# Patient Record
Sex: FEMALE | Race: WHITE | NOT HISPANIC OR LATINO | Employment: OTHER | URBAN - METROPOLITAN AREA
[De-identification: names, ages, dates, MRNs, and addresses within clinical notes are randomized per-mention and may not be internally consistent; named-entity substitution may affect disease eponyms.]

---

## 2017-01-19 ENCOUNTER — GENERIC CONVERSION - ENCOUNTER (OUTPATIENT)
Dept: OTHER | Facility: OTHER | Age: 67
End: 2017-01-19

## 2017-01-19 ENCOUNTER — ALLSCRIPTS OFFICE VISIT (OUTPATIENT)
Dept: OTHER | Facility: OTHER | Age: 67
End: 2017-01-19

## 2017-01-19 DIAGNOSIS — R07.9 CHEST PAIN: ICD-10-CM

## 2017-01-19 DIAGNOSIS — I10 ESSENTIAL (PRIMARY) HYPERTENSION: ICD-10-CM

## 2017-01-19 DIAGNOSIS — R06.09 OTHER FORMS OF DYSPNEA: ICD-10-CM

## 2017-01-24 LAB
ADEQUACY: (HISTORICAL): NORMAL
CLINICIAN PROVIDIED ICD 9 OR 10 (HISTORICAL): NORMAL
COMMENT (HISTORICAL): NORMAL
DIAGNOSIS (HISTORICAL): NORMAL
HPV HIGH RISK (HISTORICAL): NEGATIVE
NOTE: (HISTORICAL): NORMAL
PERFORMED BY (HISTORICAL): NORMAL
QC REVIEWED BY (HISTORICAL): NORMAL

## 2017-01-25 ENCOUNTER — GENERIC CONVERSION - ENCOUNTER (OUTPATIENT)
Dept: OTHER | Facility: OTHER | Age: 67
End: 2017-01-25

## 2017-01-27 ENCOUNTER — GENERIC CONVERSION - ENCOUNTER (OUTPATIENT)
Dept: OTHER | Facility: OTHER | Age: 67
End: 2017-01-27

## 2017-01-27 LAB
A/G RATIO (HISTORICAL): 2 (ref 1.1–2.5)
ALBUMIN SERPL BCP-MCNC: 4.3 G/DL (ref 3.6–4.8)
ALP SERPL-CCNC: 77 IU/L (ref 39–117)
ALT SERPL W P-5'-P-CCNC: 23 IU/L (ref 0–32)
AST SERPL W P-5'-P-CCNC: 17 IU/L (ref 0–40)
BASOPHILS # BLD AUTO: 0 %
BASOPHILS # BLD AUTO: 0 X10E3/UL (ref 0–0.2)
BILIRUB SERPL-MCNC: 0.8 MG/DL (ref 0–1.2)
BUN SERPL-MCNC: 13 MG/DL (ref 8–27)
BUN/CREA RATIO (HISTORICAL): 21 (ref 11–26)
CALCIUM SERPL-MCNC: 9 MG/DL (ref 8.7–10.3)
CHLORIDE SERPL-SCNC: 104 MMOL/L (ref 96–106)
CHOLEST SERPL-MCNC: 165 MG/DL (ref 100–199)
CHOLEST/HDLC SERPL: 3.6 RATIO UNITS (ref 0–4.4)
CO2 SERPL-SCNC: 23 MMOL/L (ref 18–29)
CREAT SERPL-MCNC: 0.63 MG/DL (ref 0.57–1)
DEPRECATED RDW RBC AUTO: 13.9 % (ref 12.3–15.4)
EGFR AFRICAN AMERICAN (HISTORICAL): 110 ML/MIN/1.73
EGFR-AMERICAN CALC (HISTORICAL): 95 ML/MIN/1.73
EOSINOPHIL # BLD AUTO: 0.1 X10E3/UL (ref 0–0.4)
EOSINOPHIL # BLD AUTO: 2 %
GLUCOSE SERPL-MCNC: 131 MG/DL (ref 65–99)
HBA1C MFR BLD HPLC: 5.7 % (ref 4.8–5.6)
HCT VFR BLD AUTO: 46.3 % (ref 34–46.6)
HDLC SERPL-MCNC: 46 MG/DL
HGB BLD-MCNC: 16.3 G/DL (ref 11.1–15.9)
IMM.GRANULOCYTES (CD4/8) (HISTORICAL): 0 %
IMM.GRANULOCYTES (CD4/8) (HISTORICAL): 0 X10E3/UL (ref 0–0.1)
LDLC SERPL CALC-MCNC: 98 MG/DL (ref 0–99)
LYMPHOCYTES # BLD AUTO: 1.3 X10E3/UL (ref 0.7–3.1)
LYMPHOCYTES # BLD AUTO: 17 %
MCH RBC QN AUTO: 30.6 PG (ref 26.6–33)
MCHC RBC AUTO-ENTMCNC: 35.2 G/DL (ref 31.5–35.7)
MCV RBC AUTO: 87 FL (ref 79–97)
MONOCYTES # BLD AUTO: 0.5 X10E3/UL (ref 0.1–0.9)
MONOCYTES (HISTORICAL): 6 %
NEUTROPHILS # BLD AUTO: 6 X10E3/UL (ref 1.4–7)
NEUTROPHILS # BLD AUTO: 75 %
PLATELET # BLD AUTO: 234 X10E3/UL (ref 150–379)
POTASSIUM SERPL-SCNC: 3.7 MMOL/L (ref 3.5–5.2)
RBC (HISTORICAL): 5.32 X10E6/UL (ref 3.77–5.28)
SODIUM SERPL-SCNC: 142 MMOL/L (ref 134–144)
TOT. GLOBULIN, SERUM (HISTORICAL): 2.2 G/DL (ref 1.5–4.5)
TOTAL PROTEIN (HISTORICAL): 6.5 G/DL (ref 6–8.5)
TRIGL SERPL-MCNC: 103 MG/DL (ref 0–149)
VLDLC SERPL CALC-MCNC: 21 MG/DL (ref 5–40)
WBC # BLD AUTO: 8 X10E3/UL (ref 3.4–10.8)

## 2017-01-28 LAB
INTERPRETATION (HISTORICAL): NORMAL
T4 FREE SERPL-MCNC: 8.5 UG/DL (ref 4.5–12)
TSH SERPL DL<=0.05 MIU/L-ACNC: 5.56 UIU/ML (ref 0.45–4.5)

## 2017-01-30 ENCOUNTER — GENERIC CONVERSION - ENCOUNTER (OUTPATIENT)
Dept: OTHER | Facility: OTHER | Age: 67
End: 2017-01-30

## 2017-02-22 ENCOUNTER — HOSPITAL ENCOUNTER (OUTPATIENT)
Dept: RADIOLOGY | Facility: HOSPITAL | Age: 67
Discharge: HOME | End: 2017-02-22
Attending: INTERNAL MEDICINE
Payer: COMMERCIAL

## 2017-02-22 ENCOUNTER — GENERIC CONVERSION - ENCOUNTER (OUTPATIENT)
Dept: OTHER | Facility: OTHER | Age: 67
End: 2017-02-22

## 2017-02-22 DIAGNOSIS — Z12.31 ENCOUNTER FOR SCREENING MAMMOGRAM FOR MALIGNANT NEOPLASM OF BREAST: ICD-10-CM

## 2017-02-22 PROCEDURE — G0202 SCR MAMMO BI INCL CAD: HCPCS

## 2017-03-03 LAB — TSH SERPL DL<=0.05 MIU/L-ACNC: 2.34 UIU/ML (ref 0.45–4.5)

## 2017-03-05 ENCOUNTER — GENERIC CONVERSION - ENCOUNTER (OUTPATIENT)
Dept: OTHER | Facility: OTHER | Age: 67
End: 2017-03-05

## 2017-03-06 ENCOUNTER — ALLSCRIPTS OFFICE VISIT (OUTPATIENT)
Dept: OTHER | Facility: OTHER | Age: 67
End: 2017-03-06

## 2017-07-11 ENCOUNTER — GENERIC CONVERSION - ENCOUNTER (OUTPATIENT)
Dept: OTHER | Facility: OTHER | Age: 67
End: 2017-07-11

## 2017-07-17 ENCOUNTER — GENERIC CONVERSION - ENCOUNTER (OUTPATIENT)
Dept: OTHER | Facility: OTHER | Age: 67
End: 2017-07-17

## 2018-01-11 NOTE — RESULT NOTES
Verified Results  (1) TSH WITH FT4 REFLEX [id number] 02:16PM Radha Peña Serum     Test Name Result Flag Reference   TSH 2 340 uIU/mL  0 450-4 500       Discussion/Summary   Your thyroid testing is normal, please continue the same dose of your medication  - Dr Maty Escobedo

## 2018-01-13 NOTE — RESULT NOTES
Verified Results  * MAMMO SCREENING BILATERAL W CAD [id number] 09:09AM Thor Luther Order Number: [id number]     Test Name Result Flag Reference   MAMMO SCREENING BILATERAL W CAD (Report)     Patient History:   Patient is postmenopausal    Benign cyst aspiration, August 26, 2002  Patient's BMI is 41 6  Reason for exam: screening, asymptomatic  Mammo Screening Bilateral W CAD: February 22, 2017 - Check In #:    [de-identified]   Bilateral CC and MLO view(s) were taken  Technologist: TERI Gama   Prior study comparison: March 18, 2015, bilateral screening    [facility] performed at [facility] [address]  January 17, 2013, [facility]  The breast tissue is almost entirely fat  No new dominant soft    tissue mass, architectural distortion or suspicious    calcifications are noted  The skin and nipple structures are    within normal limits  Benign appearing calcifications are noted  No mammographic evidence of malignancy  No    significant changes when compared with prior studies  ACR BI-RADSï¾® Assessments: BiRad:2 - Benign     Recommendation:   Routine screening mammogram of both breasts in 1 year  Analyzed by CAD     8-10% of cancers will be missed on mammography  Management of a    palpable abnormality must be based on clinical grounds  Patients   will be notified of their results via letter from our facility  Accredited by [employer] and FDA       Transcription Location: [facility]: [id number]     Risk Value(s):   Tyrer-Cuzick 10 Year: 1 900%, Tyrer-Cuzick Lifetime: 4 100%,    Myriad Table: 1 5%, VALENTIN 5 Year: 1 4%, NCI Lifetime: 5 8%   Signed by:   Rita Ren MD   2/22/17       Discussion/Summary   Your mammogram is normal, please repeat yearly  - Dr Camila Bernardo

## 2018-01-14 VITALS
BODY MASS INDEX: 41.72 KG/M2 | DIASTOLIC BLOOD PRESSURE: 80 MMHG | SYSTOLIC BLOOD PRESSURE: 140 MMHG | WEIGHT: 221 LBS | HEART RATE: 64 BPM | RESPIRATION RATE: 16 BRPM | TEMPERATURE: 96.9 F | HEIGHT: 61 IN

## 2018-01-15 NOTE — PROGRESS NOTES
Assessment    1  Chest pain (786 50) (R07 9)   2  Encounter for annual routine gynecological examination (V7 31) (Z01 419)    Plan  Benign essential hypertension    · (1) CBC/PLT/DIFF; Status:Active; Requested [id number];    · (1) COMPREHENSIVE METABOLIC PANEL; Status:Active; Requested [id number];    · (1) LIPID PANEL, FASTING; Status:Active; Requested for:2017;   Benign essential hypertension, Chest pain, Dyspnea on exertion    · STRESS TEST ONLY, EXERCISE; Status:Hold For - Scheduling; Requested  for:2017;   Benign essential hypertension, Weight gain    · (1) TSH WITH FT4 REFLEX; Status:Active; Requested [id number];   Encounter for routine gynecological examination, Impaired fasting glucose    · (1) THIN PREP PAP WITH IMAGING; Status:Active; Requested [id number]; Maturation index required? : No  HPV? : if ASCUS  Encounter for screening mammogram for malignant neoplasm of breast    · * MAMMO SCREENING BILATERAL W CAD; Status:Active; Requested [id number];   Impaired fasting glucose    · (1) HEMOGLOBIN A1C; Status:Active; Requested [id number];     Discussion/Summary  health maintenance visit Currently, she eats a healthy diet and has an adequate exercise regimen  the risks and benefits of cervical cancer screening were discussed Pap test was done today Breast cancer screening: the risks and benefits of breast cancer screening were discussed, monthly self breast exam was advised and mammogram has been ordered  Colorectal cancer screening: colorectal cancer screening is current  Screening lab work includes hemoglobin, glucose, lipid profile and thyroid function testing  The immunizations are up to date  Advice and education were given regarding nutrition, aerobic exercise, weight loss, calcium supplements, vitamin D supplements, reproductive health, contraception, cardiovascular risk reduction and sunscreen use  Patient discussion: discussed with the patient  Pap done today    Will check stress test due to stress pain  Follow up after studies  Chief Complaint  Seen for a CPE  er/cma  History of Present Illness  HM, Adult Female: The patient is being seen for a health maintenance and gynecology evaluation  The last health maintenance visit was 4 year(s) ago  General Health: The patient's health since the last visit is described as good  She has regular dental visits  She denies vision problems  She denies hearing loss  Immunizations status: up to date  Lifestyle:  She consumes a diverse and healthy diet  She has weight concerns  She exercises regularly  She does not use tobacco  She denies alcohol use  She denies drug use  Reproductive health: the patient is postmenopausal    Screening: cancer screening reviewed and updated  metabolic screening reviewed and updated  risk screening reviewed and updated  HPI: Here for CPE  For past 2 weeks has had intermittent tingling R side of upper lip and chest tenderness  Will happen with rest  No real pattern  Has family history of heart disease and personal history of hypertension  WIll last for a few minutes at a time  Review of Systems    Constitutional: No fever, no chills, feels well, no tiredness, no recent weight gain or weight loss  Eyes: No complaints of eye pain, no red eyes, no eyesight problems, no discharge, no dry eyes, no itching of eyes  ENT: no complaints of earache, no loss of hearing, no nose bleeds, no nasal discharge, no sore throat, no hoarseness  Cardiovascular: No complaints of slow heart rate, no fast heart rate, no chest pain, no palpitations, no leg claudication, no lower extremity edema  Respiratory: No complaints of shortness of breath, no wheezing, no cough, no SOB on exertion, no orthopnea, no PND  Gastrointestinal: No complaints of abdominal pain, no constipation, no nausea or vomiting, no diarrhea, no bloody stools     Genitourinary: No complaints of dysuria, no incontinence, no pelvic pain, no dysmenorrhea, no vaginal discharge or bleeding  Musculoskeletal: No complaints of arthralgias, no myalgias, no joint swelling or stiffness, no limb pain or swelling  Integumentary: No complaints of skin rash or lesions, no itching, no skin wounds, no breast pain or lump  Neurological: No complaints of headache, no confusion, no convulsions, no numbness, no dizziness or fainting, no tingling, no limb weakness, no difficulty walking  Psychiatric: Not suicidal, no sleep disturbance, no anxiety or depression, no change in personality, no emotional problems  Endocrine: No complaints of proptosis, no hot flashes, no muscle weakness, no deepening of the voice, no feelings of weakness  Hematologic/Lymphatic: No complaints of swollen glands, no swollen glands in the neck, does not bleed easily, does not bruise easily  Active Problems    1  Acute sinusitis (461 9) (J01 90)   2  Benign essential hypertension (401 1) (I10)   3  Chest pain (786 50) (R07 9)   4  Chronic rhinitis (472 0) (J31 0)   5  Decreased body height (781 91) (R29 890)   6  Encounter for routine gynecological examination (V72 31) (Z01 419)   7  Encounter for routine pelvic examination (V72 31) (Z01 419)   8  Encounter for screening colonoscopy (V76 51) (Z12 11)   9  Encounter for screening mammogram for malignant neoplasm of breast (V76 12)   (Z12 31)   10  Hematest positive stools (792 1) (R19 5)   11  Hidradenitis suppurativa (705 83) (L73 2)   12  Impacted cerumen of left ear (380 4) (H61 22)   13  Impaired fasting glucose (790 21) (R73 01)   14  Leiomyoma of uterus (218 9) (D25 9)   15  Lymphadenopathy (785 6) (R59 1)   16  Morbid or severe obesity due to excess calories (278 01) (E66 01)   17  Screening for diabetes mellitus (DM) (V77 1) (Z13 1)   18  Sebaceous cyst (706 2) (L72 3)   19  Tinnitus, unspecified laterality (388 30) (H93 19)   20  Venous insufficiency (chronic) (peripheral) (459 81) (I87 2)   21   Well adult on routine health check (V70 0) (Z00 00)    Past Medical History    · History of acute sinusitis (V12 69) (Z87 09)    Social History    · Non-smoker (V49 89) (Z78 9)    Current Meds   1  Aspir-81 81 MG Oral Tablet Delayed Release; 1 Every Day; Therapy: 78DJV8922 to  Requested for: 51REA8703 Recorded   2  Daily Vites Oral Tablet; 1 Every Day; Therapy: 67LYV3317 to  Requested for: 60IHP3211 Recorded   3  Fluticasone Propionate 50 MCG/ACT Nasal Suspension; 2 puffs to each nostril daily; Therapy: 54OQR5787 to (Last Rx:10Jan2013)  Requested for: 79GXY5960 Ordered   4  Metoprolol Tartrate 50 MG Oral Tablet; take one tablet by mouth every day; Therapy: 48UTS8598 to (Evaluate:11Mar2017)  Requested for: 67NOL8358; Last   Rx:16Mar2016 Ordered    Allergies    1  No Known Drug Allergies    Vitals   Recorded: 27KSL9641 10:23AM   Temperature 97 1 F   Heart Rate 76   Respiration 16   Systolic 071 mm Hg   Diastolic 148 mm Hg   Height 5 ft 1 in   Weight 223 lb    BMI Calculated 42 14 kg/m2   BSA Calculated 1 97 m2     Physical Exam    Constitutional   General appearance: No acute distress, well appearing and well nourished  Eyes   Conjunctiva and lids: No swelling, erythema or discharge  Pupils and irises: Equal, round, reactive to light  Ears, Nose, Mouth, and Throat   External inspection of ears and nose: Normal     Otoscopic examination: Tympanic membranes translucent with normal light reflex  Canals patent without erythema  Hearing: Normal     Nasal mucosa, septum, and turbinates: Normal without edema or erythema  Lips, teeth, and gums: Normal, good dentition  Oropharynx: Normal with no erythema, edema, exudate or lesions  Neck   Neck: Supple, symmetric, trachea midline, no masses  Thyroid: Normal, no thyromegaly  Pulmonary   Respiratory effort: No increased work of breathing or signs of respiratory distress      Percussion of chest: Normal     Palpation of chest: Normal     Auscultation of lungs: Clear to auscultation  Cardiovascular   Palpation of heart: Normal PMI, no thrills  Auscultation of heart: Normal rate and rhythm, normal S1 and S2, no murmurs  Carotid pulses: 2+ bilaterally  Abdominal aorta: Normal     Femoral pulses: 2+ bilaterally  Pedal pulses: 2+ bilaterally  Examination of extremities for edema and/or varicosities: Normal     Chest   Breasts: Normal, no dimpling or skin changes appreciated  Palpation of breasts and axillae: Normal, no masses palpated  Abdomen   Abdomen: Non-tender, no masses  Liver and spleen: No hepatomegaly or splenomegaly  Examination for hernias: No hernia appreciated  Genitourinary   External genitalia and vagina: Normal, no lesions appreciated  Urethra: Normal, no discharge  Bladder: Not distended, no tenderness  Cervix: Normal, no lesions  Uterus: Normal size, no tenderness, no masses  Adnexa/Parametria: Normal, no masses or tenderness  Lymphatic   Palpation of lymph nodes in neck: No lymphadenopathy  Palpation of lymph nodes in axillae: No lymphadenopathy  Palpation of lymph nodes in groin: No lymphadenopathy  Palpation of lymph nodes in other areas: No lymphadenopathy  Musculoskeletal   Gait and station: Normal     Digits and nails: Normal without clubbing or cyanosis  Joints, bones, and muscles: Normal     Range of motion: Normal     Stability: Normal     Muscle strength/tone: Normal     Skin   Skin and subcutaneous tissue: Normal without rashes or lesions  Palpation of skin and subcutaneous tissue: Normal turgor  Neurologic   Cranial nerves: Cranial nerves II-XII intact  Reflexes: 2+ and symmetric  Sensation: No sensory loss  Psychiatric   Judgment and insight: Normal     Orientation to person, place, and time: Normal     Recent and remote memory: Intact      Mood and affect: Normal        Results/Data  PHQ-2 Adult Depression Screening 62WHP4164 10:26AM User, Ahs     Test Name Result Flag Reference   PHQ-2 Adult Depression Score 0     Over the last two weeks, how often have you been bothered by any of the following problems? Little interest or pleasure in doing things: Not at all - 0  Feeling down, depressed, or hopeless: Not at all - 0   PHQ-2 Adult Depression Screening Negative         Procedure    Procedure: Visual Acuity Test    Indication: routine screening  Inforrmation supplied by a Snellen chart  Results: 20/20 in both eyes with corrective device, 20/20 in the right eye with corrective device, 20/20 in the left eye with corrective device      Health Management  Encounter for screening mammogram for malignant neoplasm of breast   Digital Bilateral Screening Mammogram With CAD; every 1 year; Last 56DDT5409; Next  Due: 07BUN7579;  Overdue    Signatures   Electronically signed by : LUCAS Conway ; Jan 19 2017 10:51AM EST                       (Author)

## 2018-01-15 NOTE — RESULT NOTES
Verified Results  ([facility]) Pap IG, HPV-hr 14PDS2239 12:00AM Oscar Peña Marsha     Test Name Result Flag Reference   DIAGNOSIS: Comment     NEGATIVE FOR INTRAEPITHELIAL LESION AND MALIGNANCY  THIS SPECIMEN WAS RESCREENED AS PART OF OUR  PROGRAM   THE CYTOLOGY PROCESSING WAS PERFORMED AT THE [facility] LOCATED AT  [facility], [address], [address] [place] [id number]  Specimen adequacy: Comment     Satisfactory for evaluation  Endocervical and/or squamous metaplastic  cells (endocervical component) are present  Clinician provided ICD10: Comment     R73 01  Z01 419   Performed by: Ramya Barker, Cytotechnologist (ASCP)   QC reviewed by: Vaishnavi De La Vega, Cytotechnologist (ASCP)     Rosella Goldmann Note: Comment     The Pap smear is a screening test designed to aid in the detection of  premalignant and malignant conditions of the uterine cervix  It is not a  diagnostic procedure and should not be used as the sole means of detecting  cervical cancer  Both false-positive and false-negative reports do occur  HPV, high-risk Negative  Negative   This high-risk HPV test detects thirteen high-risk types  (16/18/31/33/35/39/45/51/52/56/58/59/68) without differentiation         Discussion/Summary   Your pap smear is normal, please repeat in 3 years  - Dr Sai Medrano

## 2018-01-16 NOTE — RESULT NOTES
Verified Results  (1) CBC/PLT/DIFF 70HRL8956 07:46AM [facility]     Test Name Result Flag Reference   WBC 8 0 x10E3/uL  3 4-10 8   RBC 5 32 x10E6/uL H 3 77-5 28   Hemoglobin 16 3 g/dL H 11 1-15 9   Hematocrit 46 3 %  34 0-46  6   MCV 87 fL  79-97   MCH 30 6 pg  26 6-33 0   MCHC 35 2 g/dL  31 5-35 7   RDW 13 9 %  12 3-15 4   Platelets 901 L35V3/WAYNE  150-379   Neutrophils 75 %     Lymphs 17 %     Monocytes 6 %     Eos 2 %     Basos 0 %     Neutrophils (Absolute) 6 0 x10E3/uL  1 4-7 0   Lymphs (Absolute) 1 3 x10E3/uL  0 7-3 1   Monocytes(Absolute) 0 5 x10E3/uL  0 1-0 9   Eos (Absolute) 0 1 x10E3/uL  0 0-0 4   Baso (Absolute) 0 0 x10E3/uL  0 0-0 2   Immature Granulocytes 0 %     Immature Grans (Abs) 0 0 x10E3/uL  0 0-0 1     (1) COMPREHENSIVE METABOLIC PANEL 75GKJ0861 43:02QX Scaled Inferencetie Tengaged     Test Name Result Flag Reference   Glucose, Serum 131 mg/dL H 65-99   BUN 13 mg/dL  8-27   Creatinine, Serum 0 63 mg/dL  0 57-1 00   eGFR If NonAfricn Am 95 mL/min/1 73  >59   eGFR If Africn Am 110 mL/min/1 73  >59   BUN/Creatinine Ratio 21  11-26   Sodium, Serum 142 mmol/L  134-144   Potassium, Serum 3 7 mmol/L  3 5-5 2   Chloride, Serum 104 mmol/L     Carbon Dioxide, Total 23 mmol/L  18-29   Calcium, Serum 9 0 mg/dL  8 7-10 3   Protein, Total, Serum 6 5 g/dL  6 0-8 5   Albumin, Serum 4 3 g/dL  3 6-4 8   Globulin, Total 2 2 g/dL  1 5-4 5   A/G Ratio 2 0  1 1-2 5   Bilirubin, Total 0 8 mg/dL  0 0-1 2   Alkaline Phosphatase, S 77 IU/L     AST (SGOT) 17 IU/L  0-40   ALT (SGPT) 23 IU/L  0-32     (1) LIPID PANEL, FASTING 27Jan2017 07:46AM Celia Peña     Test Name Result Flag Reference   Cholesterol, Total 165 mg/dL  100-199   Triglycerides 103 mg/dL  0-149   HDL Cholesterol 46 mg/dL  >39   VLDL Cholesterol Adi 21 mg/dL  5-40   LDL Cholesterol Calc 98 mg/dL  0-99   T  Chol/HDL Ratio 3 6 ratio units  0 0-4 4   T   Chol/HDL Ratio                                                             Men  Women 1/2 Avg  Risk  3 4    3 3                                                   Avg Risk  5 0    4 4                                                2X Avg  Risk  9 6    7 1                                                3X Avg  Risk 23 4   11 0     (1) TSH WITH FT4 REFLEX 27Jan2017 07:46AM Vernard Crater     Test Name Result Flag Reference   TSH 5 560 uIU/mL H 0 450-4 500   Thyroxine (T4) 8 5 ug/dL  4 5-12 0     (1) HEMOGLOBIN A1C 27Jan2017 07:46AM Yoandy Peña     Test Name Result Flag Reference   Hemoglobin A1c 5 7 % H 4 8-5 6   Pre-diabetes: 5 7 - 6 4           Diabetes: >6 4           Glycemic control for adults with diabetes: <7 0     [facility]) Cardiovascular Risk Assessment 27Jan2017 07:46AM Vernard Crater     Test Name Result Flag Reference   Interpretation Note     Supplement report is available  PDF Image

## 2018-01-22 VITALS
TEMPERATURE: 97.1 F | WEIGHT: 223 LBS | HEART RATE: 76 BPM | HEIGHT: 61 IN | BODY MASS INDEX: 42.1 KG/M2 | RESPIRATION RATE: 16 BRPM | DIASTOLIC BLOOD PRESSURE: 100 MMHG | SYSTOLIC BLOOD PRESSURE: 118 MMHG

## 2018-03-01 PROBLEM — E03.9 HYPOTHYROIDISM: Status: ACTIVE | Noted: 2017-01-30

## 2018-03-06 ENCOUNTER — OFFICE VISIT (OUTPATIENT)
Dept: FAMILY MEDICINE CLINIC | Facility: CLINIC | Age: 68
End: 2018-03-06
Payer: COMMERCIAL

## 2018-03-06 VITALS
BODY MASS INDEX: 43.65 KG/M2 | SYSTOLIC BLOOD PRESSURE: 138 MMHG | RESPIRATION RATE: 16 BRPM | TEMPERATURE: 97.5 F | HEART RATE: 82 BPM | WEIGHT: 231 LBS | DIASTOLIC BLOOD PRESSURE: 76 MMHG

## 2018-03-06 DIAGNOSIS — I10 BENIGN ESSENTIAL HYPERTENSION: ICD-10-CM

## 2018-03-06 DIAGNOSIS — E03.9 HYPOTHYROIDISM, UNSPECIFIED TYPE: ICD-10-CM

## 2018-03-06 DIAGNOSIS — J01.00 ACUTE NON-RECURRENT MAXILLARY SINUSITIS: Primary | ICD-10-CM

## 2018-03-06 PROCEDURE — 99213 OFFICE O/P EST LOW 20 MIN: CPT | Performed by: FAMILY MEDICINE

## 2018-03-06 RX ORDER — FLUCONAZOLE 150 MG/1
150 TABLET ORAL ONCE
Qty: 1 TABLET | Refills: 0 | Status: SHIPPED | OUTPATIENT
Start: 2018-03-06 | End: 2018-03-06

## 2018-03-06 RX ORDER — AMOXICILLIN AND CLAVULANATE POTASSIUM 875; 125 MG/1; MG/1
1 TABLET, FILM COATED ORAL EVERY 12 HOURS SCHEDULED
Qty: 20 TABLET | Refills: 0 | Status: SHIPPED | OUTPATIENT
Start: 2018-03-06 | End: 2018-03-16

## 2018-03-06 RX ORDER — LEVOTHYROXINE SODIUM 0.03 MG/1
25 TABLET ORAL DAILY
Qty: 90 TABLET | Refills: 1 | Status: SHIPPED | OUTPATIENT
Start: 2018-03-06 | End: 2018-03-06 | Stop reason: SDUPTHER

## 2018-03-06 RX ORDER — LEVOTHYROXINE SODIUM 0.03 MG/1
25 TABLET ORAL DAILY
Qty: 90 TABLET | Refills: 1 | Status: SHIPPED | OUTPATIENT
Start: 2018-03-06 | End: 2020-08-19

## 2018-03-06 RX ORDER — FLUCONAZOLE 150 MG/1
150 TABLET ORAL ONCE
Qty: 1 TABLET | Refills: 0 | Status: SHIPPED | OUTPATIENT
Start: 2018-03-06 | End: 2018-03-06 | Stop reason: SDUPTHER

## 2018-03-06 NOTE — PATIENT INSTRUCTIONS
Over-the-counter products may be used for your symptoms:    <<GUAIFENESIN>> is an expectorant that is useful for thick mucus  <<DEXTROMETHORPHAN>> is a cough suppressant that works in the brain to help reduce bothersome cough  <<ANTI-HISTAMINES>> are useful as allergy medications and to help dry up bothersome thin secretions  <<PSEUDOEPHEDRINE and PHENYLEPHRINE>> are stimulant decongestants that can be used for congestion and sinus pressure, however they be used with caution in those with high blood pressure or heart conditions

## 2018-03-06 NOTE — PROGRESS NOTES
Assessment/Plan:         There are no diagnoses linked to this encounter  No Follow-up on file  Subjective:      Patient ID: Ellen Carreon is a 74 y o  female  Chief Complaint   Patient presents with    Cough     with phlegm for a couple weeks        mucinex DM w/o help  Chest sore from cough      Cough   This is a new problem  The current episode started 1 to 4 weeks ago  The problem has been gradually worsening  The cough is productive of purulent sputum and productive of sputum  Associated symptoms include chills, a sore throat and shortness of breath  Pertinent negatives include no fever  She has tried OTC cough suppressant for the symptoms  The treatment provided mild relief  Sinus pressure at times, green nasal discharge  The following portions of the patient's history were reviewed and updated as appropriate: allergies, current medications, past family history, past medical history, past social history, past surgical history and problem list     Review of Systems   Constitutional: Positive for chills  Negative for fever  HENT: Positive for sore throat  Respiratory: Positive for cough and shortness of breath  Current Outpatient Prescriptions   Medication Sig Dispense Refill    aspirin 81 MG tablet Take by mouth daily      metoprolol tartrate (LOPRESSOR) 50 mg tablet Take 1 tablet by mouth daily      Multiple Vitamins-Minerals (MULTIVITAMIN ADULT PO) Take by mouth daily      fluticasone (FLONASE) 50 mcg/act nasal spray 2 puffs into each nostril daily      levothyroxine 25 mcg tablet Take 1 tablet by mouth daily       No current facility-administered medications for this visit  Objective:    /76   Pulse 82   Temp 97 5 °F (36 4 °C)   Resp 16   Wt 105 kg (231 lb)   BMI 43 65 kg/m²        Physical Exam   Constitutional: She appears well-developed  HENT:   Head: Normocephalic  Sinus congestion/redness   Eyes: Conjunctivae are normal    Neck: Neck supple  Cardiovascular: Normal rate and intact distal pulses  Pulmonary/Chest: Effort normal  No respiratory distress  rhonchi   Abdominal: Soft  Musculoskeletal: She exhibits no edema or deformity  Neurological: She is alert  Skin: Skin is warm and dry  Psychiatric: Her behavior is normal  Thought content normal    Nursing note and vitals reviewed               Deidre Yanez DO

## 2018-03-10 DIAGNOSIS — I10 BENIGN ESSENTIAL HYPERTENSION: Primary | ICD-10-CM

## 2018-03-11 RX ORDER — METOPROLOL TARTRATE 50 MG/1
TABLET, FILM COATED ORAL
Qty: 90 TABLET | Refills: 3 | Status: SHIPPED | OUTPATIENT
Start: 2018-03-11 | End: 2018-03-12 | Stop reason: SDUPTHER

## 2018-03-12 DIAGNOSIS — I10 BENIGN ESSENTIAL HYPERTENSION: ICD-10-CM

## 2018-03-12 RX ORDER — METOPROLOL TARTRATE 50 MG/1
TABLET, FILM COATED ORAL
Qty: 90 TABLET | Refills: 3 | Status: SHIPPED | OUTPATIENT
Start: 2018-03-12 | End: 2020-06-12 | Stop reason: SDUPTHER

## 2018-06-11 ENCOUNTER — OFFICE VISIT (OUTPATIENT)
Dept: FAMILY MEDICINE CLINIC | Facility: CLINIC | Age: 68
End: 2018-06-11
Payer: COMMERCIAL

## 2018-06-11 VITALS
HEART RATE: 56 BPM | RESPIRATION RATE: 18 BRPM | BODY MASS INDEX: 39.75 KG/M2 | DIASTOLIC BLOOD PRESSURE: 80 MMHG | SYSTOLIC BLOOD PRESSURE: 140 MMHG | TEMPERATURE: 96.7 F | HEIGHT: 62 IN | WEIGHT: 216 LBS

## 2018-06-11 DIAGNOSIS — I10 BENIGN ESSENTIAL HYPERTENSION: ICD-10-CM

## 2018-06-11 DIAGNOSIS — J01.80 ACUTE NON-RECURRENT SINUSITIS OF OTHER SINUS: Primary | ICD-10-CM

## 2018-06-11 DIAGNOSIS — E03.9 HYPOTHYROIDISM, UNSPECIFIED TYPE: ICD-10-CM

## 2018-06-11 DIAGNOSIS — R73.01 IMPAIRED FASTING GLUCOSE: ICD-10-CM

## 2018-06-11 PROCEDURE — 1101F PT FALLS ASSESS-DOCD LE1/YR: CPT | Performed by: INTERNAL MEDICINE

## 2018-06-11 PROCEDURE — 99213 OFFICE O/P EST LOW 20 MIN: CPT | Performed by: INTERNAL MEDICINE

## 2018-06-11 RX ORDER — AMOXICILLIN 875 MG/1
875 TABLET, COATED ORAL 2 TIMES DAILY
Qty: 20 TABLET | Refills: 0 | Status: SHIPPED | OUTPATIENT
Start: 2018-06-11 | End: 2018-06-25 | Stop reason: ALTCHOICE

## 2018-06-11 NOTE — PROGRESS NOTES
Subjective:      Patient ID: Pat Cifuentes is a 74 y o  female  Chief Complaint   Patient presents with    Nasal Congestion     for 3 months prcma    Cough       Here with acute illness for past 3 months or so  Has sinus pressure, scratchy throat, cough with yellow sputum, extreme fatigue  Tried zyrtec, claritin, xyzal without relief  Denies shortness of breath, has occasional wheeze  The following portions of the patient's history were reviewed and updated as appropriate: allergies, current medications, past family history, past medical history, past social history, past surgical history and problem list     Review of Systems   Constitutional: Positive for fatigue  Negative for fever  HENT: Positive for congestion, sinus pain, sinus pressure and sore throat  Respiratory: Positive for cough  Cardiovascular: Negative  Current Outpatient Prescriptions   Medication Sig Dispense Refill    aspirin 81 MG tablet Take by mouth daily      fluticasone (FLONASE) 50 mcg/act nasal spray 2 puffs into each nostril daily      levothyroxine 25 mcg tablet Take 1 tablet (25 mcg total) by mouth daily 90 tablet 1    metoprolol tartrate (LOPRESSOR) 50 mg tablet TAKE ONE TABLET BY MOUTH EVERY DAY 90 tablet 3    Multiple Vitamins-Minerals (MULTIVITAMIN ADULT PO) Take by mouth daily      amoxicillin (AMOXIL) 875 mg tablet Take 1 tablet (875 mg total) by mouth 2 (two) times a day for 10 days 20 tablet 0     No current facility-administered medications for this visit  Objective:    /80   Pulse 56   Temp (!) 96 7 °F (35 9 °C)   Resp 18   Ht 5' 1 5" (1 562 m)   Wt 98 kg (216 lb)   BMI 40 15 kg/m²        Physical Exam   Constitutional: She appears well-developed and well-nourished  HENT:   Right Ear: Tympanic membrane is retracted  Left Ear: Tympanic membrane is retracted  Nose: Mucosal edema and rhinorrhea present     Mouth/Throat: Oropharynx is clear and moist    Eyes: Conjunctivae are normal    Neck: Neck supple  No JVD present  No thyromegaly present  Cardiovascular: Normal rate, regular rhythm, normal heart sounds and intact distal pulses  Exam reveals no gallop and no friction rub  No murmur heard  Pulmonary/Chest: Effort normal and breath sounds normal  She has no wheezes  She has no rales  Abdominal: Soft  Bowel sounds are normal  She exhibits no distension  There is no tenderness  Musculoskeletal: She exhibits no edema  Assessment/Plan:    No problem-specific Assessment & Plan notes found for this encounter  Diagnoses and all orders for this visit:    Acute non-recurrent sinusitis of other sinus  Comments:  Continue flonase, add saline NS, continue mucinex DM  Follow up if not improving  Orders:  -     amoxicillin (AMOXIL) 875 mg tablet; Take 1 tablet (875 mg total) by mouth 2 (two) times a day for 10 days    Benign essential hypertension  -     Mammo screening bilateral w cad; Future  -     CBC and differential; Future  -     Comprehensive metabolic panel; Future  -     Lipid panel; Future  -     TSH, 3rd generation with T4 reflex; Future  -     HEMOGLOBIN A1C W/ EAG ESTIMATION; Future  -     CBC and differential  -     Comprehensive metabolic panel  -     Lipid panel  -     TSH, 3rd generation with T4 reflex  -     HEMOGLOBIN A1C W/ EAG ESTIMATION    Hypothyroidism, unspecified type  -     Mammo screening bilateral w cad; Future  -     CBC and differential; Future  -     Comprehensive metabolic panel; Future  -     Lipid panel; Future  -     TSH, 3rd generation with T4 reflex; Future  -     HEMOGLOBIN A1C W/ EAG ESTIMATION; Future  -     CBC and differential  -     Comprehensive metabolic panel  -     Lipid panel  -     TSH, 3rd generation with T4 reflex  -     HEMOGLOBIN A1C W/ EAG ESTIMATION    Impaired fasting glucose  -     Mammo screening bilateral w cad;  Future  -     CBC and differential; Future  -     Comprehensive metabolic panel; Future  -     Lipid panel; Future  -     TSH, 3rd generation with T4 reflex; Future  -     HEMOGLOBIN A1C W/ EAG ESTIMATION; Future  -     CBC and differential  -     Comprehensive metabolic panel  -     Lipid panel  -     TSH, 3rd generation with T4 reflex  -     HEMOGLOBIN A1C W/ EAG ESTIMATION          No Follow-up on file         Eric Cha MD

## 2018-06-21 LAB
ALBUMIN SERPL-MCNC: 4.2 G/DL (ref 3.6–4.8)
ALBUMIN/GLOB SERPL: 2 {RATIO} (ref 1.2–2.2)
ALP SERPL-CCNC: 68 IU/L (ref 39–117)
ALT SERPL-CCNC: 18 IU/L (ref 0–32)
AST SERPL-CCNC: 11 IU/L (ref 0–40)
BASOPHILS # BLD AUTO: 0 X10E3/UL (ref 0–0.2)
BASOPHILS NFR BLD AUTO: 0 %
BILIRUB SERPL-MCNC: 0.5 MG/DL (ref 0–1.2)
BUN SERPL-MCNC: 16 MG/DL (ref 8–27)
BUN/CREAT SERPL: 27 (ref 12–28)
CALCIUM SERPL-MCNC: 9.2 MG/DL (ref 8.7–10.3)
CHLORIDE SERPL-SCNC: 106 MMOL/L (ref 96–106)
CHOLEST SERPL-MCNC: 164 MG/DL (ref 100–199)
CHOLEST/HDLC SERPL: 4.4 RATIO (ref 0–4.4)
CO2 SERPL-SCNC: 22 MMOL/L (ref 20–29)
CREAT SERPL-MCNC: 0.59 MG/DL (ref 0.57–1)
EOSINOPHIL # BLD AUTO: 0.1 X10E3/UL (ref 0–0.4)
EOSINOPHIL NFR BLD AUTO: 2 %
ERYTHROCYTE [DISTWIDTH] IN BLOOD BY AUTOMATED COUNT: 14 % (ref 12.3–15.4)
EST. AVERAGE GLUCOSE BLD GHB EST-MCNC: 111 MG/DL
GLOBULIN SER-MCNC: 2.1 G/DL (ref 1.5–4.5)
GLUCOSE SERPL-MCNC: 112 MG/DL (ref 65–99)
HBA1C MFR BLD: 5.5 % (ref 4.8–5.6)
HCT VFR BLD AUTO: 45.5 % (ref 34–46.6)
HDLC SERPL-MCNC: 37 MG/DL
HGB BLD-MCNC: 15.1 G/DL (ref 11.1–15.9)
IMM GRANULOCYTES # BLD: 0 X10E3/UL (ref 0–0.1)
IMM GRANULOCYTES NFR BLD: 0 %
LDLC SERPL CALC-MCNC: 101 MG/DL (ref 0–99)
LYMPHOCYTES # BLD AUTO: 1.4 X10E3/UL (ref 0.7–3.1)
LYMPHOCYTES NFR BLD AUTO: 24 %
MCH RBC QN AUTO: 29.8 PG (ref 26.6–33)
MCHC RBC AUTO-ENTMCNC: 33.2 G/DL (ref 31.5–35.7)
MCV RBC AUTO: 90 FL (ref 79–97)
MICRODELETION SYND BLD/T FISH: NORMAL
MONOCYTES # BLD AUTO: 0.4 X10E3/UL (ref 0.1–0.9)
MONOCYTES NFR BLD AUTO: 7 %
NEUTROPHILS # BLD AUTO: 3.9 X10E3/UL (ref 1.4–7)
NEUTROPHILS NFR BLD AUTO: 67 %
PLATELET # BLD AUTO: 202 X10E3/UL (ref 150–379)
POTASSIUM SERPL-SCNC: 4.3 MMOL/L (ref 3.5–5.2)
PROT SERPL-MCNC: 6.3 G/DL (ref 6–8.5)
RBC # BLD AUTO: 5.07 X10E6/UL (ref 3.77–5.28)
SL AMB EGFR AFRICAN AMERICAN: 111 ML/MIN/1.73
SL AMB EGFR NON AFRICAN AMERICAN: 96 ML/MIN/1.73
SL AMB T4, FREE (DIRECT): 1.02 NG/DL (ref 0.82–1.77)
SL AMB VLDL CHOLESTEROL CALC: 26 MG/DL (ref 5–40)
SODIUM SERPL-SCNC: 144 MMOL/L (ref 134–144)
TRIGL SERPL-MCNC: 130 MG/DL (ref 0–149)
TSH SERPL DL<=0.005 MIU/L-ACNC: 4.66 UIU/ML (ref 0.45–4.5)
WBC # BLD AUTO: 5.8 X10E3/UL (ref 3.4–10.8)

## 2018-06-21 NOTE — PROGRESS NOTES
Subjective:      Patient ID: Amna Stone is a 74 y o  female  Chief Complaint   Patient presents with    Follow-up     review new labs-lj       Here for follow up of labwork  Had stopped her levothyroxine and is feeling tired and having weight gain  No side effects with any of her medications  Feels well  The following portions of the patient's history were reviewed and updated as appropriate: allergies, current medications, past family history, past medical history, past social history, past surgical history and problem list     Review of Systems   Constitutional: Positive for fatigue and unexpected weight change  Respiratory: Negative  Cardiovascular: Negative  Current Outpatient Prescriptions   Medication Sig Dispense Refill    aspirin 81 MG tablet Take by mouth daily      fluticasone (FLONASE) 50 mcg/act nasal spray 2 puffs into each nostril daily      levothyroxine 25 mcg tablet Take 1 tablet (25 mcg total) by mouth daily 90 tablet 1    metoprolol tartrate (LOPRESSOR) 50 mg tablet TAKE ONE TABLET BY MOUTH EVERY DAY 90 tablet 3    Multiple Vitamins-Minerals (MULTIVITAMIN ADULT PO) Take by mouth daily       No current facility-administered medications for this visit  Objective:    /82   Pulse 72   Temp (!) 97 3 °F (36 3 °C)   Resp 16   Ht 5' 1 5" (1 562 m)   Wt 98 4 kg (217 lb)   BMI 40 34 kg/m²        Physical Exam   Constitutional: She appears well-developed and well-nourished  Eyes: Conjunctivae are normal    Neck: Neck supple  No JVD present  No thyromegaly present  Cardiovascular: Normal rate, regular rhythm, normal heart sounds and intact distal pulses  Exam reveals no gallop and no friction rub  No murmur heard  Pulmonary/Chest: Effort normal and breath sounds normal  She has no wheezes  She has no rales  Musculoskeletal: She exhibits no edema  Assessment/Plan:    Hypothyroidism  Reviewed labs with patient  TSH is elevated    She will restart her levothyroxine at 25 mcg daily  Impaired fasting glucose  This is improved with A1C at 5 5  Encouraged continued weight loss, she is doing Weight Watchers  Benign essential hypertension  Stable on beta blocker, will continue  Diagnoses and all orders for this visit:    Hypothyroidism, unspecified type    Impaired fasting glucose    Benign essential hypertension          Return in about 6 months (around 12/25/2018)         Evan Tolentino MD

## 2018-06-22 ENCOUNTER — HOSPITAL ENCOUNTER (OUTPATIENT)
Dept: RADIOLOGY | Facility: HOSPITAL | Age: 68
Discharge: HOME | End: 2018-06-22
Attending: INTERNAL MEDICINE
Payer: COMMERCIAL

## 2018-06-22 DIAGNOSIS — I10 BENIGN ESSENTIAL HYPERTENSION: ICD-10-CM

## 2018-06-22 DIAGNOSIS — E03.9 HYPOTHYROIDISM, UNSPECIFIED TYPE: ICD-10-CM

## 2018-06-22 DIAGNOSIS — R73.01 IMPAIRED FASTING GLUCOSE: ICD-10-CM

## 2018-06-22 PROCEDURE — 77067 SCR MAMMO BI INCL CAD: CPT

## 2018-06-25 ENCOUNTER — OFFICE VISIT (OUTPATIENT)
Dept: FAMILY MEDICINE CLINIC | Facility: CLINIC | Age: 68
End: 2018-06-25
Payer: COMMERCIAL

## 2018-06-25 VITALS
HEART RATE: 72 BPM | SYSTOLIC BLOOD PRESSURE: 136 MMHG | BODY MASS INDEX: 39.93 KG/M2 | RESPIRATION RATE: 16 BRPM | WEIGHT: 217 LBS | DIASTOLIC BLOOD PRESSURE: 82 MMHG | TEMPERATURE: 97.3 F | HEIGHT: 62 IN

## 2018-06-25 DIAGNOSIS — R73.01 IMPAIRED FASTING GLUCOSE: ICD-10-CM

## 2018-06-25 DIAGNOSIS — E03.9 HYPOTHYROIDISM, UNSPECIFIED TYPE: Primary | ICD-10-CM

## 2018-06-25 DIAGNOSIS — I10 BENIGN ESSENTIAL HYPERTENSION: ICD-10-CM

## 2018-06-25 PROCEDURE — 3008F BODY MASS INDEX DOCD: CPT | Performed by: INTERNAL MEDICINE

## 2018-06-25 PROCEDURE — 3075F SYST BP GE 130 - 139MM HG: CPT | Performed by: INTERNAL MEDICINE

## 2018-06-25 PROCEDURE — 1160F RVW MEDS BY RX/DR IN RCRD: CPT | Performed by: INTERNAL MEDICINE

## 2018-06-25 PROCEDURE — 3079F DIAST BP 80-89 MM HG: CPT | Performed by: INTERNAL MEDICINE

## 2018-06-25 PROCEDURE — 1036F TOBACCO NON-USER: CPT | Performed by: INTERNAL MEDICINE

## 2018-06-25 PROCEDURE — 99214 OFFICE O/P EST MOD 30 MIN: CPT | Performed by: INTERNAL MEDICINE

## 2018-12-03 ENCOUNTER — OFFICE VISIT (OUTPATIENT)
Dept: FAMILY MEDICINE CLINIC | Facility: CLINIC | Age: 68
End: 2018-12-03
Payer: COMMERCIAL

## 2018-12-03 VITALS
BODY MASS INDEX: 38.5 KG/M2 | RESPIRATION RATE: 16 BRPM | DIASTOLIC BLOOD PRESSURE: 78 MMHG | SYSTOLIC BLOOD PRESSURE: 120 MMHG | TEMPERATURE: 96.1 F | WEIGHT: 209.2 LBS | HEART RATE: 62 BPM | HEIGHT: 62 IN

## 2018-12-03 DIAGNOSIS — J01.00 ACUTE NON-RECURRENT MAXILLARY SINUSITIS: Primary | ICD-10-CM

## 2018-12-03 PROCEDURE — 3008F BODY MASS INDEX DOCD: CPT | Performed by: NURSE PRACTITIONER

## 2018-12-03 PROCEDURE — 99213 OFFICE O/P EST LOW 20 MIN: CPT | Performed by: NURSE PRACTITIONER

## 2018-12-03 PROCEDURE — 1160F RVW MEDS BY RX/DR IN RCRD: CPT | Performed by: NURSE PRACTITIONER

## 2018-12-03 RX ORDER — AMOXICILLIN 875 MG/1
875 TABLET, COATED ORAL 2 TIMES DAILY
Qty: 20 TABLET | Refills: 0 | Status: SHIPPED | OUTPATIENT
Start: 2018-12-03 | End: 2018-12-13

## 2018-12-03 NOTE — PROGRESS NOTES
Assessment/Plan:    Take antibiotics until finished  Reviewed supportive care  RTO prn  Problem List Items Addressed This Visit     None      Visit Diagnoses     Acute non-recurrent maxillary sinusitis    -  Primary    Relevant Medications    amoxicillin (AMOXIL) 875 mg tablet          Patient Instructions   Coricidin HBP      Return if symptoms worsen or fail to improve  Subjective:      Patient ID: Jagdish Dickens is a 79 y o  female  Chief Complaint   Patient presents with    Cough     prcma    Sinus Problem    Headache       She had cold symptoms about 3 weeks ago  She has worsening sinus pressure and has a productive cough  She feels exhausted  Denies fever, SOB, or wheezing  She tried Mucinex DM and Delsym OTC  The following portions of the patient's history were reviewed and updated as appropriate: allergies, current medications, past family history, past medical history, past social history, past surgical history and problem list     Review of Systems   Constitutional: Positive for fatigue  Negative for chills and fever  HENT: Positive for congestion and sinus pressure  Negative for ear pain, postnasal drip, rhinorrhea and sore throat  Respiratory: Positive for cough  Negative for shortness of breath and wheezing  Cardiovascular: Negative for chest pain  Gastrointestinal: Negative for abdominal pain, diarrhea, nausea and vomiting  Musculoskeletal: Negative for arthralgias  Skin: Negative for rash  Neurological: Negative for headaches           Current Outpatient Prescriptions   Medication Sig Dispense Refill    aspirin 81 MG tablet Take by mouth daily      fluticasone (FLONASE) 50 mcg/act nasal spray 2 puffs into each nostril daily      levothyroxine 25 mcg tablet Take 1 tablet (25 mcg total) by mouth daily 90 tablet 1    metoprolol tartrate (LOPRESSOR) 50 mg tablet TAKE ONE TABLET BY MOUTH EVERY DAY 90 tablet 3    Multiple Vitamins-Minerals (MULTIVITAMIN ADULT PO) Take by mouth daily      amoxicillin (AMOXIL) 875 mg tablet Take 1 tablet (875 mg total) by mouth 2 (two) times a day for 10 days 20 tablet 0     No current facility-administered medications for this visit  Objective:    /78   Pulse 62   Temp (!) 96 1 °F (35 6 °C)   Resp 16   Ht 5' 1 5" (1 562 m)   Wt 94 9 kg (209 lb 3 2 oz)   BMI 38 89 kg/m²        Physical Exam   Constitutional: She appears well-developed and well-nourished  HENT:   Head: Normocephalic and atraumatic  Right Ear: Tympanic membrane, external ear and ear canal normal    Left Ear: Tympanic membrane, external ear and ear canal normal    Nose: Mucosal edema present  No rhinorrhea  Right sinus exhibits maxillary sinus tenderness and frontal sinus tenderness  Left sinus exhibits maxillary sinus tenderness and frontal sinus tenderness  Mouth/Throat: Uvula is midline, oropharynx is clear and moist and mucous membranes are normal    Eyes: Conjunctivae are normal    Neck: Neck supple  No edema present  No thyromegaly present  Cardiovascular: Normal rate, regular rhythm, normal heart sounds and intact distal pulses  No murmur heard  Pulmonary/Chest: Effort normal and breath sounds normal    Abdominal: Bowel sounds are normal  She exhibits no distension  There is no splenomegaly or hepatomegaly  There is no tenderness  Lymphadenopathy:        Right cervical: No superficial cervical adenopathy present  Left cervical: No superficial cervical adenopathy present  Skin: Skin is warm, dry and intact  No rash noted  Psychiatric: She has a normal mood and affect  Nursing note and vitals reviewed               Robertson Prima

## 2019-02-13 ENCOUNTER — OFFICE VISIT (OUTPATIENT)
Dept: URGENT CARE | Facility: CLINIC | Age: 69
End: 2019-02-13
Payer: COMMERCIAL

## 2019-02-13 VITALS
HEART RATE: 62 BPM | SYSTOLIC BLOOD PRESSURE: 150 MMHG | TEMPERATURE: 97.2 F | DIASTOLIC BLOOD PRESSURE: 82 MMHG | OXYGEN SATURATION: 100 %

## 2019-02-13 DIAGNOSIS — S76.311A RIGHT HAMSTRING STRAIN, INITIAL ENCOUNTER: Primary | ICD-10-CM

## 2019-02-13 PROCEDURE — 99213 OFFICE O/P EST LOW 20 MIN: CPT | Performed by: PHYSICIAN ASSISTANT

## 2019-02-13 NOTE — PROGRESS NOTES
[facility]        NAME: Jagruti Price is a 79 y o  female  : 1950    MRN: [id number]  DATE: 2019  TIME: 12:03 PM    Assessment and Plan   Right hamstring strain, initial encounter [W22 [id number]]  1  Right hamstring strain, initial encounter       Cane provided in office  Patient Instructions   Rest and elevate your leg as much as possible  Ice for 20-30 minutes at a time, 3-4 times per day  Apply heat for 20-30 minutes at a time  Following heat application stretch the leg  You should try to stretch her legs about 2-3 times daily  Take  Advil or Tylenol as directed for pain  Follow up with your family doctor or an orthopedist in 3-5 days  Proceed to the ER if symptoms worsen  The diagnosis, etiology, expected course of illness, and treatment plan were reviewed  All questions answered  Precautions given  Patient verbalized understanding and agreement the treatment plan  Chief Complaint     Chief Complaint   Patient presents with    Fall     averted a fell and injured right knee pain in back of knee and radiates down and up to thigh      History of Present Illness   53-year-old female accompanied by her daughter presenting with c/o right posterior thigh pain x today  Patient notes she was shoveling when injury occurred due to slipping on ice  She notes her right leg went out straight in front of her, the left knee bent under her, and that she ended up bending forward over the straightened right leg  She noted an immediate straining sensation, followed by a sharp stabbing pain in the upper half of the thigh that has been persistent and constant since the injury  She notes the pain does increase in severity and radiate to the lower calf with standing from a seated position, going up stairs, and occasionally with walking  Pain is somewhat better with sitting   She denies any bruising, swelling, or redness but notes she presented here immediately and has not really taken time to look at the thigh  No treatments tried  No previous injury  No back, hip, knee, or lower leg pain  Review of Systems   Review of Systems   Constitutional: Negative for chills, diaphoresis and fever  Respiratory: Negative for cough, shortness of breath and wheezing  Cardiovascular: Negative for chest pain and palpitations  Gastrointestinal: Negative for abdominal pain, diarrhea, nausea and vomiting  Skin: Negative for color change, rash and wound  Current Medications       Current Outpatient Medications:     aspirin 81 MG tablet, Take by mouth daily, Disp: , Rfl:     fluticasone (FLONASE) 50 mcg/act nasal spray, 2 puffs into each nostril daily, Disp: , Rfl:     levothyroxine 25 mcg tablet, Take 1 tablet (25 mcg total) by mouth daily, Disp: 90 tablet, Rfl: 1    metoprolol tartrate (LOPRESSOR) 50 mg tablet, TAKE ONE TABLET BY MOUTH EVERY DAY, Disp: 90 tablet, Rfl: 3    Multiple Vitamins-Minerals (MULTIVITAMIN ADULT PO), Take by mouth daily, Disp: , Rfl:     Current Allergies     Allergies as of 02/13/2019    (No Known Allergies)          The following portions of the patient's history were reviewed and updated as appropriate: allergies, current medications, past family history, past medical history, past social history, past surgical history and problem list      History reviewed  No pertinent past medical history  No past surgical history on file  Family History   Problem Relation Age of Onset    Dementia Mother      Medications have been verified  Objective   /82   Pulse 62   Temp (!) 97 2 °F (36 2 °C)   SpO2 100%      Physical Exam     Physical Exam   Constitutional: She is oriented to person, place, and time  She appears well-developed and well-nourished  No distress  HENT:   Head: Normocephalic and atraumatic  Eyes: Conjunctivae are normal    Cardiovascular: Normal rate, regular rhythm and normal heart sounds  Exam reveals no gallop and no friction rub     No murmur heard   Pulmonary/Chest: Effort normal and breath sounds normal  No respiratory distress  She has no decreased breath sounds  She has no wheezes  She has no rhonchi  She has no rales  Musculoskeletal:        Right hip: She exhibits normal range of motion, no tenderness and no bony tenderness  Right knee: Normal  She exhibits no swelling, no effusion, no ecchymosis, no deformity, no laceration, no erythema, normal alignment, normal patellar mobility and no bony tenderness  No tenderness found  Right upper leg: She exhibits tenderness and swelling  She exhibits no bony tenderness, no edema, no deformity and no laceration  Right lower leg: Normal  She exhibits no tenderness, no bony tenderness, no swelling, no edema, no deformity and no laceration  There is mild edema of the right lateral posterior thigh  No overlying erythema, ecchymosis, or disruption of skin integrity  Tender to palpation over the superior and lateral aspect of the left thigh  No bony tenderness to the thigh  Pain with hip flexion and rising from a seated position  Antalgic gait  LE strength 5/5  Distal sensation intact  Neurological: She is alert and oriented to person, place, and time  No cranial nerve deficit  She exhibits normal muscle tone  Coordination normal    Skin: Skin is warm and dry  No rash noted  She is not diaphoretic  Psychiatric: She has a normal mood and affect  Her behavior is normal    Nursing note and vitals reviewed

## 2019-02-13 NOTE — PATIENT INSTRUCTIONS
Rest and elevate your leg as much as possible  Ice for 20-30 minutes at a time, 3-4 times per day  Apply heat for 20-30 minutes at a time  Following heat application stretch the leg  You should try to stretch her legs about 2-3 times daily  Take  Advil or Tylenol as directed for pain  Follow up with your family doctor or an orthopedist in 3-5 days  Proceed to the ER if symptoms worsen  Hamstring Exercises   WHAT YOU NEED TO KNOW:   Hamstring exercises help strengthen and stretch the muscles that support your lower back, hips, and knee  This decreases pain, improves movement, and decreases your risk of future injury  DISCHARGE INSTRUCTIONS:   Contact your healthcare provider if:   · You have sharp or worsening pain during exercise or at rest     · You have questions or concern about your condition, care, or exercise program   Exercise safely:   · Move slowly and smoothly  Avoid fast or jerky motions  This will help prevent another injury  · Breathe normally  Do not hold your breath  It is important to breathe in and out so you do not tense up during exercise  Tension could prevent your muscles from stretching  · Do the exercises and stretches on both legs  Do this so the muscles on both legs remain strong and flexible  · Stop if you feel sharp pain or an increase in pain  Stop the exercise and contact your healthcare provider if you have these symptoms  It is normal to feel some discomfort, such as a dull ache, during exercise  Regular exercise will help decrease your discomfort over time  · Warm up before you stretch and exercise  This will help prevent an injury  Walk or ride a stationary bike for 5 to 10 minutes  How to perform stretching exercises:  Ask your healthcare provider how often to do these stretches:  · Hamstring stretch with a towel:  Lie on your back on the floor  Bend both legs so your feet rest flat  Lift one leg off the floor and loop a towel around your foot   Grasp the ends of the towel and slowly straighten your lifted leg  Use the towel to gently pull your leg toward you until you feel the stretch  Keep your leg straight and your foot flexed toward your body  Hold for 30 seconds  Use a longer towel if needed  · Sitting hamstring stretch:  Sit on the floor with both legs straight in front of you  Do not point your toes or flex your feet  Place your palms on the floor and slide your hands forward until you feel the stretch  Keep your back straight and do not lock your knees  Hold the stretch for 30 seconds  · Standing hamstring stretch:  Stand with your feet hips distance apart  Place one leg so it rests on a firm surface, such as a table or chair  Keep your toes pointing up  Slide both hands down the outside of your leg until you feel a stretch  Keep your chest lifted and your back straight  Hold for 30 seconds  · Sitting wide-leg stretch:  Sit on the floor and extend your legs as wide as possible  Keep your legs straight and lean over one leg  Slide your hands forward until you feel a stretch  Keep your chest lifted and your back straight  Hold for 30 seconds  How to perform strengthening exercises:  Always do strengthening exercises after you stretch  As you get stronger your healthcare provider may tell you to you add weights or more repetitions to your strengthening exercises  · Hamstring curls:  Place your hand on a wall or the back of a chair for balance  Place the weight in one of your legs  Lift the other leg and raise your heel toward your buttocks  Hold for 5 seconds  Slowly lower your leg until it is a few inches off the floor  Do 3 sets of 10  Repeat on other side  · Straight leg raise:  Lie on the floor with your face down  Rest your forehead on your folded arms  Keep your body in a straight line  Keep your hip bones on the floor, and tighten the butt and thigh muscles of your injured leg   Keep one leg straight and raise it toward the ceiling as high as you can  Hold for 5 seconds  Slowly return to the starting position  Do 3 sets of 10  Repeat on other side  Follow up with your healthcare provider as directed:  Write down your questions so you remember to ask them during your visits  © 2017 [employer] Information is for End User's use only and may not be sold, redistributed or otherwise used for commercial purposes  All illustrations and images included in CareNotes® are the copyrighted property of [employer]  or [employer]  The above information is an  only  It is not intended as medical advice for individual conditions or treatments  Talk to your doctor, nurse or pharmacist before following any medical regimen to see if it is safe and effective for you

## 2019-03-05 DIAGNOSIS — I10 BENIGN ESSENTIAL HYPERTENSION: ICD-10-CM

## 2019-03-05 RX ORDER — METOPROLOL TARTRATE 50 MG/1
TABLET, FILM COATED ORAL
Qty: 90 TABLET | Refills: 0 | Status: SHIPPED | OUTPATIENT
Start: 2019-03-05 | End: 2019-06-13 | Stop reason: SDUPTHER

## 2019-03-14 DIAGNOSIS — I10 BENIGN ESSENTIAL HYPERTENSION: ICD-10-CM

## 2019-03-14 RX ORDER — METOPROLOL TARTRATE 50 MG/1
TABLET, FILM COATED ORAL
Qty: 90 TABLET | Refills: 3 | Status: SHIPPED | OUTPATIENT
Start: 2019-03-14 | End: 2021-03-16

## 2019-06-13 DIAGNOSIS — I10 BENIGN ESSENTIAL HYPERTENSION: ICD-10-CM

## 2019-06-13 RX ORDER — METOPROLOL TARTRATE 50 MG/1
TABLET, FILM COATED ORAL
Qty: 90 TABLET | Refills: 3 | Status: SHIPPED | OUTPATIENT
Start: 2019-06-13 | End: 2020-06-17

## 2020-06-09 DIAGNOSIS — I10 BENIGN ESSENTIAL HYPERTENSION: ICD-10-CM

## 2020-06-12 DIAGNOSIS — I10 BENIGN ESSENTIAL HYPERTENSION: ICD-10-CM

## 2020-06-15 DIAGNOSIS — I10 BENIGN ESSENTIAL HYPERTENSION: ICD-10-CM

## 2020-06-15 RX ORDER — METOPROLOL TARTRATE 50 MG/1
50 TABLET, FILM COATED ORAL DAILY
Qty: 90 TABLET | Refills: 0 | OUTPATIENT
Start: 2020-06-15

## 2020-06-15 RX ORDER — METOPROLOL TARTRATE 50 MG/1
50 TABLET, FILM COATED ORAL DAILY
Qty: 90 TABLET | Refills: 3 | Status: SHIPPED | OUTPATIENT
Start: 2020-06-15 | End: 2020-08-19

## 2020-06-16 NOTE — TELEPHONE ENCOUNTER
Requested medication(s) are due for refill today: Yes  Patient has already received a courtesy refill: No  Other reason request has been forwarded to provider:Failed protocol-see messages

## 2020-06-17 RX ORDER — METOPROLOL TARTRATE 50 MG/1
TABLET, FILM COATED ORAL
Qty: 30 TABLET | Refills: 0 | Status: SHIPPED | OUTPATIENT
Start: 2020-06-17 | End: 2021-03-16

## 2020-08-17 NOTE — PROGRESS NOTES
Assessment/Plan:    1  Medicare annual wellness visit, subsequent    2  Benign essential hypertension  Assessment & Plan: This is well controlled on current dose of metoprolol and she will continue  Orders:  -     CBC and differential; Future  -     Comprehensive metabolic panel; Future  -     Lipid panel; Future    3  Hypothyroidism, unspecified type  Assessment & Plan:  She is feeling fatigued and having a hard time losing weight  Will check TSH  Orders:  -     TSH, 3rd generation with Free T4 reflex; Future    4  Impaired fasting glucose  Assessment & Plan: Will check labs for glucose control and A1C  Orders:  -     HEMOGLOBIN A1C W/ EAG ESTIMATION; Future    5  Morbid obesity ([address] )  Assessment & Plan:  Discussed need for dietary changes, exercise, weight loss  6  Encounter for hepatitis C screening test for low risk patient  -     Hepatitis C antibody; Future    7  Colon cancer screening  -     Ambulatory referral to Gastroenterology; Future    8  Exposure to COVID-19 virus  -     SARS-CoV2 Antibody, Total (IgG, IgA, IgM) [facility]; Future          Patient Instructions       Medicare Preventive Visit Patient Instructions  Thank you for completing your Welcome to Medicare Visit or Medicare Annual Wellness Visit today  Your next wellness visit will be due in one year (8/19/2021)  The screening/preventive services that you may require over the next 5-10 years are detailed below  Some tests may not apply to you based off risk factors and/or age  Screening tests ordered at today's visit but not completed yet may show as past due  Also, please note that scanned in results may not display below    Preventive Screenings:  Service Recommendations Previous Testing/Comments   Colorectal Cancer Screening  * Colonoscopy    * Fecal Occult Blood Test (FOBT)/Fecal Immunochemical Test (FIT)  * Fecal DNA/Cologuard Test  * Flexible Sigmoidoscopy Age: 54-65 years old   Colonoscopy: every 10 years (may be performed more frequently if at higher risk)  OR  FOBT/FIT: every 1 year  OR  Cologuard: every 3 years  OR  Sigmoidoscopy: every 5 years  Screening may be recommended earlier than age 48 if at higher risk for colorectal cancer  Also, an individualized decision between you and your healthcare provider will decide whether screening between the ages of 74-80 would be appropriate  Colonoscopy: 05/26/2015  FOBT/FIT: Not on file  Cologuard: Not on file  Sigmoidoscopy: Not on file    Screening Current     Breast Cancer Screening Age: 36 years old  Frequency: every 1-2 years  Not required if history of left and right mastectomy Mammogram: 06/22/2018       Cervical Cancer Screening Between the ages of 21-29, pap smear recommended once every 3 years  Between the ages of 33-67, can perform pap smear with HPV co-testing every 5 years  Recommendations may differ for women with a history of total hysterectomy, cervical cancer, or abnormal pap smears in past  Pap Smear: 01/19/2017    Screening Not Indicated   Hepatitis C Screening Once for adults born between 1945 and 1965  More frequently in patients at high risk for Hepatitis C Hep C Antibody: Not on file       Diabetes Screening 1-2 times per year if you're at risk for diabetes or have pre-diabetes Fasting glucose: No results in last 5 years   A1C: 5 5 %       Cholesterol Screening Once every 5 years if you don't have a lipid disorder  May order more often based on risk factors  Lipid panel: 06/19/2018    Screening Current     Other Preventive Screenings Covered by Medicare:  1  Abdominal Aortic Aneurysm (AAA) Screening: covered once if your at risk  You're considered to be at risk if you have a family history of AAA    2  Lung Cancer Screening: covers low dose CT scan once per year if you meet all of the following conditions: (1) Age 50-69; (2) No signs or symptoms of lung cancer; (3) Current smoker or have quit smoking within the last 15 years; (4) You have a tobacco smoking history of at least 30 pack years (packs per day multiplied by number of years you smoked); (5) You get a written order from a healthcare provider  3  Glaucoma Screening: covered annually if you're considered high risk: (1) You have diabetes OR (2) Family history of glaucoma OR (3)  aged 48 and older OR (3)  American aged 72 and older  3  Osteoporosis Screening: covered every 2 years if you meet one of the following conditions: (1) You're estrogen deficient and at risk for osteoporosis based off medical history and other findings; (2) Have a vertebral abnormality; (3) On glucocorticoid therapy for more than 3 months; (4) Have primary hyperparathyroidism; (5) On osteoporosis medications and need to assess response to drug therapy  · Last bone density test (DXA Scan): Not on file  5  HIV Screening: covered annually if you're between the age of 12-76  Also covered annually if you are younger than 13 and older than 72 with risk factors for HIV infection  For pregnant patients, it is covered up to 3 times per pregnancy  Immunizations:  Immunization Recommendations   Influenza Vaccine Annual influenza vaccination during flu season is recommended for all persons aged >= 6 months who do not have contraindications   Pneumococcal Vaccine (Prevnar and Pneumovax)  * Prevnar = PCV13  * Pneumovax = PPSV23   Adults 25-60 years old: 1-3 doses may be recommended based on certain risk factors  Adults 72 years old: Prevnar (PCV13) vaccine recommended followed by Pneumovax (PPSV23) vaccine  If already received PPSV23 since turning 65, then PCV13 recommended at least one year after PPSV23 dose  Hepatitis B Vaccine 3 dose series if at intermediate or high risk (ex: diabetes, end stage renal disease, liver disease)   Tetanus (Td) Vaccine - COST NOT COVERED BY MEDICARE PART B Following completion of primary series, a booster dose should be given every 10 years to maintain immunity against tetanus   Td may also be given as tetanus wound prophylaxis  Tdap Vaccine - COST NOT COVERED BY MEDICARE PART B Recommended at least once for all adults  For pregnant patients, recommended with each pregnancy  Shingles Vaccine (Shingrix) - COST NOT COVERED BY MEDICARE PART B  2 shot series recommended in those aged 48 and above     Health Maintenance Due:      Topic Date Due    Hepatitis C Screening  1950    MAMMOGRAM  06/22/2019    Cervical Cancer Screening  01/19/2020     Immunizations Due:      Topic Date Due    Influenza Vaccine  07/01/2020     Advance Directives   What are advance directives? Advance directives are legal documents that state your wishes and plans for medical care  These plans are made ahead of time in case you lose your ability to make decisions for yourself  Advance directives can apply to any medical decision, such as the treatments you want, and if you want to donate organs  What are the types of advance directives? There are many types of advance directives, and each state has rules about how to use them  You may choose a combination of any of the following:  · Living will: This is a written record of the treatment you want  You can also choose which treatments you do not want, which to limit, and which to stop at a certain time  This includes surgery, medicine, IV fluid, and tube feedings  · Durable power of  for healthcare [facility]): This is a written record that states who you want to make healthcare choices for you when you are unable to make them for yourself  This person, called a proxy, is usually a family member or a friend  You may choose more than 1 proxy  · Do not resuscitate (DNR) order:  A DNR order is used in case your heart stops beating or you stop breathing  It is a request not to have certain forms of treatment, such as CPR  A DNR order may be included in other types of advance directives  · Medical directive:   This covers the care that you want if you are in a coma, near death, or unable to make decisions for yourself  You can list the treatments you want for each condition  Treatment may include pain medicine, surgery, blood transfusions, dialysis, IV or tube feedings, and a ventilator (breathing machine)  · Values history: This document has questions about your views, beliefs, and how you feel and think about life  This information can help others choose the care that you would choose  Why are advance directives important? An advance directive helps you control your care  Although spoken wishes may be used, it is better to have your wishes written down  Spoken wishes can be misunderstood, or not followed  Treatments may be given even if you do not want them  An advance directive may make it easier for your family to make difficult choices about your care  Weight Management   Why it is important to manage your weight:  Being overweight increases your risk of health conditions such as heart disease, high blood pressure, type 2 diabetes, and certain types of cancer  It can also increase your risk for osteoarthritis, sleep apnea, and other respiratory problems  Aim for a slow, steady weight loss  Even a small amount of weight loss can lower your risk of health problems  How to lose weight safely:  A safe and healthy way to lose weight is to eat fewer calories and get regular exercise  You can lose up about 1 pound a week by decreasing the number of calories you eat by 500 calories each day  Healthy meal plan for weight management:  A healthy meal plan includes a variety of foods, contains fewer calories, and helps you stay healthy  A healthy meal plan includes the following:  · Eat whole-grain foods more often  A healthy meal plan should contain fiber  Fiber is the part of grains, fruits, and vegetables that is not broken down by your body  Whole-grain foods are healthy and provide extra fiber in your diet   Some examples of whole-grain foods are whole-wheat breads and pastas, oatmeal, brown rice, and bulgur  · Eat a variety of vegetables every day  Include dark, leafy greens such as spinach, kale, angel greens, and mustard greens  Eat yellow and orange vegetables such as carrots, sweet potatoes, and winter squash  · Eat a variety of fruits every day  Choose fresh or canned fruit (canned in its own juice or light syrup) instead of juice  Fruit juice has very little or no fiber  · Eat low-fat dairy foods  Drink fat-free (skim) milk or 1% milk  Eat fat-free yogurt and low-fat cottage cheese  Try low-fat cheeses such as mozzarella and other reduced-fat cheeses  · Choose meat and other protein foods that are low in fat  Choose beans or other legumes such as split peas or lentils  Choose fish, skinless poultry (chicken or turkey), or lean cuts of red meat (beef or pork)  Before you cook meat or poultry, cut off any visible fat  · Use less fat and oil  Try baking foods instead of frying them  Add less fat, such as margarine, sour cream, regular salad dressing and mayonnaise to foods  Eat fewer high-fat foods  Some examples of high-fat foods include french fries, doughnuts, ice cream, and cakes  · Eat fewer sweets  Limit foods and drinks that are high in sugar  This includes candy, cookies, regular soda, and sweetened drinks  Exercise:  Exercise at least 30 minutes per day on most days of the week  Some examples of exercise include walking, biking, dancing, and swimming  You can also fit in more physical activity by taking the stairs instead of the elevator or parking farther away from stores  Ask your healthcare provider about the best exercise plan for you  © Copyright [employer] 2018 Information is for End User's use only and may not be sold, redistributed or otherwise used for commercial purposes   All illustrations and images included in CareNotes® are the copyrighted property of [employer]  or Mike Dennison in about 6 months (around 2/19/2021)  Subjective:      Patient ID: Carine Cuevas is a 78 y o  female  Chief Complaint   Patient presents with   [facility] Wellness Visit     [facility]       Here for a follow up visit  Refuses colon cancer screening or mammogram     She has not taken her thyroid medication in quite some time; it ran out  She is feeling fatigued  Has not been able to lose weight  Denies hair or skin changes, cold or heat intolerance  There is no chest pain, dyspnea, cough, fever  The following portions of the patient's history were reviewed and updated as appropriate: allergies, current medications, past family history, past medical history, past social history, past surgical history and problem list     Review of Systems   Constitutional: Positive for fatigue  Negative for fever and unexpected weight change  Respiratory: Negative  Cardiovascular: Negative  Endocrine: Negative  Current Outpatient Medications   Medication Sig Dispense Refill    aspirin 81 MG tablet Take by mouth daily      metoprolol tartrate (LOPRESSOR) 50 mg tablet TAKE ONE TABLET BY MOUTH EVERY DAY 90 tablet 3    metoprolol tartrate (LOPRESSOR) 50 mg tablet TAKE ONE TABLET BY MOUTH EVERY DAY 30 tablet 0    Multiple Vitamins-Minerals (MULTIVITAMIN ADULT PO) Take by mouth daily      fluticasone (FLONASE) 50 mcg/act nasal spray 2 puffs into each nostril daily       No current facility-administered medications for this visit  Objective:    /82   Pulse 76   Temp 98 °F (36 7 °C)   Resp 18   Ht 5' 1" (1 549 m)   Wt 104 kg (230 lb)   BMI 43 46 kg/m²        Physical Exam  Constitutional:       Appearance: She is well-developed  She is obese  Eyes:      Conjunctiva/sclera: Conjunctivae normal    Neck:      Musculoskeletal: Neck supple  Thyroid: No thyromegaly  Vascular: No JVD  Cardiovascular:      Rate and Rhythm: Normal rate and regular rhythm  Heart sounds: Normal heart sounds  No murmur  No friction rub  No gallop  Pulmonary:      Effort: Pulmonary effort is normal       Breath sounds: Normal breath sounds  No wheezing or rales  Abdominal:      General: Bowel sounds are normal  There is no distension  Palpations: Abdomen is soft  Tenderness: There is no abdominal tenderness  Musculoskeletal:      Right lower leg: No edema  Left lower leg: No edema  Neurological:      Mental Status: She is alert                  De Velasco MD

## 2020-08-19 ENCOUNTER — OFFICE VISIT (OUTPATIENT)
Dept: FAMILY MEDICINE CLINIC | Facility: CLINIC | Age: 70
End: 2020-08-19
Payer: MEDICARE

## 2020-08-19 VITALS
SYSTOLIC BLOOD PRESSURE: 124 MMHG | TEMPERATURE: 98 F | RESPIRATION RATE: 18 BRPM | HEART RATE: 76 BPM | HEIGHT: 61 IN | BODY MASS INDEX: 43.43 KG/M2 | DIASTOLIC BLOOD PRESSURE: 82 MMHG | WEIGHT: 230 LBS

## 2020-08-19 DIAGNOSIS — E66.01 MORBID OBESITY (HCC): ICD-10-CM

## 2020-08-19 DIAGNOSIS — R73.01 IMPAIRED FASTING GLUCOSE: ICD-10-CM

## 2020-08-19 DIAGNOSIS — Z00.00 MEDICARE ANNUAL WELLNESS VISIT, SUBSEQUENT: Primary | ICD-10-CM

## 2020-08-19 DIAGNOSIS — Z20.822 EXPOSURE TO COVID-19 VIRUS: ICD-10-CM

## 2020-08-19 DIAGNOSIS — Z12.11 COLON CANCER SCREENING: ICD-10-CM

## 2020-08-19 DIAGNOSIS — E03.9 HYPOTHYROIDISM, UNSPECIFIED TYPE: ICD-10-CM

## 2020-08-19 DIAGNOSIS — Z11.59 ENCOUNTER FOR HEPATITIS C SCREENING TEST FOR LOW RISK PATIENT: ICD-10-CM

## 2020-08-19 DIAGNOSIS — I10 BENIGN ESSENTIAL HYPERTENSION: ICD-10-CM

## 2020-08-19 PROCEDURE — 1170F FXNL STATUS ASSESSED: CPT | Performed by: INTERNAL MEDICINE

## 2020-08-19 PROCEDURE — 4040F PNEUMOC VAC/ADMIN/RCVD: CPT | Performed by: INTERNAL MEDICINE

## 2020-08-19 PROCEDURE — 3074F SYST BP LT 130 MM HG: CPT | Performed by: INTERNAL MEDICINE

## 2020-08-19 PROCEDURE — 99214 OFFICE O/P EST MOD 30 MIN: CPT | Performed by: INTERNAL MEDICINE

## 2020-08-19 PROCEDURE — G0402 INITIAL PREVENTIVE EXAM: HCPCS | Performed by: INTERNAL MEDICINE

## 2020-08-19 PROCEDURE — 1125F AMNT PAIN NOTED PAIN PRSNT: CPT | Performed by: INTERNAL MEDICINE

## 2020-08-19 PROCEDURE — 1036F TOBACCO NON-USER: CPT | Performed by: INTERNAL MEDICINE

## 2020-08-19 PROCEDURE — 3008F BODY MASS INDEX DOCD: CPT | Performed by: INTERNAL MEDICINE

## 2020-08-19 PROCEDURE — 3079F DIAST BP 80-89 MM HG: CPT | Performed by: INTERNAL MEDICINE

## 2020-08-19 PROCEDURE — 1160F RVW MEDS BY RX/DR IN RCRD: CPT | Performed by: INTERNAL MEDICINE

## 2020-08-19 NOTE — PROGRESS NOTES
Assessment and Plan:     Problem List Items Addressed This Visit     None           Preventive health issues were discussed with patient, and age appropriate screening tests were ordered as noted in patient's After Visit Summary  Personalized health advice and appropriate referrals for health education or preventive services given if needed, as noted in patient's After Visit Summary  History of Present Illness:     Patient presents for Medicare Annual Wellness visit    Patient Care Team:  Chris Coreas MD as PCP - General     Problem List:     Patient Active Problem List   Diagnosis    Benign essential hypertension    Chronic rhinitis    Hidradenitis suppurativa    Hypothyroidism    Impaired fasting glucose    Morbid obesity ([address] )    Venous insufficiency (chronic) (peripheral)      Past Medical and Surgical History:     No past medical history on file  No past surgical history on file     Family History:     Family History   Problem Relation Age of Onset    Dementia Mother       Social History:        Social History     Socioeconomic History    Marital status: /Civil Union     Spouse name: Not on file    Number of children: Not on file    Years of education: Not on file    Highest education level: Not on file   Occupational History    Not on file   Social Needs    Financial resource strain: Not on file    Food insecurity     Worry: Not on file     Inability: Not on file   [employer] needs     Medical: Not on file     Non-medical: Not on file   Tobacco Use    Smoking status: Never Smoker    Smokeless tobacco: Never Used   Substance and Sexual Activity    Alcohol use: No    Drug use: No    Sexual activity: Not on file   Lifestyle    Physical activity     Days per week: Not on file     Minutes per session: Not on file    Stress: Not on file   Relationships    Social connections     Talks on phone: Not on file     Gets together: Not on file     Attends Orthodox service: Not on file     Active member of club or organization: Not on file     Attends meetings of clubs or organizations: Not on file     Relationship status: Not on file    Intimate partner violence     Fear of current or ex partner: Not on file     Emotionally abused: Not on file     Physically abused: Not on file     Forced sexual activity: Not on file   Other Topics Concern    Not on file   Social History Narrative    Not on file      Medications and Allergies:     Current Outpatient Medications   Medication Sig Dispense Refill    aspirin 81 MG tablet Take by mouth daily      metoprolol tartrate (LOPRESSOR) 50 mg tablet TAKE ONE TABLET BY MOUTH EVERY DAY 90 tablet 3    metoprolol tartrate (LOPRESSOR) 50 mg tablet TAKE ONE TABLET BY MOUTH EVERY DAY 30 tablet 0    Multiple Vitamins-Minerals (MULTIVITAMIN ADULT PO) Take by mouth daily      fluticasone (FLONASE) 50 mcg/act nasal spray 2 puffs into each nostril daily      levothyroxine 25 mcg tablet Take 1 tablet (25 mcg total) by mouth daily (Patient not taking: Reported on 8/19/2020) 90 tablet 1    metoprolol tartrate (LOPRESSOR) 50 mg tablet Take 1 tablet (50 mg total) by mouth daily (Patient not taking: Reported on 8/19/2020) 90 tablet 3     No current facility-administered medications for this visit  No Known Allergies   Immunizations:     Immunization History   Administered Date(s) Administered    Influenza Split High Dose Preservative Free IM 10/28/2017    Pneumococcal Conjugate 13-Valent 10/28/2017    Pneumococcal Polysaccharide PPV23 11/05/2015    Tdap 10/12/2009, 01/10/2013      Health Maintenance:         Topic Date Due    Hepatitis C Screening  1950    MAMMOGRAM  06/22/2019    Cervical Cancer Screening  01/19/2020         Topic Date Due    Influenza Vaccine  07/01/2020      Medicare Health Risk Assessment:     There were no vitals taken for this visit  Myra Campos is here for her Subsequent Wellness visit       Health Risk Assessment: Patient rates overall health as good  Patient feels that their physical health rating is slightly worse  Eyesight was rated as same  Hearing was rated as slightly worse  Patient feels that their emotional and mental health rating is same  Pain experienced in the last 7 days has been none  Patient states that she has experienced no weight loss or gain in last 6 months  Depression Screening:   PHQ-2 Score: 0      Fall Risk Screening: In the past year, patient has experienced: no history of falling in past year      Urinary Incontinence Screening:   Patient has not leaked urine accidently in the last six months  Home Safety:  Patient has trouble with stairs inside or outside of their home  Patient has working smoke alarms and has working carbon monoxide detector  Home safety hazards include: none  Nutrition:   Current diet is Regular  Medications:   Patient is currently taking over-the-counter supplements  OTC medications include: see medication list  Patient is able to manage medications  Activities of Daily Living (ADLs)/Instrumental Activities of Daily Living (IADLs):   Walk and transfer into and out of bed and chair?: Yes  Dress and groom yourself?: Yes    Bathe or shower yourself?: Yes    Feed yourself?  Yes  Do your laundry/housekeeping?: Yes  Manage your money, pay your bills and track your expenses?: Yes  Make your own meals?: Yes    Do your own shopping?: Yes    Previous Hospitalizations:   Any hospitalizations or ED visits within the last 12 months?: No      Advance Care Planning:   Living will: No    Advanced directive: No      Comments: Declines information    Cognitive Screening:   Provider or family/friend/caregiver concerned regarding cognition?: No    PREVENTIVE SCREENINGS      Cardiovascular Screening:    General: Screening Current      Diabetes Screening:     General: Risks and Benefits Discussed    Due for: Blood Glucose      Colorectal Cancer Screening:     General: Screening Current      Breast Cancer Screening:     General: Risks and Benefits Discussed    Due for: Mammogram        Cervical Cancer Screening:    General: Screening Not Indicated      Osteoporosis Screening:    General: Patient Declines      Abdominal Aortic Aneurysm (AAA) Screening:        General: Screening Not Indicated      Lung Cancer Screening:     General: Screening Not Indicated      Hepatitis C Screening:    General: Risks and Benefits Discussed    Other Counseling Topics:   Alcohol use counseling, car/seat belt/driving safety, skin self-exam, sunscreen and calcium and vitamin D intake and regular weightbearing exercise         Thaddeus Nassar MD

## 2020-08-19 NOTE — PATIENT INSTRUCTIONS
Medicare Preventive Visit Patient Instructions  Thank you for completing your Welcome to Medicare Visit or Medicare Annual Wellness Visit today  Your next wellness visit will be due in one year (8/19/2021)  The screening/preventive services that you may require over the next 5-10 years are detailed below  Some tests may not apply to you based off risk factors and/or age  Screening tests ordered at today's visit but not completed yet may show as past due  Also, please note that scanned in results may not display below  Preventive Screenings:  Service Recommendations Previous Testing/Comments   Colorectal Cancer Screening  * Colonoscopy    * Fecal Occult Blood Test (FOBT)/Fecal Immunochemical Test (FIT)  * Fecal DNA/Cologuard Test  * Flexible Sigmoidoscopy Age: 54-65 years old   Colonoscopy: every 10 years (may be performed more frequently if at higher risk)  OR  FOBT/FIT: every 1 year  OR  Cologuard: every 3 years  OR  Sigmoidoscopy: every 5 years  Screening may be recommended earlier than age 48 if at higher risk for colorectal cancer  Also, an individualized decision between you and your healthcare provider will decide whether screening between the ages of 74-80 would be appropriate  Colonoscopy: 05/26/2015  FOBT/FIT: Not on file  Cologuard: Not on file  Sigmoidoscopy: Not on file    Screening Current     Breast Cancer Screening Age: 36 years old  Frequency: every 1-2 years  Not required if history of left and right mastectomy Mammogram: 06/22/2018       Cervical Cancer Screening Between the ages of 21-29, pap smear recommended once every 3 years  Between the ages of 33-67, can perform pap smear with HPV co-testing every 5 years     Recommendations may differ for women with a history of total hysterectomy, cervical cancer, or abnormal pap smears in past  Pap Smear: 01/19/2017    Screening Not Indicated   Hepatitis C Screening Once for adults born between 1945 and 1965  More frequently in patients at high risk for Hepatitis C Hep C Antibody: Not on file       Diabetes Screening 1-2 times per year if you're at risk for diabetes or have pre-diabetes Fasting glucose: No results in last 5 years   A1C: 5 5 %       Cholesterol Screening Once every 5 years if you don't have a lipid disorder  May order more often based on risk factors  Lipid panel: 06/19/2018    Screening Current     Other Preventive Screenings Covered by Medicare:  1  Abdominal Aortic Aneurysm (AAA) Screening: covered once if your at risk  You're considered to be at risk if you have a family history of AAA  2  Lung Cancer Screening: covers low dose CT scan once per year if you meet all of the following conditions: (1) Age 50-69; (2) No signs or symptoms of lung cancer; (3) Current smoker or have quit smoking within the last 15 years; (4) You have a tobacco smoking history of at least 30 pack years (packs per day multiplied by number of years you smoked); (5) You get a written order from a healthcare provider  3  Glaucoma Screening: covered annually if you're considered high risk: (1) You have diabetes OR (2) Family history of glaucoma OR (3)  aged 48 and older OR (3)  American aged 72 and older  3  Osteoporosis Screening: covered every 2 years if you meet one of the following conditions: (1) You're estrogen deficient and at risk for osteoporosis based off medical history and other findings; (2) Have a vertebral abnormality; (3) On glucocorticoid therapy for more than 3 months; (4) Have primary hyperparathyroidism; (5) On osteoporosis medications and need to assess response to drug therapy  · Last bone density test (DXA Scan): Not on file  5  HIV Screening: covered annually if you're between the age of 12-76  Also covered annually if you are younger than 13 and older than 72 with risk factors for HIV infection  For pregnant patients, it is covered up to 3 times per pregnancy      Immunizations:  Immunization Recommendations   Influenza Vaccine Annual influenza vaccination during flu season is recommended for all persons aged >= 6 months who do not have contraindications   Pneumococcal Vaccine (Prevnar and Pneumovax)  * Prevnar = PCV13  * Pneumovax = PPSV23   Adults 25-60 years old: 1-3 doses may be recommended based on certain risk factors  Adults 72 years old: Prevnar (PCV13) vaccine recommended followed by Pneumovax (PPSV23) vaccine  If already received PPSV23 since turning 65, then PCV13 recommended at least one year after PPSV23 dose  Hepatitis B Vaccine 3 dose series if at intermediate or high risk (ex: diabetes, end stage renal disease, liver disease)   Tetanus (Td) Vaccine - COST NOT COVERED BY MEDICARE PART B Following completion of primary series, a booster dose should be given every 10 years to maintain immunity against tetanus  Td may also be given as tetanus wound prophylaxis  Tdap Vaccine - COST NOT COVERED BY MEDICARE PART B Recommended at least once for all adults  For pregnant patients, recommended with each pregnancy  Shingles Vaccine (Shingrix) - COST NOT COVERED BY MEDICARE PART B  2 shot series recommended in those aged 48 and above     Health Maintenance Due:      Topic Date Due    Hepatitis C Screening  1950    MAMMOGRAM  06/22/2019    Cervical Cancer Screening  01/19/2020     Immunizations Due:      Topic Date Due    Influenza Vaccine  07/01/2020     Advance Directives   What are advance directives? Advance directives are legal documents that state your wishes and plans for medical care  These plans are made ahead of time in case you lose your ability to make decisions for yourself  Advance directives can apply to any medical decision, such as the treatments you want, and if you want to donate organs  What are the types of advance directives? There are many types of advance directives, and each state has rules about how to use them   You may choose a combination of any of the following:  · Living will: This is a written record of the treatment you want  You can also choose which treatments you do not want, which to limit, and which to stop at a certain time  This includes surgery, medicine, IV fluid, and tube feedings  · Durable power of  for healthcare [facility]): This is a written record that states who you want to make healthcare choices for you when you are unable to make them for yourself  This person, called a proxy, is usually a family member or a friend  You may choose more than 1 proxy  · Do not resuscitate (DNR) order:  A DNR order is used in case your heart stops beating or you stop breathing  It is a request not to have certain forms of treatment, such as CPR  A DNR order may be included in other types of advance directives  · Medical directive: This covers the care that you want if you are in a coma, near death, or unable to make decisions for yourself  You can list the treatments you want for each condition  Treatment may include pain medicine, surgery, blood transfusions, dialysis, IV or tube feedings, and a ventilator (breathing machine)  · Values history: This document has questions about your views, beliefs, and how you feel and think about life  This information can help others choose the care that you would choose  Why are advance directives important? An advance directive helps you control your care  Although spoken wishes may be used, it is better to have your wishes written down  Spoken wishes can be misunderstood, or not followed  Treatments may be given even if you do not want them  An advance directive may make it easier for your family to make difficult choices about your care  Weight Management   Why it is important to manage your weight:  Being overweight increases your risk of health conditions such as heart disease, high blood pressure, type 2 diabetes, and certain types of cancer   It can also increase your risk for osteoarthritis, sleep apnea, and other respiratory problems  Aim for a slow, steady weight loss  Even a small amount of weight loss can lower your risk of health problems  How to lose weight safely:  A safe and healthy way to lose weight is to eat fewer calories and get regular exercise  You can lose up about 1 pound a week by decreasing the number of calories you eat by 500 calories each day  Healthy meal plan for weight management:  A healthy meal plan includes a variety of foods, contains fewer calories, and helps you stay healthy  A healthy meal plan includes the following:  · Eat whole-grain foods more often  A healthy meal plan should contain fiber  Fiber is the part of grains, fruits, and vegetables that is not broken down by your body  Whole-grain foods are healthy and provide extra fiber in your diet  Some examples of whole-grain foods are whole-wheat breads and pastas, oatmeal, brown rice, and bulgur  · Eat a variety of vegetables every day  Include dark, leafy greens such as spinach, kale, angel greens, and mustard greens  Eat yellow and orange vegetables such as carrots, sweet potatoes, and winter squash  · Eat a variety of fruits every day  Choose fresh or canned fruit (canned in its own juice or light syrup) instead of juice  Fruit juice has very little or no fiber  · Eat low-fat dairy foods  Drink fat-free (skim) milk or 1% milk  Eat fat-free yogurt and low-fat cottage cheese  Try low-fat cheeses such as mozzarella and other reduced-fat cheeses  · Choose meat and other protein foods that are low in fat  Choose beans or other legumes such as split peas or lentils  Choose fish, skinless poultry (chicken or turkey), or lean cuts of red meat (beef or pork)  Before you cook meat or poultry, cut off any visible fat  · Use less fat and oil  Try baking foods instead of frying them  Add less fat, such as margarine, sour cream, regular salad dressing and mayonnaise to foods  Eat fewer high-fat foods   Some examples of high-fat foods include french fries, doughnuts, ice cream, and cakes  · Eat fewer sweets  Limit foods and drinks that are high in sugar  This includes candy, cookies, regular soda, and sweetened drinks  Exercise:  Exercise at least 30 minutes per day on most days of the week  Some examples of exercise include walking, biking, dancing, and swimming  You can also fit in more physical activity by taking the stairs instead of the elevator or parking farther away from stores  Ask your healthcare provider about the best exercise plan for you  © Copyright [employer] 2018 Information is for End User's use only and may not be sold, redistributed or otherwise used for commercial purposes   All illustrations and images included in CareNotes® are the copyrighted property of A D A M , Inc  or [address] [place] [address]

## 2020-08-27 LAB
ALBUMIN SERPL-MCNC: 4.2 G/DL (ref 3.8–4.8)
ALBUMIN/GLOB SERPL: 2 {RATIO} (ref 1.2–2.2)
ALP SERPL-CCNC: 66 IU/L (ref 39–117)
ALT SERPL-CCNC: 23 IU/L (ref 0–32)
AST SERPL-CCNC: 18 IU/L (ref 0–40)
BASOPHILS # BLD AUTO: 0 X10E3/UL (ref 0–0.2)
BASOPHILS NFR BLD AUTO: 0 %
BILIRUB SERPL-MCNC: 0.5 MG/DL (ref 0–1.2)
BUN SERPL-MCNC: 18 MG/DL (ref 8–27)
BUN/CREAT SERPL: 22 (ref 12–28)
CALCIUM SERPL-MCNC: 9.2 MG/DL (ref 8.7–10.3)
CHLORIDE SERPL-SCNC: 103 MMOL/L (ref 96–106)
CHOLEST SERPL-MCNC: 155 MG/DL (ref 100–199)
CO2 SERPL-SCNC: 23 MMOL/L (ref 20–29)
CREAT SERPL-MCNC: 0.81 MG/DL (ref 0.57–1)
EOSINOPHIL # BLD AUTO: 0.1 X10E3/UL (ref 0–0.4)
EOSINOPHIL NFR BLD AUTO: 2 %
ERYTHROCYTE [DISTWIDTH] IN BLOOD BY AUTOMATED COUNT: 13.1 % (ref 11.7–15.4)
EST. AVERAGE GLUCOSE BLD GHB EST-MCNC: 123 MG/DL
GLOBULIN SER-MCNC: 2.1 G/DL (ref 1.5–4.5)
GLUCOSE SERPL-MCNC: 122 MG/DL (ref 65–99)
HBA1C MFR BLD: 5.9 % (ref 4.8–5.6)
HCT VFR BLD AUTO: 46.4 % (ref 34–46.6)
HCV AB S/CO SERPL IA: <0.1 S/CO RATIO (ref 0–0.9)
HDLC SERPL-MCNC: 38 MG/DL
HGB BLD-MCNC: 15.7 G/DL (ref 11.1–15.9)
IMM GRANULOCYTES # BLD: 0 X10E3/UL (ref 0–0.1)
IMM GRANULOCYTES NFR BLD: 0 %
LDLC SERPL CALC-MCNC: 95 MG/DL (ref 0–99)
LYMPHOCYTES # BLD AUTO: 1.5 X10E3/UL (ref 0.7–3.1)
LYMPHOCYTES NFR BLD AUTO: 22 %
MCH RBC QN AUTO: 30.8 PG (ref 26.6–33)
MCHC RBC AUTO-ENTMCNC: 33.8 G/DL (ref 31.5–35.7)
MCV RBC AUTO: 91 FL (ref 79–97)
MICRODELETION SYND BLD/T FISH: NORMAL
MONOCYTES # BLD AUTO: 0.6 X10E3/UL (ref 0.1–0.9)
MONOCYTES NFR BLD AUTO: 9 %
NEUTROPHILS # BLD AUTO: 4.6 X10E3/UL (ref 1.4–7)
NEUTROPHILS NFR BLD AUTO: 67 %
PLATELET # BLD AUTO: 234 X10E3/UL (ref 150–450)
POTASSIUM SERPL-SCNC: 4.1 MMOL/L (ref 3.5–5.2)
PROT SERPL-MCNC: 6.3 G/DL (ref 6–8.5)
RBC # BLD AUTO: 5.1 X10E6/UL (ref 3.77–5.28)
SARS-COV-2 IGA SERPL QL IA: NEGATIVE
SARS-COV-2 IGG SERPL QL IA: NEGATIVE
SARS-COV-2 IGM SERPL QL IA: NEGATIVE
SL AMB EGFR AFRICAN AMERICAN: 86 ML/MIN/1.73
SL AMB EGFR NON AFRICAN AMERICAN: 75 ML/MIN/1.73
SL AMB T4, FREE (DIRECT): 1.03 NG/DL (ref 0.82–1.77)
SL AMB VLDL CHOLESTEROL CALC: 22 MG/DL (ref 5–40)
SODIUM SERPL-SCNC: 138 MMOL/L (ref 134–144)
TRIGL SERPL-MCNC: 109 MG/DL (ref 0–149)
TSH SERPL DL<=0.005 MIU/L-ACNC: 5.45 UIU/ML (ref 0.45–4.5)
WBC # BLD AUTO: 6.8 X10E3/UL (ref 3.4–10.8)

## 2021-01-27 ENCOUNTER — TELEMEDICINE (OUTPATIENT)
Dept: FAMILY MEDICINE CLINIC | Facility: CLINIC | Age: 71
End: 2021-01-27
Payer: MEDICARE

## 2021-01-27 DIAGNOSIS — Z20.822 EXPOSURE TO COVID-19 VIRUS: ICD-10-CM

## 2021-01-27 DIAGNOSIS — Z20.822 EXPOSURE TO COVID-19 VIRUS: Primary | ICD-10-CM

## 2021-01-27 PROCEDURE — U0003 INFECTIOUS AGENT DETECTION BY NUCLEIC ACID (DNA OR RNA); SEVERE ACUTE RESPIRATORY SYNDROME CORONAVIRUS 2 (SARS-COV-2) (CORONAVIRUS DISEASE [COVID-19]), AMPLIFIED PROBE TECHNIQUE, MAKING USE OF HIGH THROUGHPUT TECHNOLOGIES AS DESCRIBED BY CMS-2020-01-R: HCPCS | Performed by: FAMILY MEDICINE

## 2021-01-27 PROCEDURE — 99212 OFFICE O/P EST SF 10 MIN: CPT | Performed by: FAMILY MEDICINE

## 2021-01-27 PROCEDURE — U0005 INFEC AGEN DETEC AMPLI PROBE: HCPCS | Performed by: FAMILY MEDICINE

## 2021-01-27 NOTE — PROGRESS NOTES
COVID-19 Virtual Visit     Assessment/Plan:    Problem List Items Addressed This Visit     None      Visit Diagnoses     Exposure to COVID-19 virus    -  Primary    Relevant Orders    Novel Coronavirus (Covid-19),PCR [facility] - Collected at [facility] or [facility]         Disposition:     I recommended self-quarantine for 10 days and to watch for symptoms until 14 days after exposure  If patient were to develop symptoms, they should self isolate and call our office for further guidance  I referred patient to one of our centralized sites for a COVID-19 swab  I have spent 15 minutes directly with the patient  Encounter provider Meet Harmon MD    Provider located at [address] [id number]    Recent Visits  No visits were found meeting these conditions  Showing recent visits within past 7 days and meeting all other requirements     Today's Visits  Date Type Provider Dept   01/27/21 Telemedicine Meet Harmon, [address] [place] [address] today's visits and meeting all other requirements     Future Appointments  No visits were found meeting these conditions  Showing future appointments within next 150 days and meeting all other requirements      This virtual check-in was done via [employer] and patient was informed that this is not a secure, HIPAA-compliant platform  She agrees to proceed  Patient agrees to participate in a virtual check in via telephone or video visit instead of presenting to the office to address urgent/immediate medical needs  Patient is aware this is a billable service  After connecting through [facility], the patient was identified by name and date of birth  Herlinda Castro was informed that this was a telemedicine visit and that the exam was being conducted confidentially over secure lines  My office door was closed  No one else was in the room   Herlinda Castro acknowledged consent and understanding of privacy and security of the telemedicine visit  I informed the patient that I have reviewed her record in Epic and presented the opportunity for her to ask any questions regarding the visit today  The patient agreed to participate  Subjective:   Holland Duenas is a 78 y o  female who is concerned about COVID-19  Patient's symptoms include nasal congestion, sore throat, cough and headache  Patient denies fever, chills, fatigue, malaise, rhinorrhea, anosmia, loss of taste, shortness of breath, chest tightness, abdominal pain, nausea, vomiting, diarrhea and myalgias  Date of symptom onset: 1/20/2021    Exposure:   Contact with a person who is under investigation (PUI) for or who is positive for COVID-19 within the last 14 days?: Yes    Hospitalized recently for fever and/or lower respiratory symptoms?: No      Currently a healthcare worker that is involved in direct patient care?: No      Works in a special setting where the risk of COVID-19 transmission may be high? (this may include long-term care, correctional and MCC facilities; homeless shelters; assisted-living facilities and group homes ): No      Resident in a special setting where the risk of COVID-19 transmission may be high? (this may include long-term care, correctional and MCC facilities; homeless shelters; assisted-living facilities and group homes ): No      Pt's  is currently hospitalized with COVID-19  No results found for: Carlos Manuel Josue, [address]  No past medical history on file  No past surgical history on file    Current Outpatient Medications   Medication Sig Dispense Refill    aspirin 81 MG tablet Take by mouth daily      fluticasone (FLONASE) 50 mcg/act nasal spray 2 puffs into each nostril daily      metoprolol tartrate (LOPRESSOR) 50 mg tablet TAKE ONE TABLET BY MOUTH EVERY DAY 90 tablet 3    metoprolol tartrate (LOPRESSOR) 50 mg tablet TAKE ONE TABLET BY MOUTH EVERY DAY 30 tablet 0    Multiple Vitamins-Minerals (MULTIVITAMIN ADULT PO) Take by mouth daily       No current facility-administered medications for this visit  No Known Allergies    Review of Systems   Constitutional: Negative for chills, fatigue and fever  HENT: Positive for congestion and sore throat  Negative for rhinorrhea  Respiratory: Positive for cough  Negative for chest tightness and shortness of breath  Gastrointestinal: Negative for abdominal pain, diarrhea, nausea and vomiting  Musculoskeletal: Negative for myalgias  Neurological: Positive for headaches  Objective: There were no vitals filed for this visit  Physical Exam  Constitutional:       General: She is not in acute distress  Appearance: Normal appearance  She is not ill-appearing, toxic-appearing or diaphoretic  HENT:      Head: Normocephalic and atraumatic  Eyes:      General:         Right eye: No discharge  Left eye: No discharge  Conjunctiva/sclera: Conjunctivae normal    Pulmonary:      Effort: Pulmonary effort is normal    Neurological:      Mental Status: She is alert  Psychiatric:         Mood and Affect: Mood normal          Behavior: Behavior normal          Thought Content: Thought content normal          Judgment: Judgment normal        VIRTUAL VISIT DISCLAIMER    Kaushal Kelly acknowledges that she has consented to an online visit or consultation  She understands that the online visit is based solely on information provided by her, and that, in the absence of a face-to-face physical evaluation by the physician, the diagnosis she receives is both limited and provisional in terms of accuracy and completeness  This is not intended to replace a full medical face-to-face evaluation by the physician  Kaushal Kelly understands and accepts these terms

## 2021-01-28 LAB — SARS-COV-2 RNA RESP QL NAA+PROBE: NEGATIVE

## 2021-03-01 DIAGNOSIS — Z23 ENCOUNTER FOR IMMUNIZATION: ICD-10-CM

## 2021-03-07 ENCOUNTER — IMMUNIZATIONS (OUTPATIENT)
Dept: FAMILY MEDICINE CLINIC | Facility: HOSPITAL | Age: 71
End: 2021-03-07

## 2021-03-07 DIAGNOSIS — Z23 ENCOUNTER FOR IMMUNIZATION: Primary | ICD-10-CM

## 2021-03-07 PROCEDURE — 91300 SARS-COV-2 / COVID-19 MRNA VACCINE (PFIZER-BIONTECH) 30 MCG: CPT

## 2021-03-07 PROCEDURE — 0001A SARS-COV-2 / COVID-19 MRNA VACCINE (PFIZER-BIONTECH) 30 MCG: CPT

## 2021-03-16 ENCOUNTER — OFFICE VISIT (OUTPATIENT)
Dept: FAMILY MEDICINE CLINIC | Facility: CLINIC | Age: 71
End: 2021-03-16
Payer: MEDICARE

## 2021-03-16 VITALS
HEIGHT: 61 IN | TEMPERATURE: 98 F | HEART RATE: 66 BPM | WEIGHT: 234 LBS | DIASTOLIC BLOOD PRESSURE: 80 MMHG | SYSTOLIC BLOOD PRESSURE: 142 MMHG | BODY MASS INDEX: 44.18 KG/M2

## 2021-03-16 DIAGNOSIS — Z12.31 ENCOUNTER FOR SCREENING MAMMOGRAM FOR MALIGNANT NEOPLASM OF BREAST: ICD-10-CM

## 2021-03-16 DIAGNOSIS — E66.01 MORBID OBESITY (HCC): ICD-10-CM

## 2021-03-16 DIAGNOSIS — N63.23 UNSPECIFIED LUMP IN THE LEFT BREAST, LOWER OUTER QUADRANT: ICD-10-CM

## 2021-03-16 DIAGNOSIS — N63.20 LEFT BREAST LUMP: Primary | ICD-10-CM

## 2021-03-16 PROCEDURE — 99213 OFFICE O/P EST LOW 20 MIN: CPT | Performed by: FAMILY MEDICINE

## 2021-03-16 NOTE — PROGRESS NOTES
Assessment/Plan:     Diagnoses and all orders for this visit:      Unspecified lump in the left breast, lower outer quadrant   -     Mammo diagnostic left w cad; Future  -     US breast left limited (diagnostic); Future    Encounter for screening mammogram for malignant neoplasm of breast  -     Mammo screening bilateral w 3d & cad; Future    Morbid obesity ([facility] [address] )  BMI Counseling: Body mass index is 44 21 kg/m²  Discussed with patient's BMI with her  The BMI is above normal  Nutrition recommendations include reducing portion sizes, decreasing overall calorie intake, 3-5 servings of fruits/vegetables daily, reducing fast food intake and consuming healthier snacks  Exercise recommendations include moderate aerobic physical activity for 150 minutes/week  Subjective:      Patient ID: Satish Huddleston is a 78 y o  female  HPI   Jil Jernigan presents today for evaluation of a lump that she feels in the left outer breast which she has noticed for the past week  States it is not painful, but she does notice it and it can be bothersome  There is some growth since she noticed it  Denies fevers, chills, weight loss, fatigue, night sweats, nipple discharge, nipple retraction, skin discoloration/changes  Her aunt (fathers sister) with breast cancer  She did not have a mammogram done recently  The following portions of the patient's history were reviewed and updated as appropriate: allergies, current medications, past family history, past medical history, past social history, past surgical history and problem list     Review of Systems   Constitutional: Negative  HENT: Negative  Eyes: Negative  Respiratory: Negative  Cardiovascular: Negative  Gastrointestinal: Negative  Endocrine: Negative  Genitourinary: Negative  Musculoskeletal: Negative  Skin: Negative  As noted in HPI   Allergic/Immunologic: Negative  Neurological: Negative  Hematological: Negative      Psychiatric/Behavioral: Negative  Objective:      /80   Pulse 66   Temp 98 °F (36 7 °C)   Ht 5' 1" (1 549 m)   Wt 106 kg (234 lb)   BMI 44 21 kg/m²          Physical Exam  Constitutional:       General: She is not in acute distress  Appearance: She is well-developed  She is not diaphoretic  HENT:      Head: Normocephalic and atraumatic  Eyes:      General: No scleral icterus  Right eye: No discharge  Left eye: No discharge  Conjunctiva/sclera: Conjunctivae normal    Neck:      Musculoskeletal: Normal range of motion  Pulmonary:      Effort: Pulmonary effort is normal    Chest:      Breasts: Breasts are symmetrical          Right: No swelling, bleeding, inverted nipple, mass, nipple discharge, skin change or tenderness  Left: Mass (possible mass vs fatty tissue noted on left outter aspect of left breast  No tenderness or pain  ) present  No swelling, bleeding, inverted nipple, nipple discharge, skin change or tenderness  Lymphadenopathy:      Upper Body:      Right upper body: No supraclavicular, axillary or pectoral adenopathy  Left upper body: No supraclavicular, axillary or pectoral adenopathy  Skin:     General: Skin is warm  Neurological:      Mental Status: She is alert and oriented to person, place, and time  Psychiatric:         Behavior: Behavior normal          Thought Content:  Thought content normal          Judgment: Judgment normal

## 2021-03-18 DIAGNOSIS — I10 BENIGN ESSENTIAL HYPERTENSION: ICD-10-CM

## 2021-03-18 RX ORDER — METOPROLOL TARTRATE 50 MG/1
TABLET, FILM COATED ORAL
Qty: 90 TABLET | Refills: 3 | Status: SHIPPED | OUTPATIENT
Start: 2021-03-18 | End: 2021-06-15

## 2021-03-26 ENCOUNTER — HOSPITAL ENCOUNTER (OUTPATIENT)
Dept: RADIOLOGY | Facility: HOSPITAL | Age: 71
Discharge: HOME | End: 2021-03-26
Attending: FAMILY MEDICINE
Payer: MEDICARE

## 2021-03-26 ENCOUNTER — IMMUNIZATIONS (OUTPATIENT)
Dept: FAMILY MEDICINE CLINIC | Facility: HOSPITAL | Age: 71
End: 2021-03-26
Payer: MEDICARE

## 2021-03-26 VITALS — BODY MASS INDEX: 44.18 KG/M2 | HEIGHT: 61 IN | WEIGHT: 234 LBS

## 2021-03-26 DIAGNOSIS — N63.23 UNSPECIFIED LUMP IN THE LEFT BREAST, LOWER OUTER QUADRANT: ICD-10-CM

## 2021-03-26 DIAGNOSIS — N63.20 LEFT BREAST LUMP: ICD-10-CM

## 2021-03-26 DIAGNOSIS — Z23 ENCOUNTER FOR IMMUNIZATION: Primary | ICD-10-CM

## 2021-03-26 PROCEDURE — 77066 DX MAMMO INCL CAD BI: CPT

## 2021-03-26 PROCEDURE — 0002A SARS-COV-2 / COVID-19 MRNA VACCINE (PFIZER-BIONTECH) 30 MCG: CPT

## 2021-03-26 PROCEDURE — 76642 ULTRASOUND BREAST LIMITED: CPT

## 2021-03-26 PROCEDURE — 91300 SARS-COV-2 / COVID-19 MRNA VACCINE (PFIZER-BIONTECH) 30 MCG: CPT

## 2021-03-26 PROCEDURE — G0279 TOMOSYNTHESIS, MAMMO: HCPCS

## 2021-03-26 NOTE — PROGRESS NOTES
Met with patient and Dr Wilma Cotto regarding recommendation for:     _____ Right ___x___Left      __x__Ultrasound guided breast biopsy  _____Stereotactic breast biopsy  __x___Verbalized understanding  Blood thinners:  _____ yes ___x___ no    Date stopped: ___N/A____    Biopsy teaching sheet given:  __x___yes ____no    Tentative biopsy appointment: Monday 04/19 @ 10:30am [address]    Patient was also counseled that the [facility] requires us to have all of our outpatient breast biopsy patients screened for COVID-19 within 6 days prior to their biopsy date  Patient could have COVID screening swab collected on Tuesday 04/13 or Wednesday 04/14 to allow adequate time for her swab results to be processed prior to her biopsy appointment date  Informed patient that the closest testing facility is the [facility] located at [address] [place] [address] in [place], [place]  Patient aware of [facility] at [address] hours of operation: Monday- Friday from 8am to 8pm, and Saturday and Sunday from 8am to 4pm  The [facility] asks that patients please call extension [phone] from the parking lot when they arrive, and a staff member will come directly to their vehicle to collect the screening swab; and that scheduling an appointment is not required per current policy

## 2021-03-29 ENCOUNTER — TELEPHONE (OUTPATIENT)
Dept: FAMILY MEDICINE CLINIC | Facility: CLINIC | Age: 71
End: 2021-03-29

## 2021-03-29 NOTE — TELEPHONE ENCOUNTER
----- Message from [address] sent at 3/29/2021 12:24 PM EDT -----    ----- Message -----  From: Gabe Rose MD  Sent: 3/26/2021   3:22 PM EDT  To: Lazarus De La Fuente MD

## 2021-03-29 NOTE — TELEPHONE ENCOUNTER
I called patient regarding ultrasound results to see if she had any questions for me regarding her upcoming breast biopsy  LMOM  I see she is already scheduled and told her she did not need to call back if she did not have any additional questions

## 2021-03-30 DIAGNOSIS — Z01.818 PREPROCEDURAL EXAMINATION: Primary | ICD-10-CM

## 2021-04-13 DIAGNOSIS — Z01.818 PREPROCEDURAL EXAMINATION: ICD-10-CM

## 2021-04-13 PROCEDURE — U0003 INFECTIOUS AGENT DETECTION BY NUCLEIC ACID (DNA OR RNA); SEVERE ACUTE RESPIRATORY SYNDROME CORONAVIRUS 2 (SARS-COV-2) (CORONAVIRUS DISEASE [COVID-19]), AMPLIFIED PROBE TECHNIQUE, MAKING USE OF HIGH THROUGHPUT TECHNOLOGIES AS DESCRIBED BY CMS-2020-01-R: HCPCS | Performed by: FAMILY MEDICINE

## 2021-04-13 PROCEDURE — U0005 INFEC AGEN DETEC AMPLI PROBE: HCPCS | Performed by: FAMILY MEDICINE

## 2021-04-14 LAB — SARS-COV-2 RNA RESP QL NAA+PROBE: NEGATIVE

## 2021-04-15 ENCOUNTER — TELEPHONE (OUTPATIENT)
Dept: RADIOLOGY | Facility: HOSPITAL | Age: 71
End: 2021-04-15

## 2021-04-19 ENCOUNTER — HOSPITAL ENCOUNTER (OUTPATIENT)
Dept: RADIOLOGY | Facility: HOSPITAL | Age: 71
Discharge: HOME | End: 2021-04-19
Attending: FAMILY MEDICINE

## 2021-04-19 ENCOUNTER — HOSPITAL ENCOUNTER (OUTPATIENT)
Dept: RADIOLOGY | Facility: HOSPITAL | Age: 71
Discharge: HOME | End: 2021-04-19
Attending: FAMILY MEDICINE
Payer: MEDICARE

## 2021-04-19 VITALS — SYSTOLIC BLOOD PRESSURE: 128 MMHG | DIASTOLIC BLOOD PRESSURE: 72 MMHG

## 2021-04-19 DIAGNOSIS — R92.8 ABNORMAL MAMMOGRAM: ICD-10-CM

## 2021-04-19 PROCEDURE — 88361 TUMOR IMMUNOHISTOCHEM/COMPUT: CPT | Performed by: PATHOLOGY

## 2021-04-19 PROCEDURE — 38505 NEEDLE BIOPSY LYMPH NODES: CPT

## 2021-04-19 PROCEDURE — 76942 ECHO GUIDE FOR BIOPSY: CPT

## 2021-04-19 PROCEDURE — 88341 IMHCHEM/IMCYTCHM EA ADD ANTB: CPT | Performed by: PATHOLOGY

## 2021-04-19 PROCEDURE — 19083 BX BREAST 1ST LESION US IMAG: CPT

## 2021-04-19 PROCEDURE — 88305 TISSUE EXAM BY PATHOLOGIST: CPT | Performed by: PATHOLOGY

## 2021-04-19 PROCEDURE — 88342 IMHCHEM/IMCYTCHM 1ST ANTB: CPT | Performed by: PATHOLOGY

## 2021-04-19 PROCEDURE — A4648 IMPLANTABLE TISSUE MARKER: HCPCS

## 2021-04-19 RX ORDER — LIDOCAINE HYDROCHLORIDE 10 MG/ML
5 INJECTION, SOLUTION EPIDURAL; INFILTRATION; INTRACAUDAL; PERINEURAL ONCE
Status: COMPLETED | OUTPATIENT
Start: 2021-04-19 | End: 2021-04-19

## 2021-04-19 RX ADMIN — LIDOCAINE HYDROCHLORIDE 5 ML: 10 INJECTION, SOLUTION EPIDURAL; INFILTRATION; INTRACAUDAL; PERINEURAL at 10:57

## 2021-04-19 NOTE — DISCHARGE INSTR - OTHER ORDERS
POST LARGE CORE BREAST BIOPSY PATIENT INFORMATION      1  Place an ice pack inside your bra over the top of the dressing every hour for 20 minutes (20 minutes on, 60 minutes off)  Do this until bedtime  2  Do not shower or bathe until the following morning  3  You may bathe your breast carefully with the steri-strips in place  Be careful    Not to loosen them  The steri-strips will fall off in 3-5 days  4  You may have mild discomfort, and you may have some bruising where the   Needle entered the skin  This should clear within 5-7 days  5  If you need medicine for discomfort, take acetaminophen products such as   Tylenol  You may also take Advil or Motrin products  6  Do not participate in strenuous activities such as-tennis, aerobics, skiing,  Weight lifting, etc  for 24 hours  Refrain from swimming/soaking for 72 hours  7  Wearing a bra for sleeping may be more comfortable for the first 24-48 hours  8  Watch for continued bleeding, pain or fever over 101; please call with any questions or concerns  For procedures done at the [facility]  [facility] "[address] call:  Sandra Emmanuel RN at [facility] RN at [phone]                    *After 4 PM call the Interventional Radiology Department                    [phone] and ask to speak with the nurse on call  For procedures done at the [facility] or [address] [place] [address] call:         Immanuel Campos RN at [phone]  *After 4 PM call your physician, or go to the Emergency Department  For procedures done at [address] [place] [address] call:  Nidhi Robbins RN at [phone]  *After 4 PM call your physician, or go to the Emergency Department  9          The final results of your biopsy are usually available within one week

## 2021-04-19 NOTE — PROGRESS NOTES
Outside lab work received from [facility] via fax. Orders placed back in Jan 2020 by Dr. Arguelles. Routed to dr. Arguelles to advise. Copy sent to scanning.            Procedure type:    __x___Ultrasound guided _____Stereotactic    Breast:    __x___Left breast biopsy _____Right breast biopsy    Location: Site #1 Left Breast 2 o'clock 8cm FN       Site #2 Left Breast axillary tail/ axillary lymph node    Needle: Site #1 12G Marquee               Site #2 16G Marquee    # of passes: Site #1 3 passes            Site #2 3 passes    Clip: Site #1 Wing clip          Site #2 Hydromark shape#3 coil/corkscrew    Performed by: Erik Dickens MD    Pressure held for 5 minutes by: Frank Tee RN    Steri Strips:    ___x__yes _____no    Band aid:    _____yes__x___no  Gauze and tape in place    Tolerated procedure:    __x___yes _____no

## 2021-04-20 ENCOUNTER — TELEPHONE (OUTPATIENT)
Dept: RADIOLOGY | Facility: HOSPITAL | Age: 71
End: 2021-04-20

## 2021-04-20 ENCOUNTER — TELEPHONE (OUTPATIENT)
Dept: FAMILY MEDICINE CLINIC | Facility: CLINIC | Age: 71
End: 2021-04-20

## 2021-04-20 DIAGNOSIS — C50.919 MALIGNANT NEOPLASM OF FEMALE BREAST, UNSPECIFIED ESTROGEN RECEPTOR STATUS, UNSPECIFIED LATERALITY, UNSPECIFIED SITE OF BREAST (HCC): Primary | ICD-10-CM

## 2021-04-20 NOTE — TELEPHONE ENCOUNTER
Radiology called stating they tried to get a hold of the pt that both the findings were malignant  Pt was not able to get a hold of   FYI

## 2021-04-21 NOTE — TELEPHONE ENCOUNTER
I called and phone went right to voicemail  [employer] message was sent to have patient call as well

## 2021-04-22 ENCOUNTER — TELEPHONE (OUTPATIENT)
Dept: RADIOLOGY | Facility: HOSPITAL | Age: 71
End: 2021-04-22

## 2021-04-22 ENCOUNTER — TELEPHONE (OUTPATIENT)
Dept: HEMATOLOGY ONCOLOGY | Facility: CLINIC | Age: 71
End: 2021-04-22

## 2021-04-22 NOTE — TELEPHONE ENCOUNTER
Patient called back and I spoke to her about the biopsy, positive for breast cancer  She would like to see Dr Horace Hussein as her next step  Order is in the chart and she will call there for an appointment  She had no further questions

## 2021-04-22 NOTE — TELEPHONE ENCOUNTER
New Patient Breast Form   Patient Details:     Travis Mclean     1950     [id number]     Background Information:   [address] starts by opening a telephone encounter and gathering the following information   Who is calling to schedule and relationship?  self   Referring Provider Darnell   To which speciality is the referral?  Surgical Oncology   Reason for Visit? new   Tumor Type?  invasive breast ca   Is there a confimed diagnosis from biopsy/tissue reviewed by Pathology? Yes   Date of Tissue Diagnosis (If done outside of [facility] please obtain report and slides) 4/2021   Is patient aware of diagnosis, and who made them aware? Yes / Dr Tonya Gamino   If no tissue diagnosis, please stop and discuss with Navigator prior to scheduling   When was the diagnosis made? 4/2021   Were outside slides requested  No   If biopsy done externally, obtain reports and slides for internal review   Have you had any imaging or labs done? Yes   If YES, when and  where was the blood work done? [facility]   If outside of [facility] obtain records   Was the patient told to bring a disk? No   Are records in EPIC? Yes   Is there a personal history of cancer? No   If YES please list type and YR diagnosed    If patient has a prior history of breast cancer were old records obtained? Have you ever had genetic testing done for breast cancer? If so, can you please bring a copy to appointment for timely treatment planning No   Is there a family history of cancer? Yes   If YES please list type Lung, colon   Scheduling Information:   Preferred [facility]   Are there any days the patient cannot be seen?  no   How was New Patient Packet Sent? Awaiting appt   Miscellaneous: Routing to [facility] for direction  After completing the above information, please route to finance, nurse navigation and clinical trials for review

## 2021-04-23 PROBLEM — C50.412 MALIGNANT NEOPLASM OF UPPER-OUTER QUADRANT OF LEFT FEMALE BREAST (HCC): Status: ACTIVE | Noted: 2021-04-23

## 2021-04-26 ENCOUNTER — TELEPHONE (OUTPATIENT)
Dept: RADIOLOGY | Facility: HOSPITAL | Age: 71
End: 2021-04-26

## 2021-04-26 NOTE — PROGRESS NOTES
Patient returned follow-up call on: Thursday 04/22 @ 3:37pm    Post procedure call completed: Tuesday 04/20 attempted, Wednesday 04/21 attempted, Thursday 04/22 attempted      Bleeding: _____yes __x___no    Pain: _____yes ___x___no    Patient reports using the ice pack Monday evening 04/19 and that it did help moderately well with her breast pain  She also has been recommended to continue to use ice pack near breast biopsy site as needed if it still benefits her pain control  I did also recommend her to take OTC medications as needed if she has any residual pain or discomfort in the breast near the biopsy site  Redness/Swelling: ______yes ___x___no    Band aid removed: __x___yes _____no  Patient reports she did remove the gauze and paper tape Tuesday 04/20 when she washed up in the morning  Steri-Strips intact: ___x___yes _____no    Patient with no additional questions at this time  I advised her that her breast biopsy pathology results are also available at this time, and that both Dr Vane Puentes and Dr Bryson Penaloza have made attempts to reach out to her to review the results of her pathology  Patient to review results with Radiologist or PCP when available

## 2021-04-30 ENCOUNTER — TELEPHONE (OUTPATIENT)
Dept: SURGICAL ONCOLOGY | Facility: CLINIC | Age: 71
End: 2021-04-30

## 2021-04-30 PROBLEM — Z17.0 MALIGNANT NEOPLASM OF UPPER-OUTER QUADRANT OF LEFT BREAST IN FEMALE, ESTROGEN RECEPTOR POSITIVE (HCC): Status: ACTIVE | Noted: 2021-04-23

## 2021-05-03 ENCOUNTER — APPOINTMENT (OUTPATIENT)
Dept: LAB | Facility: CLINIC | Age: 71
End: 2021-05-03
Payer: MEDICARE

## 2021-05-03 ENCOUNTER — CONSULT (OUTPATIENT)
Dept: SURGICAL ONCOLOGY | Facility: CLINIC | Age: 71
End: 2021-05-03
Payer: MEDICARE

## 2021-05-03 ENCOUNTER — TRANSCRIBE ORDERS (OUTPATIENT)
Dept: LAB | Facility: CLINIC | Age: 71
End: 2021-05-03

## 2021-05-03 ENCOUNTER — HOSPITAL ENCOUNTER (OUTPATIENT)
Dept: RADIOLOGY | Facility: HOSPITAL | Age: 71
Discharge: HOME | End: 2021-05-03
Attending: SURGERY
Payer: MEDICARE

## 2021-05-03 ENCOUNTER — OFFICE VISIT (OUTPATIENT)
Dept: LAB | Facility: CLINIC | Age: 71
End: 2021-05-03
Payer: MEDICARE

## 2021-05-03 VITALS
HEIGHT: 61 IN | TEMPERATURE: 98.1 F | DIASTOLIC BLOOD PRESSURE: 90 MMHG | HEART RATE: 63 BPM | BODY MASS INDEX: 43.61 KG/M2 | RESPIRATION RATE: 16 BRPM | WEIGHT: 231 LBS | SYSTOLIC BLOOD PRESSURE: 154 MMHG

## 2021-05-03 DIAGNOSIS — C50.412 MALIGNANT NEOPLASM OF UPPER-OUTER QUADRANT OF LEFT FEMALE BREAST, UNSPECIFIED ESTROGEN RECEPTOR STATUS (HCC): ICD-10-CM

## 2021-05-03 DIAGNOSIS — Z80.3 FAMILY HISTORY OF BREAST CANCER: ICD-10-CM

## 2021-05-03 DIAGNOSIS — Z17.0 MALIGNANT NEOPLASM OF UPPER-OUTER QUADRANT OF LEFT BREAST IN FEMALE, ESTROGEN RECEPTOR POSITIVE (HCC): ICD-10-CM

## 2021-05-03 DIAGNOSIS — C50.412 MALIGNANT NEOPLASM OF UPPER-OUTER QUADRANT OF LEFT BREAST IN FEMALE, ESTROGEN RECEPTOR POSITIVE (HCC): ICD-10-CM

## 2021-05-03 DIAGNOSIS — Z01.818 PREOPERATIVE TESTING: ICD-10-CM

## 2021-05-03 DIAGNOSIS — C50.912 CARCINOMA OF LEFT BREAST METASTATIC TO AXILLARY LYMPH NODE (HCC): ICD-10-CM

## 2021-05-03 DIAGNOSIS — Z17.0 MALIGNANT NEOPLASM OF UPPER-OUTER QUADRANT OF LEFT BREAST IN FEMALE, ESTROGEN RECEPTOR POSITIVE (HCC): Primary | ICD-10-CM

## 2021-05-03 DIAGNOSIS — C50.412 MALIGNANT NEOPLASM OF UPPER-OUTER QUADRANT OF LEFT BREAST IN FEMALE, ESTROGEN RECEPTOR POSITIVE (HCC): Primary | ICD-10-CM

## 2021-05-03 DIAGNOSIS — Z80.3 FAMILY HISTORY OF MALIGNANT NEOPLASM OF BREAST: ICD-10-CM

## 2021-05-03 DIAGNOSIS — Z17.0 ESTROGEN RECEPTOR POSITIVE: ICD-10-CM

## 2021-05-03 DIAGNOSIS — C50.412 MALIGNANT NEOPLASM OF UPPER-OUTER QUADRANT OF LEFT FEMALE BREAST, UNSPECIFIED ESTROGEN RECEPTOR STATUS (HCC): Primary | ICD-10-CM

## 2021-05-03 DIAGNOSIS — C50.919 MALIGNANT NEOPLASM OF FEMALE BREAST, UNSPECIFIED ESTROGEN RECEPTOR STATUS, UNSPECIFIED LATERALITY, UNSPECIFIED SITE OF BREAST (HCC): ICD-10-CM

## 2021-05-03 DIAGNOSIS — C77.3 CARCINOMA OF LEFT BREAST METASTATIC TO AXILLARY LYMPH NODE (HCC): ICD-10-CM

## 2021-05-03 LAB
ALBUMIN SERPL BCP-MCNC: 3.9 G/DL (ref 3.5–5)
ALP SERPL-CCNC: 74 U/L (ref 46–116)
ALT SERPL W P-5'-P-CCNC: 41 U/L (ref 12–78)
ANION GAP SERPL CALCULATED.3IONS-SCNC: 10 MMOL/L (ref 4–13)
AST SERPL W P-5'-P-CCNC: 19 U/L (ref 5–45)
BASOPHILS # BLD AUTO: 0.03 THOUSANDS/ΜL (ref 0–0.1)
BASOPHILS NFR BLD AUTO: 0 % (ref 0–1)
BILIRUB SERPL-MCNC: 0.49 MG/DL (ref 0.2–1)
BUN SERPL-MCNC: 14 MG/DL (ref 5–25)
CALCIUM SERPL-MCNC: 9.2 MG/DL (ref 8.3–10.1)
CHLORIDE SERPL-SCNC: 106 MMOL/L (ref 100–108)
CO2 SERPL-SCNC: 28 MMOL/L (ref 21–32)
CREAT SERPL-MCNC: 0.74 MG/DL (ref 0.6–1.3)
EOSINOPHIL # BLD AUTO: 0.06 THOUSAND/ΜL (ref 0–0.61)
EOSINOPHIL NFR BLD AUTO: 1 % (ref 0–6)
ERYTHROCYTE [DISTWIDTH] IN BLOOD BY AUTOMATED COUNT: 13.9 % (ref 11.6–15.1)
GFR SERPL CREATININE-BSD FRML MDRD: 84 ML/MIN/1.73SQ M
GLUCOSE SERPL-MCNC: 114 MG/DL (ref 65–140)
HCT VFR BLD AUTO: 47.4 % (ref 34.8–46.1)
HGB BLD-MCNC: 15.8 G/DL (ref 11.5–15.4)
IMM GRANULOCYTES # BLD AUTO: 0.02 THOUSAND/UL (ref 0–0.2)
IMM GRANULOCYTES NFR BLD AUTO: 0 % (ref 0–2)
LYMPHOCYTES # BLD AUTO: 1.48 THOUSANDS/ΜL (ref 0.6–4.47)
LYMPHOCYTES NFR BLD AUTO: 17 % (ref 14–44)
MCH RBC QN AUTO: 29.9 PG (ref 26.8–34.3)
MCHC RBC AUTO-ENTMCNC: 33.3 G/DL (ref 31.4–37.4)
MCV RBC AUTO: 90 FL (ref 82–98)
MONOCYTES # BLD AUTO: 0.57 THOUSAND/ΜL (ref 0.17–1.22)
MONOCYTES NFR BLD AUTO: 7 % (ref 4–12)
NEUTROPHILS # BLD AUTO: 6.37 THOUSANDS/ΜL (ref 1.85–7.62)
NEUTS SEG NFR BLD AUTO: 75 % (ref 43–75)
NRBC BLD AUTO-RTO: 0 /100 WBCS
PLATELET # BLD AUTO: 255 THOUSANDS/UL (ref 149–390)
PMV BLD AUTO: 10.5 FL (ref 8.9–12.7)
POTASSIUM SERPL-SCNC: 3.8 MMOL/L (ref 3.5–5.3)
PROT SERPL-MCNC: 7.5 G/DL (ref 6.4–8.2)
RBC # BLD AUTO: 5.29 MILLION/UL (ref 3.81–5.12)
SODIUM SERPL-SCNC: 144 MMOL/L (ref 136–145)
WBC # BLD AUTO: 8.53 THOUSAND/UL (ref 4.31–10.16)

## 2021-05-03 PROCEDURE — 71046 X-RAY EXAM CHEST 2 VIEWS: CPT

## 2021-05-03 PROCEDURE — 99205 OFFICE O/P NEW HI 60 MIN: CPT | Performed by: SURGERY

## 2021-05-03 PROCEDURE — 80053 COMPREHEN METABOLIC PANEL: CPT

## 2021-05-03 PROCEDURE — 36415 COLL VENOUS BLD VENIPUNCTURE: CPT

## 2021-05-03 PROCEDURE — 85025 COMPLETE CBC W/AUTO DIFF WBC: CPT

## 2021-05-03 PROCEDURE — 93005 ELECTROCARDIOGRAM TRACING: CPT

## 2021-05-03 RX ORDER — OXYCODONE HYDROCHLORIDE AND ACETAMINOPHEN 5; 325 MG/1; MG/1
1 TABLET ORAL EVERY 6 HOURS PRN
Qty: 20 TABLET | Refills: 0 | Status: SHIPPED | OUTPATIENT
Start: 2021-05-03 | End: 2022-06-29

## 2021-05-03 NOTE — PROGRESS NOTES
Surgical Oncology Consult Note       [address]  [facility]  [address]   [place] [address]    Carine Hwang  1950  [id number]  [id number] [facility]  [facility]  [address] [address]      Chief Complaint:     Chief Complaint   Patient presents with    Consult    Breast Cancer     New Diagnosis       Assessment and Plan:   Assessment/Plan   Patient has 4 cm left breast cancer ER positive HER2 negative MT negative and positive axillary lymph node  I have recommended a modified radical mastectomy with a ANAND  directed axillary dissection  Well so coordinated Genetics as well as a metastatic workup  Oncology History:     Oncology History   Malignant neoplasm of upper-outer quadrant of left breast in female, estrogen receptor positive ([address] )   4/19/2021 Biopsy    Left breast US guided biopsy:  A  2 o'clock 8 cm from the nipple  Invasive mammary carcinoma of no special type  Grade 2  ER 90  MT 1  HER2 1+  Lymphovascular invasion: not identified    B  Left Axillary lymph node biopsy:  Invasive/ metastatic adenocarcinoma, compatible with breast primary involving fibroadipose tissues and a portion of lymphoid pranchyma  ER 90  MT 1  HER2 1+    Concordant  Malignancy appears multifocal  The 2 adjacent masses span an area of approximately 4 cm  Right breast clear  4/19/2021 Genomic Testing    MammaPrint pending  History of Present Illness: This is a 53-year-old woman who approximately 1 month ago appreciated a palpable abnormality in her left axilla  She sought medical attention and had a diagnostic workup which showed no abnormalities on the right side however on the left side at the 2 o'clock position 8 cm from the nipple was a 4 cm mass  This was biopsied and shown to be invasive ductal carcinoma grade 2 ER 90% MT negative HER2 negative    They also saw a deep lymph node which was biopsied and shown to be positive for metastatic disease  There is some question whether this was a 2nd  Breast cancer though the tissue had lymphoid tissue within it and most likely represents a metastatic lymph node  The radiologist also describes some skin and parenchymal tissue thickening  There is no peu de orange on her clinical exam    Patient's family history is remarkable for a paternal aunt with breast cancer and a mother with colon cancer  She would like to be genetically tested if possible  Review of Systems:   Review of Systems   Constitutional: Negative for activity change, appetite change and fatigue  HENT: Negative  Eyes: Negative  Respiratory: Negative for cough, shortness of breath and wheezing  Cardiovascular: Negative for chest pain and leg swelling  Gastrointestinal: Negative  Endocrine: Negative  Genitourinary: Negative  Musculoskeletal:        No new changes or complaints of bone pain   Skin: Negative  Allergic/Immunologic: Negative  Neurological: Negative  Hematological: Negative  Psychiatric/Behavioral: Negative  Past Medical History:      Patient Active Problem List   Diagnosis    Benign essential hypertension    Chronic rhinitis    Hidradenitis suppurativa    Hypothyroidism    Impaired fasting glucose    Morbid obesity ([address] )    Venous insufficiency (chronic) (peripheral)    Malignant neoplasm of upper-outer quadrant of left breast in female, estrogen receptor positive ([address] )      History reviewed  No pertinent past medical history       Past Surgical History:   Procedure Laterality Date    AXILLARY SURGERY Right     sweat glands removed -     BREAST CYST EXCISION Right 2000    benign    BREAST SURGERY Right     CHOLECYSTECTOMY      US GUIDED BREAST BIOPSY LEFT COMPLETE Left 4/19/2021    US GUIDED BREAST LYMPH NODE BIOPSY LEFT Left 4/19/2021        Family History   Problem Relation Age of Onset    Dementia Mother     Colon cancer Mother         55's-59's    Lung cancer Father         55's-59's    No Known Problems Sister     Breast cancer Paternal Aunt     No Known Problems Sister     Stomach cancer Paternal Uncle         66's        Social History     Socioeconomic History    Marital status: /Civil Union     Spouse name: Not on file    Number of children: Not on file    Years of education: Not on file    Highest education level: Not on file   Occupational History    Not on file   Social Needs    Financial resource strain: Not on file    Food insecurity     Worry: Not on file     Inability: Not on file   [employer] needs     Medical: Not on file     Non-medical: Not on file   Tobacco Use    Smoking status: Never Smoker    Smokeless tobacco: Never Used   Substance and Sexual Activity    Alcohol use: No    Drug use: No    Sexual activity: Not on file   Lifestyle    Physical activity     Days per week: Not on file     Minutes per session: Not on file    Stress: Not on file   Relationships    Social connections     Talks on phone: Not on file     Gets together: Not on file     Attends Anabaptist service: Not on file     Active member of club or organization: Not on file     Attends meetings of clubs or organizations: Not on file     Relationship status: Not on file    Intimate partner violence     Fear of current or ex partner: Not on file     Emotionally abused: Not on file     Physically abused: Not on file     Forced sexual activity: Not on file   Other Topics Concern    Not on file   Social History Narrative    Not on file        Current Outpatient Medications:     aspirin 81 MG tablet, Take by mouth daily, Disp: , Rfl:     fluticasone (FLONASE) 50 mcg/act nasal spray, 2 puffs into each nostril daily, Disp: , Rfl:     metoprolol tartrate (LOPRESSOR) 50 mg tablet, TAKE ONE TABLET BY MOUTH EVERY DAY, Disp: 90 tablet, Rfl: 3    Multiple Vitamins-Minerals (MULTIVITAMIN ADULT PO), Take by mouth daily, Disp: , Rfl:    No Known Allergies    Physical Exam:     Vitals:    05/03/21 1304   BP: 154/90   Pulse: 63   Resp: 16   Temp: 98 1 °F (36 7 °C)     Physical Exam  Vitals signs reviewed  Constitutional:       Appearance: She is well-developed  HENT:      Head: Normocephalic and atraumatic  Eyes:      Pupils: Pupils are equal, round, and reactive to light  Neck:      Musculoskeletal: Normal range of motion and neck supple  Thyroid: No thyromegaly  Vascular: No JVD  Trachea: No tracheal deviation  Cardiovascular:      Rate and Rhythm: Normal rate and regular rhythm  Heart sounds: Normal heart sounds  No murmur  No friction rub  No gallop  Pulmonary:      Effort: Pulmonary effort is normal  No respiratory distress  Breath sounds: Normal breath sounds  No wheezing or rales  Chest:          Comments: The right breast was examined in the sitting and supine position  There are no worrisome skin changes, tenderness, inverted nipple, nipple discharge, swelling, bleeding or evidence of a mass in any quadrant  Jeremías survey demonstrated no evidence of any clinically suspicious axillary, pectoral or paraclavicular lymph nodes  Examination of the left breast demonstrates some ecchymosis  I do not see any peau de orange over the mass  There is palpable adenopathy,  The there is also ecchymosis in the region so I am not sure that I am feeling the node  The node on ultrasound is quite deep  Abdominal:      General: There is no distension  Palpations: Abdomen is soft  There is no hepatomegaly or mass  Tenderness: There is no abdominal tenderness  There is no guarding or rebound  Musculoskeletal: Normal range of motion  General: No tenderness  Lymphadenopathy:      Cervical: No cervical adenopathy  Skin:     General: Skin is warm and dry  Findings: No erythema or rash     Neurological:      Mental Status: She is alert and oriented to person, place, and time  Cranial Nerves: No cranial nerve deficit  Psychiatric:         Behavior: Behavior normal          Results/Discussion:     I reviewed her mammogram and ultrasound films  There is a question whether there is multifocality the primary lesion  Given the size of the lesion as well as the size of the breast I have recommended a mastectomy without  Immediate reconstruction  In addition because she has at least 1 biopsy-proven lymph node and another suspicious I recommended an axillary dissection  Postoperatively I explained she would most likely need radiation therapy as well as chemotherapy and anti hormonal therapy  I explained delayed reconstruction review possible but I would complete her primary treatment 1st   The patient is interested in genetic testing  We will coordinate that today  Given the size of her primary as well as axilla disease I have recommended a metastatic workup which will coordinate as well  The patient and I underwent the process of consent for   Left breast modified radical mastectomy with ANAND  directed axillary dissection  The complications outlined on the consent including relatively minor problems (wound infection, wound healing problems, hematomas, scarring, chronic discomfort/pain), moderate problems (injury to nerves or blood vessels, allergic reactions) and major complications (extensive blood loss requiring transfusions or possible addtional surgeries, cardiac arrest, stroke and possible death)  We also reviewed specific complications as outlined on the consent form including  Recurrence, need for additional adjuvant therapies, lymphedema  All questions were answered to the patient's satisfaction  We will coordinate the surgery at our next mutual convience  Advance Care Planning/Advance Directives:  I discussed the disease status, treatment plans and follow-up with the patient

## 2021-05-03 NOTE — PATIENT INSTRUCTIONS
Pre-Surgery Instructions:   Medication Instructions    aspirin 81 MG tablet per anesthesia guidelines     fluticasone (FLONASE) 50 mcg/act nasal spray Stop taking 1 days prior to surgery    metoprolol tartrate (LOPRESSOR) 50 mg tablet Take morning of surgery if this is when you would normally take it    Multiple Vitamins-Minerals (MULTIVITAMIN ADULT PO) Stop taking 1 week prior to surgery             Mastectomy   AMBULATORY CARE:   What you need to know about a mastectomy:  A mastectomy is surgery to remove all or part of one or both breasts  Tissue, lymph nodes, or muscle near the breast may also be removed  A mastectomy may be done to treat breast cancer or prevent the cancer from spreading  The type of mastectomy used depends on the size of the tumor and if the cancer has spread  A mastectomy can also be done to prevent breast cancer  This may be a choice if you are at high risk for breast cancer  How to prepare for a mastectomy:   · Your surgeon will talk to you about how to prepare for surgery  He or she may tell you not to eat or drink anything after midnight on the day before your surgery  Arrange for someone to drive you home and stay with you after surgery  This person can help care for you and watch for complications from surgery  · Tell your surgeon about all medicines you currently take  He or she will tell you if you need to stop any medicine for the surgery, and when to stop  You may need to stop taking aspirin or blood thinners several days before surgery  He or she will tell you which medicines to take or not take on the day of surgery  · Tell your healthcare provider or surgeon about all your allergies, including allergic reactions to medicine, anesthesia, or antibiotics  What will happen during a mastectomy:   · You will be given general anesthesia to keep you asleep and free from pain during surgery  You may be given an antibiotic to help prevent a bacterial infection      · Your surgeon will make an incision over your breast  He or she will remove the tumor and breast tissue  The nipple and areola (area around the nipple) may be removed  Surgery that leaves these in place is called nipple-sparing mastectomy  Your surgeon may also remove muscle from behind your breast  If lymph nodes will be taken, a small incision will be made in your armpit  The lymph nodes will be removed and tested for cancer  · Your surgeon may leave extra skin if you are having reconstruction surgery  This surgery is called skin-sparing mastectomy  Reconstruction surgery helps make the breast look more natural  It is done right after the mastectomy  · One or more drains may be inserted near your incision  A drain removes extra fluid and helps your incision heal  Your surgeon will close your incision with stitches and cover it with a bandage  He or she may also wrap a tight-fitting bandage around both of your breasts  This may decrease swelling, bleeding, and pain  What to expect after a mastectomy:   · You will be helped to walk around after surgery to help prevent blood clots  · Healthcare providers will monitor you until you are awake  You may need to spend 1 to 2 nights in the hospital     · You may have trouble moving the arm closest to your mastectomy  This should get better in a few days  Risks of a mastectomy:  You may bleed more than expected or develop an infection  Nerves, blood vessels, and muscles may be damaged during your surgery  Blood or fluid may collect under your skin  You may need other procedures to remove the fluid or blood  You may have swelling in the arm closest to the mastectomy or where lymph nodes were removed  This swelling is called lymphedema  Lymphedema may cause tingling, numbness, stiffness, and weakness in your arm  This may be permanent  You may get a blood clot in your arm or leg  The blood clot may travel to your heart, lungs, or brain   This may become life-threatening  Call your local emergency number (911 in the [address]) for any of the following:   · You feel lightheaded, short of breath, and have chest pain  · You have trouble breathing  · You cough up blood  Seek care immediately if:   · Blood soaks through your bandage  · Your stitches come apart  · Your bruise suddenly gets bigger  · Your leg or arm is larger than usual and painful  Call your doctor or surgeon if:   · You have a fever or chills  · Your wound is red, swollen, or draining pus  · You have nausea or are vomiting  · Your skin is itchy, swollen, or you have a rash  · Your pain does not get better after you take pain medicine  · Your drain falls out or stops draining fluid  · Your drain has pus or foul-smelling fluid coming out of it  · You have numbness, tingling, or swelling in your arm or hand  · You feel very sad or anxious, or are having trouble coping with changes from the mastectomy  · You have questions or concerns about your condition or care  Medicines: You may need any of the following:  · Antibiotics  help prevent a bacterial infection  · Prescription pain medicine  may be given  Ask your healthcare provider how to take this medicine safely  Some prescription pain medicines contain acetaminophen  Do not take other medicines that contain acetaminophen without talking to your healthcare provider  Too much acetaminophen may cause liver damage  Prescription pain medicine may cause constipation  Ask your healthcare provider how to prevent or treat constipation  · NSAIDs , such as ibuprofen, help decrease swelling, pain, and fever  NSAIDs can cause stomach bleeding or kidney problems in certain people  If you take blood thinner medicine, always ask your healthcare provider if NSAIDs are safe for you  Always read the medicine label and follow directions  · Take your medicine as directed    Contact your healthcare provider if you think your medicine is not helping or if you have side effects  Tell him or her if you are allergic to any medicine  Keep a list of the medicines, vitamins, and herbs you take  Include the amounts, and when and why you take them  Bring the list or the pill bottles to follow-up visits  Carry your medicine list with you in case of an emergency  Care for your wound as directed: If you have a tight-fitting bandage, you can remove it in 24 to 48 hours, or as directed  Ask your healthcare provider when your incision can get wet  You may need to take a sponge bath until your drain is removed  Carefully wash around the incision with soap and water  It is okay to allow the soap and water to gently run over your incision  Gently pat dry the area and put on new, clean bandages as directed  Change your bandages when they get wet or dirty  If lymph nodes were removed from your armpit, ask your healthcare provider when you can wear deodorant  Check your incision every day for redness, pus, or swelling  Self-care:   · Apply ice  on your incision for 15 to 20 minutes every hour or as directed  Use an ice pack, or put crushed ice in a plastic bag  Cover it with a towel  Ice helps prevent tissue damage and decreases swelling and pain  · Elevate  your arm nearest to your incision above the level of your heart  Do this as often as you can  This will help decrease swelling and pain  Prop your arm on pillows or blankets to keep it elevated comfortably  · Rest  as directed  Do not lift anything heavier than 5 pounds  Do not push or pull with your arms  You can use your arms to groom, eat, and bathe  Take short walks around the house  Gradually walk further as you feel better  Ask your healthcare provider when you can return to your normal activities  · Do not sleep on your stomach  This will put too much pressure on your incision  Sleep on your back or on the opposite side of your incision  · Empty your drain  as directed   You may need to write down how much fluid you empty from your drain each day  Ask your healthcare provider for more information about how to empty your drain  · Wear a supportive bra  as directed  Wait until you remove the tight-fitting bandage to wear a bra  You may be given a surgical bra or told to wear a sports bra  A supportive bra may help hold your bandages in place  It may also help with swelling and pain  Do not  wear bras with lace or underwire  They may rub against your incision and cause discomfort  Arm stretches: Your healthcare provider may show you how to do arm stretches  Arm stretches may prevent stiff arms or shoulders  You may need to wait until after your drains are removed to begin stretching  Do not do arm stretches until your healthcare provider says it is okay  Ask your healthcare provider for more information about arm stretches  Follow up with your doctor or surgeon as directed:  Write down your questions so you remember to ask them during your visits  For support and more information:  You may have difficulty coping with the changes to your body  Talk to your family or friends about how you are feeling  Ask your healthcare provider about support groups  It may be helpful to talk with other women who have had a mastectomy  · [address] [place] [address]  Phone: [phone]  Web Address: http://[employer]/  org  · [address] [place] [address]  Phone: 3- 460 - [phone]  Web Address: http://[employer]/  [address] Information is for End User's use only and may not be sold, redistributed or otherwise used for commercial purposes  All illustrations and images included in CareNotes® are the copyrighted property of [employer]  or [address] [place] [address]  The above information is an  only   It is not intended as medical advice for individual conditions or treatments  Talk to your doctor, nurse or pharmacist before following any medical regimen to see if it is safe and effective for you  Daniel-Reese Drain Care   WHAT YOU NEED TO KNOW:   A Daniel-Reese (ALYSSA) drain is used to remove fluids that build up in an area of your body after surgery  The ALYSSA drain is a bulb shaped device connected to a tube  One end of the tube is placed inside you during surgery  The other end comes out through a small cut in your skin  The bulb is connected to this end  You may have a stitch to hold the tube in place  DISCHARGE INSTRUCTIONS:   Return to the emergency department if:   · Your ALYSSA drain breaks or comes out  · You have cloudy yellow or brown drainage from your ALYSSA drain site, or the drainage smells bad  Contact your healthcare provider if:   · You drain less than 30 milliliters (2 tablespoons) in 24 hours  This may mean your drain can be removed  · You suddenly stop draining fluid or think your ALYSSA drain is blocked  · You have a fever higher than 101 5°F (38 6°C)  · You have increased pain, redness, or swelling around the drain site  · You have questions about your ALYSSA drain care  How a Daniel-Reese drain works: The ALYSSA drain removes fluids by creating suction in the tube  The bulb is squeezed flat and connected to the tube that sticks out of your body  The bulb expands as it fills with fluid  How to change the bandage around your Daniel-Reese drain:  If you have a bandage, change it once a day  You may need to change your bandage more than once a day if it gets completely wet  · Wash your hands with soap and water  · Loosen the tape and gently remove the old bandage  Throw the old bandage into a plastic trash bag  · Use soap and water or saline solution to clean your ALYSSA drain site  Dip a cotton swab or gauze pad in the solution and gently clean your skin  · Pat the area dry       · Place a new bandage on your ALYSSA drain site and secure it to your skin with surgical tape  · Wash your hands  How to empty the Daniel-Reese drain:  Empty the bulb when it is half full or every 8 to 12 hours  · Wash your hands with soap and water  · Remove the plug from the bulb  · Pour the fluid into a measuring cup  · Clean the plug with an alcohol swab or a cotton ball dipped in rubbing alcohol  · Squeeze the bulb flat and put the plug back in  The bulb should stay flat until it starts to fill with fluid again  · Measure the amount of fluid you pour out  Write down how much fluid you empty from the ALYSSA drain and the date and time you collected it  · Flush the fluid down the toilet  Wash your hands  Clear clogged tubing:  Use the following steps to clear your Daniel-Reese tubing:  · Hold the tubing between your thumb and first finger at the place closest to your skin  This hand will prevent the tube from being pulled out of your skin  · Use your other thumb and first finger to slide the clog down the tubing toward the bulb  You may have to repeat the sliding until the tubing is unclogged  Daniel-Reese drain removal:  The amount of fluid that you drain will decrease as your wound heals  The ALYSSA drain usually is removed when less than 30 milliliters (2 tablespoons) is collected in 24 hours  Ask your healthcare provider when and how your ALYSSA drain will be removed  Follow up with your healthcare provider as directed:  Write down your questions so you remember to ask them during your visits  © Copyright [address] Information is for End User's use only and may not be sold, redistributed or otherwise used for commercial purposes  All illustrations and images included in CareNotes® are the copyrighted property of [employer]  or [facility]   The above information is an  only  It is not intended as medical advice for individual conditions or treatments   Talk to your doctor, nurse or pharmacist before following any medical regimen to see if it is safe and effective for you  Physical Therapy

## 2021-05-04 DIAGNOSIS — Z17.0 MALIGNANT NEOPLASM OF UPPER-OUTER QUADRANT OF LEFT BREAST IN FEMALE, ESTROGEN RECEPTOR POSITIVE (HCC): Primary | ICD-10-CM

## 2021-05-04 DIAGNOSIS — C50.412 MALIGNANT NEOPLASM OF UPPER-OUTER QUADRANT OF LEFT BREAST IN FEMALE, ESTROGEN RECEPTOR POSITIVE (HCC): Primary | ICD-10-CM

## 2021-05-04 LAB
ATRIAL RATE: 60 BPM
P AXIS: 14 DEGREES
PR INTERVAL: 146 MS
QRS AXIS: 2 DEGREES
QRSD INTERVAL: 90 MS
QT INTERVAL: 420 MS
QTC INTERVAL: 420 MS
T WAVE AXIS: 38 DEGREES
VENTRICULAR RATE: 60 BPM

## 2021-05-04 PROCEDURE — 93010 ELECTROCARDIOGRAM REPORT: CPT | Performed by: INTERNAL MEDICINE

## 2021-05-04 NOTE — PROGRESS NOTES
Call placed to patient regarding recommendation for;    _____ RIGHT ____X__LEFT LN      __X___SAVI  placement  Procedure explained to patient, additional questions answered at this time    __X___Verbalized understanding        Blood thinners:  ___X__yes _____no (ASA 81mg)    Date stopped: ___N/A____    All teaching points discussed during call, pt with no questions at this time, pt adv to arrive at 0800 for 0830 insertion    Pt given name/# for any further questions/needs

## 2021-05-05 LAB — MISCELLANEOUS LAB TEST RESULT: NORMAL

## 2021-05-07 ENCOUNTER — HOSPITAL ENCOUNTER (OUTPATIENT)
Dept: RADIOLOGY | Facility: HOSPITAL | Age: 71
Discharge: HOME | End: 2021-05-07
Attending: SURGERY
Payer: MEDICARE

## 2021-05-07 ENCOUNTER — PATIENT OUTREACH (OUTPATIENT)
Dept: SURGICAL ONCOLOGY | Facility: CLINIC | Age: 71
End: 2021-05-07

## 2021-05-07 ENCOUNTER — TELEPHONE (OUTPATIENT)
Dept: GYNECOLOGIC ONCOLOGY | Facility: CLINIC | Age: 71
End: 2021-05-07

## 2021-05-07 DIAGNOSIS — C50.412 MALIGNANT NEOPLASM OF UPPER-OUTER QUADRANT OF LEFT BREAST IN FEMALE, ESTROGEN RECEPTOR POSITIVE (HCC): ICD-10-CM

## 2021-05-07 DIAGNOSIS — Z17.0 MALIGNANT NEOPLASM OF UPPER-OUTER QUADRANT OF LEFT BREAST IN FEMALE, ESTROGEN RECEPTOR POSITIVE (HCC): ICD-10-CM

## 2021-05-07 DIAGNOSIS — C77.3 CARCINOMA OF LEFT BREAST METASTATIC TO AXILLARY LYMPH NODE (HCC): ICD-10-CM

## 2021-05-07 DIAGNOSIS — C50.912 CARCINOMA OF LEFT BREAST METASTATIC TO AXILLARY LYMPH NODE (HCC): ICD-10-CM

## 2021-05-07 PROCEDURE — 71260 CT THORAX DX C+: CPT

## 2021-05-07 PROCEDURE — 78306 BONE IMAGING WHOLE BODY: CPT

## 2021-05-07 PROCEDURE — A9503 TC99M MEDRONATE: HCPCS

## 2021-05-07 PROCEDURE — 74177 CT ABD & PELVIS W/CONTRAST: CPT

## 2021-05-07 PROCEDURE — G1004 CDSM NDSC: HCPCS

## 2021-05-07 RX ADMIN — IOHEXOL 100 ML: 350 INJECTION, SOLUTION INTRAVENOUS at 12:42

## 2021-05-07 NOTE — PROGRESS NOTES
Breast Oncology Nurse Navigator    Called patient for initial navigation outreach  No answer  Left voicemail message explaining reason for call  Provided with brief information regarding the role of breast oncology nurse navigator as well as additional cancer support services available to utilize as needed  Offered my contact information and availability with request for return call to discuss in more detail and assist with any questions and or support needs at this time  Awaiting return call

## 2021-05-10 ENCOUNTER — TELEPHONE (OUTPATIENT)
Dept: SURGICAL ONCOLOGY | Facility: CLINIC | Age: 71
End: 2021-05-10

## 2021-05-10 NOTE — TELEPHONE ENCOUNTER
Called patient to review CT and bone scan results  Explained that neither study showed any evidence of metastatic disease  Discussed the pulmonary nodules with patient and informed her that Dr Floridalma Parekh recommended a 3 month follow up CT scan  Patient verbalized understanding and was appreciative of the phone call  Denies any questions at this time

## 2021-05-14 ENCOUNTER — PATIENT OUTREACH (OUTPATIENT)
Dept: SURGICAL ONCOLOGY | Facility: CLINIC | Age: 71
End: 2021-05-14

## 2021-05-14 NOTE — PROGRESS NOTES
Received call from patient with questions regarding treatment, reason for order of treatment, pathology clarification, post operative care and recovery and post surgical bra to her satisfaction  She has no additional questions or support needs at this time  Encouraged to call as needed with any additional questions or support needs

## 2021-05-19 ENCOUNTER — TELEPHONE (OUTPATIENT)
Dept: SURGICAL ONCOLOGY | Facility: CLINIC | Age: 71
End: 2021-05-19

## 2021-05-19 NOTE — TELEPHONE ENCOUNTER
Called patient to review genetic testing results; which came back negative  Patient verbalized understanding and was appreciative of the phone call  Explained that a copy of the results would be mailed to her today  Patient denies any questions at this time

## 2021-06-02 ENCOUNTER — OFFICE VISIT (OUTPATIENT)
Dept: SURGICAL ONCOLOGY | Facility: CLINIC | Age: 71
End: 2021-06-02

## 2021-06-02 ENCOUNTER — HOSPITAL ENCOUNTER (OUTPATIENT)
Dept: ULTRASOUND IMAGING | Facility: CLINIC | Age: 71
Discharge: HOME | End: 2021-06-02
Payer: MEDICARE

## 2021-06-02 ENCOUNTER — HOSPITAL ENCOUNTER (OUTPATIENT)
Dept: MAMMOGRAPHY | Facility: CLINIC | Age: 71
Discharge: HOME | End: 2021-06-02
Payer: MEDICARE

## 2021-06-02 VITALS
HEART RATE: 64 BPM | DIASTOLIC BLOOD PRESSURE: 80 MMHG | HEIGHT: 61 IN | TEMPERATURE: 98 F | RESPIRATION RATE: 16 BRPM | SYSTOLIC BLOOD PRESSURE: 148 MMHG | BODY MASS INDEX: 43.23 KG/M2 | WEIGHT: 229 LBS | OXYGEN SATURATION: 96 %

## 2021-06-02 VITALS — SYSTOLIC BLOOD PRESSURE: 142 MMHG | HEART RATE: 60 BPM | DIASTOLIC BLOOD PRESSURE: 84 MMHG

## 2021-06-02 DIAGNOSIS — C50.412 MALIGNANT NEOPLASM OF UPPER-OUTER QUADRANT OF LEFT BREAST IN FEMALE, ESTROGEN RECEPTOR POSITIVE (HCC): ICD-10-CM

## 2021-06-02 DIAGNOSIS — R93.89 ABNORMAL ULTRASOUND: ICD-10-CM

## 2021-06-02 DIAGNOSIS — Z17.0 MALIGNANT NEOPLASM OF UPPER-OUTER QUADRANT OF LEFT BREAST IN FEMALE, ESTROGEN RECEPTOR POSITIVE (HCC): ICD-10-CM

## 2021-06-02 DIAGNOSIS — C77.3 CARCINOMA OF LEFT BREAST METASTATIC TO AXILLARY LYMPH NODE (HCC): ICD-10-CM

## 2021-06-02 DIAGNOSIS — Z01.818 PREOP EXAMINATION: Primary | ICD-10-CM

## 2021-06-02 DIAGNOSIS — C50.912 CARCINOMA OF LEFT BREAST METASTATIC TO AXILLARY LYMPH NODE (HCC): ICD-10-CM

## 2021-06-02 PROCEDURE — 99024 POSTOP FOLLOW-UP VISIT: CPT | Performed by: SURGERY

## 2021-06-02 PROCEDURE — 19285 PERQ DEV BREAST 1ST US IMAG: CPT

## 2021-06-02 PROCEDURE — A4648 IMPLANTABLE TISSUE MARKER: HCPCS

## 2021-06-02 RX ORDER — LIDOCAINE HYDROCHLORIDE 10 MG/ML
5 INJECTION, SOLUTION EPIDURAL; INFILTRATION; INTRACAUDAL; PERINEURAL ONCE
Status: COMPLETED | OUTPATIENT
Start: 2021-06-02 | End: 2021-06-02

## 2021-06-02 RX ADMIN — LIDOCAINE HYDROCHLORIDE 5 ML: 10 INJECTION, SOLUTION EPIDURAL; INFILTRATION; INTRACAUDAL; PERINEURAL at 10:18

## 2021-06-02 NOTE — H&P (VIEW-ONLY)
Surgical Oncology Follow Up       [address] [facility],[address]  [facility] [place]  [address]  [place] [address]    Nathanael Goins  1950  [id number]  [address] [facility],[address]  [facility]  [address] [address]    Chief Complaint   Patient presents with    Updated History and Physical          Assessment & Plan:    the patient presents for of follow-up visit  She has had a ANAND  clip placed in her lymph node earlier today  This is in a different enlarged lymph node than the 1 that was originally biopsied but they appear to be tethered together on the mammogram   Patient is gone through the process of informed consent for modified radical mastectomy using the ANAND  to facilitate the axillary dissection  Cancer History:     Oncology History   Malignant neoplasm of upper-outer quadrant of left breast in female, estrogen receptor positive ([facility] [address] )   4/19/2021 Biopsy    Left breast US guided biopsy:  A  2 o'clock 8 cm from the nipple  Invasive mammary carcinoma of no special type  Grade 2  ER 90  VA 1  HER2 1+  Lymphovascular invasion: not identified    B  Left Axillary lymph node biopsy:  Invasive/ metastatic adenocarcinoma, compatible with breast primary involving fibroadipose tissues and a portion of lymphoid pranchyma  ER 90  VA 1  HER2 1+    Concordant  Malignancy appears multifocal  The 2 adjacent masses span an area of approximately 4 cm  Right breast clear  5/3/2021 Genetic Testing    The following genes were evaluated: OLINDA, BRCA1, BRCA2, CDH1, CHEK2, PALB2, PTEN, STK11, TP53  Additional genes evaluated for a total of 36 genes analyzed  Negative result  No pathogenic sequence variants or deletions/dupllications identified  [facility]           Interval History:     Patient had a ANAND  placed clip an axillary lymph node earlier today    See above she has not noticed any changes in her breast  She is minimally sore in the axilla  Review of Systems:   Review of Systems   All other systems reviewed and are negative        Past Medical History     Patient Active Problem List   Diagnosis    Benign essential hypertension    Chronic rhinitis    Hidradenitis suppurativa    Hypothyroidism    Impaired fasting glucose    Morbid obesity ([facility] [address] )    Venous insufficiency (chronic) (peripheral)    Malignant neoplasm of upper-outer quadrant of left breast in female, estrogen receptor positive ([facility] [address] )     Past Medical History:   Diagnosis Date    BRCA gene mutation negative 05/19/2021    [facility]; 36 gene panel     Past Surgical History:   Procedure Laterality Date    AXILLARY SURGERY Right     sweat glands removed -     BREAST CYST EXCISION Right 2000    benign    BREAST SURGERY Right     CHOLECYSTECTOMY      US BREAST NEEDLE LOC LEFT Left 6/2/2021    US GUIDED BREAST BIOPSY LEFT COMPLETE Left 4/19/2021    US GUIDED BREAST LYMPH NODE BIOPSY LEFT Left 4/19/2021     Family History   Problem Relation Age of Onset    Dementia Mother     Colon cancer Mother         55's-59's    Lung cancer Father         55's-59's    No Known Problems Sister     Breast cancer Paternal Aunt     No Known Problems Sister     Stomach cancer Paternal Uncle         66's     Social History     Socioeconomic History    Marital status: /Civil Union     Spouse name: Not on file    Number of children: Not on file    Years of education: Not on file    Highest education level: Not on file   Occupational History    Not on file   Social Needs    Financial resource strain: Not on file    Food insecurity     Worry: Not on file     Inability: Not on file    Transportation needs     Medical: Not on file     Non-medical: Not on file   Tobacco Use    Smoking status: Never Smoker    Smokeless tobacco: Never Used   Substance and Sexual Activity    Alcohol use: No    Drug use: No    Sexual activity: Not on file   Lifestyle    Physical activity     Days per week: Not on file     Minutes per session: Not on file    Stress: Not on file   Relationships    Social connections     Talks on phone: Not on file     Gets together: Not on file     Attends Sabianism service: Not on file     Active member of club or organization: Not on file     Attends meetings of clubs or organizations: Not on file     Relationship status: Not on file    Intimate partner violence     Fear of current or ex partner: Not on file     Emotionally abused: Not on file     Physically abused: Not on file     Forced sexual activity: Not on file   Other Topics Concern    Not on file   Social History Narrative    Not on file       Current Outpatient Medications:     aspirin 81 MG tablet, Take by mouth daily, Disp: , Rfl:     fluticasone (FLONASE) 50 mcg/act nasal spray, 2 puffs into each nostril daily, Disp: , Rfl:     metoprolol tartrate (LOPRESSOR) 50 mg tablet, TAKE ONE TABLET BY MOUTH EVERY DAY, Disp: 90 tablet, Rfl: 3    Multiple Vitamins-Minerals (MULTIVITAMIN ADULT PO), Take by mouth daily, Disp: , Rfl:     oxyCODONE-acetaminophen (PERCOCET) 5-325 mg per tablet, Take 1 tablet by mouth every 6 (six) hours as needed for moderate painMax Daily Amount: 4 tablets (Patient not taking: Reported on 6/2/2021), Disp: 20 tablet, Rfl: 0  No current facility-administered medications for this visit  No Known Allergies    Physical Exam:     Vitals:    06/02/21 1450   BP: 148/80   Pulse: 64   Resp: 16   Temp: 98 °F (36 7 °C)   SpO2: 96%     Physical Exam  Vitals signs reviewed  Constitutional:       Appearance: She is well-developed  HENT:      Head: Normocephalic and atraumatic  Eyes:      Pupils: Pupils are equal, round, and reactive to light  Neck:      Musculoskeletal: Normal range of motion and neck supple  Thyroid: No thyromegaly  Vascular: No JVD  Trachea: No tracheal deviation     Cardiovascular:      Rate and Rhythm: Normal rate and regular rhythm  Heart sounds: Normal heart sounds  No murmur  No friction rub  No gallop  Pulmonary:      Effort: Pulmonary effort is normal  No respiratory distress  Breath sounds: Normal breath sounds  No wheezing or rales  Chest:       Abdominal:      General: There is no distension  Palpations: Abdomen is soft  There is no hepatomegaly or mass  Tenderness: There is no abdominal tenderness  There is no guarding or rebound  Musculoskeletal: Normal range of motion  General: No tenderness  Lymphadenopathy:      Cervical: No cervical adenopathy  Skin:     General: Skin is warm and dry  Findings: No erythema or rash  Neurological:      Mental Status: She is alert and oriented to person, place, and time  Cranial Nerves: No cranial nerve deficit  Psychiatric:         Behavior: Behavior normal            Results & Discussion:     I reviewed her films from earlier today  Her ANAND clip is placed in a node adjacent to the clip biopsied node  I have not recommended reconstruction since she will need postoperative radiation therapy and has relatively extensive disease  We did talk about delayed reconstruction is an option which will pursue  The [place] of her physical exam is unchanged  All questions were answered the patient's satisfaction  Advance Care Planning/Advance Directives:  I discussed the disease status, treatment plans and follow-up with the patient

## 2021-06-02 NOTE — PROGRESS NOTES
Surgical Oncology Follow Up       [address]  CANCER [facility] SURGICAL ONCOLOGY [facility]  [address]   [facility] [address]    Remona   1950  [id number]  [address] [facility] SURGICAL ONCOLOGY [place]  [address] [address]    Chief Complaint   Patient presents with    Updated History and Physical          Assessment & Plan:    the patient presents for of follow-up visit  She has had a ANAND  clip placed in her lymph node earlier today  This is in a different enlarged lymph node than the 1 that was originally biopsied but they appear to be tethered together on the mammogram   Patient is gone through the process of informed consent for modified radical mastectomy using the ANAND  to facilitate the axillary dissection  Cancer History:     Oncology History   Malignant neoplasm of upper-outer quadrant of left breast in female, estrogen receptor positive ([facility] [address] )   2021 Biopsy    Left breast US guided biopsy:  A  2 o'clock 8 cm from the nipple  Invasive mammary carcinoma of no special type  Grade 2  ER 90  ME 1  HER2 1+  Lymphovascular invasion: not identified    B  Left Axillary lymph node biopsy:  Invasive/ metastatic adenocarcinoma, compatible with breast primary involving fibroadipose tissues and a portion of lymphoid pranchyma  ER 90  ME 1  HER2 1+    Concordant  Malignancy appears multifocal  The 2 adjacent masses span an area of approximately 4 cm  Right breast clear  5/3/2021 Genetic Testing    The following genes were evaluated: OLINDA, BRCA1, BRCA2, CDH1, CHEK2, PALB2, PTEN, STK11, TP53  Additional genes evaluated for a total of 36 genes analyzed  Negative result  No pathogenic sequence variants or deletions/dupllications identified  [employer]           Interval History:     Patient had a ANAND  placed clip an axillary lymph node earlier today    See above she has not noticed any changes in her breast  She is minimally sore in the axilla  Review of Systems:   Review of Systems   All other systems reviewed and are negative        Past Medical History     Patient Active Problem List   Diagnosis    Benign essential hypertension    Chronic rhinitis    Hidradenitis suppurativa    Hypothyroidism    Impaired fasting glucose    Morbid obesity ([facility] [address] )    Venous insufficiency (chronic) (peripheral)    Malignant neoplasm of upper-outer quadrant of left breast in female, estrogen receptor positive ([facility] [address] )     Past Medical History:   Diagnosis Date    BRCA gene mutation negative 05/19/2021    [employer]; 36 gene panel     Past Surgical History:   Procedure Laterality Date    AXILLARY SURGERY Right     sweat glands removed -     BREAST CYST EXCISION Right 2000    benign    BREAST SURGERY Right     CHOLECYSTECTOMY      US BREAST NEEDLE LOC LEFT Left 6/2/2021    US GUIDED BREAST BIOPSY LEFT COMPLETE Left 4/19/2021    US GUIDED BREAST LYMPH NODE BIOPSY LEFT Left 4/19/2021     Family History   Problem Relation Age of Onset    Dementia Mother     Colon cancer Mother         55's-59's    Lung cancer Father         55's-59's    No Known Problems Sister     Breast cancer Paternal Aunt     No Known Problems Sister     Stomach cancer Paternal Uncle         66's     Social History     Socioeconomic History    Marital status: /Civil Union     Spouse name: Not on file    Number of children: Not on file    Years of education: Not on file    Highest education level: Not on file   Occupational History    Not on file   Social Needs    Financial resource strain: Not on file    Food insecurity     Worry: Not on file     Inability: Not on file    Transportation needs     Medical: Not on file     Non-medical: Not on file   Tobacco Use    Smoking status: Never Smoker    Smokeless tobacco: Never Used   Substance and Sexual Activity    Alcohol use: No    Drug use: No    Sexual activity: Not on file   Lifestyle    Physical activity     Days per week: Not on file     Minutes per session: Not on file    Stress: Not on file   Relationships    Social connections     Talks on phone: Not on file     Gets together: Not on file     Attends Protestant service: Not on file     Active member of club or organization: Not on file     Attends meetings of clubs or organizations: Not on file     Relationship status: Not on file    Intimate partner violence     Fear of current or ex partner: Not on file     Emotionally abused: Not on file     Physically abused: Not on file     Forced sexual activity: Not on file   Other Topics Concern    Not on file   Social History Narrative    Not on file       Current Outpatient Medications:     aspirin 81 MG tablet, Take by mouth daily, Disp: , Rfl:     fluticasone (FLONASE) 50 mcg/act nasal spray, 2 puffs into each nostril daily, Disp: , Rfl:     metoprolol tartrate (LOPRESSOR) 50 mg tablet, TAKE ONE TABLET BY MOUTH EVERY DAY, Disp: 90 tablet, Rfl: 3    Multiple Vitamins-Minerals (MULTIVITAMIN ADULT PO), Take by mouth daily, Disp: , Rfl:     oxyCODONE-acetaminophen (PERCOCET) 5-325 mg per tablet, Take 1 tablet by mouth every 6 (six) hours as needed for moderate painMax Daily Amount: 4 tablets (Patient not taking: Reported on 6/2/2021), Disp: 20 tablet, Rfl: 0  No current facility-administered medications for this visit  No Known Allergies    Physical Exam:     Vitals:    06/02/21 1450   BP: 148/80   Pulse: 64   Resp: 16   Temp: 98 °F (36 7 °C)   SpO2: 96%     Physical Exam  Vitals signs reviewed  Constitutional:       Appearance: She is well-developed  HENT:      Head: Normocephalic and atraumatic  Eyes:      Pupils: Pupils are equal, round, and reactive to light  Neck:      Musculoskeletal: Normal range of motion and neck supple  Thyroid: No thyromegaly  Vascular: No JVD  Trachea: No tracheal deviation     Cardiovascular:      Rate and Rhythm: Normal rate and regular rhythm  Heart sounds: Normal heart sounds  No murmur  No friction rub  No gallop  Pulmonary:      Effort: Pulmonary effort is normal  No respiratory distress  Breath sounds: Normal breath sounds  No wheezing or rales  Chest:       Abdominal:      General: There is no distension  Palpations: Abdomen is soft  There is no hepatomegaly or mass  Tenderness: There is no abdominal tenderness  There is no guarding or rebound  Musculoskeletal: Normal range of motion  General: No tenderness  Lymphadenopathy:      Cervical: No cervical adenopathy  Skin:     General: Skin is warm and dry  Findings: No erythema or rash  Neurological:      Mental Status: She is alert and oriented to person, place, and time  Cranial Nerves: No cranial nerve deficit  Psychiatric:         Behavior: Behavior normal            Results & Discussion:     I reviewed her films from earlier today  Her ANAND clip is placed in a node adjacent to the clip biopsied node  I have not recommended reconstruction since she will need postoperative radiation therapy and has relatively extensive disease  We did talk about delayed reconstruction is an option which will pursue  The [place] of her physical exam is unchanged  All questions were answered the patient's satisfaction  Advance Care Planning/Advance Directives:  I discussed the disease status, treatment plans and follow-up with the patient

## 2021-06-02 NOTE — DISCHARGE INSTR - OTHER ORDERS
POST LARGE CORE BREAST BIOPSY PATIENT INFORMATION      1  Place an ice pack inside your bra over the top of the dressing every hour for 20 minutes (20 minutes on, 60 minutes off)  Do this until bedtime  2  Do not shower or bathe until the following morning  3  You may bathe your breast carefully with the steri-strips in place  Be careful    Not to loosen them  The steri-strips will fall off in 3-5 days  4  You may have mild discomfort, and you may have some bruising where the   Needle entered the skin  This should clear within 5-7 days  5  If you need medicine for discomfort, take acetaminophen products such as   Tylenol  You may also take Advil or Motrin products  6  Do not participate in strenuous activities such as-tennis, aerobics, skiing,  Weight lifting, etc  for 24 hours  Refrain from swimming/soaking for 72 hours  7  Wearing a bra for sleeping may be more comfortable for the first 24-48 hours  8  Watch for continued bleeding, pain or fever over 101; please call with any questions or concerns  For procedures done at the [facility] [address] call:  Javier Habermann RN at [facility] RN at [phone]                    *After 4 PM call the Interventional Radiology Department                    [phone] and ask to speak with the nurse on call  For procedures done at the [address] [place] [address] call:         Alyssa Roca RN at   *After 4 PM call the Interventional Radiology Department   [phone] and ask to speak with the nurse on call  For procedures done at [address] [place] [address] call: The Radiology Nurse at [phone]  *After 4 PM call your physician, or go to the Emergency Department  9          The final results of your biopsy are usually available within one week

## 2021-06-02 NOTE — PROGRESS NOTES
Procedure type:    __x___ultrasound guided siobhan  placement _____stereotactic    Breast:    __x___Left _____Right    Location: Axilla    Needle: 16g Siobhan  Reflector    # of passes: 1     Clip: Siobhan     Performed by: Dr Vera Wei held for 5 minutes by: Sammy Membreno    Steryumiko Strips:    __x___yes _____no    Band aid:    __x___yes_____no    Tape and guaze:    _____yes __x___no    Tolerated procedure:    __x___yes _____no

## 2021-06-03 NOTE — PROGRESS NOTES
Post procedure call completed 6/3/21 at 1128    Bleeding: _____yes __X___no    Pain: _____yes ___X___no    Redness/Swelling: ______yes ___X___no    Pt with no questions at this time,  adv to call with any questions or concerns, has name/# for contact

## 2021-06-04 NOTE — PRE-PROCEDURE INSTRUCTIONS
Pre-Surgery Instructions:   Medication Instructions    aspirin 81 MG tablet Patient was instructed by Physician and understands   fluticasone (FLONASE) 50 mcg/act nasal spray Instructed patient per Anesthesia Guidelines   metoprolol tartrate (LOPRESSOR) 50 mg tablet Instructed patient per Anesthesia Guidelines   Multiple Vitamins-Minerals (MULTIVITAMIN ADULT PO) Patient was instructed by Physician and understands  Pre op and bathing instructions reviewed  Pt has hibiclens  Pt  Verbalized understanding of current visitor restrictions  Pt  Verbalized an understanding of all instructions reviewed and offers no concerns at this time  Instructed to avoid all NSAIDs and OTC Vit/Supp from now until after surgery   Tylenol ok prn Last dose for ASA 6-3-21  Instructed to take per normal schedule including DOS with sips water   DOS instructed to take Metoprolol with a few sips of H2O and may use Flonase nasal spray

## 2021-06-07 ENCOUNTER — ANESTHESIA EVENT (OUTPATIENT)
Dept: PERIOP | Facility: HOSPITAL | Age: 71
End: 2021-06-07
Payer: MEDICARE

## 2021-06-08 ENCOUNTER — ANESTHESIA (OUTPATIENT)
Dept: PERIOP | Facility: HOSPITAL | Age: 71
End: 2021-06-08
Payer: MEDICARE

## 2021-06-08 ENCOUNTER — HOSPITAL ENCOUNTER (OUTPATIENT)
Facility: HOSPITAL | Age: 71
Setting detail: OUTPATIENT SURGERY
Discharge: HOME | End: 2021-06-09
Attending: SURGERY | Admitting: SURGERY
Payer: MEDICARE

## 2021-06-08 DIAGNOSIS — C50.412 MALIGNANT NEOPLASM OF UPPER-OUTER QUADRANT OF LEFT BREAST IN FEMALE, ESTROGEN RECEPTOR POSITIVE (HCC): ICD-10-CM

## 2021-06-08 DIAGNOSIS — Z17.0 MALIGNANT NEOPLASM OF UPPER-OUTER QUADRANT OF LEFT BREAST IN FEMALE, ESTROGEN RECEPTOR POSITIVE (HCC): ICD-10-CM

## 2021-06-08 PROCEDURE — 99024 POSTOP FOLLOW-UP VISIT: CPT | Performed by: SURGERY

## 2021-06-08 PROCEDURE — 19307 MAST MOD RAD: CPT | Performed by: SURGERY

## 2021-06-08 PROCEDURE — 88307 TISSUE EXAM BY PATHOLOGIST: CPT | Performed by: PATHOLOGY

## 2021-06-08 PROCEDURE — 88342 IMHCHEM/IMCYTCHM 1ST ANTB: CPT | Performed by: PATHOLOGY

## 2021-06-08 PROCEDURE — C9290 INJ, BUPIVACAINE LIPOSOME: HCPCS | Performed by: ANESTHESIOLOGY

## 2021-06-08 PROCEDURE — 88341 IMHCHEM/IMCYTCHM EA ADD ANTB: CPT | Performed by: PATHOLOGY

## 2021-06-08 PROCEDURE — G9197 ORDER FOR CEPH: HCPCS | Performed by: SURGERY

## 2021-06-08 RX ORDER — HYDROMORPHONE HCL/PF 1 MG/ML
0.5 SYRINGE (ML) INJECTION EVERY 4 HOURS PRN
Status: DISCONTINUED | OUTPATIENT
Start: 2021-06-08 | End: 2021-06-09 | Stop reason: HOSPADM

## 2021-06-08 RX ORDER — SODIUM CHLORIDE, SODIUM LACTATE, POTASSIUM CHLORIDE, CALCIUM CHLORIDE 600; 310; 30; 20 MG/100ML; MG/100ML; MG/100ML; MG/100ML
125 INJECTION, SOLUTION INTRAVENOUS CONTINUOUS
Status: DISCONTINUED | OUTPATIENT
Start: 2021-06-08 | End: 2021-06-08

## 2021-06-08 RX ORDER — ACETAMINOPHEN 325 MG/1
650 TABLET ORAL EVERY 6 HOURS PRN
Status: DISCONTINUED | OUTPATIENT
Start: 2021-06-08 | End: 2021-06-09 | Stop reason: HOSPADM

## 2021-06-08 RX ORDER — CEFAZOLIN SODIUM 2 G/50ML
2000 SOLUTION INTRAVENOUS ONCE
Status: COMPLETED | OUTPATIENT
Start: 2021-06-08 | End: 2021-06-08

## 2021-06-08 RX ORDER — OXYCODONE HYDROCHLORIDE 10 MG/1
10 TABLET ORAL EVERY 4 HOURS PRN
Status: DISCONTINUED | OUTPATIENT
Start: 2021-06-08 | End: 2021-06-09 | Stop reason: HOSPADM

## 2021-06-08 RX ORDER — HYDRALAZINE HYDROCHLORIDE 20 MG/ML
5 INJECTION INTRAMUSCULAR; INTRAVENOUS EVERY 6 HOURS PRN
Status: DISCONTINUED | OUTPATIENT
Start: 2021-06-08 | End: 2021-06-09 | Stop reason: HOSPADM

## 2021-06-08 RX ORDER — DEXAMETHASONE SODIUM PHOSPHATE 10 MG/ML
INJECTION, SOLUTION INTRAMUSCULAR; INTRAVENOUS AS NEEDED
Status: DISCONTINUED | OUTPATIENT
Start: 2021-06-08 | End: 2021-06-08

## 2021-06-08 RX ORDER — GLYCOPYRROLATE 0.2 MG/ML
INJECTION INTRAMUSCULAR; INTRAVENOUS AS NEEDED
Status: DISCONTINUED | OUTPATIENT
Start: 2021-06-08 | End: 2021-06-08

## 2021-06-08 RX ORDER — ROCURONIUM BROMIDE 10 MG/ML
INJECTION, SOLUTION INTRAVENOUS AS NEEDED
Status: DISCONTINUED | OUTPATIENT
Start: 2021-06-08 | End: 2021-06-08

## 2021-06-08 RX ORDER — OXYCODONE HYDROCHLORIDE 5 MG/1
5 TABLET ORAL EVERY 4 HOURS PRN
Status: DISCONTINUED | OUTPATIENT
Start: 2021-06-08 | End: 2021-06-09 | Stop reason: HOSPADM

## 2021-06-08 RX ORDER — LIDOCAINE HYDROCHLORIDE 10 MG/ML
0.5 INJECTION, SOLUTION EPIDURAL; INFILTRATION; INTRACAUDAL; PERINEURAL ONCE AS NEEDED
Status: DISCONTINUED | OUTPATIENT
Start: 2021-06-08 | End: 2021-06-08

## 2021-06-08 RX ORDER — KETOROLAC TROMETHAMINE 30 MG/ML
INJECTION, SOLUTION INTRAMUSCULAR; INTRAVENOUS AS NEEDED
Status: DISCONTINUED | OUTPATIENT
Start: 2021-06-08 | End: 2021-06-08

## 2021-06-08 RX ORDER — FENTANYL CITRATE/PF 50 MCG/ML
25 SYRINGE (ML) INJECTION
Status: DISCONTINUED | OUTPATIENT
Start: 2021-06-08 | End: 2021-06-08 | Stop reason: HOSPADM

## 2021-06-08 RX ORDER — FENTANYL CITRATE 50 UG/ML
INJECTION, SOLUTION INTRAMUSCULAR; INTRAVENOUS AS NEEDED
Status: DISCONTINUED | OUTPATIENT
Start: 2021-06-08 | End: 2021-06-08

## 2021-06-08 RX ORDER — MAGNESIUM HYDROXIDE 1200 MG/15ML
LIQUID ORAL AS NEEDED
Status: DISCONTINUED | OUTPATIENT
Start: 2021-06-08 | End: 2021-06-08 | Stop reason: HOSPADM

## 2021-06-08 RX ORDER — METOPROLOL TARTRATE 50 MG/1
50 TABLET, FILM COATED ORAL EVERY MORNING
Status: DISCONTINUED | OUTPATIENT
Start: 2021-06-08 | End: 2021-06-09 | Stop reason: HOSPADM

## 2021-06-08 RX ORDER — ONDANSETRON 2 MG/ML
INJECTION INTRAMUSCULAR; INTRAVENOUS AS NEEDED
Status: DISCONTINUED | OUTPATIENT
Start: 2021-06-08 | End: 2021-06-08

## 2021-06-08 RX ORDER — HYDROMORPHONE HCL/PF 1 MG/ML
0.5 SYRINGE (ML) INJECTION
Status: DISCONTINUED | OUTPATIENT
Start: 2021-06-08 | End: 2021-06-08 | Stop reason: HOSPADM

## 2021-06-08 RX ORDER — ONDANSETRON 2 MG/ML
4 INJECTION INTRAMUSCULAR; INTRAVENOUS EVERY 6 HOURS PRN
Status: DISCONTINUED | OUTPATIENT
Start: 2021-06-08 | End: 2021-06-09 | Stop reason: HOSPADM

## 2021-06-08 RX ORDER — LIDOCAINE HYDROCHLORIDE 10 MG/ML
INJECTION, SOLUTION EPIDURAL; INFILTRATION; INTRACAUDAL; PERINEURAL AS NEEDED
Status: DISCONTINUED | OUTPATIENT
Start: 2021-06-08 | End: 2021-06-08

## 2021-06-08 RX ORDER — NEOSTIGMINE METHYLSULFATE 1 MG/ML
INJECTION INTRAVENOUS AS NEEDED
Status: DISCONTINUED | OUTPATIENT
Start: 2021-06-08 | End: 2021-06-08

## 2021-06-08 RX ORDER — BUPIVACAINE HYDROCHLORIDE AND EPINEPHRINE 2.5; 5 MG/ML; UG/ML
INJECTION, SOLUTION EPIDURAL; INFILTRATION; INTRACAUDAL; PERINEURAL AS NEEDED
Status: DISCONTINUED | OUTPATIENT
Start: 2021-06-08 | End: 2021-06-08 | Stop reason: HOSPADM

## 2021-06-08 RX ORDER — MIDAZOLAM HYDROCHLORIDE 2 MG/2ML
INJECTION, SOLUTION INTRAMUSCULAR; INTRAVENOUS AS NEEDED
Status: DISCONTINUED | OUTPATIENT
Start: 2021-06-08 | End: 2021-06-08

## 2021-06-08 RX ORDER — SODIUM CHLORIDE, SODIUM LACTATE, POTASSIUM CHLORIDE, CALCIUM CHLORIDE 600; 310; 30; 20 MG/100ML; MG/100ML; MG/100ML; MG/100ML
75 INJECTION, SOLUTION INTRAVENOUS CONTINUOUS
Status: DISPENSED | OUTPATIENT
Start: 2021-06-08 | End: 2021-06-08

## 2021-06-08 RX ORDER — PROPOFOL 10 MG/ML
INJECTION, EMULSION INTRAVENOUS AS NEEDED
Status: DISCONTINUED | OUTPATIENT
Start: 2021-06-08 | End: 2021-06-08

## 2021-06-08 RX ADMIN — MIDAZOLAM HYDROCHLORIDE 2 MG: 1 INJECTION, SOLUTION INTRAMUSCULAR; INTRAVENOUS at 09:38

## 2021-06-08 RX ADMIN — PROPOFOL 200 MG: 10 INJECTION, EMULSION INTRAVENOUS at 09:48

## 2021-06-08 RX ADMIN — LIDOCAINE HYDROCHLORIDE 50 MG: 10 INJECTION, SOLUTION EPIDURAL; INFILTRATION; INTRACAUDAL at 09:48

## 2021-06-08 RX ADMIN — CEFAZOLIN SODIUM 2000 MG: 2 SOLUTION INTRAVENOUS at 09:38

## 2021-06-08 RX ADMIN — HYDRALAZINE HYDROCHLORIDE 5 MG: 20 INJECTION, SOLUTION INTRAMUSCULAR; INTRAVENOUS at 17:05

## 2021-06-08 RX ADMIN — SODIUM CHLORIDE, SODIUM LACTATE, POTASSIUM CHLORIDE, AND CALCIUM CHLORIDE: .6; .31; .03; .02 INJECTION, SOLUTION INTRAVENOUS at 11:41

## 2021-06-08 RX ADMIN — OXYCODONE HYDROCHLORIDE 10 MG: 10 TABLET ORAL at 22:08

## 2021-06-08 RX ADMIN — ROCURONIUM BROMIDE 50 MG: 10 SOLUTION INTRAVENOUS at 09:48

## 2021-06-08 RX ADMIN — FENTANYL CITRATE 50 MCG: 50 INJECTION, SOLUTION INTRAMUSCULAR; INTRAVENOUS at 09:48

## 2021-06-08 RX ADMIN — SODIUM CHLORIDE, SODIUM LACTATE, POTASSIUM CHLORIDE, AND CALCIUM CHLORIDE: .6; .31; .03; .02 INJECTION, SOLUTION INTRAVENOUS at 08:24

## 2021-06-08 RX ADMIN — ONDANSETRON 4 MG: 2 INJECTION INTRAMUSCULAR; INTRAVENOUS at 09:52

## 2021-06-08 RX ADMIN — KETOROLAC TROMETHAMINE 15 MG: 30 INJECTION, SOLUTION INTRAMUSCULAR at 11:31

## 2021-06-08 RX ADMIN — NEOSTIGMINE METHYLSULFATE 2 MG: 1 INJECTION INTRAVENOUS at 11:35

## 2021-06-08 RX ADMIN — FENTANYL CITRATE 50 MCG: 50 INJECTION, SOLUTION INTRAMUSCULAR; INTRAVENOUS at 10:10

## 2021-06-08 RX ADMIN — GLYCOPYRROLATE 0.4 MG: 0.2 INJECTION, SOLUTION INTRAMUSCULAR; INTRAVENOUS at 11:35

## 2021-06-08 RX ADMIN — DEXAMETHASONE SODIUM PHOSPHATE 4 MG: 10 INJECTION, SOLUTION INTRAMUSCULAR; INTRAVENOUS at 09:52

## 2021-06-08 NOTE — PROGRESS NOTES
Communication with Resident re no phone call post op from Dr Mercedes Doran and pt sbp trending upward   the patient did take her 50mg of lopressor this am prior to coming in to the hospital    Resident stated he will head upstairs to speak to the pt and  shortly  No orders given for BP at this time  Pt states she feels fine, no dizziness or pain   HR 66-75 on monitor NSR  o2 sats 98 on room air

## 2021-06-08 NOTE — INTERVAL H&P NOTE
H&P reviewed  After examining the patient I find no changes in the patients condition since the H&P had been written      Vitals:    06/08/21 0855   BP: 150/80   Pulse: 60   Resp: 20   Temp: (!) 97 3 °F (36 3 °C)   SpO2: 100%

## 2021-06-08 NOTE — OP NOTE
OPERATIVE REPORT  PATIENT NAME: Faheem Kruger    :  1950  MRN: [id number]  Pt Location: AN OR ROOM 03    SURGERY DATE: 2021    Surgeon(s) and Role:     * Aylin Shaver MD - Primary     * Autumn Durant DO - Assisting    Preop Diagnosis:  Malignant neoplasm of upper-outer quadrant of left breast in female, estrogen receptor positive ([facility] [address] ) [C50 412, Z17 0]    Post-Op Diagnosis Codes:     * Malignant neoplasm of upper-outer quadrant of left breast in female, estrogen receptor positive ([facility] [address] ) [C50 412, Z17 0]    Procedure(s) (LRB):  BREAST MODIFIED RADICAL MASTECTOMY; ANAND  DIRECTED AXILLARY DISSECTION (Left)    Specimen(s):  ID Type Source Tests Collected by Time Destination   1 : Left breast with axillary content - long suture marks lateral, medium suture marks medial, short suture marks superior  Tissue Breast, Left TISSUE EXAM Aylin Shaver MD 2021 1057        Estimated Blood Loss:   Minimal    Drains:  Closed/Suction Drain Left;Lateral Chest Bulb 19 Fr  (Active)   Site Description Unable to view 21 1215   Dressing Status Clean;Dry; Intact 21 1215   Drainage Appearance Bloody 21 1215   Status To bulb suction 21 1215   Output (mL) 5 mL 21 1215   Number of days: 0       Closed/Suction Drain Left;Medial Chest Bulb 19 Fr  (Active)   Site Description Unable to view 21 1215   Dressing Status Clean;Dry; Intact 21 1215   Drainage Appearance Bloody 21 1215   Status To bulb suction 21 1215   Output (mL) 5 mL 21 1215   Number of days: 0       Anesthesia Type:   General    Operative Indications:  Malignant neoplasm of upper-outer quadrant of left breast in female, estrogen receptor positive ([facility] [address] ) [C50 412, Z17 0]      Operative Findings:  anand clipped node ca 1 5 cm removed along with axillary contents, left breast cancer grossly removed, right breast grossly normal    Complications:   None    Procedure and Technique:  The patient was brought to the operation room and placed under general anesthesia  Attention was paid to ensure appropriate padding and correct positioning  The bilateral breast were then prepped and draped in a sterile fashion  I initiated a time-out, identifying the patient, the correct side and the above procedure  All parties agreed with the time out  The planned right breast incision was then sharply incised  Thin flaps were then elevated using electrocautery starting superiorly and then continuing medially, inferiorly and finally laterally  The tail of Alfornia Rummage was then dissected away from the axilla proper leaving the breast tethered to the underlying musculature  The breast was then removed from the muscles in a sub-facial plane  The breast was oriented with sutures (short=superior; medium=medial; long=lateral)  The breast was sent to pathology in formalin for permanent pathologic evaluation  Meticulous hemostasis was maintained with electrocautery  The wound was packed with a lap sponge  Attention was then turned to the malignant left side  The planned left breast incision was sharply incised  The was a large orbital incision encompassing the large tumor mass  Thin flaps were then elevated using electrocautery starting superiorly and then continuing medially, inferiorly and finally laterally  The tail of Alfornia Rummage was then dissected away from the axilla proper leaving the breast tethered to the underlying musculature  The medial and lateral flaps were extended into the lower axilla  The breast was then removed from the muscles in a sub-facial plane  The breast was oriented with sutures (short=superior; medium=medial; long=lateral)  The breast was sent to pathology in formalin for permanent pathologic evaluation  Axillary Dissection: The medial and lateral flaps were then extended into the upper axilla  The pectoralis minor was then retracted medially and level 2 nodes were brought into field    Just underneath they estimated position of the subclavian vein the deltopectoral fascia was released with electrocautery  Blunt dissection was then taken down to identify intercostal nerve which was divided  Along the medial wall the long thoracic nerve was identified and with meticulous dissection thoracodorsal neurovascular bundle was identified in the nerve carefully preserved along its course  During this dissection small veins and arteries were either ligated cauterized or clipped to maintain meticulous hemostasis  The axillary contents (levels 1 and 2) within withdrawn and  from the remaining fascial attachments with electrocautery  There then handed off the field for permanent pathologic analysis  The siobhan detector was used to ensure that the siobhan-clipped node was encompassed within the dissection  The plastic surgery team entered the room to initiate reconstruction  The counts were correct x 2               I was present for the entire procedure    Patient Disposition:  PACU     SIGNATURE: Prudence Soto MD  DATE: June 8, 2021  TIME: 2:50 PM

## 2021-06-08 NOTE — OP NOTE
OPERATIVE REPORT  PATIENT NAME: Vic Maharja    :  1950  MRN: [id number]  Pt Location: AN OR ROOM 03    SURGERY DATE: 2021    Surgeon(s) and Role:     * Miles Flores MD - Primary     * Saira Camacho DO - Assisting    Preop Diagnosis:  Malignant neoplasm of upper-outer quadrant of left breast in female, estrogen receptor positive ([facility] [address] ) [C50 412, Z17 0]    Post-Op Diagnosis Codes:     * Malignant neoplasm of upper-outer quadrant of left breast in female, estrogen receptor positive ([address] ) [C50 412, Z17 0]    Procedure(s) (LRB):  BREAST MODIFIED RADICAL MASTECTOMY; ANAND  DIRECTED AXILLARY DISSECTION (Left)    Specimen(s):  ID Type Source Tests Collected by Time Destination   1 : Left breast with axillary content - long suture marks lateral, medium suture marks medial, short suture marks superior  Tissue Breast, Left TISSUE EXAM Miles Flores MD 2021 1057      Intraoperative pectoral nerve block was performed at the conclusion of the case  Estimated Blood Loss:   Minimal    Drains:  Closed/Suction Drain Left;Lateral Chest Bulb 19 Fr  (Active)   Number of days: 0       Closed/Suction Drain Left;Medial Chest Bulb 19 Fr  (Active)   Number of days: 0       Anesthesia Type:   General    Operative Indications:  Malignant neoplasm of upper-outer quadrant of left breast in female, estrogen receptor positive ([address] ) [C50 412, Z17 0]      Operative Findings:  Gross adenopathy continuing into level III and possibly higher, some residual adenopathy was unresectable in this region  Complications:   None    Procedure and Technique:  The patient was brought to the operation room and placed under general anesthesia  Attention was paid to ensure appropriate padding and correct positioning        The left breast was prepped and draped in a sterile fashion  I initiated a time-out, identifying the patient, the correct side and the above procedure  All parties agreed with the time out      Mastectomy:  The planned incision was sharply incised  Flaps were then elevated using electrocautery starting superiorly and then continuing medially, inferiorly and finally laterally  The tail of Ivy Mcintosh was then dissected away from the axilla proper leaving the breast tethered to the underlying musculature  The superior flap seemed to have strands of tumor extending into the subcutaneous tissue, this tissue was removed in continuity  The medial and lateral flaps were extended into the lower axilla  The breast was then removed from the muscles in a sub-facial plane  Axillary Dissection: The medial and lateral flaps were then extended into the upper axilla  There was extensive adenopathy extending beyond level II  The pectoralis minor was then retracted medially and level 2 nodes were brought into field  Just underneath they estimated position of the subclavian vein the deltopectoral fascia was released with electrocautery  The adenopathy underneath the subclavian vein was removed, the lower level III nodes were divided leaving additional adenopathy supeior to this as far as I could palpate  The lower level III and level II nodes were removed incontinuity with the level I nodes  The Eva clip as identifed within the lower axilla and the large node just inferior to this which most likely was the biopsied node was also removed  With meticulous dissection the thoracodorsal neurovascular bundle was identified and the nerve carefully preserved along its course  During this dissection small veins and arteries were either ligated cauterized or clipped to maintain meticulous hemostasis  The axillary contents (levels 1,2 and lower level III) within withdrawn and  from the remaining fascial attachments with electrocautery  There then handed off the field for permanent pathologic analysis  The breast was oriented long lateral, superior short and medium medial sutures      Twenty ml of exparel was instilled into the muscular fascia around the serratus anterior and the pectoral muscles  Two hundred nineteen Yi slotted ALYSSA catheter was then brought in through 2 separate lateral stab incisions and secured with nylon sutures  The wound was extensively irrigated  Superficial bleeding was controlled with electrocautery  The wound was then closed with interrupted 2 0 Vicryl sutures followed by for Monocryl  Steri-Strips were applied  The needle count lap counts sponge counts were correct x2         I was present for the entire procedure    Patient Disposition:  PACU     SIGNATURE: Aylin Shaver MD  DATE: June 8, 2021  TIME: 11:32 AM

## 2021-06-08 NOTE — PLAN OF CARE
Problem: Potential for Falls  Goal: Patient will remain free of falls  Description: INTERVENTIONS:  - Assess patient frequently for physical needs  -  Identify cognitive and physical deficits and behaviors that affect risk of falls    -  [place] fall precautions as indicated by assessment   - Educate patient/family on patient safety including physical limitations  - Instruct patient to call for assistance with activity based on assessment  - Modify environment to reduce risk of injury  - Consider OT/PT consult to assist with strengthening/mobility  Outcome: Progressing     Problem: PAIN - ADULT  Goal: Verbalizes/displays adequate comfort level or baseline comfort level  Description: Interventions:  - Encourage patient to monitor pain and request assistance  - Assess pain using appropriate pain scale  - Administer analgesics based on type and severity of pain and evaluate response  - Implement non-pharmacological measures as appropriate and evaluate response  - Consider cultural and social influences on pain and pain management  - Notify physician/advanced practitioner if interventions unsuccessful or patient reports new pain  Outcome: Progressing     Problem: INFECTION - ADULT  Goal: Absence or prevention of progression during hospitalization  Description: INTERVENTIONS:  - Assess and monitor for signs and symptoms of infection  - Monitor lab/diagnostic results  - Monitor all insertion sites, i e  indwelling lines, tubes, and drains  - Monitor endotracheal if appropriate and nasal secretions for changes in amount and color  - [place] appropriate cooling/warming therapies per order  - Administer medications as ordered  - Instruct and encourage patient and family to use good hand hygiene technique  - Identify and instruct in appropriate isolation precautions for identified infection/condition  Outcome: Progressing     Problem: SAFETY ADULT  Goal: Patient will remain free of falls  Description: INTERVENTIONS:  - Assess patient frequently for physical needs  -  Identify cognitive and physical deficits and behaviors that affect risk of falls    -  [place] fall precautions as indicated by assessment   - Educate patient/family on patient safety including physical limitations  - Instruct patient to call for assistance with activity based on assessment  - Modify environment to reduce risk of injury  - Consider OT/PT consult to assist with strengthening/mobility  Outcome: Progressing  Goal: Maintain or return to baseline ADL function  Description: INTERVENTIONS:  -  Assess patient's ability to carry out ADLs; assess patient's baseline for ADL function and identify physical deficits which impact ability to perform ADLs (bathing, care of mouth/teeth, toileting, grooming, dressing, etc )  - Assess/evaluate cause of self-care deficits   - Assess range of motion  - Assess patient's mobility; develop plan if impaired  - Assess patient's need for assistive devices and provide as appropriate  - Encourage maximum independence but intervene and supervise when necessary  - Involve family in performance of ADLs  - Assess for home care needs following discharge   - Consider OT consult to assist with ADL evaluation and planning for discharge  - Provide patient education as appropriate  Outcome: Progressing  Goal: Maintain or return mobility status to optimal level  Description: INTERVENTIONS:  - Assess patient's baseline mobility status (ambulation, transfers, stairs, etc )    - Identify cognitive and physical deficits and behaviors that affect mobility  - Identify mobility aids required to assist with transfers and/or ambulation (gait belt, sit-to-stand, lift, walker, cane, etc )  - [place] fall precautions as indicated by assessment  - Record patient progress and toleration of activity level on Mobility SBAR; progress patient to next Phase/Stage  - Instruct patient to call for assistance with activity based on assessment  - Consider rehabilitation consult to assist with strengthening/weightbearing, etc   Outcome: Progressing     Problem: DISCHARGE PLANNING  Goal: Discharge to home or other facility with appropriate resources  Description: INTERVENTIONS:  - Identify barriers to discharge w/patient and caregiver  - Arrange for needed discharge resources and transportation as appropriate  - Identify discharge learning needs (meds, wound care, etc )  - Arrange for interpretive services to assist at discharge as needed  - Refer to Case Management Department for coordinating discharge planning if the patient needs post-hospital services based on physician/advanced practitioner order or complex needs related to functional status, cognitive ability, or social support system  Outcome: Progressing     Problem: Knowledge Deficit  Goal: Patient/family/caregiver demonstrates understanding of disease process, treatment plan, medications, and discharge instructions  Description: Complete learning assessment and assess knowledge base    Interventions:  - Provide teaching at level of understanding  - Provide teaching via preferred learning methods  Outcome: Progressing

## 2021-06-08 NOTE — ANESTHESIA POSTPROCEDURE EVALUATION
Post-Op Assessment Note    CV Status:  Stable  Pain Score: 0    Pain management: adequate     Mental Status:  Alert and awake   Hydration Status:  Stable   PONV Controlled:  None   Airway Patency:  Patent      Post Op Vitals Reviewed: Yes      Staff: CRNA   Comments: AAOx3, SV Nonobstructed, VSS        No complications documented      BP   149/69   Temp 96 3   Pulse 59   Resp 18   SpO2 99

## 2021-06-08 NOTE — ANESTHESIA PREPROCEDURE EVALUATION
Procedure:  BREAST MODIFIED RADICAL MASTECTOMY; ANAND  DIRECTED AXILLARY DISSECTION (Left Breast)    Relevant Problems   ANESTHESIA (within normal limits)      CARDIO   (+) Benign essential hypertension   (+) Venous insufficiency (chronic) (peripheral)      ENDO   (+) Hypothyroidism      GYN   (+) Malignant neoplasm of upper-outer quadrant of left breast in female, estrogen receptor positive (HCC)      PULMONARY (within normal limits)      Other   (+) Morbid obesity (HCC)        Physical Exam    Airway    Mallampati score: II  TM Distance: >3 FB  Neck ROM: full     Dental       Cardiovascular      Pulmonary      Other Findings  Several missing teeth      Anesthesia Plan  ASA Score- 3     Anesthesia Type- general with ASA Monitors  Additional Monitors:   Airway Plan:           Plan Factors-Exercise tolerance (METS): >4 METS  Chart reviewed  EKG reviewed  Existing labs reviewed  Patient summary reviewed  Patient is not a current smoker  Induction- intravenous  Postoperative Plan-     Informed Consent- Anesthetic plan and risks discussed with patient  I personally reviewed this patient with the CRNA  Discussed and agreed on the Anesthesia Plan with the CRNA  Aurelia Al

## 2021-06-09 VITALS
HEIGHT: 61 IN | BODY MASS INDEX: 42.5 KG/M2 | SYSTOLIC BLOOD PRESSURE: 125 MMHG | OXYGEN SATURATION: 98 % | DIASTOLIC BLOOD PRESSURE: 77 MMHG | TEMPERATURE: 97.6 F | HEART RATE: 57 BPM | RESPIRATION RATE: 18 BRPM | WEIGHT: 225.09 LBS

## 2021-06-09 LAB
ANION GAP SERPL CALCULATED.3IONS-SCNC: 7 MMOL/L (ref 4–13)
BUN SERPL-MCNC: 12 MG/DL (ref 5–25)
CALCIUM SERPL-MCNC: 8.5 MG/DL (ref 8.3–10.1)
CHLORIDE SERPL-SCNC: 108 MMOL/L (ref 100–108)
CO2 SERPL-SCNC: 27 MMOL/L (ref 21–32)
CREAT SERPL-MCNC: 0.75 MG/DL (ref 0.6–1.3)
ERYTHROCYTE [DISTWIDTH] IN BLOOD BY AUTOMATED COUNT: 13.7 % (ref 11.6–15.1)
GFR SERPL CREATININE-BSD FRML MDRD: 82 ML/MIN/1.73SQ M
GLUCOSE SERPL-MCNC: 123 MG/DL (ref 65–140)
HCT VFR BLD AUTO: 43.2 % (ref 34.8–46.1)
HGB BLD-MCNC: 14 G/DL (ref 11.5–15.4)
MCH RBC QN AUTO: 29.5 PG (ref 26.8–34.3)
MCHC RBC AUTO-ENTMCNC: 32.4 G/DL (ref 31.4–37.4)
MCV RBC AUTO: 91 FL (ref 82–98)
PLATELET # BLD AUTO: 207 THOUSANDS/UL (ref 149–390)
PMV BLD AUTO: 10.9 FL (ref 8.9–12.7)
POTASSIUM SERPL-SCNC: 3.7 MMOL/L (ref 3.5–5.3)
RBC # BLD AUTO: 4.74 MILLION/UL (ref 3.81–5.12)
SODIUM SERPL-SCNC: 142 MMOL/L (ref 136–145)
WBC # BLD AUTO: 9.47 THOUSAND/UL (ref 4.31–10.16)

## 2021-06-09 PROCEDURE — 80048 BASIC METABOLIC PNL TOTAL CA: CPT | Performed by: SURGERY

## 2021-06-09 PROCEDURE — 85027 COMPLETE CBC AUTOMATED: CPT | Performed by: SURGERY

## 2021-06-09 PROCEDURE — 99024 POSTOP FOLLOW-UP VISIT: CPT | Performed by: SURGERY

## 2021-06-09 PROCEDURE — NC001 PR NO CHARGE: Performed by: SURGERY

## 2021-06-09 RX ADMIN — METOPROLOL TARTRATE 50 MG: 50 TABLET, FILM COATED ORAL at 09:51

## 2021-06-09 NOTE — PROGRESS NOTES
Progress Note - Surgical Oncology   Jagdish Dickens 78 y o  female MRN: [id number]  Unit/Bed#: S -01 Encounter: [id number]    Assessment:  72-year-old female status post left modified radical mastectomy 6/8    JP1 wth 135 and JP2 with 95 both ss output     Plan:  -diet as tolerated  -DVT prophylaxis with SCDs  -trend drain output  -up out of bed ambulate  -incentive spirometry  -follow-up Case Management for drain care  -plan for dc today    Subjective/Objective     Subjective:    Patient seen and examined at bedside  No acute events overnight  Pain controlled  Denies nausea, vomiting, fever, chills  Using IS        Objective:     Blood pressure 145/65, pulse 83, temperature 99 3 °F (37 4 °C), temperature source Oral, resp  rate 19, height 5' 1" (1 549 m), weight 102 kg (225 lb 1 4 oz), SpO2 95 %  ,Body mass index is 42 53 kg/m²  Intake/Output Summary (Last 24 hours) at 6/9/2021 0815  Last data filed at 6/9/2021 7461  Gross per 24 hour   Intake 1290 ml   Output 530 ml   Net 760 ml       Invasive Devices     Peripheral Intravenous Line            Peripheral IV 06/08/21 Right Hand less than 1 day          Drain            Closed/Suction Drain Left;Lateral Chest Bulb 19 Fr  less than 1 day    Closed/Suction Drain Left;Medial Chest Bulb 19 Fr  less than 1 day                Physical Exam:  General: NAD  Head: normocephalic, atraumatic  CV: Pulse regular  Lungs: no conversational dyspnea  Chest:  Incision clean dry intact, drain with serosanguineous output  Abdomen: soft, non distended, non tender, no guarding or rebound  Extremities: LEWIS, motor sensory intact  Neuro: awake, alert, answers questions appropriately      Lab, Imaging and other studies:I have personally reviewed pertinent lab results      VTE Pharmacologic Prophylaxis: Sequential compression device (Venodyne)   VTE Mechanical Prophylaxis: sequential compression device

## 2021-06-09 NOTE — PROGRESS NOTES
Progress Note - Surgical Oncology   Bettye Ventura 78 y o  female MRN: [id number]  Unit/Bed#: S -01 Encounter: [id number]    Assessment:  81-year-old female status post left modified radical mastectomy 6/8    Plan:  -diet as tolerated  -DVT prophylaxis with SCDs  -trend drain output  -up out of bed ambulate  -incentive spirometry  -follow-up Case Management for drain care    Subjective/Objective     Subjective:  Patient seen examined at bedside  Patient states she is doing well with no pain  Denies any nausea, vomiting, fever, chills  Discussed the findings of the surgery with the patient   Objective:     Blood pressure 152/63, pulse 80, temperature 98 1 °F (36 7 °C), temperature source Oral, resp  rate 18, height 5' 1" (1 549 m), weight 102 kg (225 lb 1 4 oz), SpO2 93 %  ,Body mass index is 42 53 kg/m²  Intake/Output Summary (Last 24 hours) at 6/8/2021 2342  Last data filed at 6/8/2021 2226  Gross per 24 hour   Intake 1290 ml   Output 480 ml   Net 810 ml       Invasive Devices     Peripheral Intravenous Line            Peripheral IV 06/08/21 Right Hand less than 1 day          Drain            Closed/Suction Drain Left;Lateral Chest Bulb 19 Fr  less than 1 day    Closed/Suction Drain Left;Medial Chest Bulb 19 Fr  less than 1 day                Physical Exam:  General: NAD  Head: normocephalic, atraumatic  CV: Pulse regular  Lungs: no conversational dyspnea  Chest:  Incision clean dry intact, drain with serosanguineous output  Abdomen: soft, non distended, non tender, no guarding or rebound  Extremities: LEWIS, motor sensory intact  Neuro: awake, alert, answers questions appropriately      Lab, Imaging and other studies:I have personally reviewed pertinent lab results      VTE Pharmacologic Prophylaxis: Sequential compression device (Venodyne)   VTE Mechanical Prophylaxis: sequential compression device

## 2021-06-09 NOTE — DISCHARGE SUMMARY
Discharge Summary   Ellen Carreon 78 y o  female MRN: [id number]  Unit/Bed#: S -01 Encounter: [id number]    Admission Date: 6/8/2021     Discharge Date:06/09/21    Attending:Lisandro Doran MD     Consultants: none    Admitting Diagnosis: Malignant neoplasm of upper-outer quadrant of left breast in female, estrogen receptor positive ([facility] [address] ) [C50 412, Z17 0]    Principle/ Secondary Diagnosis:  Past Medical History:   Diagnosis Date    BRCA gene mutation negative 05/19/2021    [employer]; 36 gene panel     Past Surgical History:   Procedure Laterality Date    AXILLARY SURGERY Right     sweat glands removed -     BREAST CYST EXCISION Right 2000    benign    BREAST SURGERY Right     CHOLECYSTECTOMY      COLONOSCOPY      MO MASTECTOMY, MODIFIED RADICAL Left 6/8/2021    Procedure: BREAST MODIFIED RADICAL MASTECTOMY; ANAND  DIRECTED AXILLARY DISSECTION;  Surgeon: Ellie Walters MD;  [facility];  Service: Surgical Oncology    US BREAST NEEDLE LOC LEFT Left 6/2/2021    US GUIDED BREAST BIOPSY LEFT COMPLETE Left 4/19/2021    US GUIDED BREAST LYMPH NODE BIOPSY LEFT Left 4/19/2021       Procedures Performed: No orders of the defined types were placed in this encounter      MO MASTECTOMY, MODIFIED RADICAL [[id number]] (BREAST MODIFIED RADICAL MASTECTOMY; ANAND  DIRECTED AXILLARY DISSECTION)  MO EXCISE BREAST LES W XRAY MARKER [[id number]]  BREAST MODIFIED RADICAL MASTECTOMY; ANAND  DIRECTED AXILLARY DISSECTION (Left Breast)      Laboratory data at discharge:   Results from last 7 days   Lab Units 06/09/21  0550   WBC Thousand/uL 9 47   HEMOGLOBIN g/dL 14 0   HEMATOCRIT % 43 2   PLATELETS Thousands/uL 207     Results from last 7 days   Lab Units 06/09/21  0550   POTASSIUM mmol/L 3 7   CHLORIDE mmol/L 108   CO2 mmol/L 27   BUN mg/dL 12   CREATININE mg/dL 0 75   CALCIUM mg/dL 8 5               Discharge instructions/Information to patient and family:   See after visit summary for information provided to patient and family  Discharge Medications:  See after visit summary for reconciled discharge medications provided to patient and family  Hospital Course:   Patient underwent elective Left modified radical mastectomy on 6/8/2021 with Esther Lan  Immediately post-operative the patient recovered from anesthesia in the PACU and then was transferred to the appropriate med-surg floor for an overnight stay  POD 1 the patient was tolerating a regular diet, pain was well controlled, was voiding without difficulty, and ambulating without assistance  A referral for VNA services was sent for drain care upon discharge  A follow up appointment was scheduled with Mariaa Colbert in 2 weeks and pain control for discharge was addressed  Prior to discharge, the patient was given instructions for outpatient care and follow-up  The patient has been instructed to call w/ any questions, changes, or concerns for the medical condition  For further details of the hospitalization, please refer to the medical record  Condition at Discharge: good     Provisions for Follow-Up Care:  See after visit summary for information related to follow-up care and any pertinent home health orders  Disposition: Home  With VNA    Planned Readmission: No    Discharge Statement   I spent 20 minutes discharging the patient  This time was spent on the day of discharge  I had direct contact with the patient on the day of discharge  Additional documentation is required if more than 30 minutes were spent on discharge  Castillo Lowe PA-C  6/9/2021      This text is generated with voice recognition software  There may be translation, syntax,  or grammatical errors  If you have any questions, please contact the dictating provider

## 2021-06-09 NOTE — DISCHARGE INSTRUCTIONS
POST-OPERATIVE BREAST CARE INSTRUCTIONS    Wound Closure/Dressing: Your wound is closed with:   Steri strips-white pieces of tape that hold your incision together along with surgical glue           Dissolvable sutures-underneath the skin    Wound care:     If you have gauze over your wound you may remove it the day after surgery  Leave the Steri-strips on, they will fall off on their own in 1-2 weeks   You may shower using soap and water to clean your wound  Gently pat dry  Drain Care: If you do not have a drain, disregard this section   Visiting Nursing should have been arranged upon discharge from hospital   A nurse will call your home to schedule an initial visit  Please call the number below if you have not received a call within 48 hours of hospital discharge   You may shower with the ALYSSA drains  Wash area around the drains with soap and water and pat dry   Record drain outputs on chart given to you at pre op visit and bring that chart to office at post op appt   ALYSSA drains can be removed in the office by a nurse either at post op visit OR when drain output is 30 ccs or less in a 24 hour period for three days in a row   If you had plastic surgery or reconstructive surgery, the plastic surgeon will manage the drains  Other:      You can begin wearing your own bra when it feels comfortable   You may resume using deodorant the day after surgery                 Post-op visit:  your post-op visit is scheduled for:  2021 at 8:00 am    Call our office at [phone] if you have any  of the followin  Redness, swelling, heat, unusual drainage or heavy bleeding from wound  2  Fever greater than 101 °  3  Pain not relieved by medication

## 2021-06-09 NOTE — CASE MANAGEMENT
Cm met with pt and  to review the recommendations of the care team for [facility] as pt will be returning home with drain care  Cm reviewed that [facility] does not go to [place] and there are only a few agencies that would service her area  Pt agrees to a blanket referral as she does not have a preference for a specific agency  CM made blanket referral to [address] [place] [address] that are able to service [place], [place]  Pt reports her  will be transporting her home at [place] and she will not need assistance with transportation

## 2021-06-09 NOTE — CASE MANAGEMENT
[address] [place] in [place] has accepted pt and will be following up with her tomorrow    Contact information has been placed on AVS

## 2021-06-09 NOTE — PLAN OF CARE
Problem: Potential for Falls  Goal: Patient will remain free of falls  Description: INTERVENTIONS:  - Assess patient frequently for physical needs  -  Identify cognitive and physical deficits and behaviors that affect risk of falls    -  [place] fall precautions as indicated by assessment   - Educate patient/family on patient safety including physical limitations  - Instruct patient to call for assistance with activity based on assessment  - Modify environment to reduce risk of injury  - Consider OT/PT consult to assist with strengthening/mobility  Outcome: Progressing     Problem: PAIN - ADULT  Goal: Verbalizes/displays adequate comfort level or baseline comfort level  Description: Interventions:  - Encourage patient to monitor pain and request assistance  - Assess pain using appropriate pain scale  - Administer analgesics based on type and severity of pain and evaluate response  - Implement non-pharmacological measures as appropriate and evaluate response  - Consider cultural and social influences on pain and pain management  - Notify physician/advanced practitioner if interventions unsuccessful or patient reports new pain  Outcome: Progressing     Problem: INFECTION - ADULT  Goal: Absence or prevention of progression during hospitalization  Description: INTERVENTIONS:  - Assess and monitor for signs and symptoms of infection  - Monitor lab/diagnostic results  - Monitor all insertion sites, i e  indwelling lines, tubes, and drains  - Monitor endotracheal if appropriate and nasal secretions for changes in amount and color  - [place] appropriate cooling/warming therapies per order  - Administer medications as ordered  - Instruct and encourage patient and family to use good hand hygiene technique  - Identify and instruct in appropriate isolation precautions for identified infection/condition  Outcome: Progressing  Goal: Absence of fever/infection during neutropenic period  Description: INTERVENTIONS:  - Monitor WBC    Outcome: Progressing     Problem: SAFETY ADULT  Goal: Patient will remain free of falls  Description: INTERVENTIONS:  - Assess patient frequently for physical needs  -  Identify cognitive and physical deficits and behaviors that affect risk of falls    -  [place] fall precautions as indicated by assessment   - Educate patient/family on patient safety including physical limitations  - Instruct patient to call for assistance with activity based on assessment  - Modify environment to reduce risk of injury  - Consider OT/PT consult to assist with strengthening/mobility  Outcome: Progressing  Goal: Maintain or return to baseline ADL function  Description: INTERVENTIONS:  -  Assess patient's ability to carry out ADLs; assess patient's baseline for ADL function and identify physical deficits which impact ability to perform ADLs (bathing, care of mouth/teeth, toileting, grooming, dressing, etc )  - Assess/evaluate cause of self-care deficits   - Assess range of motion  - Assess patient's mobility; develop plan if impaired  - Assess patient's need for assistive devices and provide as appropriate  - Encourage maximum independence but intervene and supervise when necessary  - Involve family in performance of ADLs  - Assess for home care needs following discharge   - Consider OT consult to assist with ADL evaluation and planning for discharge  - Provide patient education as appropriate  Outcome: Progressing  Goal: Maintain or return mobility status to optimal level  Description: INTERVENTIONS:  - Assess patient's baseline mobility status (ambulation, transfers, stairs, etc )    - Identify cognitive and physical deficits and behaviors that affect mobility  - Identify mobility aids required to assist with transfers and/or ambulation (gait belt, sit-to-stand, lift, walker, cane, etc )  - [place] fall precautions as indicated by assessment  - Record patient progress and toleration of activity level on Mobility SBAR; progress patient to next Phase/Stage  - Instruct patient to call for assistance with activity based on assessment  - Consider rehabilitation consult to assist with strengthening/weightbearing, etc   Outcome: Progressing     Problem: DISCHARGE PLANNING  Goal: Discharge to home or other facility with appropriate resources  Description: INTERVENTIONS:  - Identify barriers to discharge w/patient and caregiver  - Arrange for needed discharge resources and transportation as appropriate  - Identify discharge learning needs (meds, wound care, etc )  - Arrange for interpretive services to assist at discharge as needed  - Refer to Case Management Department for coordinating discharge planning if the patient needs post-hospital services based on physician/advanced practitioner order or complex needs related to functional status, cognitive ability, or social support system  Outcome: Progressing     Problem: Knowledge Deficit  Goal: Patient/family/caregiver demonstrates understanding of disease process, treatment plan, medications, and discharge instructions  Description: Complete learning assessment and assess knowledge base    Interventions:  - Provide teaching at level of understanding  - Provide teaching via preferred learning methods  Outcome: Progressing     Problem: SKIN/TISSUE INTEGRITY - ADULT  Goal: Skin integrity remains intact  Description: INTERVENTIONS  - Identify patients at risk for skin breakdown  - Assess and monitor skin integrity  - Assess and monitor nutrition and hydration status  - Monitor labs (i e  albumin)  - Assess for incontinence   - Turn and reposition patient  - Assist with mobility/ambulation  - Relieve pressure over bony prominences  - Avoid friction and shearing  - Provide appropriate hygiene as needed including keeping skin clean and dry  - Evaluate need for skin moisturizer/barrier cream  - Collaborate with interdisciplinary team (i e  Nutrition, Rehabilitation, etc )   - Patient/family teaching  Outcome: Progressing  Goal: Incision(s), wounds(s) or drain site(s) healing without S/S of infection  Description: INTERVENTIONS  - Assess and document risk factors for skin impairment   - Assess and document dressing, incision, wound bed, drain sites and surrounding tissue  - Consider nutrition services referral as needed  - Oral mucous membranes remain intact  - Provide patient/ family education  Outcome: Progressing  Goal: Oral mucous membranes remain intact  Description: INTERVENTIONS  - Assess oral mucosa and hygiene practices  - Implement preventative oral hygiene regimen  - Implement oral medicated treatments as ordered  - Initiate Nutrition services referral as needed  Outcome: Progressing

## 2021-06-15 DIAGNOSIS — I10 BENIGN ESSENTIAL HYPERTENSION: ICD-10-CM

## 2021-06-16 RX ORDER — METOPROLOL TARTRATE 50 MG/1
TABLET, FILM COATED ORAL
Qty: 90 TABLET | Refills: 3 | Status: SHIPPED | OUTPATIENT
Start: 2021-06-16 | End: 2022-03-15

## 2021-06-17 ENCOUNTER — TELEPHONE (OUTPATIENT)
Dept: HEMATOLOGY ONCOLOGY | Facility: CLINIC | Age: 71
End: 2021-06-17

## 2021-06-17 NOTE — TELEPHONE ENCOUNTER
Patient called in stating she had surgery last week and believes she is getting an infection  Site is red, puffy, no fever, slightly painful

## 2021-06-17 NOTE — TELEPHONE ENCOUNTER
Received call from [facility] patients facility was advising patient incision is red and tender  Does not have fever  Patients daughter Alycia Malik would like patient to be seen sooner  I was not able to find sooner appt for patient  Daughter Alycia Malik phone number is [phone]

## 2021-06-18 ENCOUNTER — TELEPHONE (OUTPATIENT)
Dept: HEMATOLOGY ONCOLOGY | Facility: CLINIC | Age: 71
End: 2021-06-18

## 2021-06-18 ENCOUNTER — CONSULT (OUTPATIENT)
Dept: HEMATOLOGY ONCOLOGY | Facility: CLINIC | Age: 71
End: 2021-06-18
Payer: MEDICARE

## 2021-06-18 ENCOUNTER — OFFICE VISIT (OUTPATIENT)
Dept: SURGICAL ONCOLOGY | Facility: CLINIC | Age: 71
End: 2021-06-18

## 2021-06-18 VITALS
RESPIRATION RATE: 12 BRPM | BODY MASS INDEX: 41.91 KG/M2 | WEIGHT: 222 LBS | HEIGHT: 61 IN | HEART RATE: 60 BPM | DIASTOLIC BLOOD PRESSURE: 80 MMHG | TEMPERATURE: 97.7 F | SYSTOLIC BLOOD PRESSURE: 122 MMHG

## 2021-06-18 VITALS
RESPIRATION RATE: 16 BRPM | DIASTOLIC BLOOD PRESSURE: 78 MMHG | HEIGHT: 61 IN | HEART RATE: 58 BPM | TEMPERATURE: 98.9 F | OXYGEN SATURATION: 99 % | BODY MASS INDEX: 44.07 KG/M2 | SYSTOLIC BLOOD PRESSURE: 124 MMHG | WEIGHT: 233.4 LBS

## 2021-06-18 DIAGNOSIS — T81.89XA PROBLEM INVOLVING SURGICAL INCISION: Primary | ICD-10-CM

## 2021-06-18 DIAGNOSIS — C50.412 MALIGNANT NEOPLASM OF UPPER-OUTER QUADRANT OF LEFT BREAST IN FEMALE, ESTROGEN RECEPTOR POSITIVE (HCC): Primary | ICD-10-CM

## 2021-06-18 DIAGNOSIS — C50.412 MALIGNANT NEOPLASM OF UPPER-OUTER QUADRANT OF LEFT BREAST IN FEMALE, ESTROGEN RECEPTOR POSITIVE (HCC): ICD-10-CM

## 2021-06-18 DIAGNOSIS — Z17.0 MALIGNANT NEOPLASM OF UPPER-OUTER QUADRANT OF LEFT BREAST IN FEMALE, ESTROGEN RECEPTOR POSITIVE (HCC): Primary | ICD-10-CM

## 2021-06-18 DIAGNOSIS — D70.1 CHEMOTHERAPY INDUCED NEUTROPENIA (HCC): Primary | ICD-10-CM

## 2021-06-18 DIAGNOSIS — Z17.0 MALIGNANT NEOPLASM OF UPPER-OUTER QUADRANT OF LEFT BREAST IN FEMALE, ESTROGEN RECEPTOR POSITIVE (HCC): ICD-10-CM

## 2021-06-18 DIAGNOSIS — T45.1X5A CHEMOTHERAPY INDUCED NEUTROPENIA (HCC): Primary | ICD-10-CM

## 2021-06-18 DIAGNOSIS — Z90.12 STATUS POST LEFT MASTECTOMY: ICD-10-CM

## 2021-06-18 PROCEDURE — 99205 OFFICE O/P NEW HI 60 MIN: CPT | Performed by: INTERNAL MEDICINE

## 2021-06-18 PROCEDURE — 99024 POSTOP FOLLOW-UP VISIT: CPT | Performed by: SURGERY

## 2021-06-18 RX ORDER — SODIUM CHLORIDE 9 MG/ML
20 INJECTION, SOLUTION INTRAVENOUS ONCE
Status: CANCELLED | OUTPATIENT
Start: 2021-08-06

## 2021-06-18 RX ORDER — SODIUM CHLORIDE 9 MG/ML
20 INJECTION, SOLUTION INTRAVENOUS ONCE
Status: CANCELLED | OUTPATIENT
Start: 2021-07-16

## 2021-06-18 RX ORDER — SACCHAROMYCES BOULARDII 250 MG
250 CAPSULE ORAL DAILY
COMMUNITY

## 2021-06-18 RX ORDER — ONDANSETRON 4 MG/1
4 TABLET, FILM COATED ORAL EVERY 8 HOURS PRN
Qty: 30 TABLET | Refills: 1 | Status: SHIPPED | OUTPATIENT
Start: 2021-06-18

## 2021-06-18 NOTE — PROGRESS NOTES
Surgical Oncology Follow Up  [address] [place] [address]   [facility] [place]  [address] [facility] [id number]    Rachell Jimenez  1950  [id number]      Chief Complaint   Patient presents with    Post-op     redness         Assessment & Plan:    Ezekiel Wang is a 62-year-old female status post left modified radical mastectomy last week by Dr Kristin Barnard  She was brought in today to examine has surgical site  No evidence of surgical site infection    Very minimum mild erythema at the surgical incision which is appropriate  Drains continued to drain  Serous   Cancer History:     Oncology History   Malignant neoplasm of upper-outer quadrant of left breast in female, estrogen receptor positive ([facility] [address] )   4/19/2021 Biopsy    Left breast US guided biopsy:  A  2 o'clock 8 cm from the nipple  Invasive mammary carcinoma of no special type  Grade 2  ER 90  MS 1  HER2 1+  Lymphovascular invasion: not identified    B  Left Axillary lymph node biopsy:  Invasive/ metastatic adenocarcinoma, compatible with breast primary involving fibroadipose tissues and a portion of lymphoid pranchyma  ER 90  MS 1  HER2 1+    Concordant  Malignancy appears multifocal  The 2 adjacent masses span an area of approximately 4 cm  Right breast clear  5/3/2021 Genetic Testing    The following genes were evaluated: OLINDA, BRCA1, BRCA2, CDH1, CHEK2, PALB2, PTEN, STK11, TP53  Additional genes evaluated for a total of 36 genes analyzed  Negative result   No pathogenic sequence variants or deletions/dupllications identified  [employer]     6/8/2021 Surgery    Left breast modified radical mastectomy with ANAND  directed axillary dissection  Invasive carcinoma of no special type (ductal)  Grade 2  6 cm  Lymphovascular invasion present  Margins negative  4/10 Lymph nodes (3 5 cm)  Anatomic Stage IIIA  Prognostic Stage IB     7/16/2021 -  Chemotherapy    pegfilgrastim (NEULASTA ONPRO), 6 mg, Subcutaneous, Once, 0 of 4 cycles  cyclophosphamide (CYTOXAN) IVPB, 600 mg/m2 = 1,212 mg, Intravenous, Once, 0 of 4 cycles  DOCEtaxel (TAXOTERE) chemo infusion, 75 mg/m2 = 151 6 mg, Intravenous, Once, 0 of 4 cycles           Interval History:    status post left modified radical mastectomy  Review of Systems:   Review of Systems   Constitutional: Negative for chills and fever  HENT: Negative for ear pain and sore throat  Eyes: Negative for pain and visual disturbance  Respiratory: Negative for cough and shortness of breath  Cardiovascular: Negative for chest pain and palpitations  Gastrointestinal: Negative for abdominal pain and vomiting  Genitourinary: Negative for dysuria and hematuria  Musculoskeletal: Negative for arthralgias and back pain  Skin: Negative for color change and rash  Neurological: Negative for seizures and syncope  All other systems reviewed and are negative        Past Medical History     Patient Active Problem List   Diagnosis    Benign essential hypertension    Chronic rhinitis    Hidradenitis suppurativa    Hypothyroidism    Impaired fasting glucose    Morbid obesity ([address] )    Venous insufficiency (chronic) (peripheral)    Malignant neoplasm of upper-outer quadrant of left breast in female, estrogen receptor positive ([facility] [address] )    Chemotherapy induced neutropenia ([facility] [address] )     Past Medical History:   Diagnosis Date    BRCA gene mutation negative 05/19/2021    [employer]; 36 gene panel     Past Surgical History:   Procedure Laterality Date    AXILLARY SURGERY Right     sweat glands removed -     BREAST CYST EXCISION Right 2000    benign    BREAST SURGERY Right     CHOLECYSTECTOMY      COLONOSCOPY      [place] MASTECTOMY, MODIFIED RADICAL Left 6/8/2021    Procedure: BREAST MODIFIED RADICAL MASTECTOMY; ANAND  DIRECTED AXILLARY DISSECTION;  Surgeon: Donato Rizo MD;  [facility];  Service: Surgical Oncology    US BREAST NEEDLE LOC LEFT Left 6/2/2021    US GUIDED BREAST BIOPSY LEFT COMPLETE Left 4/19/2021    US GUIDED BREAST LYMPH NODE BIOPSY LEFT Left 4/19/2021     Family History   Problem Relation Age of Onset    Dementia Mother     Colon cancer Mother         55's-59's    Lung cancer Father         55's-59's    No Known Problems Sister     Breast cancer Paternal Aunt     No Known Problems Sister     Stomach cancer Paternal Uncle         66's     Social History     Socioeconomic History    Marital status: /Civil Union     Spouse name: Not on file    Number of children: Not on file    Years of education: Not on file    Highest education level: Not on file   Occupational History    Not on file   Tobacco Use    Smoking status: Never Smoker    Smokeless tobacco: Never Used   Vaping Use    Vaping Use: Never used   Substance and Sexual Activity    Alcohol use: No    Drug use: No    Sexual activity: Not Currently   Other Topics Concern    Not on file   Social History Narrative    Not on file     Social Determinants of Health     Financial Resource Strain:     Difficulty of Paying Living Expenses:    Food Insecurity:     Worried About Running Out of Food in the Last Year:     Ran Out of Food in the Last Year:    Transportation Needs:     Lack of Transportation (Medical):      Lack of Transportation (Non-Medical):    Physical Activity:     Days of Exercise per Week:     Minutes of Exercise per Session:    Stress:     Feeling of Stress :    Social Connections:     Frequency of Communication with Friends and Family:     Frequency of Social Gatherings with Friends and Family:     Attends Latter day Services:     Active Member of Clubs or Organizations:     Attends Club or Organization Meetings:     Marital Status:    Intimate Partner Violence:     Fear of Current or Ex-Partner:     Emotionally Abused:     Physically Abused:     Sexually Abused:        Current Outpatient Medications:     aspirin 81 MG tablet, Take by mouth daily, Disp: , Rfl:     CRANIAL PROSTHESIS, RX,, One wig as needed, Disp: 1 each, Rfl: 0    fluticasone (FLONASE) 50 mcg/act nasal spray, 2 puffs into each nostril daily, Disp: , Rfl:     metoprolol tartrate (LOPRESSOR) 50 mg tablet, TAKE ONE TABLET BY MOUTH EVERY DAY, Disp: 90 tablet, Rfl: 3    Multiple Vitamins-Minerals (MULTIVITAMIN ADULT PO), Take by mouth daily, Disp: , Rfl:     ondansetron (ZOFRAN) 4 mg tablet, Take 1 tablet (4 mg total) by mouth every 8 (eight) hours as needed for nausea or vomiting, Disp: 30 tablet, Rfl: 1    oxyCODONE-acetaminophen (PERCOCET) 5-325 mg per tablet, Take 1 tablet by mouth every 6 (six) hours as needed for moderate painMax Daily Amount: 4 tablets (Patient not taking: Reported on 2021), Disp: 20 tablet, Rfl: 0    saccharomyces boulardii (FLORASTOR) 250 mg capsule, Take 250 mg by mouth 2 (two) times a day, Disp: , Rfl:   No Known Allergies    Physical Exam:     Vitals:    21 1133   BP: 122/80   Pulse: 60   Resp: 12   Temp: 97 7 °F (36 5 °C)     Physical Exam  Chest:               Results & Discussion:   I did review   With the my findings  Appointment with an appointment to see Dr Víctor Chacon next week  She was told to call us with any questions or concerns at any time  She understand and agree to do so  Dr Garrido we will go over her pathology results in detail at her next visit  Advance Care Planning/Advance Directives: Juan Chu MD discussed the disease status with Jose Bell  today 21  treatment plans and follow-up with the patient

## 2021-06-18 NOTE — TELEPHONE ENCOUNTER
Called patient's daughter and scheduled patient to see Dr Gita Mo today at 11:30 for incision evaluation as Dr Calixto Perales is out of the office  Olaf May verbalized understanding of appointment details

## 2021-06-18 NOTE — PROGRESS NOTES
Hematology / Oncology Outpatient Consult Note    Vic Maharaj 78 y o  female [id number]         Date:  6/18/2021    Assessment / Plan:    A 65-year-old postmenopausal woman with newly diagnosed stage III A left breast cancer, grade 2 with invasive lobular histology, ER 90% positive, NE 1% positive, HER2 negative disease  She is negative for BRCA gene mutation  She had 4 positive lymph nodes  She underwent mastectomy and axillary lymph node dissection, resulting in MADAN  She presents today with her  to discuss adjuvant treatment options  We had extensive discussion regarding the diagnosis, staging information, relatively high risk disease, prognosis and treatment options  Because of the advanced disease, adjuvant chemotherapy is indicated  I recommended her to have Taxotere and cyclophosphamide every 3 weeks x4 cycle followed by adjuvant hormonal therapy with aromatase inhibitors  I would expect 10 years of adjuvant hormonal therapy is appropriate based on the risk  Obviously, she is indicated to have postmastectomy radiation therapy due to the size of tumor as well as number of the lymph nodes involvement  She is aware of this  Side effects of chemotherapy was thoroughly discussed, including but not limited to alopecia, nausea, vomiting, neutropenia, risk of infection, allergic reaction, peripheral edema and fatigue  She understood and wished to proceed  I am going to set up 1st cycle of TC in July 17, 2021 at [facility]  She was instructed to give us a call immediately if she has fever  All the patient and her 's questions were answered to their satisfaction  Subjective:     HPI:   A 65-year-old postmenopausal woman who recently noticed a lump in her left breast which she brought to medical attention  She was found to have large left breast mass radiographically which was biopsied in April 19, 2021 which showed invasive ductal carcinoma, grade 2  This was ER 90% positive, RI 1% positive, HER2 negative disease  She subsequently underwent mastectomy and axillary lymph node dissection by Dr Pan Amanda in June 8, 2021  She had 60 x 38 x 30 mm of invasive lobular carcinoma, grade 2  Lymphovascular invasion was present  4/10 axillary lymph nodes were positive for metastatic disease with largest measuring  35 mm  Extranodal extension was present  She did not have reconstruction at the same time of mastectomy  She presents today to discuss adjuvant treatment options  She feels well  She is afebrile  She has no complaint of pain  She still have limited range of motion in her left shoulder  She has 2 drain still in place  She has no respiratory symptoms  Her CT scan showed 7 mm pulmonary nodule which is indeterminate  Bone scan was negative for osseous metastasis  Her performance status is normal   She is negative for BRCA gene mutation  Interval History:          Objective:     Primary Diagnosis:    1  Left breast cancer, stage III A (pT3, pN2, M0 ) grade 2 with invasive lobular histology, ER 90% positive, RI 1% positive, HER2 negative disease  Diagnosed in June 2021     2  BRCA gene mutation negative  Cancer Staging:  Cancer Staging  No matching staging information was found for the patient  Previous Hematologic/ Oncologic Treatment:         Current Hematologic/ Oncologic Treatment:        Adjuvant chemotherapy with Taxotere and cyclophosphamide x4 cycles  1st cycle to be given in July 16, 2021  Disease Status:      MADAN status post mastectomy and axillary lymph node dissection  Test Results:    Pathology:      60 x 38 x 30 mm of invasive lobular carcinoma, grade 2  Lymphovascular invasion was present  4/10 axillary lymph nodes were positive for metastatic disease with largest tumor measuring 3 5 cm  Extranodal extension was positive  ER 90% positive, RI 1% positive, HER2 negative disease    Stage III A (pT3, pN2, M0)    Radiology:     CT scan of chest abdomen pelvis showed a 7 mm of pulmonary nodules  Bone scan was negative for osseous metastasis  Laboratory:      See below  Physical Exam:      General Appearance:    Alert, oriented        Eyes:    PERRL   Ears:    Normal external ear canals, both ears   Nose:   Nares normal, septum midline   Throat:   Mucosa moist  Pharynx without injection  Neck:   Supple       Lungs:     Clear to auscultation bilaterally   Chest Wall:    No tenderness or deformity    Heart:    Regular rate and rhythm       Abdomen:     Soft, non-tender, bowel sounds +, no organomegaly           Extremities:   Extremities no cyanosis or edema       Skin:   no rash or icterus  Lymph nodes:   Cervical, supraclavicular, and axillary nodes normal   Neurologic:   CNII-XII intact, normal strength, sensation and reflexes     Throughout          Breast exam:     Status post left mastectomy without reconstruction  No palpable abnormality in her left chest wall  Right breast exam is negative  ROS: Review of Systems   All other systems reviewed and are negative  Imaging: US breast siobhan  left, Mammo post biopsy left    Result Date: 6/2/2021  Narrative: DIAGNOSIS: Malignant neoplasm of upper-outer quadrant of left breast in female, estrogen receptor positive ([facility] [address] ); Carcinoma of left breast metastatic to axillary lymph node ([facility] [address] ) RELEVANT HISTORY: Family Breast Cancer History: History of breast cancer in Paternal [de-identified]  Family Medical History: Family medical history includes breast cancer in paternal aunt and colon cancer in mother  Personal History: No known relevant hormone history  Surgical history includes breast excisional biopsy  No known relevant medical history   COMPARISONS: Prior breast imaging dated: 04/19/2021, 04/19/2021, 04/19/2021, 03/26/2021, 03/26/2021, 06/22/2018, and 02/22/2017 INDICATION: Dean Stein is a 78 y o  female presenting for left axillary lymph node that was previously biopsied  FINDINGS: LEFT 4) MASS US breast siobhan  left: Images of the left breast mass in the axilla region were obtained  The patient was placed supine on the biopsy table  After obtaining informed consent for the procedure at which time the risks and benefits were explained to the patient including bleeding, infection pneumothorax, a time-out was performed  T he skin was prepped in the usual fashion  Anesthesia was administered using lidocaine 1%  A 16 gauge SIOBHAN  is placed through the pathologic lymph node  It should be noted the [facility] clip was sonographically occult likely due to the deep location of the lymph node  The clip was at the site  The patient tolerated the procedure well  There were no immediate complications  Impression:  SIOBHAN  placement for pathologic lymph node in the left axilla  RECOMMENDATION:      - Surgical consultation for the left breast  Workstation ID: [id number]        Labs:   Lab Results   Component Value Date    WBC 9 47 06/09/2021    HGB 14 0 06/09/2021    HCT 43 2 06/09/2021    MCV 91 06/09/2021     06/09/2021     Lab Results   Component Value Date     01/27/2017    K 3 7 06/09/2021     06/09/2021    CO2 27 06/09/2021    BUN 12 06/09/2021    CREATININE 0 75 06/09/2021    GLUCOSE 131 (H) 01/27/2017    CALCIUM 8 5 06/09/2021    AST 19 05/03/2021    ALT 41 05/03/2021    ALKPHOS 74 05/03/2021    PROT 6 5 01/27/2017    BILITOT 0 8 01/27/2017    EGFR 82 06/09/2021           Vital Sign:    Body surface area is 2 02 meters squared      Wt Readings from Last 3 Encounters:   06/18/21 106 kg (233 lb 6 4 oz)   06/08/21 102 kg (225 lb 1 4 oz)   06/02/21 104 kg (229 lb)        Temp Readings from Last 3 Encounters:   06/18/21 98 9 °F (37 2 °C) (Tympanic)   06/09/21 97 6 °F (36 4 °C) (Oral)   06/02/21 98 °F (36 7 °C)        BP Readings from Last 3 Encounters:   06/18/21 124/78   06/09/21 125/77   06/02/21 148/80         Pulse Readings from Last 3 Encounters:   06/18/21 58   06/09/21 57   06/02/21 64     @LASTSAO2(3)@    Active Problems:   Patient Active Problem List   Diagnosis    Benign essential hypertension    Chronic rhinitis    Hidradenitis suppurativa    Hypothyroidism    Impaired fasting glucose    Morbid obesity (HCC)    Venous insufficiency (chronic) (peripheral)    Malignant neoplasm of upper-outer quadrant of left breast in female, estrogen receptor positive (HCC)    Chemotherapy induced neutropenia (HCC)       Past Medical History:   Past Medical History:   Diagnosis Date    BRCA gene mutation negative 05/19/2021    [employer]; 36 gene panel       Surgical History:   Past Surgical History:   Procedure Laterality Date    AXILLARY SURGERY Right     sweat glands removed -     BREAST CYST EXCISION Right 2000    benign    BREAST SURGERY Right     CHOLECYSTECTOMY      COLONOSCOPY      [place] MASTECTOMY, MODIFIED RADICAL Left 6/8/2021    Procedure: BREAST MODIFIED RADICAL MASTECTOMY; ANAND  DIRECTED AXILLARY DISSECTION;  Surgeon: Mia Mendoza MD;  [facility];  Service: Surgical Oncology    US BREAST NEEDLE LOC LEFT Left 6/2/2021    US GUIDED BREAST BIOPSY LEFT COMPLETE Left 4/19/2021    US GUIDED BREAST LYMPH NODE BIOPSY LEFT Left 4/19/2021       Family History:    Family History   Problem Relation Age of Onset    Dementia Mother     Colon cancer Mother         55's-59's    Lung cancer Father         55's-59's    No Known Problems Sister     Breast cancer Paternal Aunt     No Known Problems Sister     Stomach cancer Paternal Uncle         66's       Cancer-related family history includes Breast cancer in her paternal aunt; Colon cancer in her mother; Lung cancer in her father; Stomach cancer in her paternal uncle      Social History:   Social History     Socioeconomic History    Marital status: /Civil Union     Spouse name: Not on file    Number of children: Not on file    Years of education: Not on file    Highest education level: Not on file   Occupational History    Not on file   Tobacco Use    Smoking status: Never Smoker    Smokeless tobacco: Never Used   Vaping Use    Vaping Use: Never used   Substance and Sexual Activity    Alcohol use: No    Drug use: No    Sexual activity: Not on file   Other Topics Concern    Not on file   Social History Narrative    Not on file     Social Determinants of Health     Financial Resource Strain:     Difficulty of Paying Living Expenses:    Food Insecurity:     Worried About Running Out of Food in the Last Year:     Ran Out of Food in the Last Year:    Transportation Needs:     Lack of Transportation (Medical):      Lack of Transportation (Non-Medical):    Physical Activity:     Days of Exercise per Week:     Minutes of Exercise per Session:    Stress:     Feeling of Stress :    Social Connections:     Frequency of Communication with Friends and Family:     Frequency of Social Gatherings with Friends and Family:     Attends Yazidism Services:     Active Member of Clubs or Organizations:     Attends Club or Organization Meetings:     Marital Status:    Intimate Partner Violence:     Fear of Current or Ex-Partner:     Emotionally Abused:     Physically Abused:     Sexually Abused:        Current Medications:   Current Outpatient Medications   Medication Sig Dispense Refill    aspirin 81 MG tablet Take by mouth daily      CRANIAL PROSTHESIS, RX, One wig as needed 1 each 0    fluticasone (FLONASE) 50 mcg/act nasal spray 2 puffs into each nostril daily      metoprolol tartrate (LOPRESSOR) 50 mg tablet TAKE ONE TABLET BY MOUTH EVERY DAY 90 tablet 3    Multiple Vitamins-Minerals (MULTIVITAMIN ADULT PO) Take by mouth daily      saccharomyces boulardii (FLORASTOR) 250 mg capsule Take 250 mg by mouth 2 (two) times a day      oxyCODONE-acetaminophen (PERCOCET) 5-325 mg per tablet Take 1 tablet by mouth every 6 (six) hours as needed for moderate painMax Daily Amount: 4 tablets (Patient not taking: Reported on 6/2/2021) 20 tablet 0     No current facility-administered medications for this visit         Allergies: No Known Allergies

## 2021-06-18 NOTE — TELEPHONE ENCOUNTER
I called the patient and left a message with her follow up for 7/14 @ 10:40 am @ [facility], [facility] Kiesha Rodriguez  Then her first infusion is scheduled for 7/16@ 10 am @ [facility]  [address] along with 8/06 @ 10 am and 8/27 @ 10 am as well  I then voiced to call the office if any questions

## 2021-06-23 ENCOUNTER — OFFICE VISIT (OUTPATIENT)
Dept: SURGICAL ONCOLOGY | Facility: CLINIC | Age: 71
End: 2021-06-23

## 2021-06-23 VITALS
RESPIRATION RATE: 16 BRPM | SYSTOLIC BLOOD PRESSURE: 122 MMHG | TEMPERATURE: 98 F | BODY MASS INDEX: 42.48 KG/M2 | HEIGHT: 61 IN | OXYGEN SATURATION: 98 % | DIASTOLIC BLOOD PRESSURE: 80 MMHG | WEIGHT: 225 LBS | HEART RATE: 62 BPM

## 2021-06-23 DIAGNOSIS — Z90.12 STATUS POST LEFT MASTECTOMY: Primary | ICD-10-CM

## 2021-06-23 DIAGNOSIS — Z17.0 MALIGNANT NEOPLASM OF UPPER-OUTER QUADRANT OF LEFT BREAST IN FEMALE, ESTROGEN RECEPTOR POSITIVE (HCC): ICD-10-CM

## 2021-06-23 DIAGNOSIS — C50.412 MALIGNANT NEOPLASM OF UPPER-OUTER QUADRANT OF LEFT BREAST IN FEMALE, ESTROGEN RECEPTOR POSITIVE (HCC): ICD-10-CM

## 2021-06-23 PROCEDURE — 99024 POSTOP FOLLOW-UP VISIT: CPT | Performed by: SURGERY

## 2021-06-23 NOTE — PROGRESS NOTES
Surgical Oncology Breast Post-Op       [address]  [facility]  [address] [facility] [address]    Jessie Shila  1950  [id number]  [address] [facility]  [facility]  [address] [address]    Chief Complaint:   Kameron Alfredo is seen for a post-operative visit of her recent breast surgery  Oncology History:     Oncology History   Malignant neoplasm of upper-outer quadrant of left breast in female, estrogen receptor positive ([address] )   4/19/2021 Biopsy    Left breast US guided biopsy:  A  2 o'clock 8 cm from the nipple  Invasive mammary carcinoma of no special type  Grade 2  ER 90  OK 1  HER2 1+  Lymphovascular invasion: not identified    B  Left Axillary lymph node biopsy:  Invasive/ metastatic adenocarcinoma, compatible with breast primary involving fibroadipose tissues and a portion of lymphoid pranchyma  ER 90  OK 1  HER2 1+    Concordant  Malignancy appears multifocal  The 2 adjacent masses span an area of approximately 4 cm  Right breast clear  5/3/2021 Genetic Testing    The following genes were evaluated: OLINDA, BRCA1, BRCA2, CDH1, CHEK2, PALB2, PTEN, STK11, TP53  Additional genes evaluated for a total of 36 genes analyzed  Negative result   No pathogenic sequence variants or deletions/dupllications identified  [employer]     6/8/2021 Surgery    Left breast modified radical mastectomy with ANAND  directed axillary dissection  Invasive carcinoma of no special type (ductal)  Grade 2  6 cm  Lymphovascular invasion present  Margins negative  4/10 Lymph nodes (3 5 cm)  Anatomic Stage IIIA  Prognostic Stage IB     7/16/2021 -  Chemotherapy    pegfilgrastim (NEULASTA ONPRO), 6 mg, Subcutaneous, Once, 0 of 4 cycles  cyclophosphamide (CYTOXAN) IVPB, 600 mg/m2 = 1,212 mg, Intravenous, Once, 0 of 4 cycles  DOCEtaxel (TAXOTERE) chemo infusion, 75 mg/m2 = 151 6 mg, Intravenous, Once, 0 of 4 cycles         Assessment & Plan:   Assessment/Plan    The patient presents for postoperative visit  She has no complaints referable to her surgery  She has already seen Dr Cassandria Lombard is scheduled for chemotherapy  She will follow up with radiation therapy after that and then anti hormonal therapy  She would like to have revisions of her lateral flaps with Dr Sheela Silva once her adjuvant therapies are completed  We have previously reviewed this as well  Her drains were removed without difficulty  History of Present Illness:   See Oncology History    Interval History:   See Assessment & Plan    Review of Systems:   Review of Systems   All other systems reviewed and are negative        Past Medical History:     Patient Active Problem List   Diagnosis    Benign essential hypertension    Chronic rhinitis    Hidradenitis suppurativa    Hypothyroidism    Impaired fasting glucose    Morbid obesity (HCC)    Venous insufficiency (chronic) (peripheral)    Malignant neoplasm of upper-outer quadrant of left breast in female, estrogen receptor positive ([facility] [place] [address] )    Chemotherapy induced neutropenia (HCC)     Past Medical History:   Diagnosis Date    BRCA gene mutation negative 05/19/2021    [employer]; 36 gene panel     Past Surgical History:   Procedure Laterality Date    AXILLARY SURGERY Right     sweat glands removed -     BREAST CYST EXCISION Right 2000    benign    BREAST SURGERY Right     CHOLECYSTECTOMY      COLONOSCOPY      [place] MASTECTOMY, MODIFIED RADICAL Left 6/8/2021    Procedure: BREAST MODIFIED RADICAL MASTECTOMY; ANAND  DIRECTED AXILLARY DISSECTION;  Surgeon: Catrachito Ham MD;  Location: [facility];  Service: Surgical Oncology    US BREAST NEEDLE LOC LEFT Left 6/2/2021    US GUIDED BREAST BIOPSY LEFT COMPLETE Left 4/19/2021    US GUIDED BREAST LYMPH NODE BIOPSY LEFT Left 4/19/2021     Family History   Problem Relation Age of Onset    Dementia Mother     Colon cancer Mother         55's-59's    Lung cancer Father         55's-59's    No Known Problems Sister     Breast cancer Paternal Aunt     No Known Problems Sister     Stomach cancer Paternal Uncle         66's     Social History     Socioeconomic History    Marital status: /Civil Union     Spouse name: Not on file    Number of children: Not on file    Years of education: Not on file    Highest education level: Not on file   Occupational History    Not on file   Tobacco Use    Smoking status: Never Smoker    Smokeless tobacco: Never Used   Vaping Use    Vaping Use: Never used   Substance and Sexual Activity    Alcohol use: No    Drug use: No    Sexual activity: Not Currently   Other Topics Concern    Not on file   Social History Narrative    Not on file     Social Determinants of Health     Financial Resource Strain:     Difficulty of Paying Living Expenses:    Food Insecurity:     Worried About Running Out of Food in the Last Year:     [address] in the Last Year:    Transportation Needs:     Lack of Transportation (Medical):      Lack of Transportation (Non-Medical):    Physical Activity:     Days of Exercise per Week:     Minutes of Exercise per Session:    Stress:     Feeling of Stress :    Social Connections:     Frequency of Communication with Friends and Family:     Frequency of Social Gatherings with Friends and Family:     Attends Anab[employer]ist Services:     Active Member of Clubs or Organizations:     Attends Club or Organization Meetings:     Marital Status:    Intimate Partner Violence:     Fear of Current or Ex-Partner:     Emotionally Abused:     Physically Abused:     Sexually Abused:        Current Outpatient Medications:     aspirin 81 MG tablet, Take by mouth daily, Disp: , Rfl:     CRANIAL PROSTHESIS, RX,, One wig as needed, Disp: 1 each, Rfl: 0    fluticasone (FLONASE) 50 mcg/act nasal spray, 2 puffs into each nostril daily, Disp: , Rfl:     metoprolol tartrate (LOPRESSOR) 50 mg tablet, TAKE ONE TABLET BY MOUTH EVERY DAY, Disp: 90 tablet, Rfl: 3    Multiple Vitamins-Minerals (MULTIVITAMIN ADULT PO), Take by mouth daily, Disp: , Rfl:     ondansetron (ZOFRAN) 4 mg tablet, Take 1 tablet (4 mg total) by mouth every 8 (eight) hours as needed for nausea or vomiting, Disp: 30 tablet, Rfl: 1    saccharomyces boulardii (FLORASTOR) 250 mg capsule, Take 250 mg by mouth 2 (two) times a day, Disp: , Rfl:     oxyCODONE-acetaminophen (PERCOCET) 5-325 mg per tablet, Take 1 tablet by mouth every 6 (six) hours as needed for moderate painMax Daily Amount: 4 tablets (Patient not taking: Reported on 6/2/2021), Disp: 20 tablet, Rfl: 0  No Known Allergies    Physical Exams:     Vitals:    06/23/21 0754   BP: 122/80   Pulse: 62   Resp: 16   Temp: 98 °F (36 7 °C)   SpO2: 98%     Physical Exam  Chest:      Comments:   Examination of the left mastectomy site demonstrates a relatively significant dog ear in the lateral aspect small in the medial aspect  She has some inflammation around her incision but no evidence of infection  Her drains were removed without difficulty  Results & Discussion:     The patient is already coordinated start her adjuvant therapies  We will see her back in 3 months  The patient has been informed to have a low threshold to contact us regarding any questions or concerns

## 2021-07-12 ENCOUNTER — TELEPHONE (OUTPATIENT)
Dept: BARIATRICS | Facility: CLINIC | Age: 71
End: 2021-07-12

## 2021-07-12 NOTE — TELEPHONE ENCOUNTER
Left msg on vcmail  We received a referral to Weight Management and calling to see if she would like to schedule

## 2021-07-13 ENCOUNTER — TELEPHONE (OUTPATIENT)
Dept: HEMATOLOGY ONCOLOGY | Facility: CLINIC | Age: 71
End: 2021-07-13

## 2021-07-13 ENCOUNTER — APPOINTMENT (OUTPATIENT)
Dept: LAB | Facility: HOSPITAL | Age: 71
End: 2021-07-13
Attending: INTERNAL MEDICINE
Payer: MEDICARE

## 2021-07-13 DIAGNOSIS — C50.412 MALIGNANT NEOPLASM OF UPPER-OUTER QUADRANT OF LEFT BREAST IN FEMALE, ESTROGEN RECEPTOR POSITIVE (HCC): ICD-10-CM

## 2021-07-13 DIAGNOSIS — T45.1X5A CHEMOTHERAPY INDUCED NEUTROPENIA (HCC): ICD-10-CM

## 2021-07-13 DIAGNOSIS — Z17.0 MALIGNANT NEOPLASM OF UPPER-OUTER QUADRANT OF LEFT BREAST IN FEMALE, ESTROGEN RECEPTOR POSITIVE (HCC): ICD-10-CM

## 2021-07-13 DIAGNOSIS — D70.1 CHEMOTHERAPY INDUCED NEUTROPENIA (HCC): ICD-10-CM

## 2021-07-13 LAB
ALBUMIN SERPL BCP-MCNC: 3.4 G/DL (ref 3.5–5)
ALP SERPL-CCNC: 66 U/L (ref 46–116)
ALT SERPL W P-5'-P-CCNC: 34 U/L (ref 12–78)
ANION GAP SERPL CALCULATED.3IONS-SCNC: 9 MMOL/L (ref 4–13)
AST SERPL W P-5'-P-CCNC: 11 U/L (ref 5–45)
BASOPHILS # BLD AUTO: 0.03 THOUSANDS/ΜL (ref 0–0.1)
BASOPHILS NFR BLD AUTO: 0 % (ref 0–1)
BILIRUB SERPL-MCNC: 0.33 MG/DL (ref 0.2–1)
BUN SERPL-MCNC: 18 MG/DL (ref 5–25)
CALCIUM ALBUM COR SERPL-MCNC: 9.4 MG/DL (ref 8.3–10.1)
CALCIUM SERPL-MCNC: 8.9 MG/DL (ref 8.3–10.1)
CHLORIDE SERPL-SCNC: 106 MMOL/L (ref 100–108)
CO2 SERPL-SCNC: 29 MMOL/L (ref 21–32)
CREAT SERPL-MCNC: 0.88 MG/DL (ref 0.6–1.3)
EOSINOPHIL # BLD AUTO: 0.17 THOUSAND/ΜL (ref 0–0.61)
EOSINOPHIL NFR BLD AUTO: 2 % (ref 0–6)
ERYTHROCYTE [DISTWIDTH] IN BLOOD BY AUTOMATED COUNT: 13.2 % (ref 11.6–15.1)
GFR SERPL CREATININE-BSD FRML MDRD: 67 ML/MIN/1.73SQ M
GLUCOSE SERPL-MCNC: 100 MG/DL (ref 65–140)
HCT VFR BLD AUTO: 44 % (ref 34.8–46.1)
HGB BLD-MCNC: 14.5 G/DL (ref 11.5–15.4)
IMM GRANULOCYTES # BLD AUTO: 0.03 THOUSAND/UL (ref 0–0.2)
IMM GRANULOCYTES NFR BLD AUTO: 0 % (ref 0–2)
LYMPHOCYTES # BLD AUTO: 1.55 THOUSANDS/ΜL (ref 0.6–4.47)
LYMPHOCYTES NFR BLD AUTO: 20 % (ref 14–44)
MCH RBC QN AUTO: 29.8 PG (ref 26.8–34.3)
MCHC RBC AUTO-ENTMCNC: 33 G/DL (ref 31.4–37.4)
MCV RBC AUTO: 91 FL (ref 82–98)
MONOCYTES # BLD AUTO: 0.72 THOUSAND/ΜL (ref 0.17–1.22)
MONOCYTES NFR BLD AUTO: 10 % (ref 4–12)
NEUTROPHILS # BLD AUTO: 5.08 THOUSANDS/ΜL (ref 1.85–7.62)
NEUTS SEG NFR BLD AUTO: 68 % (ref 43–75)
NRBC BLD AUTO-RTO: 0 /100 WBCS
PLATELET # BLD AUTO: 211 THOUSANDS/UL (ref 149–390)
PMV BLD AUTO: 10.7 FL (ref 8.9–12.7)
POTASSIUM SERPL-SCNC: 3.9 MMOL/L (ref 3.5–5.3)
PROT SERPL-MCNC: 6.7 G/DL (ref 6.4–8.2)
RBC # BLD AUTO: 4.86 MILLION/UL (ref 3.81–5.12)
SODIUM SERPL-SCNC: 144 MMOL/L (ref 136–145)
WBC # BLD AUTO: 7.58 THOUSAND/UL (ref 4.31–10.16)

## 2021-07-13 PROCEDURE — 80053 COMPREHEN METABOLIC PANEL: CPT

## 2021-07-13 PROCEDURE — 36415 COLL VENOUS BLD VENIPUNCTURE: CPT

## 2021-07-13 PROCEDURE — 85025 COMPLETE CBC W/AUTO DIFF WBC: CPT

## 2021-07-14 ENCOUNTER — OFFICE VISIT (OUTPATIENT)
Dept: HEMATOLOGY ONCOLOGY | Facility: CLINIC | Age: 71
End: 2021-07-14
Payer: MEDICARE

## 2021-07-14 VITALS
WEIGHT: 225 LBS | DIASTOLIC BLOOD PRESSURE: 78 MMHG | RESPIRATION RATE: 18 BRPM | HEIGHT: 61 IN | HEART RATE: 67 BPM | OXYGEN SATURATION: 97 % | SYSTOLIC BLOOD PRESSURE: 126 MMHG | BODY MASS INDEX: 42.48 KG/M2 | TEMPERATURE: 96.5 F

## 2021-07-14 DIAGNOSIS — Z17.0 MALIGNANT NEOPLASM OF UPPER-OUTER QUADRANT OF LEFT BREAST IN FEMALE, ESTROGEN RECEPTOR POSITIVE (HCC): Primary | ICD-10-CM

## 2021-07-14 DIAGNOSIS — C50.412 MALIGNANT NEOPLASM OF UPPER-OUTER QUADRANT OF LEFT BREAST IN FEMALE, ESTROGEN RECEPTOR POSITIVE (HCC): Primary | ICD-10-CM

## 2021-07-14 PROCEDURE — 99214 OFFICE O/P EST MOD 30 MIN: CPT | Performed by: INTERNAL MEDICINE

## 2021-07-14 NOTE — PROGRESS NOTES
Hematology / Oncology Outpatient Follow Up Note    Manuel Villanueva 73 y o  female OYV:[id number] OQS:[id number]         Date:  7/14/2021    Assessment / Plan:    A 72-year-old postmenopausal woman with stage III A left breast cancer, grade 2 with invasive lobular histology, ER 90% positive, SC 1% positive, HER2 negative disease  She is negative for BRCA gene mutation  She had 4 positive lymph nodes  She underwent mastectomy and axillary lymph node dissection, resulting in MADAN  Clinically, she has no evidence recurrent disease  She has adequate ANC to start adjuvant chemotherapy with Taxotere and cyclophosphamide later this week  We again discussed the expected toxicity from this regimen  She is in agreement and wished to proceed  I will see her again in 3 weeks for follow-up           Subjective:      HPI:   A 72-year-old postmenopausal woman who recently noticed a lump in her left breast which she brought to medical attention  She was found to have large left breast mass radiographically which was biopsied in April 19, 2021 which showed invasive ductal carcinoma, grade 2  This was ER 90% positive, SC 1% positive, HER2 negative disease  She subsequently underwent mastectomy and axillary lymph node dissection by Dr Olimpia Oleary in June 8, 2021  She had 60 x 38 x 30 mm of invasive lobular carcinoma, grade 2  Lymphovascular invasion was present  4/10 axillary lymph nodes were positive for metastatic disease with largest measuring  35 mm  Extranodal extension was present  She did not have reconstruction at the same time of mastectomy  She presents today to discuss adjuvant treatment options  She feels well  She is afebrile  She has no complaint of pain  She still have limited range of motion in her left shoulder  She has 2 drain still in place  She has no respiratory symptoms  Her CT scan showed 7 mm pulmonary nodule which is indeterminate  Bone scan was negative for osseous metastasis    Her performance status is normal   She is negative for BRCA gene mutation            Interval History:  A 82-year-old postmenopausal woman with stage III A left breast cancer, grade 2 with invasive lobular histology, ER 90% positive, TX 1% positive, HER2 negative disease  She is negative for BRCA gene mutation  She had 4 positive lymph nodes  She underwent mastectomy and axillary lymph node dissection, resulting in MADAN  We discussed adjuvant chemotherapy which she is agreeable with Taxotere and cyclophosphamide  She presents today prior to the 1st cycle of adjuvant chemotherapy  Her drains were removed  She has intact skin on her left chest wall  She has no pain  She has quite good range of motion of her left shoulder  She has no respiratory symptoms  Her performance status is normal            Objective:      Primary Diagnosis:     1    Left breast cancer, stage III A (pT3, pN2, M0 ) grade 2 with invasive lobular histology, ER 90% positive, TX 1% positive, HER2 negative disease  Diagnosed in June 2021      2  BRCA gene mutation negative      Cancer Staging:  Cancer Staging  No matching staging information was found for the patient         Previous Hematologic/ Oncologic Treatment:            Current Hematologic/ Oncologic Treatment:         Adjuvant chemotherapy with Taxotere and cyclophosphamide x4 cycles  1st cycle to be given in July 16, 2021      Disease Status:       MADAN status post mastectomy and axillary lymph node dissection      Test Results:     Pathology:       60 x 38 x 30 mm of invasive lobular carcinoma, grade 2  Lymphovascular invasion was present  4/10 axillary lymph nodes were positive for metastatic disease with largest tumor measuring 3 5 cm  Extranodal extension was positive  ER 90% positive, TX 1% positive, HER2 negative disease    Stage III A (pT3, pN2, M0)     Radiology:      CT scan of chest abdomen pelvis showed a 7 mm of pulmonary nodules        Bone scan was negative for osseous metastasis      Laboratory:       See below      Physical Exam:        General Appearance:    Alert, oriented          Eyes:    PERRL   Ears:    Normal external ear canals, both ears   Nose:   Nares normal, septum midline   Throat:   Mucosa moist  Pharynx without injection  Neck:   Supple         Lungs:     Clear to auscultation bilaterally   Chest Wall:    No tenderness or deformity    Heart:    Regular rate and rhythm         Abdomen:     Soft, non-tender, bowel sounds +, no organomegaly               Extremities:   Extremities no cyanosis or edema         Skin:   no rash or icterus  Lymph nodes:   Cervical, supraclavicular, and axillary nodes normal   Neurologic:   CNII-XII intact, normal strength, sensation and reflexes     Throughout             Breast exam:     Status post left mastectomy without reconstruction  No palpable abnormality in her left chest wall  Right breast exam is negative  ROS: Review of Systems   All other systems reviewed and are negative  Imaging: No results found  Labs:   Lab Results   Component Value Date    WBC 7 58 07/13/2021    HGB 14 5 07/13/2021    HCT 44 0 07/13/2021    MCV 91 07/13/2021     07/13/2021     Lab Results   Component Value Date     01/27/2017    K 3 9 07/13/2021     07/13/2021    CO2 29 07/13/2021    BUN 18 07/13/2021    CREATININE 0 88 07/13/2021    GLUCOSE 131 (H) 01/27/2017    CALCIUM 8 9 07/13/2021    CORRECTEDCA 9 4 07/13/2021    AST 11 07/13/2021    ALT 34 07/13/2021    ALKPHOS 66 07/13/2021    PROT 6 5 01/27/2017    BILITOT 0 8 01/27/2017    EGFR 67 07/13/2021       Current Medications: Reviewed  Allergies: Reviewed  PMH/FH/SH:  Reviewed      Vital Sign:    Body surface area is 1 99 meters squared      Wt Readings from Last 3 Encounters:   07/14/21 102 kg (225 lb)   06/23/21 102 kg (225 lb)   06/18/21 101 kg (222 lb)        Temp Readings from Last 3 Encounters:   07/14/21 (!) 96 5 °F (35 8 °C) (Tympanic Core) 06/23/21 98 °F (36 7 °C)   06/18/21 97 7 °F (36 5 °C) (Tympanic)        BP Readings from Last 3 Encounters:   07/14/21 126/78   06/23/21 122/80   06/18/21 122/80         Pulse Readings from Last 3 Encounters:   07/14/21 67   06/23/21 62   06/18/21 60     @LASTSAO2(3)@

## 2021-07-16 ENCOUNTER — HOSPITAL ENCOUNTER (OUTPATIENT)
Dept: INFUSION CENTER | Facility: HOSPITAL | Age: 71
Discharge: HOME | End: 2021-07-16
Attending: INTERNAL MEDICINE
Payer: MEDICARE

## 2021-07-16 VITALS
OXYGEN SATURATION: 98 % | BODY MASS INDEX: 39.73 KG/M2 | HEART RATE: 68 BPM | WEIGHT: 224.21 LBS | RESPIRATION RATE: 20 BRPM | HEIGHT: 63 IN | SYSTOLIC BLOOD PRESSURE: 156 MMHG | TEMPERATURE: 97 F | DIASTOLIC BLOOD PRESSURE: 70 MMHG

## 2021-07-16 DIAGNOSIS — T45.1X5A CHEMOTHERAPY INDUCED NEUTROPENIA (HCC): Primary | ICD-10-CM

## 2021-07-16 DIAGNOSIS — D70.1 CHEMOTHERAPY INDUCED NEUTROPENIA (HCC): Primary | ICD-10-CM

## 2021-07-16 DIAGNOSIS — C50.412 MALIGNANT NEOPLASM OF UPPER-OUTER QUADRANT OF LEFT BREAST IN FEMALE, ESTROGEN RECEPTOR POSITIVE (HCC): ICD-10-CM

## 2021-07-16 DIAGNOSIS — Z17.0 MALIGNANT NEOPLASM OF UPPER-OUTER QUADRANT OF LEFT BREAST IN FEMALE, ESTROGEN RECEPTOR POSITIVE (HCC): ICD-10-CM

## 2021-07-16 PROCEDURE — 96377 APPLICATON ON-BODY INJECTOR: CPT

## 2021-07-16 PROCEDURE — 96417 CHEMO IV INFUS EACH ADDL SEQ: CPT

## 2021-07-16 PROCEDURE — 96413 CHEMO IV INFUSION 1 HR: CPT

## 2021-07-16 PROCEDURE — 96367 TX/PROPH/DG ADDL SEQ IV INF: CPT

## 2021-07-16 RX ORDER — SODIUM CHLORIDE 9 MG/ML
20 INJECTION, SOLUTION INTRAVENOUS ONCE
Status: COMPLETED | OUTPATIENT
Start: 2021-07-16 | End: 2021-07-16

## 2021-07-16 RX ADMIN — PEGFILGRASTIM 6 MG: KIT SUBCUTANEOUS at 13:00

## 2021-07-16 RX ADMIN — DOCETAXEL 151.6 MG: 20 INJECTION, SOLUTION INTRAVENOUS at 11:10

## 2021-07-16 RX ADMIN — CYCLOPHOSPHAMIDE 1212 MG: 1 INJECTION, POWDER, FOR SOLUTION INTRAVENOUS; ORAL at 12:22

## 2021-07-16 RX ADMIN — DEXAMETHASONE SODIUM PHOSPHATE: 10 INJECTION, SOLUTION INTRAMUSCULAR; INTRAVENOUS at 10:31

## 2021-07-16 RX ADMIN — SODIUM CHLORIDE 20 ML/HR: 0.9 INJECTION, SOLUTION INTRAVENOUS at 10:29

## 2021-07-16 NOTE — PLAN OF CARE
Problem: Potential for Falls  Goal: Patient will remain free of falls  Description: INTERVENTIONS:  - Educate patient/family on patient safety including physical limitations  - Instruct patient to call for assistance with activity   - Consult OT/PT to assist with strengthening/mobility   - Keep Call bell within reach  - Keep bed low and locked with side rails adjusted as appropriate  - Keep care items and personal belongings within reach  - Initiate and maintain comfort rounds    Outcome: Progressing     Problem: Knowledge Deficit  Goal: Patient/family/caregiver demonstrates understanding of disease process, treatment plan, medications, and discharge instructions  Description: Complete learning assessment and assess knowledge base    Interventions:  - Provide teaching at level of understanding  - Provide teaching via preferred learning methods  Outcome: Progressing

## 2021-07-16 NOTE — PROGRESS NOTES
Taxotere and cytoxan administered as per order  Patient tolerated well with no adverse effects  Neulasta onpro applied to R arm   Next appointment verified, patient declines AVS

## 2021-08-02 ENCOUNTER — APPOINTMENT (OUTPATIENT)
Dept: LAB | Facility: HOSPITAL | Age: 71
End: 2021-08-02
Attending: INTERNAL MEDICINE
Payer: MEDICARE

## 2021-08-02 DIAGNOSIS — T45.1X5A CHEMOTHERAPY INDUCED NEUTROPENIA (HCC): ICD-10-CM

## 2021-08-02 DIAGNOSIS — Z17.0 MALIGNANT NEOPLASM OF UPPER-OUTER QUADRANT OF LEFT BREAST IN FEMALE, ESTROGEN RECEPTOR POSITIVE (HCC): ICD-10-CM

## 2021-08-02 DIAGNOSIS — D70.1 CHEMOTHERAPY INDUCED NEUTROPENIA (HCC): ICD-10-CM

## 2021-08-02 DIAGNOSIS — C50.412 MALIGNANT NEOPLASM OF UPPER-OUTER QUADRANT OF LEFT BREAST IN FEMALE, ESTROGEN RECEPTOR POSITIVE (HCC): ICD-10-CM

## 2021-08-02 LAB
ALBUMIN SERPL BCP-MCNC: 3.3 G/DL (ref 3.5–5)
ALP SERPL-CCNC: 73 U/L (ref 46–116)
ALT SERPL W P-5'-P-CCNC: 31 U/L (ref 12–78)
ANION GAP SERPL CALCULATED.3IONS-SCNC: 8 MMOL/L (ref 4–13)
AST SERPL W P-5'-P-CCNC: 14 U/L (ref 5–45)
BASOPHILS # BLD AUTO: 0.08 THOUSANDS/ΜL (ref 0–0.1)
BASOPHILS NFR BLD AUTO: 1 % (ref 0–1)
BILIRUB SERPL-MCNC: 0.3 MG/DL (ref 0.2–1)
BUN SERPL-MCNC: 14 MG/DL (ref 5–25)
CALCIUM ALBUM COR SERPL-MCNC: 9.3 MG/DL (ref 8.3–10.1)
CALCIUM SERPL-MCNC: 8.7 MG/DL (ref 8.3–10.1)
CHLORIDE SERPL-SCNC: 107 MMOL/L (ref 100–108)
CO2 SERPL-SCNC: 28 MMOL/L (ref 21–32)
CREAT SERPL-MCNC: 0.8 MG/DL (ref 0.6–1.3)
EOSINOPHIL # BLD AUTO: 0.03 THOUSAND/ΜL (ref 0–0.61)
EOSINOPHIL NFR BLD AUTO: 0 % (ref 0–6)
ERYTHROCYTE [DISTWIDTH] IN BLOOD BY AUTOMATED COUNT: 13.8 % (ref 11.6–15.1)
GFR SERPL CREATININE-BSD FRML MDRD: 75 ML/MIN/1.73SQ M
GLUCOSE SERPL-MCNC: 119 MG/DL (ref 65–140)
HCT VFR BLD AUTO: 42.1 % (ref 34.8–46.1)
HGB BLD-MCNC: 13.7 G/DL (ref 11.5–15.4)
IMM GRANULOCYTES # BLD AUTO: 0.06 THOUSAND/UL (ref 0–0.2)
IMM GRANULOCYTES NFR BLD AUTO: 1 % (ref 0–2)
LYMPHOCYTES # BLD AUTO: 1.32 THOUSANDS/ΜL (ref 0.6–4.47)
LYMPHOCYTES NFR BLD AUTO: 15 % (ref 14–44)
MCH RBC QN AUTO: 29.6 PG (ref 26.8–34.3)
MCHC RBC AUTO-ENTMCNC: 32.5 G/DL (ref 31.4–37.4)
MCV RBC AUTO: 91 FL (ref 82–98)
MONOCYTES # BLD AUTO: 0.9 THOUSAND/ΜL (ref 0.17–1.22)
MONOCYTES NFR BLD AUTO: 10 % (ref 4–12)
NEUTROPHILS # BLD AUTO: 6.5 THOUSANDS/ΜL (ref 1.85–7.62)
NEUTS SEG NFR BLD AUTO: 73 % (ref 43–75)
NRBC BLD AUTO-RTO: 0 /100 WBCS
PLATELET # BLD AUTO: 274 THOUSANDS/UL (ref 149–390)
PMV BLD AUTO: 10.7 FL (ref 8.9–12.7)
POTASSIUM SERPL-SCNC: 4 MMOL/L (ref 3.5–5.3)
PROT SERPL-MCNC: 6.9 G/DL (ref 6.4–8.2)
RBC # BLD AUTO: 4.63 MILLION/UL (ref 3.81–5.12)
SODIUM SERPL-SCNC: 143 MMOL/L (ref 136–145)
WBC # BLD AUTO: 8.89 THOUSAND/UL (ref 4.31–10.16)

## 2021-08-02 PROCEDURE — 85025 COMPLETE CBC W/AUTO DIFF WBC: CPT

## 2021-08-02 PROCEDURE — 36415 COLL VENOUS BLD VENIPUNCTURE: CPT

## 2021-08-02 PROCEDURE — 80053 COMPREHEN METABOLIC PANEL: CPT

## 2021-08-04 ENCOUNTER — OFFICE VISIT (OUTPATIENT)
Dept: HEMATOLOGY ONCOLOGY | Facility: CLINIC | Age: 71
End: 2021-08-04
Payer: MEDICARE

## 2021-08-04 VITALS
OXYGEN SATURATION: 96 % | HEIGHT: 63 IN | HEART RATE: 70 BPM | BODY MASS INDEX: 38.98 KG/M2 | SYSTOLIC BLOOD PRESSURE: 136 MMHG | DIASTOLIC BLOOD PRESSURE: 88 MMHG | WEIGHT: 220 LBS | TEMPERATURE: 98.2 F | RESPIRATION RATE: 18 BRPM

## 2021-08-04 DIAGNOSIS — C50.412 MALIGNANT NEOPLASM OF UPPER-OUTER QUADRANT OF LEFT BREAST IN FEMALE, ESTROGEN RECEPTOR POSITIVE (HCC): Primary | ICD-10-CM

## 2021-08-04 DIAGNOSIS — Z17.0 MALIGNANT NEOPLASM OF UPPER-OUTER QUADRANT OF LEFT BREAST IN FEMALE, ESTROGEN RECEPTOR POSITIVE (HCC): Primary | ICD-10-CM

## 2021-08-04 PROCEDURE — 99214 OFFICE O/P EST MOD 30 MIN: CPT | Performed by: INTERNAL MEDICINE

## 2021-08-04 PROCEDURE — 1124F ACP DISCUSS-NO DSCNMKR DOCD: CPT | Performed by: INTERNAL MEDICINE

## 2021-08-04 RX ORDER — SODIUM CHLORIDE 9 MG/ML
20 INJECTION, SOLUTION INTRAVENOUS ONCE
Status: CANCELLED | OUTPATIENT
Start: 2021-09-17

## 2021-08-04 RX ORDER — SODIUM CHLORIDE 9 MG/ML
20 INJECTION, SOLUTION INTRAVENOUS ONCE
Status: CANCELLED | OUTPATIENT
Start: 2021-08-27

## 2021-08-04 NOTE — PROGRESS NOTES
Hematology / Oncology Outpatient Follow Up Note    Rylee Pitts 73 y o  female ELB:[birth year] [id number]:[id number]         Date:  8/4/2021    Assessment / Plan:    A 43-year-old postmenopausal woman with stage III A left breast cancer, grade 2 with invasive lobular histology, ER 90% positive, AL 1% positive, HER2 negative disease   She is negative for BRCA gene mutation   She had 4 positive lymph nodes   She underwent mastectomy and axillary lymph node dissection, resulting in MADAN  Currently, she is on adjuvant chemotherapy with Taxotere and cyclophosphamide with excellent tolerance  She has adequate ANC to have 2nd cycle of adjuvant chemotherapy later this week  Clinically, she has no evidence recurrent disease  I recommended her to continue with current regimen every 3 weeks  I will see her again in 3 weeks I which time I will make a referral to radiation oncologist for postmastectomy radiation therapy  She is in agreement with my recommendations         Subjective:      HPI:   A 45-year-old postmenopausal woman who recently noticed a lump in her left breast which she brought to medical attention  Magnolia Banegas was found to have large left breast mass radiographically which was biopsied in April 19, 2021 which showed invasive ductal carcinoma, grade 2  This was ER 90% positive, AL 1% positive, HER2 negative disease   She subsequently underwent mastectomy and axillary lymph node dissection by Dr Emily Gray in June 8, 2021  She had 60 x 38 x 30 mm of invasive lobular carcinoma, grade 2   Lymphovascular invasion was present   4/10 axillary lymph nodes were positive for metastatic disease with largest measuring  35 mm   Extranodal extension was present   She did not have reconstruction at the same time of mastectomy   She presents today to discuss adjuvant treatment options   She feels well  Magnolia Banegas is afebrile  Magnolia Banegas has no complaint of pain   She still have limited range of motion in her left shoulder   She has 2 drain still in [place] Banegas has no respiratory symptoms   Her CT scan showed 7 mm pulmonary nodule which is indeterminate   Bone scan was negative for osseous metastasis   Her performance status is normal   She is negative for BRCA gene mutation            Interval History:  A 72-year-old postmenopausal woman with stage III A left breast cancer, grade 2 with invasive lobular histology, ER 90% positive, KS 1% positive, HER2 negative disease   She is negative for BRCA gene mutation   She had 4 positive lymph nodes   She underwent mastectomy and axillary lymph node dissection, resulting in MADAN  She is currently on adjuvant chemotherapy with Taxotere and cyclophosphamide  She tolerated 1st cycle reasonably well  However, she has 7 days of fatigue  She had a few days of nausea as well as diarrhea but no vomiting  Currently, she feels well  She did not have Neulasta induced bone pain  Her weight is stable  She has no respiratory symptoms    Her performance status is normal          Objective:      Primary Diagnosis:     1    Left breast cancer, stage III A (pT3, pN2, M0 ) grade 2 with invasive lobular histology, ER 90% positive, KS 1% positive, HER2 negative disease   Diagnosed in June 2021      2   BRCA gene mutation negative      Cancer Staging:  Cancer Staging  No matching staging information was found for the patient         Previous Hematologic/ Oncologic Treatment:            Current Hematologic/ Oncologic Treatment:         Adjuvant chemotherapy with Taxotere and cyclophosphamide x4 cycles   2nd cycle to be given in   August 6, 2021      Disease Status:       MADAN status post mastectomy and axillary lymph node dissection      Test Results:     Pathology:       60 x 38 x 30 mm of invasive lobular carcinoma, grade 2   Lymphovascular invasion was present   4/10 axillary lymph nodes were positive for metastatic disease with largest tumor measuring 3 5 cm   Extranodal extension was positive   ER 90% positive, KS 1% positive, HER2 negative disease   Stage III A (pT3, pN2, M0)     Radiology:      CT scan of chest abdomen pelvis showed a 7 mm of pulmonary nodules        Bone scan was negative for osseous metastasis      Laboratory:       See below      Physical Exam:        General Appearance:    Alert, oriented          Eyes:    PERRL   Ears:    Normal external ear canals, both ears   Nose:   Nares normal, septum midline   Throat:   Mucosa moist  Pharynx without injection  Neck:   Supple         Lungs:     Clear to auscultation bilaterally   Chest Wall:    No tenderness or deformity    Heart:    Regular rate and rhythm         Abdomen:     Soft, non-tender, bowel sounds +, no organomegaly               Extremities:   Extremities no cyanosis or edema         Skin:   no rash or icterus  Lymph nodes:   Cervical, supraclavicular, and axillary nodes normal   Neurologic:   CNII-XII intact, normal strength, sensation and reflexes     Throughout             Breast exam:     Status post left mastectomy without reconstruction   No palpable abnormality in her left chest wall   Right breast exam is negative             ROS: Review of Systems   All other systems reviewed and are negative  Imaging: No results found  Labs:   Lab Results   Component Value Date    WBC 8 89 08/02/2021    HGB 13 7 08/02/2021    HCT 42 1 08/02/2021    MCV 91 08/02/2021     08/02/2021     Lab Results   Component Value Date     01/27/2017    K 4 0 08/02/2021     08/02/2021    CO2 28 08/02/2021    BUN 14 08/02/2021    CREATININE 0 80 08/02/2021    GLUCOSE 131 (H) 01/27/2017    CALCIUM 8 7 08/02/2021    CORRECTEDCA 9 3 08/02/2021    AST 14 08/02/2021    ALT 31 08/02/2021    ALKPHOS 73 08/02/2021    PROT 6 5 01/27/2017    BILITOT 0 8 01/27/2017    EGFR 75 08/02/2021           Current Medications: Reviewed  Allergies: Reviewed  PMH/FH/SH:  Reviewed      Vital Sign:    Body surface area is 2 01 meters squared      Wt Readings from Last 3 Encounters: 08/04/21 99 8 kg (220 lb)   07/16/21 102 kg (224 lb 3 3 oz)   07/14/21 102 kg (225 lb)        Temp Readings from Last 3 Encounters:   08/04/21 98 2 °F (36 8 °C) (Tympanic Core)   07/16/21 (!) 97 °F (36 1 °C) (Temporal)   07/14/21 (!) 96 5 °F (35 8 °C) (Tympanic Core)        BP Readings from Last 3 Encounters:   08/04/21 136/88   07/16/21 156/70   07/14/21 126/78         Pulse Readings from Last 3 Encounters:   08/04/21 70   07/16/21 68   07/14/21 67     @LASTSAO2(3)@

## 2021-08-06 ENCOUNTER — HOSPITAL ENCOUNTER (OUTPATIENT)
Dept: INFUSION CENTER | Facility: HOSPITAL | Age: 71
Discharge: HOME | End: 2021-08-06
Attending: INTERNAL MEDICINE
Payer: MEDICARE

## 2021-08-06 VITALS
DIASTOLIC BLOOD PRESSURE: 72 MMHG | HEART RATE: 72 BPM | HEIGHT: 63 IN | BODY MASS INDEX: 39.14 KG/M2 | OXYGEN SATURATION: 97 % | WEIGHT: 220.9 LBS | RESPIRATION RATE: 20 BRPM | SYSTOLIC BLOOD PRESSURE: 165 MMHG | TEMPERATURE: 96 F

## 2021-08-06 DIAGNOSIS — D70.1 CHEMOTHERAPY INDUCED NEUTROPENIA (HCC): Primary | ICD-10-CM

## 2021-08-06 DIAGNOSIS — Z17.0 MALIGNANT NEOPLASM OF UPPER-OUTER QUADRANT OF LEFT BREAST IN FEMALE, ESTROGEN RECEPTOR POSITIVE (HCC): ICD-10-CM

## 2021-08-06 DIAGNOSIS — C50.412 MALIGNANT NEOPLASM OF UPPER-OUTER QUADRANT OF LEFT BREAST IN FEMALE, ESTROGEN RECEPTOR POSITIVE (HCC): ICD-10-CM

## 2021-08-06 DIAGNOSIS — T45.1X5A CHEMOTHERAPY INDUCED NEUTROPENIA (HCC): Primary | ICD-10-CM

## 2021-08-06 PROCEDURE — 96377 APPLICATON ON-BODY INJECTOR: CPT

## 2021-08-06 PROCEDURE — 96413 CHEMO IV INFUSION 1 HR: CPT

## 2021-08-06 PROCEDURE — 96367 TX/PROPH/DG ADDL SEQ IV INF: CPT

## 2021-08-06 PROCEDURE — 96417 CHEMO IV INFUS EACH ADDL SEQ: CPT

## 2021-08-06 RX ORDER — SODIUM CHLORIDE 9 MG/ML
20 INJECTION, SOLUTION INTRAVENOUS ONCE
Status: COMPLETED | OUTPATIENT
Start: 2021-08-06 | End: 2021-08-06

## 2021-08-06 RX ADMIN — DEXAMETHASONE SODIUM PHOSPHATE: 10 INJECTION, SOLUTION INTRAMUSCULAR; INTRAVENOUS at 10:40

## 2021-08-06 RX ADMIN — CYCLOPHOSPHAMIDE 1212 MG: 1 INJECTION, POWDER, FOR SOLUTION INTRAVENOUS; ORAL at 12:20

## 2021-08-06 RX ADMIN — PEGFILGRASTIM 6 MG: KIT SUBCUTANEOUS at 12:56

## 2021-08-06 RX ADMIN — SODIUM CHLORIDE 20 ML/HR: 9 INJECTION, SOLUTION INTRAVENOUS at 10:40

## 2021-08-06 RX ADMIN — DOCETAXEL 151.6 MG: 20 INJECTION, SOLUTION INTRAVENOUS at 11:15

## 2021-08-24 ENCOUNTER — APPOINTMENT (OUTPATIENT)
Dept: LAB | Facility: HOSPITAL | Age: 71
End: 2021-08-24
Attending: INTERNAL MEDICINE
Payer: MEDICARE

## 2021-08-24 DIAGNOSIS — D70.1 CHEMOTHERAPY INDUCED NEUTROPENIA (HCC): ICD-10-CM

## 2021-08-24 DIAGNOSIS — Z17.0 MALIGNANT NEOPLASM OF UPPER-OUTER QUADRANT OF LEFT BREAST IN FEMALE, ESTROGEN RECEPTOR POSITIVE (HCC): ICD-10-CM

## 2021-08-24 DIAGNOSIS — T45.1X5A CHEMOTHERAPY INDUCED NEUTROPENIA (HCC): ICD-10-CM

## 2021-08-24 DIAGNOSIS — C50.412 MALIGNANT NEOPLASM OF UPPER-OUTER QUADRANT OF LEFT BREAST IN FEMALE, ESTROGEN RECEPTOR POSITIVE (HCC): ICD-10-CM

## 2021-08-24 LAB
ALBUMIN SERPL BCP-MCNC: 3.2 G/DL (ref 3.5–5)
ALP SERPL-CCNC: 78 U/L (ref 46–116)
ALT SERPL W P-5'-P-CCNC: 38 U/L (ref 12–78)
ANION GAP SERPL CALCULATED.3IONS-SCNC: 9 MMOL/L (ref 4–13)
AST SERPL W P-5'-P-CCNC: 15 U/L (ref 5–45)
BASOPHILS # BLD AUTO: 0.1 THOUSANDS/ΜL (ref 0–0.1)
BASOPHILS NFR BLD AUTO: 1 % (ref 0–1)
BILIRUB SERPL-MCNC: 0.4 MG/DL (ref 0.2–1)
BUN SERPL-MCNC: 12 MG/DL (ref 5–25)
CALCIUM ALBUM COR SERPL-MCNC: 9.2 MG/DL (ref 8.3–10.1)
CALCIUM SERPL-MCNC: 8.6 MG/DL (ref 8.3–10.1)
CHLORIDE SERPL-SCNC: 106 MMOL/L (ref 100–108)
CO2 SERPL-SCNC: 27 MMOL/L (ref 21–32)
CREAT SERPL-MCNC: 0.7 MG/DL (ref 0.6–1.3)
EOSINOPHIL # BLD AUTO: 0.04 THOUSAND/ΜL (ref 0–0.61)
EOSINOPHIL NFR BLD AUTO: 1 % (ref 0–6)
ERYTHROCYTE [DISTWIDTH] IN BLOOD BY AUTOMATED COUNT: 15.1 % (ref 11.6–15.1)
GFR SERPL CREATININE-BSD FRML MDRD: 89 ML/MIN/1.73SQ M
GLUCOSE P FAST SERPL-MCNC: 137 MG/DL (ref 65–99)
HCT VFR BLD AUTO: 39.5 % (ref 34.8–46.1)
HGB BLD-MCNC: 12.8 G/DL (ref 11.5–15.4)
IMM GRANULOCYTES # BLD AUTO: 0.05 THOUSAND/UL (ref 0–0.2)
IMM GRANULOCYTES NFR BLD AUTO: 1 % (ref 0–2)
LYMPHOCYTES # BLD AUTO: 0.91 THOUSANDS/ΜL (ref 0.6–4.47)
LYMPHOCYTES NFR BLD AUTO: 12 % (ref 14–44)
MCH RBC QN AUTO: 30 PG (ref 26.8–34.3)
MCHC RBC AUTO-ENTMCNC: 32.4 G/DL (ref 31.4–37.4)
MCV RBC AUTO: 93 FL (ref 82–98)
MONOCYTES # BLD AUTO: 0.85 THOUSAND/ΜL (ref 0.17–1.22)
MONOCYTES NFR BLD AUTO: 11 % (ref 4–12)
NEUTROPHILS # BLD AUTO: 5.79 THOUSANDS/ΜL (ref 1.85–7.62)
NEUTS SEG NFR BLD AUTO: 74 % (ref 43–75)
NRBC BLD AUTO-RTO: 0 /100 WBCS
PLATELET # BLD AUTO: 250 THOUSANDS/UL (ref 149–390)
PMV BLD AUTO: 10.4 FL (ref 8.9–12.7)
POTASSIUM SERPL-SCNC: 3.8 MMOL/L (ref 3.5–5.3)
PROT SERPL-MCNC: 6.6 G/DL (ref 6.4–8.2)
RBC # BLD AUTO: 4.27 MILLION/UL (ref 3.81–5.12)
SODIUM SERPL-SCNC: 142 MMOL/L (ref 136–145)
WBC # BLD AUTO: 7.74 THOUSAND/UL (ref 4.31–10.16)

## 2021-08-24 PROCEDURE — 80053 COMPREHEN METABOLIC PANEL: CPT

## 2021-08-24 PROCEDURE — 85025 COMPLETE CBC W/AUTO DIFF WBC: CPT

## 2021-08-24 PROCEDURE — 36415 COLL VENOUS BLD VENIPUNCTURE: CPT

## 2021-08-27 ENCOUNTER — HOSPITAL ENCOUNTER (OUTPATIENT)
Dept: INFUSION CENTER | Facility: HOSPITAL | Age: 71
Discharge: HOME | End: 2021-08-27
Attending: INTERNAL MEDICINE
Payer: MEDICARE

## 2021-08-27 VITALS
OXYGEN SATURATION: 94 % | DIASTOLIC BLOOD PRESSURE: 61 MMHG | BODY MASS INDEX: 38.63 KG/M2 | HEIGHT: 63 IN | SYSTOLIC BLOOD PRESSURE: 131 MMHG | RESPIRATION RATE: 20 BRPM | WEIGHT: 218.03 LBS | HEART RATE: 74 BPM | TEMPERATURE: 96.6 F

## 2021-08-27 DIAGNOSIS — Z17.0 MALIGNANT NEOPLASM OF UPPER-OUTER QUADRANT OF LEFT BREAST IN FEMALE, ESTROGEN RECEPTOR POSITIVE (HCC): ICD-10-CM

## 2021-08-27 DIAGNOSIS — C50.412 MALIGNANT NEOPLASM OF UPPER-OUTER QUADRANT OF LEFT BREAST IN FEMALE, ESTROGEN RECEPTOR POSITIVE (HCC): ICD-10-CM

## 2021-08-27 DIAGNOSIS — D70.1 CHEMOTHERAPY INDUCED NEUTROPENIA (HCC): Primary | ICD-10-CM

## 2021-08-27 DIAGNOSIS — T45.1X5A CHEMOTHERAPY INDUCED NEUTROPENIA (HCC): Primary | ICD-10-CM

## 2021-08-27 PROCEDURE — 96377 APPLICATON ON-BODY INJECTOR: CPT

## 2021-08-27 PROCEDURE — 96417 CHEMO IV INFUS EACH ADDL SEQ: CPT

## 2021-08-27 PROCEDURE — 96413 CHEMO IV INFUSION 1 HR: CPT

## 2021-08-27 PROCEDURE — 96367 TX/PROPH/DG ADDL SEQ IV INF: CPT

## 2021-08-27 RX ORDER — SODIUM CHLORIDE 9 MG/ML
20 INJECTION, SOLUTION INTRAVENOUS ONCE
Status: COMPLETED | OUTPATIENT
Start: 2021-08-27 | End: 2021-08-27

## 2021-08-27 RX ADMIN — SODIUM CHLORIDE 20 ML/HR: 0.9 INJECTION, SOLUTION INTRAVENOUS at 10:27

## 2021-08-27 RX ADMIN — CYCLOPHOSPHAMIDE 1212 MG: 1 INJECTION, POWDER, FOR SOLUTION INTRAVENOUS; ORAL at 12:02

## 2021-08-27 RX ADMIN — DOCETAXEL 151.6 MG: 20 INJECTION, SOLUTION INTRAVENOUS at 10:58

## 2021-08-27 RX ADMIN — PEGFILGRASTIM 6 MG: KIT SUBCUTANEOUS at 12:37

## 2021-08-27 RX ADMIN — DEXAMETHASONE SODIUM PHOSPHATE: 10 INJECTION, SOLUTION INTRAMUSCULAR; INTRAVENOUS at 10:27

## 2021-08-27 NOTE — PLAN OF CARE
Problem: Potential for Falls  Goal: Patient will remain free of falls  Description: INTERVENTIONS:  - Educate patient/family on patient safety including physical limitations  - Instruct patient to call for assistance with activity   - Keep Call bell within reach  - Keep care items and personal belongings within reach  Outcome: Progressing

## 2021-09-08 ENCOUNTER — OFFICE VISIT (OUTPATIENT)
Dept: HEMATOLOGY ONCOLOGY | Facility: CLINIC | Age: 71
End: 2021-09-08
Payer: MEDICARE

## 2021-09-08 ENCOUNTER — TELEPHONE (OUTPATIENT)
Dept: HEMATOLOGY ONCOLOGY | Facility: CLINIC | Age: 71
End: 2021-09-08

## 2021-09-08 VITALS
RESPIRATION RATE: 18 BRPM | OXYGEN SATURATION: 97 % | WEIGHT: 220 LBS | SYSTOLIC BLOOD PRESSURE: 138 MMHG | HEART RATE: 85 BPM | TEMPERATURE: 99.2 F | BODY MASS INDEX: 38.98 KG/M2 | HEIGHT: 63 IN | DIASTOLIC BLOOD PRESSURE: 88 MMHG

## 2021-09-08 DIAGNOSIS — Z17.0 MALIGNANT NEOPLASM OF UPPER-OUTER QUADRANT OF LEFT BREAST IN FEMALE, ESTROGEN RECEPTOR POSITIVE (HCC): Primary | ICD-10-CM

## 2021-09-08 DIAGNOSIS — C50.412 MALIGNANT NEOPLASM OF UPPER-OUTER QUADRANT OF LEFT BREAST IN FEMALE, ESTROGEN RECEPTOR POSITIVE (HCC): Primary | ICD-10-CM

## 2021-09-08 PROCEDURE — 99214 OFFICE O/P EST MOD 30 MIN: CPT | Performed by: INTERNAL MEDICINE

## 2021-09-08 RX ORDER — ANASTROZOLE 1 MG/1
1 TABLET ORAL DAILY
Qty: 90 TABLET | Refills: 1 | Status: SHIPPED | OUTPATIENT
Start: 2021-09-08 | End: 2022-01-11

## 2021-09-08 NOTE — TELEPHONE ENCOUNTER
Jesus Venegas referred the patient to Radiation Oncology  Per Radiation Oncology, I was advised to contact Janette Barnard at [phone] as Cristy Wayne is currently on vacation  I called and there was no answer   I left voicemail requesting she call the patient to schedule the appointment during the week of Mon, 09/20/2021 through Fri, 10/01/2021 as instructed on Dr Salome Felty paperwork  -[facility]

## 2021-09-08 NOTE — PROGRESS NOTES
Hematology / Oncology Outpatient Follow Up Note    Cheryl Appiah 73 y o  female UBY:[id number] UAK:[id number]         Date:  9/8/2021    Assessment / Plan:    A 59-year-old postmenopausal woman with stage III A left breast cancer, grade 2 with invasive lobular histology, ER 90% positive, OR 1% positive, HER2 negative disease   She is negative for BRCA gene mutation   She had 4 positive lymph nodes   She underwent mastectomy and axillary lymph node dissection, resulting in MADAN  Currently, she is on adjuvant chemotherapy with Taxotere and cyclophosphamide with excellent tolerance , except slowly progressive fatigue  Clinically, she has no evidence recurrent disease  Assuming that she has adequate ANC, she is going to have last cycle of adjuvant chemotherapy, later next week  I will refer her to radiation oncologist for postmastectomy radiation therapy  We discussed the adjuvant hormonal therapy  I recommended her to start taking anastrozole in mid October 2021  Side effects of anastrozole was thoroughly discussed, including but not limited to hot flashes, musculoskeletal symptom and bone mineral density loss  I recommended her to take calcium vitamin-D on a regular basis  She is in agreement with my recommendation  I will see her again in 6 months for routine follow-up       Subjective:      HPI:   A 59-year-old postmenopausal woman who recently noticed a lump in her left breast which she brought to medical attention  Magdi Sanford was found to have large left breast mass radiographically which was biopsied in April 19, 2021 which showed invasive ductal carcinoma, grade 2  This was ER 90% positive, OR 1% positive, HER2 negative disease   She subsequently underwent mastectomy and axillary lymph node dissection by Dr Jane Rowland in June 8, 2021  She had 60 x 38 x 30 mm of invasive lobular carcinoma, grade 2   Lymphovascular invasion was present   4/10 axillary lymph nodes were positive for metastatic disease with largest measuring  35 mm   Extranodal extension was present   She did not have reconstruction at the same time of mastectomy   She presents today to discuss adjuvant treatment options   She feels well  Kimberly Fiddler is afebrile  Kimberly Fiddler has no complaint of pain   She still have limited range of motion in her left shoulder   She has 2 drain still in place   She has no respiratory symptoms   Her CT scan showed 7 mm pulmonary nodule which is indeterminate   Bone scan was negative for osseous metastasis   Her performance status is normal   She is negative for BRCA gene mutation            Interval History:  A 72-year-old postmenopausal woman with stage III A left breast cancer, grade 2 with invasive lobular histology, ER 90% positive, IL 1% positive, HER2 negative disease   She is negative for BRCA gene mutation   She had 4 positive lymph nodes   She underwent mastectomy and axillary lymph node dissection, resulting in MADAN  She is currently on adjuvant chemotherapy with Taxotere and cyclophosphamide  She has been tolerating well  However, she has slowly progressive fatigue as well as some nausea but no vomiting  She has no febrile episode  She has no complaint of pain  She denied skin rash  Her weight is stable  She has no bone pain  Her performance status is normal   She is going to have last cycle of adjuvant chemotherapy later next week          Objective:      Primary Diagnosis:     1    Left breast cancer, stage III A (pT3, pN2, M0 ) grade 2 with invasive lobular histology, ER 90% positive, IL 1% positive, HER2 negative disease   Diagnosed in June 2021      2   BRCA gene mutation negative      Cancer Staging:  Cancer Staging  No matching staging information was found for the patient         Previous Hematologic/ Oncologic Treatment:            Current Hematologic/ Oncologic Treatment:         Adjuvant chemotherapy with Taxotere and cyclophosphamide x4 cycles   4th cycle to be given in  September 17, 2021      Disease Status:       MADAN status post mastectomy and axillary lymph node dissection      Test Results:     Pathology:       60 x 38 x 30 mm of invasive lobular carcinoma, grade 2   Lymphovascular invasion was present   4/10 axillary lymph nodes were positive for metastatic disease with largest tumor measuring 3 5 cm   Extranodal extension was positive   ER 90% positive, UT 1% positive, HER2 negative disease   Stage III A (pT3, pN2, M0)     Radiology:      CT scan of chest abdomen pelvis showed a 7 mm of pulmonary nodules        Bone scan was negative for osseous metastasis      Laboratory:       See below      Physical Exam:        General Appearance:    Alert, oriented          Eyes:    PERRL   Ears:    Normal external ear canals, both ears   Nose:   Nares normal, septum midline   Throat:   Mucosa moist  Pharynx without injection  Neck:   Supple         Lungs:     Clear to auscultation bilaterally   Chest Wall:    No tenderness or deformity    Heart:    Regular rate and rhythm         Abdomen:     Soft, non-tender, bowel sounds +, no organomegaly               Extremities:   Extremities no cyanosis or edema         Skin:   no rash or icterus  Lymph nodes:   Cervical, supraclavicular, and axillary nodes normal   Neurologic:   CNII-XII intact, normal strength, sensation and reflexes     Throughout             Breast exam:     Status post left mastectomy without reconstruction   No palpable abnormality in her left chest wall   Right breast exam is negative  ROS: Review of Systems   All other systems reviewed and are negative  Imaging: No results found        Labs:   Lab Results   Component Value Date    WBC 7 74 08/24/2021    HGB 12 8 08/24/2021    HCT 39 5 08/24/2021    MCV 93 08/24/2021     08/24/2021     Lab Results   Component Value Date     01/27/2017    K 3 8 08/24/2021     08/24/2021    CO2 27 08/24/2021    BUN 12 08/24/2021    CREATININE 0 70 08/24/2021    GLUCOSE 131 (H) 01/27/2017    GLUF 137 (H) 08/24/2021    CALCIUM 8 6 08/24/2021    CORRECTEDCA 9 2 08/24/2021    AST 15 08/24/2021    ALT 38 08/24/2021    ALKPHOS 78 08/24/2021    PROT 6 5 01/27/2017    BILITOT 0 8 01/27/2017    EGFR 89 08/24/2021         Current Medications: Reviewed  Allergies: Reviewed  PMH/FH/SH:  Reviewed      Vital Sign:    Body surface area is 2 01 meters squared      Wt Readings from Last 3 Encounters:   09/08/21 99 8 kg (220 lb)   08/27/21 98 9 kg (218 lb 0 6 oz)   08/06/21 100 kg (220 lb 14 4 oz)        Temp Readings from Last 3 Encounters:   09/08/21 99 2 °F (37 3 °C) (Tympanic Core)   08/27/21 (!) 96 6 °F (35 9 °C) (Temporal)   08/06/21 (!) 96 °F (35 6 °C) (Temporal)        BP Readings from Last 3 Encounters:   09/08/21 138/88   08/27/21 131/61   08/06/21 165/72         Pulse Readings from Last 3 Encounters:   09/08/21 85   08/27/21 74   08/06/21 72     @LASTSAO2(3)@

## 2021-09-10 ENCOUNTER — TELEPHONE (OUTPATIENT)
Dept: FAMILY MEDICINE CLINIC | Facility: CLINIC | Age: 71
End: 2021-09-10

## 2021-09-16 ENCOUNTER — APPOINTMENT (OUTPATIENT)
Dept: LAB | Facility: HOSPITAL | Age: 71
End: 2021-09-16
Attending: INTERNAL MEDICINE
Payer: MEDICARE

## 2021-09-16 DIAGNOSIS — T45.1X5A CHEMOTHERAPY INDUCED NEUTROPENIA (HCC): ICD-10-CM

## 2021-09-16 DIAGNOSIS — D70.1 CHEMOTHERAPY INDUCED NEUTROPENIA (HCC): ICD-10-CM

## 2021-09-16 DIAGNOSIS — C50.412 MALIGNANT NEOPLASM OF UPPER-OUTER QUADRANT OF LEFT BREAST IN FEMALE, ESTROGEN RECEPTOR POSITIVE (HCC): ICD-10-CM

## 2021-09-16 DIAGNOSIS — Z17.0 MALIGNANT NEOPLASM OF UPPER-OUTER QUADRANT OF LEFT BREAST IN FEMALE, ESTROGEN RECEPTOR POSITIVE (HCC): ICD-10-CM

## 2021-09-16 LAB
ALBUMIN SERPL BCP-MCNC: 3.5 G/DL (ref 3.5–5)
ALP SERPL-CCNC: 76 U/L (ref 46–116)
ALT SERPL W P-5'-P-CCNC: 43 U/L (ref 12–78)
ANION GAP SERPL CALCULATED.3IONS-SCNC: 9 MMOL/L (ref 4–13)
AST SERPL W P-5'-P-CCNC: 19 U/L (ref 5–45)
BASOPHILS # BLD AUTO: 0.09 THOUSANDS/ΜL (ref 0–0.1)
BASOPHILS NFR BLD AUTO: 1 % (ref 0–1)
BILIRUB SERPL-MCNC: 0.62 MG/DL (ref 0.2–1)
BUN SERPL-MCNC: 16 MG/DL (ref 5–25)
CALCIUM SERPL-MCNC: 8.7 MG/DL (ref 8.3–10.1)
CHLORIDE SERPL-SCNC: 108 MMOL/L (ref 100–108)
CO2 SERPL-SCNC: 26 MMOL/L (ref 21–32)
CREAT SERPL-MCNC: 0.71 MG/DL (ref 0.6–1.3)
EOSINOPHIL # BLD AUTO: 0.02 THOUSAND/ΜL (ref 0–0.61)
EOSINOPHIL NFR BLD AUTO: 0 % (ref 0–6)
ERYTHROCYTE [DISTWIDTH] IN BLOOD BY AUTOMATED COUNT: 16.5 % (ref 11.6–15.1)
GFR SERPL CREATININE-BSD FRML MDRD: 87 ML/MIN/1.73SQ M
GLUCOSE P FAST SERPL-MCNC: 121 MG/DL (ref 65–99)
HCT VFR BLD AUTO: 39.2 % (ref 34.8–46.1)
HGB BLD-MCNC: 12.9 G/DL (ref 11.5–15.4)
IMM GRANULOCYTES # BLD AUTO: 0.04 THOUSAND/UL (ref 0–0.2)
IMM GRANULOCYTES NFR BLD AUTO: 1 % (ref 0–2)
LYMPHOCYTES # BLD AUTO: 0.92 THOUSANDS/ΜL (ref 0.6–4.47)
LYMPHOCYTES NFR BLD AUTO: 12 % (ref 14–44)
MCH RBC QN AUTO: 30.7 PG (ref 26.8–34.3)
MCHC RBC AUTO-ENTMCNC: 32.9 G/DL (ref 31.4–37.4)
MCV RBC AUTO: 93 FL (ref 82–98)
MONOCYTES # BLD AUTO: 0.7 THOUSAND/ΜL (ref 0.17–1.22)
MONOCYTES NFR BLD AUTO: 9 % (ref 4–12)
NEUTROPHILS # BLD AUTO: 6.01 THOUSANDS/ΜL (ref 1.85–7.62)
NEUTS SEG NFR BLD AUTO: 77 % (ref 43–75)
NRBC BLD AUTO-RTO: 0 /100 WBCS
PLATELET # BLD AUTO: 257 THOUSANDS/UL (ref 149–390)
PMV BLD AUTO: 10.1 FL (ref 8.9–12.7)
POTASSIUM SERPL-SCNC: 4 MMOL/L (ref 3.5–5.3)
PROT SERPL-MCNC: 6.5 G/DL (ref 6.4–8.2)
RBC # BLD AUTO: 4.2 MILLION/UL (ref 3.81–5.12)
SODIUM SERPL-SCNC: 143 MMOL/L (ref 136–145)
WBC # BLD AUTO: 7.78 THOUSAND/UL (ref 4.31–10.16)

## 2021-09-16 PROCEDURE — 80053 COMPREHEN METABOLIC PANEL: CPT

## 2021-09-16 PROCEDURE — 36415 COLL VENOUS BLD VENIPUNCTURE: CPT

## 2021-09-16 PROCEDURE — 85025 COMPLETE CBC W/AUTO DIFF WBC: CPT

## 2021-09-16 NOTE — TELEPHONE ENCOUNTER
Spoke to patient to schedule appointment for AWV  Patient stated that she is currently doing chemo, so she will call us back to schedule when she is feeling better  GREGG Bonds LPN

## 2021-09-17 ENCOUNTER — HOSPITAL ENCOUNTER (OUTPATIENT)
Dept: INFUSION CENTER | Facility: HOSPITAL | Age: 71
Discharge: HOME | End: 2021-09-17
Attending: INTERNAL MEDICINE
Payer: MEDICARE

## 2021-09-17 VITALS
HEIGHT: 63 IN | DIASTOLIC BLOOD PRESSURE: 72 MMHG | WEIGHT: 219.8 LBS | SYSTOLIC BLOOD PRESSURE: 169 MMHG | OXYGEN SATURATION: 97 % | TEMPERATURE: 96.5 F | HEART RATE: 72 BPM | RESPIRATION RATE: 18 BRPM | BODY MASS INDEX: 38.95 KG/M2

## 2021-09-17 DIAGNOSIS — T45.1X5A CHEMOTHERAPY INDUCED NEUTROPENIA (HCC): Primary | ICD-10-CM

## 2021-09-17 DIAGNOSIS — C50.412 MALIGNANT NEOPLASM OF UPPER-OUTER QUADRANT OF LEFT BREAST IN FEMALE, ESTROGEN RECEPTOR POSITIVE (HCC): ICD-10-CM

## 2021-09-17 DIAGNOSIS — Z17.0 MALIGNANT NEOPLASM OF UPPER-OUTER QUADRANT OF LEFT BREAST IN FEMALE, ESTROGEN RECEPTOR POSITIVE (HCC): ICD-10-CM

## 2021-09-17 DIAGNOSIS — D70.1 CHEMOTHERAPY INDUCED NEUTROPENIA (HCC): Primary | ICD-10-CM

## 2021-09-17 PROCEDURE — 96367 TX/PROPH/DG ADDL SEQ IV INF: CPT

## 2021-09-17 PROCEDURE — 96413 CHEMO IV INFUSION 1 HR: CPT

## 2021-09-17 PROCEDURE — 96377 APPLICATON ON-BODY INJECTOR: CPT

## 2021-09-17 PROCEDURE — 96417 CHEMO IV INFUS EACH ADDL SEQ: CPT

## 2021-09-17 RX ORDER — SODIUM CHLORIDE 9 MG/ML
20 INJECTION, SOLUTION INTRAVENOUS ONCE
Status: COMPLETED | OUTPATIENT
Start: 2021-09-17 | End: 2021-09-17

## 2021-09-17 RX ADMIN — DOCETAXEL 151.6 MG: 20 INJECTION, SOLUTION INTRAVENOUS at 11:13

## 2021-09-17 RX ADMIN — DEXAMETHASONE SODIUM PHOSPHATE: 10 INJECTION, SOLUTION INTRAMUSCULAR; INTRAVENOUS at 10:16

## 2021-09-17 RX ADMIN — CYCLOPHOSPHAMIDE 1212 MG: 1 INJECTION, POWDER, FOR SOLUTION INTRAVENOUS; ORAL at 12:23

## 2021-09-17 RX ADMIN — SODIUM CHLORIDE 20 ML/HR: 9 INJECTION, SOLUTION INTRAVENOUS at 10:16

## 2021-09-17 RX ADMIN — PEGFILGRASTIM 6 MG: KIT SUBCUTANEOUS at 12:59

## 2021-09-29 ENCOUNTER — OFFICE VISIT (OUTPATIENT)
Dept: SURGICAL ONCOLOGY | Facility: CLINIC | Age: 71
End: 2021-09-29
Payer: MEDICARE

## 2021-09-29 VITALS
HEIGHT: 63 IN | DIASTOLIC BLOOD PRESSURE: 90 MMHG | OXYGEN SATURATION: 97 % | HEART RATE: 67 BPM | RESPIRATION RATE: 18 BRPM | BODY MASS INDEX: 38.8 KG/M2 | SYSTOLIC BLOOD PRESSURE: 138 MMHG | TEMPERATURE: 98.7 F | WEIGHT: 219 LBS

## 2021-09-29 DIAGNOSIS — Z17.0 MALIGNANT NEOPLASM OF UPPER-OUTER QUADRANT OF LEFT BREAST IN FEMALE, ESTROGEN RECEPTOR POSITIVE (HCC): Primary | ICD-10-CM

## 2021-09-29 DIAGNOSIS — Z90.12 STATUS POST LEFT MASTECTOMY: ICD-10-CM

## 2021-09-29 DIAGNOSIS — C50.412 MALIGNANT NEOPLASM OF UPPER-OUTER QUADRANT OF LEFT BREAST IN FEMALE, ESTROGEN RECEPTOR POSITIVE (HCC): Primary | ICD-10-CM

## 2021-09-29 PROCEDURE — 99214 OFFICE O/P EST MOD 30 MIN: CPT | Performed by: SURGERY

## 2021-09-29 NOTE — PROGRESS NOTES
Surgical Oncology Follow Up       [address]kins  CANCER [facility] SURGICAL ONCOLOGY [place]  [address] [address]    Pamela Viera  1950  [id number]  [address] [facility] SURGICAL ONCOLOGY [place]  [address] [address]    Chief Complaint   Patient presents with    Follow-up          Assessment & Plan:   The patient presents for a 3 M f/u visit  Overall she is doing well, she has a lateral defect in her mastectomy site and we will have her see plastic surgery  We will have her see PT for some evolving left arm lymphedema  She needs a f/u CT of the chest for small lung nodules  She will start her anastrozole in the near future  We will see her back in 10 M (NP) for f/u  Cancer History:     Oncology History Overview Note   Patient here to discuss radiation therapy for recently diagnosed left breast cancer, referred by Dr Solo Verma  73year old female presents  to PCP with non tender left breast lump  Diagnostic mammogram reveals a 2 2 cm x 2 4 cm x 4 4 cm irregularly shaped, hypoechoic mass with angular margins seen in the left breast at 2 o'clock, 8 cm from the nipple  There is a lymph node seen in the left axilla, 13 cm from the nipple  Cortical thickness measures 4 mm on the left  This borderline abnormal lymph node is quite deep within the axilla and may be difficult to access for sampling if clinically desired  Biospy revealed invasive mammary carcinoma no special type, invasive carcinoma involves 3 of 3 submitted core biopsies, max  Dimension= 13 mm, LVI not involved  Axillary biopsy revealed invasive/metastatic adenocarcinoma, compatible with breast primary involving fibroadipose tissues and a portion of lymphoid parenchyma, ER/VT positive, HER 2 negative  Genetic testing was negative  June 8, 2021 patient underwent left breast modified radical mastectomy, ANAND  directed axillary dissection   Results revealed Invasive carcinoma, Grade 2, Greatest dimension 60 mm, DCIS present, LVI present, Margins uninvolved, Closest margin >20 mm, 4/10 lymph nodes micrometastases, ER/NJ positive, HER2 negative  3/26/21 Mammogram Diagnostic bilateral w 3d & cad/US breast left limited  FINDINGS:   LEFT  1) MASS  Mammo diagnostic bilateral w 3d & cad: There is an irregularly shaped mass seen in the upper outer quadrant of the left breast in the posterior depth  The mass correlates with the palpable mass reported by the patient  Associated features include skin retraction, skin thickening and trabecular thickening  US breast left limited (diagnostic): There is a 2 2 cm x 2 4 cm x 4 4 cm irregularly shaped, hypoechoic mass with angular margins seen in the left breast at 2 o'clock, 8 cm from the nipple  2) MASS  Mammo diagnostic bilateral w 3d & cad: There is an irregularly shaped mass seen in the upper outer quadrant of the left breast    US breast left limited (diagnostic): There is a 1 3 mm x 1 5 mm x 2 mm irregularly shaped, non-parallel mass with angular margins seen in the left breast at 2 o'clock, 7 cm from the nipple  This separately measured mass is immediately adjacent to the above described larger mass, with a small amount of bridging tissue spanning the distance of approximately 7 mm  In addition to these findings there is linear hypoechoic tissue extending between the 2 immediately adjacent above masses toward the nipple  3) LYMPH NODE  Mammo diagnostic bilateral w 3d & cad: There is a lymph node seen in the left axilla  US breast left limited (diagnostic): There is a lymph node seen in the left axilla, 13 cm from the nipple  Cortical thickness measures 4 mm on the left  This borderline abnormal lymph node is quite deep within the axilla and may be difficult to access for sampling if clinically desired    RIGHT  There are no suspicious masses, grouped microcalcifications or areas of architectural distortion  The skin and nipple areolar complex are unremarkable  IMPRESSION:  Large left upper outer quadrant 02:00 o'clock mass with immediately adjacent satellite and other findings suggesting infiltrating tumor tissue extending toward the nipple, toward the skin and probably lymphatic involvement with trabecular and skin thickening  Recommend ultrasound-guided core biopsy sampling of the largest left breast mass  Borderline abnormal thickened left axillary lymph node also noted  4/19/21 US guided breast biopsy left/US guided breast lymph node biopsy left   A  Breast, left at 02:00 o'clock 8 cm from nipple, ultrasound-guided biopsy (3 passes):  - Invasive mammary carcinoma of no special type (ductal)  -- Sarah histologic grade 2 of 3 (total score: 6 of 9)          B  Lymph node, left axillary, ultrasound-guided biopsy (3 passes):  -  Invasive/metastatic adenocarcinoma, compatible with breast primary involving fibroadipose tissues and a portion of lymphoid parenchyma  -  Estrogen, Progesterone & HER2 receptor studies and Mammoprint pending, to be described in an addendum  A: Left Breast:  Test Description                    Result             Prognostic Interpretation  Estrogen Receptor                  90-95%                        positive  Progesterone Receptor           1-2%                            positive  HER2 by IHC                            1+                               negative     B:  Lymph Node:  Test Description                    Result             Prognostic Interpretation  Estrogen Receptor                  90-95%                        positive  Progesterone Receptor           1-2%                           positive  HER2 by IHC                           1+                               negative    5/3/2021 Surg/Onc - Dr Sepulveda Plan a modified radical mastectomy with a ANAND  directed axillary dissection  Genetics as well as a metastatic workup      5/7/21 CT CAP w contrast  IMPRESSION:  1  Left lateral breast mass consistent with provided clinical history of known primary breast carcinoma  Left axillary lymphadenopathy noted suspicious for metastatic disease  2   Bilateral pulmonary nodules, largest 7 mm  Based on current Fleischner Society 2017 Guidelines on incidental pulmonary nodule, patients with a known malignancy are at increased risk of metastasis and should receive initial three month follow-up chest   CT  3   No further suspected metastatic disease throughout the chest, abdomen and pelvis  Bone scan currently pending  5/7/21 Bone Scan  IMPRESSION:  1  No scintigraphic evidence of osseous metastasis  2   Mild asymmetric soft tissue activity in the left breast corresponds with known malignancy  6/2/21 Surg/Onc - Dr Shoemaker Cure not recommended since she will need postoperative radiation therapy and has relatively extensive disease  Discussed delay reconstruction is an option  6/8/21 Breast modified radical mastectomy, ANAND  directed axillary dissection  A  Left breast with axillary content, modified radical mastectomy:  - Invasive breast carcinoma of no special type (ductal NST/invasive ductal carcinoma)  - Ductal carcinemia in situ   - Skin with sebotropic keratosis  - Nipple with no pathologic abnormality   - Lymphovascular and perineural invasion are present   - All margins are negative  - Four of ten lymph nodes are positive for metastatic carcinoma (4/10)         Procedure  Total mastectomy    Specimen Laterality  Left    TUMOR   Tumor Site  Upper outer quadrant    Histologic Type  Invasive carcinoma of no special type (ductal)    Glandular (Acinar) / Tubular Differentiation  Score 3    Nuclear Pleomorphism  Score 2    Mitotic Rate  Score 1    Overall Grade  Grade 2 (scores of 6 or 7)    Tumor Size  Greatest dimension of largest invasive focus (Millimeters): 60 mm   Additional Dimension (Millimeters)  38 mm     30 mm   Tumor Focality  Single focus of invasive carcinoma    Ductal Carcinoma In Situ (DCIS)  Present      Negative for extensive intraductal component (EIC)    Size (Extent) of DCIS     Number of Blocks with DCIS  3    Number of Blocks Examined  18    Architectural Patterns  Comedo      Solid    Nuclear Grade  Grade II (intermediate)    Necrosis  Present, central (expansive "comedo" necrosis)    Lobular Carcinoma In Situ (LCIS)  Not identified    Tumor Extent     Lymphovascular Invasion  Present    Dermal Lymphovascular Invasion  Not identified    Microcalcifications  Present in invasive carcinoma    Treatment Effect in the Breast  No known presurgical therapy    MARGINS   Invasive Carcinoma Margins  Uninvolved by invasive carcinoma    Distance from Closest Margin (Millimeters)  Greater than: 20 mm   Closest Margin(s)  Posterior    DCIS Margins  Uninvolved by DCIS    Distance from Closest Margin (Millimeters)  Greater than: 20 mm   Closest Margin(s)  Posterior    LYMPH NODES   Regional Lymph Nodes  Involved by tumor cells    Number of Lymph Nodes with Macrometastases (> 2 mm)  4    Number of Lymph Nodes with Micrometastases (> 0 2 mm to 2 mm and / or > 200 cells)  0    Number of Lymph Nodes with Isolated Tumor Cells (<= 0 2 mm or <= 200 cells)  0    Size of Largest Metastatic Deposit (Millimeters)  At least: 35 mm   Extranodal Extension  Present    Extent of Extranodal Extension  Greater than 2 mm    Total Number of Lymph Nodes Examined  10    PATHOLOGIC STAGE CLASSIFICATION (pTNM, AJCC 8th Edition)      Primary Tumor (pT)  pT3    Regional Lymph Nodes (pN)  pN2a    ADDITIONAL FINDINGS   Additional Findings  intraductal papilloma    SPECIAL STUDIES   Breast Biomarker Testing Performed on Previous Biopsy     Estrogen Receptor (ER) Status  Positive (greater than 10% of cells demonstrate nuclear positivity)    Percentage of Cells with Nuclear Positivity  %    Breast Biomarker Testing Performed on Previous Biopsy Progesterone Receptor (PgR) Status  Positive    Percentage of Cells with Nuclear Positivity  1-2 %   Breast Biomarker Testing Performed on Previous Biopsy     HER2 (by immunohistochemistry)  Negative (Score 1+)        6/18/21 Hem/Onc Mir Bernard  Adjuvant chemotherapy is indicated  Taxotere and cyclophosphamide every 3 weeks x4 cycle followed by adjuvant hormonal therapy with aromatase inhibitors  10 years of adjuvant hormonal therapy is appropriate based on the risk  Recommends  postmastectomy radiation therapy due to the size of tumor as well as number of the lymph nodes involvement      6/23/21 Surg/Onc - Dr Haseeb Campa  Examination of the left mastectomy site demonstrates a relatively significant dog ear in the lateral aspect small in the medial aspect  She has some inflammation around her incision but no evidence of infection  Her drains were removed without difficulty  7/14/21 Hem/Onc  Clinically, no evidence recurrent disease  Adequate ANC to start adjuvant chemotherapy with Taxotere and cyclophosphamide later this week  Chemo - Taxotere and cyclophosphamide  7/16/21 -  9/17/21 9/8/21 Hem/Onc - Dr Jaclyn Bernard  Discussed  discussed the adjuvant hormonal therapy  Recommends anastrozole in mid October 2021  Recommends calcium - vitamin D on regular basis      9/29/21 Surg/Onc Mir Campa      Upcoming:  3/9/22 Dr Jaclyn Bernard       Malignant neoplasm of upper-outer quadrant of left breast in female, estrogen receptor positive ([facility] [address] )   4/19/2021 Biopsy    Left breast US guided biopsy:  A  2 o'clock 8 cm from the nipple  Invasive mammary carcinoma of no special type  Grade 2  ER 90  CA 1  HER2 1+  Lymphovascular invasion: not identified    B  Left Axillary lymph node biopsy:  Invasive/ metastatic adenocarcinoma, compatible with breast primary involving fibroadipose tissues and a portion of lymphoid pranchyma  ER 90  CA 1  HER2 1+    Concordant   Malignancy appears multifocal  The 2 adjacent masses span an area of approximately 4 cm  Right breast clear  5/3/2021 Genetic Testing    The following genes were evaluated: OLINDA, BRCA1, BRCA2, CDH1, CHEK2, PALB2, PTEN, STK11, TP53  Additional genes evaluated for a total of 36 genes analyzed  Negative result  No pathogenic sequence variants or deletions/dupllications identified  [employer]     6/8/2021 Surgery    Left breast modified radical mastectomy with ANAND  directed axillary dissection  Invasive carcinoma of no special type (ductal)  Grade 2  6 cm  Lymphovascular invasion present  Margins negative  4/10 Lymph nodes (3 5 cm)  Anatomic Stage IIIA  Prognostic Stage IB     7/16/2021 -  Chemotherapy    pegfilgrastim (NEULASTA ONPRO), 6 mg, Subcutaneous, Once, 4 of 4 cycles  Administration: 6 mg (7/16/2021), 6 mg (8/6/2021), 6 mg (8/27/2021), 6 mg (9/17/2021)  cyclophosphamide (CYTOXAN) IVPB, 600 mg/m2 = 1,212 mg, Intravenous, Once, 4 of 4 cycles  Administration: 1,212 mg (7/16/2021), 1,212 mg (8/6/2021), 1,212 mg (8/27/2021), 1,212 mg (9/17/2021)  DOCEtaxel (TAXOTERE) chemo infusion, 75 mg/m2 = 151 6 mg, Intravenous, Once, 4 of 4 cycles  Administration: 151 6 mg (7/16/2021), 151 6 mg (8/6/2021), 151 6 mg (8/27/2021), 151 6 mg (9/17/2021)           Interval History:   See above, pt with left arm edema and lateral flap protrusion  Review of Systems:   Review of Systems   All other systems reviewed and are negative        Past Medical History     Patient Active Problem List   Diagnosis    Benign essential hypertension    Chronic rhinitis    Hidradenitis suppurativa    Hypothyroidism    Impaired fasting glucose    Morbid obesity ([address] )    Venous insufficiency (chronic) (peripheral)    Malignant neoplasm of upper-outer quadrant of left breast in female, estrogen receptor positive ([address] )    Chemotherapy induced neutropenia ([address] )     Past Medical History:   Diagnosis Date    BRCA gene mutation negative 05/19/2021    [employer]; 36 gene panel     Past Surgical History:   Procedure Laterality Date    AXILLARY SURGERY Right     sweat glands removed -     BREAST CYST EXCISION Right 2000    benign    BREAST SURGERY Right     CHOLECYSTECTOMY      COLONOSCOPY      NH MASTECTOMY, MODIFIED RADICAL Left 6/8/2021    Procedure: BREAST MODIFIED RADICAL MASTECTOMY; ANAND  DIRECTED AXILLARY DISSECTION;  Surgeon: Jerardo Allison MD;  Location: [facility];  Service: Surgical Oncology    US BREAST NEEDLE LOC LEFT Left 6/2/2021    US GUIDED BREAST BIOPSY LEFT COMPLETE Left 4/19/2021    US GUIDED BREAST LYMPH NODE BIOPSY LEFT Left 4/19/2021     Family History   Problem Relation Age of Onset    Dementia Mother     Colon cancer Mother         55's-59's    Lung cancer Father         55's-59's    No Known Problems Sister     Breast cancer Paternal Aunt     No Known Problems Sister     Stomach cancer Paternal Uncle         66's     Social History     Socioeconomic History    Marital status: /Civil Union     Spouse name: Not on file    Number of children: Not on file    Years of education: Not on file    Highest education level: Not on file   Occupational History    Not on file   Tobacco Use    Smoking status: Never Smoker    Smokeless tobacco: Never Used   Vaping Use    Vaping Use: Never used   Substance and Sexual Activity    Alcohol use: No    Drug use: No    Sexual activity: Not Currently   Other Topics Concern    Not on file   Social History Narrative    Not on file     Social Determinants of Health     Financial Resource Strain:     Difficulty of Paying Living Expenses:    Food Insecurity:     Worried About Running Out of Food in the Last Year:     Ran Out of Food in the Last Year:    Transportation Needs:     Lack of Transportation (Medical):      Lack of Transportation (Non-Medical):    Physical Activity:     Days of Exercise per Week:     Minutes of Exercise per Session:    Stress:     Feeling of Stress :    Social Connections:     Frequency of Communication with Friends and Family:     Frequency of Social Gatherings with Friends and Family:     Attends Cheondo[employer] Services:     Active Member of Clubs or Organizations:     Attends Club or Organization Meetings:     Marital Status:    Intimate Partner Violence:     Fear of Current or Ex-Partner:     Emotionally Abused:     Physically Abused:     Sexually Abused:        Current Outpatient Medications:     anastrozole (ARIMIDEX) 1 mg tablet, Take 1 tablet (1 mg total) by mouth daily, Disp: 90 tablet, Rfl: 1    aspirin 81 MG tablet, Take by mouth daily, Disp: , Rfl:     CRANIAL PROSTHESIS, RX,, One wig as needed, Disp: 1 each, Rfl: 0    fluticasone (FLONASE) 50 mcg/act nasal spray, 2 puffs into each nostril daily, Disp: , Rfl:     metoprolol tartrate (LOPRESSOR) 50 mg tablet, TAKE ONE TABLET BY MOUTH EVERY DAY, Disp: 90 tablet, Rfl: 3    Multiple Vitamins-Minerals (MULTIVITAMIN ADULT PO), Take by mouth daily, Disp: , Rfl:     ondansetron (ZOFRAN) 4 mg tablet, Take 1 tablet (4 mg total) by mouth every 8 (eight) hours as needed for nausea or vomiting, Disp: 30 tablet, Rfl: 1    saccharomyces boulardii (FLORASTOR) 250 mg capsule, Take 250 mg by mouth 2 (two) times a day, Disp: , Rfl:     oxyCODONE-acetaminophen (PERCOCET) 5-325 mg per tablet, Take 1 tablet by mouth every 6 (six) hours as needed for moderate painMax Daily Amount: 4 tablets (Patient not taking: Reported on 6/2/2021), Disp: 20 tablet, Rfl: 0  No Known Allergies    Physical Exam:     Vitals:    09/29/21 1305   BP: 138/90   Pulse: 67   Resp: 18   Temp: 98 7 °F (37 1 °C)   SpO2: 97%     Physical Exam  Vitals reviewed  Constitutional:       Appearance: She is well-developed  HENT:      Head: Normocephalic and atraumatic  Eyes:      Pupils: Pupils are equal, round, and reactive to light  Neck:      Thyroid: No thyromegaly  Vascular: No JVD  Trachea: No tracheal deviation     Cardiovascular:      Rate and Rhythm: Normal rate and regular rhythm  Heart sounds: Normal heart sounds  No murmur heard  No friction rub  No gallop  Pulmonary:      Effort: Pulmonary effort is normal  No respiratory distress  Breath sounds: Normal breath sounds  No wheezing or rales  Chest:      Breasts:         Right: No swelling, bleeding, inverted nipple, mass, nipple discharge, skin change or tenderness  Left: Absent  No swelling, bleeding, mass or skin change  Comments: There is a triangular defect in her lateral flap  Abdominal:      General: There is no distension  Palpations: Abdomen is soft  There is no hepatomegaly or mass  Tenderness: There is no abdominal tenderness  There is no guarding or rebound  Musculoskeletal:         General: No tenderness  Normal range of motion  Cervical back: Normal range of motion and neck supple  Lymphadenopathy:      Cervical: No cervical adenopathy  Upper Body:      Right upper body: No supraclavicular, axillary or pectoral adenopathy  Left upper body: No supraclavicular, axillary or pectoral adenopathy  Skin:     General: Skin is warm and dry  Findings: No erythema or rash  Neurological:      Mental Status: She is alert and oriented to person, place, and time  Cranial Nerves: No cranial nerve deficit  Psychiatric:         Behavior: Behavior normal            Results & Discussion:   Overall doing well, new lymphedema, pt to f/u with PT and plastics  PT to see RT tomorrow, f/u with NP in 6 M  Advance Care Planning/Advance Directives:  I discussed the disease status, treatment plans and follow-up with the patient

## 2021-09-30 ENCOUNTER — RADIATION ONCOLOGY CONSULT (OUTPATIENT)
Dept: RADIATION ONCOLOGY | Facility: HOSPITAL | Age: 71
End: 2021-09-30
Attending: RADIOLOGY
Payer: MEDICARE

## 2021-09-30 VITALS
BODY MASS INDEX: 38.59 KG/M2 | HEART RATE: 60 BPM | SYSTOLIC BLOOD PRESSURE: 122 MMHG | OXYGEN SATURATION: 98 % | DIASTOLIC BLOOD PRESSURE: 90 MMHG | WEIGHT: 217.8 LBS | RESPIRATION RATE: 18 BRPM | TEMPERATURE: 97.4 F | HEIGHT: 63 IN

## 2021-09-30 DIAGNOSIS — Z17.0 MALIGNANT NEOPLASM OF UPPER-OUTER QUADRANT OF LEFT BREAST IN FEMALE, ESTROGEN RECEPTOR POSITIVE (HCC): ICD-10-CM

## 2021-09-30 DIAGNOSIS — C50.412 MALIGNANT NEOPLASM OF UPPER-OUTER QUADRANT OF LEFT BREAST IN FEMALE, ESTROGEN RECEPTOR POSITIVE (HCC): Primary | ICD-10-CM

## 2021-09-30 DIAGNOSIS — Z17.0 MALIGNANT NEOPLASM OF UPPER-OUTER QUADRANT OF LEFT BREAST IN FEMALE, ESTROGEN RECEPTOR POSITIVE (HCC): Primary | ICD-10-CM

## 2021-09-30 DIAGNOSIS — C50.412 MALIGNANT NEOPLASM OF UPPER-OUTER QUADRANT OF LEFT BREAST IN FEMALE, ESTROGEN RECEPTOR POSITIVE (HCC): ICD-10-CM

## 2021-09-30 PROCEDURE — 99204 OFFICE O/P NEW MOD 45 MIN: CPT | Performed by: RADIOLOGY

## 2021-09-30 PROCEDURE — 77263 THER RADIOLOGY TX PLNG CPLX: CPT | Performed by: RADIOLOGY

## 2021-09-30 PROCEDURE — 99211 OFF/OP EST MAY X REQ PHY/QHP: CPT | Performed by: RADIOLOGY

## 2021-09-30 NOTE — PROGRESS NOTES
Collin Child 1950 is a 73 y o  female     Patient here to discuss radiation therapy for recently diagnosed left breast cancer, referred by Dr Star Del Valle  73year old female presents  to PCP with non tender left breast lump  Diagnostic mammogram reveals a 2 2 cm x 2 4 cm x 4 4 cm irregularly shaped, hypoechoic mass with angular margins seen in the left breast at 2 o'clock, 8 cm from the nipple  There is a lymph node seen in the left axilla, 13 cm from the nipple  Cortical thickness measures 4 mm on the left  This borderline abnormal lymph node is quite deep within the axilla and may be difficult to access for sampling if clinically desired  Biospy revealed invasive mammary carcinoma no special type, invasive carcinoma involves 3 of 3 submitted core biopsies, max  Dimension= 13 mm, LVI not involved  Axillary biopsy revealed invasive/metastatic adenocarcinoma, compatible with breast primary involving fibroadipose tissues and a portion of lymphoid parenchyma, ER/MA positive, HER 2 negative  Genetic testing was negative  June 8, 2021 patient underwent left breast modified radical mastectomy, ANAND  directed axillary dissection  Results revealed Invasive carcinoma, Grade 2, Greatest dimension 60 mm, DCIS present, LVI present, Margins uninvolved, Closest margin >20 mm, 4/10 lymph nodes micrometastases, ER/MA positive, HER2 negative  3/26/21 Mammogram Diagnostic bilateral w 3d & cad/US breast left limited  FINDINGS:   LEFT  1) MASS  Mammo diagnostic bilateral w 3d & cad: There is an irregularly shaped mass seen in the upper outer quadrant of the left breast in the posterior depth  The mass correlates with the palpable mass reported by the patient  Associated features include skin retraction, skin thickening and trabecular thickening  US breast left limited (diagnostic):  There is a 2 2 cm x 2 4 cm x 4 4 cm irregularly shaped, hypoechoic mass with angular margins seen in the left breast at 2 o'clock, 8 cm from the nipple  2) MASS  Mammo diagnostic bilateral w 3d & cad: There is an irregularly shaped mass seen in the upper outer quadrant of the left breast    US breast left limited (diagnostic): There is a 1 3 mm x 1 5 mm x 2 mm irregularly shaped, non-parallel mass with angular margins seen in the left breast at 2 o'clock, 7 cm from the nipple  This separately measured mass is immediately adjacent to the above described larger mass, with a small amount of bridging tissue spanning the distance of approximately 7 mm  In addition to these findings there is linear hypoechoic tissue extending between the 2 immediately adjacent above masses toward the nipple  3) LYMPH NODE  Mammo diagnostic bilateral w 3d & cad: There is a lymph node seen in the left axilla  US breast left limited (diagnostic): There is a lymph node seen in the left axilla, 13 cm from the nipple  Cortical thickness measures 4 mm on the left  This borderline abnormal lymph node is quite deep within the axilla and may be difficult to access for sampling if clinically desired  RIGHT  There are no suspicious masses, grouped microcalcifications or areas of architectural distortion  The skin and nipple areolar complex are unremarkable  IMPRESSION:  Large left upper outer quadrant 02:00 o'clock mass with immediately adjacent satellite and other findings suggesting infiltrating tumor tissue extending toward the nipple, toward the skin and probably lymphatic involvement with trabecular and skin thickening  Recommend ultrasound-guided core biopsy sampling of the largest left breast mass  Borderline abnormal thickened left axillary lymph node also noted  4/19/21 US guided breast biopsy left/US guided breast lymph node biopsy left   A  Breast, left at 02:00 o'clock 8 cm from nipple, ultrasound-guided biopsy (3 passes):  - Invasive mammary carcinoma of no special type (ductal)      -- Sarah histologic grade 2 of 3 (total score: 6 of 9) B  Lymph node, left axillary, ultrasound-guided biopsy (3 passes):  -  Invasive/metastatic adenocarcinoma, compatible with breast primary involving fibroadipose tissues and a portion of lymphoid parenchyma  -  Estrogen, Progesterone & HER2 receptor studies and Mammoprint pending, to be described in an addendum  A: Left Breast:  Test Description                    Result             Prognostic Interpretation  Estrogen Receptor                  90-95%                        positive  Progesterone Receptor           1-2%                            positive  HER2 by IHC                            1+                               negative     B:  Lymph Node:  Test Description                    Result             Prognostic Interpretation  Estrogen Receptor                  90-95%                        positive  Progesterone Receptor           1-2%                           positive  HER2 by IHC                           1+                               negative    5/3/2021 Surg/Onc - Dr Ellyn Martinez a modified radical mastectomy with a ANAND  directed axillary dissection  Genetics as well as a metastatic workup  5/7/21 CT CAP w contrast  IMPRESSION:  1  Left lateral breast mass consistent with provided clinical history of known primary breast carcinoma  Left axillary lymphadenopathy noted suspicious for metastatic disease  2   Bilateral pulmonary nodules, largest 7 mm  Based on current Fleischner Society 2017 Guidelines on incidental pulmonary nodule, patients with a known malignancy are at increased risk of metastasis and should receive initial three month follow-up chest   CT  3   No further suspected metastatic disease throughout the chest, abdomen and pelvis  Bone scan currently pending  5/7/21 Bone Scan  IMPRESSION:  1  No scintigraphic evidence of osseous metastasis  2   Mild asymmetric soft tissue activity in the left breast corresponds with known malignancy         6/2/21 Surg/Onc - Dr Inessa Hernandes not recommended since she will need postoperative radiation therapy and has relatively extensive disease  Discussed delay reconstruction is an option  6/8/21 Breast modified radical mastectomy, ANAND  directed axillary dissection  A  Left breast with axillary content, modified radical mastectomy:  - Invasive breast carcinoma of no special type (ductal NST/invasive ductal carcinoma)  - Ductal carcinemia in situ   - Skin with sebotropic keratosis  - Nipple with no pathologic abnormality   - Lymphovascular and perineural invasion are present   - All margins are negative  - Four of ten lymph nodes are positive for metastatic carcinoma (4/10)         Procedure  Total mastectomy    Specimen Laterality  Left    TUMOR   Tumor Site  Upper outer quadrant    Histologic Type  Invasive carcinoma of no special type (ductal)    Glandular (Acinar) / Tubular Differentiation  Score 3    Nuclear Pleomorphism  Score 2    Mitotic Rate  Score 1    Overall Grade  Grade 2 (scores of 6 or 7)    Tumor Size  Greatest dimension of largest invasive focus (Millimeters): 60 mm   Additional Dimension (Millimeters)  38 mm     30 mm   Tumor Focality  Single focus of invasive carcinoma    Ductal Carcinoma In Situ (DCIS)  Present      Negative for extensive intraductal component (EIC)    Size (Extent) of DCIS     Number of Blocks with DCIS  3    Number of Blocks Examined  18    Architectural Patterns  Comedo      Solid    Nuclear Grade  Grade II (intermediate)    Necrosis  Present, central (expansive "comedo" necrosis)    Lobular Carcinoma In Situ (LCIS)  Not identified    Tumor Extent     Lymphovascular Invasion  Present    Dermal Lymphovascular Invasion  Not identified    Microcalcifications  Present in invasive carcinoma    Treatment Effect in the Breast  No known presurgical therapy    MARGINS   Invasive Carcinoma Margins  Uninvolved by invasive carcinoma    Distance from Closest Margin (Millimeters) Greater than: 20 mm   Closest Margin(s)  Posterior    DCIS Margins  Uninvolved by DCIS    Distance from Closest Margin (Millimeters)  Greater than: 20 mm   Closest Margin(s)  Posterior    LYMPH NODES   Regional Lymph Nodes  Involved by tumor cells    Number of Lymph Nodes with Macrometastases (> 2 mm)  4    Number of Lymph Nodes with Micrometastases (> 0 2 mm to 2 mm and / or > 200 cells)  0    Number of Lymph Nodes with Isolated Tumor Cells (<= 0 2 mm or <= 200 cells)  0    Size of Largest Metastatic Deposit (Millimeters)  At least: 35 mm   Extranodal Extension  Present    Extent of Extranodal Extension  Greater than 2 mm    Total Number of Lymph Nodes Examined  10    PATHOLOGIC STAGE CLASSIFICATION (pTNM, AJCC 8th Edition)      Primary Tumor (pT)  pT3    Regional Lymph Nodes (pN)  pN2a    ADDITIONAL FINDINGS   Additional Findings  intraductal papilloma    SPECIAL STUDIES   Breast Biomarker Testing Performed on Previous Biopsy     Estrogen Receptor (ER) Status  Positive (greater than 10% of cells demonstrate nuclear positivity)    Percentage of Cells with Nuclear Positivity  %    Breast Biomarker Testing Performed on Previous Biopsy     Progesterone Receptor (PgR) Status  Positive    Percentage of Cells with Nuclear Positivity  1-2 %   Breast Biomarker Testing Performed on Previous Biopsy     HER2 (by immunohistochemistry)  Negative (Score 1+)        6/18/21 Hem/Onc - Dr Rosi Valerio  Adjuvant chemotherapy is indicated  Taxotere and cyclophosphamide every 3 weeks x4 cycle followed by adjuvant hormonal therapy with aromatase inhibitors  10 years of adjuvant hormonal therapy is appropriate based on the risk  Recommends  postmastectomy radiation therapy due to the size of tumor as well as number of the lymph nodes involvement      6/23/21 Surg/Onc - Dr  Jacquelin Amezcua  Examination of the left mastectomy site demonstrates a relatively significant dog ear in the lateral aspect small in the medial aspect    She has some inflammation around her incision but no evidence of infection  Her drains were removed without difficulty  7/14/21 Hem/Onc  Clinically, no evidence recurrent disease  Adequate ANC to start adjuvant chemotherapy with Taxotere and cyclophosphamide later this week  Chemo - Taxotere and cyclophosphamide  7/16/21 -  9/17/21 9/8/21 Hem/Onc - Dr Mile Soto  Discussed  discussed the adjuvant hormonal therapy  Recommends anastrozole in mid October 2021  Recommends calcium - vitamin D on regular basis      9/29/21 Surg/Onc - Dr Severa Liberty  Doing well  New lymphedema, referral to PT  Referral plastic surgery for some cosmetic surgery in the lateral left chest area    Left arm measurements taken today:  Left forearm measurement (taken 4 " below [facility]): 10 3/4"  Left upper arm measurement (taken 4" above AC): 14"    Upcoming:  3/9/22 Dr Mile Soto  4/12/22 Surgical Oncology      Oncology History   Malignant neoplasm of upper-outer quadrant of left breast in female, estrogen receptor positive ([facility] [address] )   4/19/2021 Biopsy    Left breast US guided biopsy:  A  2 o'clock 8 cm from the nipple  Invasive mammary carcinoma of no special type  Grade 2  ER 90  DE 1  HER2 1+  Lymphovascular invasion: not identified    B  Left Axillary lymph node biopsy:  Invasive/ metastatic adenocarcinoma, compatible with breast primary involving fibroadipose tissues and a portion of lymphoid pranchyma  ER 90  DE 1  HER2 1+    Concordant  Malignancy appears multifocal  The 2 adjacent masses span an area of approximately 4 cm  Right breast clear  5/3/2021 Genetic Testing    The following genes were evaluated: OLINDA, BRCA1, BRCA2, CDH1, CHEK2, PALB2, PTEN, STK11, TP53  Additional genes evaluated for a total of 36 genes analyzed  Negative result   No pathogenic sequence variants or deletions/dupllications identified  [employer]     6/8/2021 Surgery    Left breast modified radical mastectomy with ANAND  directed axillary dissection  Invasive carcinoma of no special type (ductal)  Grade 2  6 cm  Lymphovascular invasion present  Margins negative  4/10 Lymph nodes (3 5 cm)  Anatomic Stage IIIA  Prognostic Stage IB     7/16/2021 -  Chemotherapy    pegfilgrastim (NEULASTA ONPRO), 6 mg, Subcutaneous, Once, 4 of 4 cycles  Administration: 6 mg (7/16/2021), 6 mg (8/6/2021), 6 mg (8/27/2021), 6 mg (9/17/2021)  cyclophosphamide (CYTOXAN) IVPB, 600 mg/m2 = 1,212 mg, Intravenous, Once, 4 of 4 cycles  Administration: 1,212 mg (7/16/2021), 1,212 mg (8/6/2021), 1,212 mg (8/27/2021), 1,212 mg (9/17/2021)  DOCEtaxel (TAXOTERE) chemo infusion, 75 mg/m2 = 151 6 mg, Intravenous, Once, 4 of 4 cycles  Administration: 151 6 mg (7/16/2021), 151 6 mg (8/6/2021), 151 6 mg (8/27/2021), 151 6 mg (9/17/2021)         Clinical Trial: no    [unfilled]    Health Maintenance   Topic Date Due    Colorectal Cancer Screening  05/26/2020    Pneumococcal Vaccine: 65+ Years (3 of 4 - PPSV23) 11/05/2020    COVID-19 Vaccine (3 - Pfizer risk 3-dose series) 04/23/2021    Fall Risk  08/19/2021    Medicare Annual Wellness Visit (AWV)  08/19/2021    Influenza Vaccine (1) 09/01/2021    Depression Screening  03/16/2022    BMI: Followup Plan  03/16/2022    Cervical Cancer Screening  03/16/2022 (Originally 1/19/2020)    Breast Cancer Screening: Mammogram  03/26/2022    BMI: Adult  09/29/2022    DTaP,Tdap,and Td Vaccines (3 - Td or Tdap) 01/10/2023    Hepatitis C Screening  Completed    HIB Vaccine  Aged Out    Hepatitis B Vaccine  Aged Out    IPV Vaccine  Aged Out    Hepatitis A Vaccine  Aged Out    Meningococcal ACWY Vaccine  Aged Out    HPV Vaccine  Aged Out       Past Medical History:   Diagnosis Date    BRCA gene mutation negative 05/19/2021    [employer]; 36 gene panel       Past Surgical History:   Procedure Laterality Date    AXILLARY SURGERY Right     sweat glands removed -     BREAST CYST EXCISION Right 2000    benign    BREAST SURGERY Right     CHOLECYSTECTOMY      COLONOSCOPY      [place] MASTECTOMY, MODIFIED RADICAL Left 6/8/2021    Procedure: BREAST MODIFIED RADICAL MASTECTOMY; ANAND  DIRECTED AXILLARY DISSECTION;  Surgeon: Chidi Wright MD;  Location: [facility];  Service: Surgical Oncology    US BREAST NEEDLE LOC LEFT Left 6/2/2021    US GUIDED BREAST BIOPSY LEFT COMPLETE Left 4/19/2021    US GUIDED BREAST LYMPH NODE BIOPSY LEFT Left 4/19/2021       Family History   Problem Relation Age of Onset    Dementia Mother     Colon cancer Mother         55's-59's    Lung cancer Father         55's-59's    No Known Problems Sister     Breast cancer Paternal Aunt     No Known Problems Sister     Stomach cancer Paternal Uncle         66's       Social History     Tobacco Use    Smoking status: Never Smoker    Smokeless tobacco: Never Used   Vaping Use    Vaping Use: Never used   Substance Use Topics    Alcohol use: No    Drug use: No          Current Outpatient Medications:     anastrozole (ARIMIDEX) 1 mg tablet, Take 1 tablet (1 mg total) by mouth daily, Disp: 90 tablet, Rfl: 1    aspirin 81 MG tablet, Take by mouth daily, Disp: , Rfl:     CRANIAL PROSTHESIS, RX,, One wig as needed, Disp: 1 each, Rfl: 0    fluticasone (FLONASE) 50 mcg/act nasal spray, 2 puffs into each nostril daily, Disp: , Rfl:     metoprolol tartrate (LOPRESSOR) 50 mg tablet, TAKE ONE TABLET BY MOUTH EVERY DAY, Disp: 90 tablet, Rfl: 3    Multiple Vitamins-Minerals (MULTIVITAMIN ADULT PO), Take by mouth daily, Disp: , Rfl:     ondansetron (ZOFRAN) 4 mg tablet, Take 1 tablet (4 mg total) by mouth every 8 (eight) hours as needed for nausea or vomiting, Disp: 30 tablet, Rfl: 1    oxyCODONE-acetaminophen (PERCOCET) 5-325 mg per tablet, Take 1 tablet by mouth every 6 (six) hours as needed for moderate painMax Daily Amount: 4 tablets (Patient not taking: Reported on 6/2/2021), Disp: 20 tablet, Rfl: 0    saccharomyces boulardii (FLORASTOR) 250 mg capsule, Take 250 mg by mouth 2 (two) times a day, Disp: , Rfl:     No Known Allergies     Review of Systems:  Review of Systems   Constitutional: Positive for appetite change (due to chemo, metal taste with liquids and foods), fatigue (5/10, dependant on activites) and unexpected weight change (lost  ~ 30 pounds during chemo)  HENT: Negative  Eyes: Negative  Wears glasses   Respiratory: Positive for shortness of breath (occasionally at rest or with exertion)  Negative for cough, chest tightness and wheezing  Cardiovascular: Positive for leg swelling (Mild BLE)  Negative for chest pain  Gastrointestinal: Negative  Negative for abdominal pain, constipation, diarrhea, nausea and vomiting  Genitourinary: Negative  Negative for difficulty urinating and pelvic pain  Musculoskeletal: Negative  Skin: Negative  Neurological: Positive for headaches (occasional sinus headache r/t allergies)  Negative for dizziness, light-headedness and numbness  Hematological: Positive for adenopathy (left hand)  Does not bruise/bleed easily  Psychiatric/Behavioral: Positive for dysphoric mood (mild)  Negative for sleep disturbance  The patient is nervous/anxious  There were no vitals filed for this visit  OB/GYN History:  The patient underwent menarche at 17 years  Menopause Status Pre, Mayela, Post, Unknown and No Answer  No LMP recorded  Patient is postmenopausal   Menopause at : unknown years  Menopause Reason: natural  Hormone replacement therapy: no   3   Para 3   Age at first delivery being 27 years  Nursing: no    Birth control pills: yes  Years used: unknown  Pregnancy test needed:  no    ONCOTYPE/MAMMOPRINT results: no    Imaging:No images are attached to the encounter       Teaching: NCI RT packet, RT to breast reviewed with patient    MST: completed, declined referral    Implantable Devices (Port, Pacemaker, pain stimulator): no    Hip Replacement:  No

## 2021-09-30 NOTE — PROGRESS NOTES
Consultation - Radiation Oncology      RE : 1950  Encounter: [id number]  Patient Information: [address]  Chief Complaint   Patient presents with    Consult     radiation oncology     Cancer Staging  No matching staging information was found for the patient  History of Present Illness     Patient here to discuss radiation therapy for recently diagnosed left breast cancer, referred by Dr Zacarias Stage      73year old female presents  to PCP with non tender left breast lump  Diagnostic mammogram reveals a 2 2 cm x 2 4 cm x 4 4 cm irregularly shaped, hypoechoic mass with angular margins seen in the left breast at 2 o'clock, 8 cm from the nipple  There is a lymph node seen in the left axilla, 13 cm from the nipple  Cortical thickness measures 4 mm on the left   This borderline abnormal lymph node is quite deep within the axilla and may be difficult to access for sampling if clinically desired  Biospy revealed invasive mammary carcinoma no special type, invasive carcinoma involves 3 of 3 submitted core biopsies, max  Dimension= 13 mm, LVI not involved  Axillary biopsy revealed invasive/metastatic adenocarcinoma, compatible with breast primary involving fibroadipose tissues and a portion of lymphoid parenchyma, ER/AL positive, HER 2 negative  Genetic testing was negative  2021 patient underwent left breast modified radical mastectomy, ANAND  directed axillary dissection  Results revealed Invasive carcinoma, Grade 2, Greatest dimension 60 mm, DCIS present, LVI present, Margins uninvolved, Closest margin >20 mm, 4/10 lymph nodes micrometastases, ER/AL positive, HER2 negative           3/26/21 Mammogram Diagnostic bilateral w 3d & cad/US breast left limited  FINDINGS:   LEFT  1) MASS  Mammo diagnostic bilateral w 3d & cad: There is an irregularly shaped mass seen in the upper outer quadrant of the left breast in the posterior depth   The mass correlates with the palpable mass reported by the patient  Associated features include skin retraction, skin thickening and trabecular thickening  US breast left limited (diagnostic): There is a 2 2 cm x 2 4 cm x 4 4 cm irregularly shaped, hypoechoic mass with angular margins seen in the left breast at 2 o'clock, 8 cm from the nipple  2) MASS  Mammo diagnostic bilateral w 3d & cad: There is an irregularly shaped mass seen in the upper outer quadrant of the left breast    US breast left limited (diagnostic): There is a 1 3 mm x 1 5 mm x 2 mm irregularly shaped, non-parallel mass with angular margins seen in the left breast at 2 o'clock, 7 cm from the nipple   This separately measured mass is immediately adjacent to the above described larger mass, with a small amount of bridging tissue spanning the distance of approximately 7 mm  In addition to these findings there is linear hypoechoic tissue extending between the 2 immediately adjacent above masses toward the nipple  3) LYMPH NODE  Mammo diagnostic bilateral w 3d & cad: There is a lymph node seen in the left axilla  US breast left limited (diagnostic): There is a lymph node seen in the left axilla, 13 cm from the nipple  Cortical thickness measures 4 mm on the left   This borderline abnormal lymph node is quite deep within the axilla and may be difficult to access for sampling if clinically desired  RIGHT  There are no suspicious masses, grouped microcalcifications or areas of architectural distortion  The skin and nipple areolar complex are unremarkable     IMPRESSION:  Large left upper outer quadrant 02:00 o'clock mass with immediately adjacent satellite and other findings suggesting infiltrating tumor tissue extending toward the nipple, toward the skin and probably lymphatic involvement with trabecular and skin thickening   Recommend ultrasound-guided core biopsy sampling of the largest left breast mass   Borderline abnormal thickened left axillary lymph node also noted         4/19/21 US guided breast biopsy left/US guided breast lymph node biopsy left   A  Breast, left at 02:00 o'clock 8 cm from nipple, ultrasound-guided biopsy (3 passes):  - Invasive mammary carcinoma of no special type (ductal)    -- [place] histologic grade 2 of 3 (total score: 6 of 9)          B  Lymph node, left axillary, ultrasound-guided biopsy (3 passes):  -  Invasive/metastatic adenocarcinoma, compatible with breast primary involving fibroadipose tissues and a portion of lymphoid parenchyma  -  Estrogen, Progesterone & HER2 receptor studies   -  Estrogen, Progesterone & HER2 receptor studies and Mammoprint pending, to be described in an addendum      A: Left Breast:  Test Description                    Result             Prognostic Interpretation  Estrogen Receptor                  90-95%                        positive  Progesterone Receptor           1-2%                            positive  HER2 by IHC                            1+                               negative     B:  Lymph Node:  Test Description                    Result             Prognostic Interpretation  Estrogen Receptor                  90-95%                        positive  Progesterone Receptor           1-2%                           positive  HER2 by FUY                           1+                               negative     5/3/2021 Surg/Onc - Dr Ellyn Martinez a modified radical mastectomy with a ANAND  directed axillary dissection  Genetics as well as a metastatic workup      5/7/21 CT CAP w contrast  IMPRESSION:  1   Left lateral breast mass consistent with provided clinical history of known primary breast carcinoma   Left axillary lymphadenopathy noted suspicious for metastatic disease  2   Bilateral pulmonary nodules, largest 7 mm   Based on current Fleischner Society 2017 Guidelines on incidental pulmonary nodule, patients with a known malignancy are at increased risk of metastasis and should receive initial three month follow-up chest   CT  3   No further suspected metastatic disease throughout the chest, abdomen and pelvis   Bone scan currently pending      5/7/21 Bone Scan  IMPRESSION:  1   No scintigraphic evidence of osseous metastasis  2   Mild asymmetric soft tissue activity in the left breast corresponds with known malignancy        6/2/21 Surg/Onc - Dr Afshan Jaimes not recommended since she will need postoperative radiation therapy and has relatively extensive disease     Discussed delay reconstruction is an option      6/8/21 Breast modified radical mastectomy, ANAND  directed axillary dissection  A  Left breast with axillary content, modified radical mastectomy:  - Invasive breast carcinoma of no special type (ductal NST/invasive ductal carcinoma)  - Ductal carcinemia in situ   - Skin with sebotropic keratosis  - Nipple with no pathologic abnormality   - Lymphovascular and perineural invasion are present   - All margins are negative  - Four of ten lymph nodes are positive for metastatic carcinoma (4/10)                Procedure   Total mastectomy    Specimen Laterality   Left    TUMOR   Tumor Site   Upper outer quadrant    Histologic Type   Invasive carcinoma of no special type (ductal)    Glandular (Acinar) / Tubular Differentiation   Score 3    Nuclear Pleomorphism   Score 2    Mitotic Rate   Score 1    Overall Grade   Grade 2 (scores of 6 or 7)    Tumor Size   Greatest dimension of largest invasive focus (Millimeters): 60 mm   Additional Dimension (Millimeters)   38 mm       30 mm   Tumor Focality   Single focus of invasive carcinoma    Ductal Carcinoma In Situ (DCIS)   Present        Negative for extensive intraductal component (EIC)    Size (Extent) of DCIS       Number of Blocks with DCIS   3    Number of Blocks Examined   18    Architectural Patterns   Comedo        Solid    Nuclear Grade   Grade II (intermediate)    Necrosis   Present, central (expansive "comedo" Necrosis   Present, central (expansive "comedo" necrosis)    Lobular Carcinoma In Situ (LCIS)   Not identified    Tumor Extent       Lymphovascular Invasion   Present    Dermal Lymphovascular Invasion   Not identified    Microcalcifications   Present in invasive carcinoma    Treatment Effect in the Breast   No known presurgical therapy    MARGINS   Invasive Carcinoma Margins   Uninvolved by invasive carcinoma    Distance from Closest Margin (Millimeters)   Greater than: 20 mm   Closest Margin(s)   Posterior    DCIS Margins   Uninvolved by DCIS    Distance from Closest Margin (Millimeters)   Greater than: 20 mm   Closest Margin(s)   Posterior    LYMPH NODES   Regional Lymph Nodes   Involved by tumor cells    Number of Lymph Nodes with Macrometastases (> 2 mm)   4    Number of Lymph Nodes with Micrometastases (> 0 2 mm to 2 mm and / or > 200 cells)   0    Number of Lymph Nodes with Isolated Tumor Cells (<= 0 2 mm or <= 200 cells)   0    Size of Largest Metastatic Deposit (Millimeters)   At least: 35 mm   Extranodal Extension   Present    Extent of Extranodal Extension   Greater than 2 mm    Total Number of Lymph Nodes Examined   10    PATHOLOGIC STAGE CLASSIFICATION (pTNM, AJCC 8th Edition)       Primary Tumor (pT)   pT3    Regional Lymph Nodes (pN)   pN2a    ADDITIONAL FINDINGS   Additional Findings   intraductal papilloma    SPECIAL STUDIES     Previous Biopsy       Estrogen Receptor (ER) Status   Positive (greater than 10% of cells demonstrate nuclear positivity)    Percentage of Cells with Nuclear Positivity   %    Breast Biomarker Testing Performed on Previous Biopsy       Progesterone Receptor (PgR) Status   Positive    Percentage of Cells with Nuclear Positivity   1-2 %   Breast Biomarker Testing Performed on Previous Biopsy       HER2 (by immunohistochemistry)   Negative (Score 1+)          6/18/21 Hem/Onc - Dr Josseline Renae  Adjuvant chemotherapy is indicated    Taxotere and cyclophosphamide every 3 weeks x4 cycle followed by adjuvant hormonal therapy with aromatase inhibitors  10 years of adjuvant hormonal therapy is appropriate based on the risk   Recommends  postmastectomy radiation therapy due to the size of tumor as well as number of the lymph nodes involvement        6/23/21 Surg/Onc - Dr Pepito Brandt  Examination of the left mastectomy site demonstrates a relatively significant dog ear in the lateral aspect small in the medial aspect   She has some inflammation around her incision but no evidence of infection   Her drains were removed without difficulty      7/14/21 Hem/Onc  Clinically, no evidence recurrent disease     Adequate ANC to start adjuvant chemotherapy with Taxotere and cyclophosphamide later this week      Chemo - Taxotere and cyclophosphamide  7/16/21 -  9/17/21 9/8/21 Hem/Onc - Dr Jose L Zelaya  Discussed  discussed the adjuvant hormonal therapy  Recommends anastrozole in mid October 2021  Recommends calcium - vitamin D on regular basis        9/29/21 Surg/Onc - Dr Pepito Brandt  Doing well  New lymphedema, referral to PT  Referral plastic surgery for some cosmetic surgery in the lateral left chest area  Upcoming:  3/9/22 Dr Jose L Zelaya  4/12/22 Surgical Oncology       Historical Information   Oncology History   Malignant neoplasm of upper-outer quadrant of left breast in female, estrogen receptor positive ([facility] [address] )   4/19/2021 Biopsy    Left breast US guided biopsy:  A  2 o'clock 8 cm from the nipple  Invasive mammary carcinoma of no special type  Grade 2  ER 90  IA 1  HER2 1+  Lymphovascular invasion: not identified    B  Left Axillary lymph node biopsy:  Invasive/ metastatic adenocarcinoma, compatible with breast primary involving fibroadipose tissues and a portion of lymphoid pranchyma  ER 90  IA 1  HER2 1+    Concordant  Malignancy appears multifocal  The 2 adjacent masses span an area of approximately 4 cm  Right breast clear        5/3/2021 Genetic Testing    The following genes were evaluated: OLINDA, BRCA1, BRCA2, CDH1, CHEK2, PALB2, PTEN, STK11, TP53  Additional genes evaluated for a total of 36 genes analyzed  Negative result   No pathogenic sequence variants or deletions/dupllications identified  [employer]     6/8/2021 Surgery    Left breast modified radical mastectomy with ANAND  directed axillary dissection  Invasive carcinoma of no special type (ductal)  Grade 2  6 cm  Lymphovascular invasion present  Margins negative  4/10 Lymph nodes (3 5 cm)  Anatomic Stage IIIA  Prognostic Stage IB     7/16/2021 -  Chemotherapy    pegfilgrastim (NEULASTA ONPRO), 6 mg, Subcutaneous, Once, 4 of 4 cycles  Administration: 6 mg (7/16/2021), 6 mg (8/6/2021), 6 mg (8/27/2021), 6 mg (9/17/2021)  cyclophosphamide (CYTOXAN) IVPB, 600 mg/m2 = 1,212 mg, Intravenous, Once, 4 of 4 cycles  Administration: 1,212 mg (7/16/2021), 1,212 mg (8/6/2021), 1,212 mg (8/27/2021), 1,212 mg (9/17/2021)  DOCEtaxel (TAXOTERE) chemo infusion, 75 mg/m2 = 151 6 mg, Intravenous, Once, 4 of 4 cycles  Administration: 151 6 mg (7/16/2021), 151 6 mg (8/6/2021), 151 6 mg (8/27/2021), 151 6 mg (9/17/2021)           Past Medical History:   Diagnosis Date    BRCA gene mutation negative 05/19/2021    [employer]; 36 gene panel    Breast cancer ([facility] [address] )     Hypertension      Past Surgical History:   Procedure Laterality Date    AXILLARY SURGERY Right     sweat glands removed -     BREAST CYST EXCISION Right 2000    benign    BREAST SURGERY Right     CHOLECYSTECTOMY      COLONOSCOPY      CA MASTECTOMY, MODIFIED RADICAL Left 6/8/2021    Procedure: BREAST MODIFIED RADICAL MASTECTOMY; ANAND  DIRECTED AXILLARY DISSECTION;  Surgeon: Vasu Mckeon MD;  Location: [facility];  Service: Surgical Oncology    US BREAST NEEDLE LOC LEFT Left 6/2/2021    US GUIDED BREAST BIOPSY LEFT COMPLETE Left 4/19/2021    US GUIDED BREAST LYMPH NODE BIOPSY LEFT Left 4/19/2021       Family History   Problem Relation Age of Onset    Dementia Mother     Colon cancer Mother 55's-59's    Lung cancer Father         55's-59's    No Known Problems Sister     Breast cancer Paternal Aunt     No Known Problems Sister     Stomach cancer Paternal Uncle         66's       Social History   Social History     Substance and Sexual Activity   Alcohol Use No     Social History     Substance and Sexual Activity   Drug Use No     Social History     Tobacco Use   Smoking Status Never Smoker   Smokeless Tobacco Never Used         Meds/Allergies     Current Outpatient Medications:     aspirin 81 MG tablet, Take by mouth daily, Disp: , Rfl:     metoprolol tartrate (LOPRESSOR) 50 mg tablet, TAKE ONE TABLET BY MOUTH EVERY DAY, Disp: 90 tablet, Rfl: 3    Multiple Vitamins-Minerals (MULTIVITAMIN ADULT PO), Take by mouth daily, Disp: , Rfl:     saccharomyces boulardii (FLORASTOR) 250 mg capsule, Take 250 mg by mouth daily , Disp: , Rfl:     anastrozole (ARIMIDEX) 1 mg tablet, Take 1 tablet (1 mg total) by mouth daily (Patient not taking: Reported on 9/30/2021), Disp: 90 tablet, Rfl: 1    CRANIAL PROSTHESIS, RX,, One wig as needed, Disp: 1 each, Rfl: 0    fluticasone (FLONASE) 50 mcg/act nasal spray, 2 puffs into each nostril daily (Patient not taking: Reported on 9/30/2021), Disp: , Rfl:     ondansetron (ZOFRAN) 4 mg tablet, Take 1 tablet (4 mg total) by mouth every 8 (eight) hours as needed for nausea or vomiting, Disp: 30 tablet, Rfl: 1    oxyCODONE-acetaminophen (PERCOCET) 5-325 mg per tablet, Take 1 tablet by mouth every 6 (six) hours as needed for moderate painMax Daily Amount: 4 tablets (Patient not taking: Reported on 6/2/2021), Disp: 20 tablet, Rfl: 0  No Known Allergies      Review of Systems   Constitutional: Positive for appetite change (due to chemo, metal taste with liquids and foods), fatigue (5/10, dependant on activites) and unexpected weight change (lost  ~ 30 pounds during chemo)  HENT: Negative  Eyes: Negative           Wears glasses   Respiratory: Positive for shortness of breath (occasionally at rest or with exertion)  Negative for cough, chest tightness and wheezing  Cardiovascular: Positive for leg swelling (Mild BLE)  Negative for chest pain  Gastrointestinal: Negative  Negative for abdominal pain, constipation, diarrhea, nausea and vomiting  Genitourinary: Negative  Negative for difficulty urinating and pelvic pain  Musculoskeletal: Negative  Skin: Negative  Neurological: Positive for headaches (occasional sinus headache r/t allergies)  Negative for dizziness, light-headedness and numbness  Hematological: Positive for adenopathy (left hand)  Does not bruise/bleed easily  Psychiatric/Behavioral: Positive for dysphoric mood (mild)  Negative for sleep disturbance  The patient is nervous/anxious           Vitals   There were no vitals filed for this visit         OB/GYN History:  The patient underwent menarche at 17 years  Menopause Status Pre, Mayela, Post, Unknown and No Answer  No LMP recorded  Patient is postmenopausal   Menopause at : unknown years  Menopause Reason: natural  Hormone replacement therapy: no   3   Para 3   Age at first delivery being 27 years  Nursing: no    Birth control pills: yes  Years used: unknown  Pregnancy test needed:  no     ONCOTYPE/MAMMOPRINT results: no        OBJECTIVE:   /90   Pulse 60   Temp (!) 97 4 °F (36 3 °C) (Temporal)   Resp 18   Ht 5' 2 84" (1 596 m)   Wt 98 8 kg (217 lb 12 8 oz)   SpO2 98%   BMI 38 78 kg/m²   Pain Assessment:  0  Performance Status: ECOG/Zubrod/WHO:     Physical Exam  Constitutional:       Appearance: She is well-developed  HENT:      Head: Normocephalic  Hair is normal    Eyes:      General: No scleral icterus  Cardiovascular:      Rate and Rhythm: Normal rate and regular rhythm  Pulmonary:      Effort: Pulmonary effort is normal  No respiratory distress  Breath sounds: Normal breath sounds  No wheezing  Chest:      Chest wall: No tenderness  Comments:    There is no supraclavicular axillary adenopathy palpable her left mastectomy scar is well healed  She does have a dog ear laterally  No suspicious lesions noted in the left chest wall or right breast   She has evidence of mild lymphedema in the left arm extending down to her hand  Abdominal:      General: Bowel sounds are normal  There is no distension  Palpations: Abdomen is soft  There is no mass  Tenderness: There is no abdominal tenderness  There is no rebound  Genitourinary:     Vagina: Normal    Musculoskeletal:         General: No tenderness  Normal range of motion  Cervical back: Neck supple  No edema  No spinous process tenderness  Lymphadenopathy:      Cervical: No cervical adenopathy  Neurological:      Mental Status: She is alert  Cranial Nerves: No cranial nerve deficit  Coordination: Coordination normal       Gait: Gait normal    Psychiatric:         Speech: Speech normal          Behavior: Behavior normal            RESULTS  Lab Results    Chemistry        Component Value Date/Time     01/27/2017 0746    K 4 0 09/16/2021 0849    K 4 1 08/26/2020 0850     09/16/2021 0849     08/26/2020 0850    CO2 26 09/16/2021 0849    CO2 23 08/26/2020 0850    BUN 16 09/16/2021 0849    BUN 18 08/26/2020 0850    CREATININE 0 71 09/16/2021 0849    CREATININE 0 63 01/27/2017 0746        Component Value Date/Time    CALCIUM 8 7 09/16/2021 0849    CALCIUM 9 0 01/27/2017 0746    ALKPHOS 76 09/16/2021 0849    ALKPHOS 77 01/27/2017 0746    AST 19 09/16/2021 0849    AST 18 08/26/2020 0850    ALT 43 09/16/2021 0849    ALT 23 08/26/2020 0850    BILITOT 0 8 01/27/2017 0746            Lab Results   Component Value Date    WBC 7 78 09/16/2021    HGB 12 9 09/16/2021    HCT 39 2 09/16/2021    MCV 93 09/16/2021     09/16/2021         Imaging Studies  No results found        Pathology:  SPECIMEN   Procedure  Total mastectomy    Specimen Laterality  Left    TUMOR   Tumor Site  Upper outer quadrant    Histologic Type  Invasive carcinoma of no special type (ductal)    Glandular (Acinar) / Tubular Differentiation  Score 3    Nuclear Pleomorphism  Score 2    Mitotic Rate  Score 1    Overall Grade  Grade 2 (scores of 6 or 7)    Tumor Size  Greatest dimension of largest invasive focus (Millimeters): 60 mm   Additional Dimension (Millimeters)  38 mm     30 mm   Tumor Focality  Single focus of invasive carcinoma    Ductal Carcinoma In Situ (DCIS)  Present      Negative for extensive intraductal component (EIC)    Size (Extent) of DCIS     Number of Blocks with DCIS  3    Number of Blocks Examined  18    Architectural Patterns  Comedo      Solid    Nuclear Grade  Grade II (intermediate)    Necrosis  Present, central (expansive "comedo" necrosis)    Lobular Carcinoma In Situ (LCIS)  Not identified    Tumor Extent     Lymphovascular Invasion  Present    Dermal Lymphovascular Invasion  Not identified    Microcalcifications  Present in invasive carcinoma    Treatment Effect in the Breast  No known presurgical therapy    MARGINS   Invasive Carcinoma Margins  Uninvolved by invasive carcinoma    Distance from Closest Margin (Millimeters)  Greater than: 20 mm   Closest Margin(s)  Posterior    DCIS Margins  Uninvolved by DCIS    Distance from Closest Margin (Millimeters)  Greater than: 20 mm   Closest Margin(s)  Posterior    LYMPH NODES   Regional Lymph Nodes  Involved by tumor cells    Number of Lymph Nodes with Macrometastases (> 2 mm)  4    Number of Lymph Nodes with Micrometastases (> 0 2 mm to 2 mm and / or > 200 cells)  0    Number of Lymph Nodes with Isolated Tumor Cells (<= 0 2 mm or <= 200 cells)  0    Size of Largest Metastatic Deposit (Millimeters)  At least: 35 mm   Extranodal Extension  Present    Extent of Extranodal Extension  Greater than 2 mm    Total Number of Lymph Nodes Examined  10    PATHOLOGIC STAGE CLASSIFICATION (pTNM, AJCC 8th Edition)      Primary Tumor (pT)  pT3    Regional Lymph Nodes (pN)  pN2a    ADDITIONAL FINDINGS   Additional Findings  intraductal papilloma    SPECIAL STUDIES   Breast Biomarker Testing Performed on Previous Biopsy     Estrogen Receptor (ER) Status  Positive (greater than 10% of cells demonstrate nuclear positivity)    Percentage of Cells with Nuclear Positivity  %    Breast Biomarker Testing Performed on Previous Biopsy     Progesterone Receptor (PgR) Status  Positive    Percentage of Cells with Nuclear Positivity  1-2 %   Breast Biomarker Testing Performed on Previous Biopsy     HER2 (by immunohistochemistry)  Negative (Score 1+)    Testing Performed on Case Number  [id number]              ASSESSMENT  1  Malignant neoplasm of upper-outer quadrant of left breast in female, estrogen receptor positive [facility])  Ambulatory referral to Radiation Oncology     Cancer Staging  No matching staging information was found for the patient  PLAN/DISCUSSION  No orders of the defined types were placed in this encounter  Rylee Pitts is a 73y o  year old female with   T3 [id number]  invasive ductal carcinoma status post  Left mastectomy and axillary dissection  Her tumor is ER/CT positive, and HER2 negative  She has 4/10 axillary lymph nodes which are positive with extracapsular extension  She has completed adjuvant chemotherapy  I reviewed with her indications for postmastectomy radiation therapy  Based on the size of her primary  as well as her number of lymph nodes and extracapsular extension I recommended she receive radiation therapy to include the left chest wall and regional lymphatics  I reviewed with her CT simulation which will include free breathing as well as deep inspiration breath hold scans for optimal cardiac sparing  The plan will be to treat this region to dose of 5000 cGy in 25 treatment fractions  Possible side effects both acute and long-term were reviewed in detail    This can include but not limited to fatigue, skin erythema, hyperpigmentation, desquamation, pneumonitis, cardiac injury, breast fibrosis and increase lymphedema  The plan will be for her to initiate her radiation therapy 4 weeks post chemotherapy  She is agreeable to the above outlined plan  Shannen Weaver MD  9/30/2021,9:13 AM      Portions of the record may have been created with voice recognition software  Occasional wrong word or "sound a like" substitutions may have occurred due to the inherent limitations of voice recognition software  Read the chart carefully and recognize, using context, where substitutions have occurred

## 2021-10-05 ENCOUNTER — EVALUATION (OUTPATIENT)
Dept: PHYSICAL THERAPY | Facility: CLINIC | Age: 71
End: 2021-10-05
Payer: MEDICARE

## 2021-10-05 DIAGNOSIS — Z17.0 MALIGNANT NEOPLASM OF UPPER-OUTER QUADRANT OF LEFT BREAST IN FEMALE, ESTROGEN RECEPTOR POSITIVE (HCC): Primary | ICD-10-CM

## 2021-10-05 DIAGNOSIS — I89.0 LYMPHEDEMA OF LEFT UPPER EXTREMITY: ICD-10-CM

## 2021-10-05 DIAGNOSIS — Z90.12 STATUS POST LEFT MASTECTOMY: ICD-10-CM

## 2021-10-05 DIAGNOSIS — C50.412 MALIGNANT NEOPLASM OF UPPER-OUTER QUADRANT OF LEFT BREAST IN FEMALE, ESTROGEN RECEPTOR POSITIVE (HCC): Primary | ICD-10-CM

## 2021-10-05 PROCEDURE — 97162 PT EVAL MOD COMPLEX 30 MIN: CPT | Performed by: PHYSICAL THERAPIST

## 2021-10-05 PROCEDURE — 97535 SELF CARE MNGMENT TRAINING: CPT | Performed by: PHYSICAL THERAPIST

## 2021-10-06 ENCOUNTER — RADIATION THERAPY TREATMENT (OUTPATIENT)
Dept: RADIATION ONCOLOGY | Facility: HOSPITAL | Age: 71
End: 2021-10-06
Attending: RADIOLOGY
Payer: MEDICARE

## 2021-10-06 PROCEDURE — 77290 THER RAD SIMULAJ FIELD CPLX: CPT | Performed by: STUDENT IN AN ORGANIZED HEALTH CARE EDUCATION/TRAINING PROGRAM

## 2021-10-06 PROCEDURE — 77334 RADIATION TREATMENT AID(S): CPT | Performed by: STUDENT IN AN ORGANIZED HEALTH CARE EDUCATION/TRAINING PROGRAM

## 2021-10-08 ENCOUNTER — HOSPITAL ENCOUNTER (OUTPATIENT)
Dept: RADIOLOGY | Facility: HOSPITAL | Age: 71
Discharge: HOME | End: 2021-10-08
Attending: SURGERY
Payer: MEDICARE

## 2021-10-08 DIAGNOSIS — Z17.0 MALIGNANT NEOPLASM OF UPPER-OUTER QUADRANT OF LEFT BREAST IN FEMALE, ESTROGEN RECEPTOR POSITIVE (HCC): ICD-10-CM

## 2021-10-08 DIAGNOSIS — C50.412 MALIGNANT NEOPLASM OF UPPER-OUTER QUADRANT OF LEFT BREAST IN FEMALE, ESTROGEN RECEPTOR POSITIVE (HCC): ICD-10-CM

## 2021-10-08 PROCEDURE — G1004 CDSM NDSC: HCPCS

## 2021-10-08 PROCEDURE — 71250 CT THORAX DX C-: CPT

## 2021-10-13 ENCOUNTER — OFFICE VISIT (OUTPATIENT)
Dept: PHYSICAL THERAPY | Facility: CLINIC | Age: 71
End: 2021-10-13
Payer: MEDICARE

## 2021-10-13 DIAGNOSIS — I89.0 LYMPHEDEMA OF LEFT UPPER EXTREMITY: ICD-10-CM

## 2021-10-13 DIAGNOSIS — C50.412 MALIGNANT NEOPLASM OF UPPER-OUTER QUADRANT OF LEFT BREAST IN FEMALE, ESTROGEN RECEPTOR POSITIVE (HCC): Primary | ICD-10-CM

## 2021-10-13 DIAGNOSIS — Z90.12 STATUS POST LEFT MASTECTOMY: ICD-10-CM

## 2021-10-13 DIAGNOSIS — Z17.0 MALIGNANT NEOPLASM OF UPPER-OUTER QUADRANT OF LEFT BREAST IN FEMALE, ESTROGEN RECEPTOR POSITIVE (HCC): Primary | ICD-10-CM

## 2021-10-13 PROCEDURE — 97110 THERAPEUTIC EXERCISES: CPT | Performed by: PHYSICAL THERAPIST

## 2021-10-13 PROCEDURE — 77295 3-D RADIOTHERAPY PLAN: CPT | Performed by: RADIOLOGY

## 2021-10-13 PROCEDURE — 77334 RADIATION TREATMENT AID(S): CPT | Performed by: RADIOLOGY

## 2021-10-13 PROCEDURE — 77300 RADIATION THERAPY DOSE PLAN: CPT | Performed by: RADIOLOGY

## 2021-10-13 PROCEDURE — 97140 MANUAL THERAPY 1/> REGIONS: CPT | Performed by: PHYSICAL THERAPIST

## 2021-10-15 ENCOUNTER — OFFICE VISIT (OUTPATIENT)
Dept: PHYSICAL THERAPY | Facility: CLINIC | Age: 71
End: 2021-10-15
Payer: MEDICARE

## 2021-10-15 ENCOUNTER — APPOINTMENT (OUTPATIENT)
Dept: RADIATION ONCOLOGY | Facility: HOSPITAL | Age: 71
End: 2021-10-15
Attending: RADIOLOGY
Payer: MEDICARE

## 2021-10-15 DIAGNOSIS — Z17.0 MALIGNANT NEOPLASM OF UPPER-OUTER QUADRANT OF LEFT BREAST IN FEMALE, ESTROGEN RECEPTOR POSITIVE (HCC): Primary | ICD-10-CM

## 2021-10-15 DIAGNOSIS — Z90.12 STATUS POST LEFT MASTECTOMY: ICD-10-CM

## 2021-10-15 DIAGNOSIS — C50.412 MALIGNANT NEOPLASM OF UPPER-OUTER QUADRANT OF LEFT BREAST IN FEMALE, ESTROGEN RECEPTOR POSITIVE (HCC): Primary | ICD-10-CM

## 2021-10-15 DIAGNOSIS — I89.0 LYMPHEDEMA OF LEFT UPPER EXTREMITY: ICD-10-CM

## 2021-10-15 PROCEDURE — 97110 THERAPEUTIC EXERCISES: CPT | Performed by: PHYSICAL THERAPIST

## 2021-10-15 PROCEDURE — 97140 MANUAL THERAPY 1/> REGIONS: CPT | Performed by: PHYSICAL THERAPIST

## 2021-10-15 PROCEDURE — 77280 THER RAD SIMULAJ FIELD SMPL: CPT | Performed by: RADIOLOGY

## 2021-10-18 ENCOUNTER — APPOINTMENT (OUTPATIENT)
Dept: RADIATION ONCOLOGY | Facility: HOSPITAL | Age: 71
End: 2021-10-18
Attending: RADIOLOGY
Payer: MEDICARE

## 2021-10-18 PROCEDURE — 77427 RADIATION TX MANAGEMENT X5: CPT | Performed by: STUDENT IN AN ORGANIZED HEALTH CARE EDUCATION/TRAINING PROGRAM

## 2021-10-18 PROCEDURE — 77331 SPECIAL RADIATION DOSIMETRY: CPT | Performed by: RADIOLOGY

## 2021-10-18 PROCEDURE — 77412 RADIATION TX DELIVERY LVL 3: CPT | Performed by: RADIOLOGY

## 2021-10-18 PROCEDURE — 77387 GUIDANCE FOR RADJ TX DLVR: CPT | Performed by: RADIOLOGY

## 2021-10-19 ENCOUNTER — TELEPHONE (OUTPATIENT)
Dept: SURGICAL ONCOLOGY | Facility: CLINIC | Age: 71
End: 2021-10-19

## 2021-10-19 ENCOUNTER — APPOINTMENT (OUTPATIENT)
Dept: RADIATION ONCOLOGY | Facility: HOSPITAL | Age: 71
End: 2021-10-19
Attending: RADIOLOGY
Payer: MEDICARE

## 2021-10-19 PROCEDURE — 77412 RADIATION TX DELIVERY LVL 3: CPT | Performed by: RADIOLOGY

## 2021-10-19 PROCEDURE — 77387 GUIDANCE FOR RADJ TX DLVR: CPT | Performed by: RADIOLOGY

## 2021-10-20 ENCOUNTER — OFFICE VISIT (OUTPATIENT)
Dept: PHYSICAL THERAPY | Facility: CLINIC | Age: 71
End: 2021-10-20
Payer: MEDICARE

## 2021-10-20 ENCOUNTER — APPOINTMENT (OUTPATIENT)
Dept: RADIATION ONCOLOGY | Facility: HOSPITAL | Age: 71
End: 2021-10-20
Attending: RADIOLOGY
Payer: MEDICARE

## 2021-10-20 DIAGNOSIS — Z17.0 MALIGNANT NEOPLASM OF UPPER-OUTER QUADRANT OF LEFT BREAST IN FEMALE, ESTROGEN RECEPTOR POSITIVE (HCC): Primary | ICD-10-CM

## 2021-10-20 DIAGNOSIS — C50.412 MALIGNANT NEOPLASM OF UPPER-OUTER QUADRANT OF LEFT BREAST IN FEMALE, ESTROGEN RECEPTOR POSITIVE (HCC): Primary | ICD-10-CM

## 2021-10-20 DIAGNOSIS — Z90.12 STATUS POST LEFT MASTECTOMY: ICD-10-CM

## 2021-10-20 DIAGNOSIS — I89.0 LYMPHEDEMA OF LEFT UPPER EXTREMITY: ICD-10-CM

## 2021-10-20 PROCEDURE — 77387 GUIDANCE FOR RADJ TX DLVR: CPT | Performed by: STUDENT IN AN ORGANIZED HEALTH CARE EDUCATION/TRAINING PROGRAM

## 2021-10-20 PROCEDURE — 97110 THERAPEUTIC EXERCISES: CPT | Performed by: PHYSICAL THERAPIST

## 2021-10-20 PROCEDURE — 77412 RADIATION TX DELIVERY LVL 3: CPT | Performed by: STUDENT IN AN ORGANIZED HEALTH CARE EDUCATION/TRAINING PROGRAM

## 2021-10-20 PROCEDURE — 97140 MANUAL THERAPY 1/> REGIONS: CPT | Performed by: PHYSICAL THERAPIST

## 2021-10-21 ENCOUNTER — APPOINTMENT (OUTPATIENT)
Dept: RADIATION ONCOLOGY | Facility: HOSPITAL | Age: 71
End: 2021-10-21
Attending: RADIOLOGY
Payer: MEDICARE

## 2021-10-21 ENCOUNTER — APPOINTMENT (OUTPATIENT)
Dept: RADIATION ONCOLOGY | Facility: HOSPITAL | Age: 71
End: 2021-10-21
Payer: MEDICARE

## 2021-10-21 PROCEDURE — G6017 INTRAFRACTION TRACK MOTION: HCPCS | Performed by: STUDENT IN AN ORGANIZED HEALTH CARE EDUCATION/TRAINING PROGRAM

## 2021-10-21 PROCEDURE — 77412 RADIATION TX DELIVERY LVL 3: CPT | Performed by: STUDENT IN AN ORGANIZED HEALTH CARE EDUCATION/TRAINING PROGRAM

## 2021-10-21 PROCEDURE — 77387 GUIDANCE FOR RADJ TX DLVR: CPT | Performed by: STUDENT IN AN ORGANIZED HEALTH CARE EDUCATION/TRAINING PROGRAM

## 2021-10-22 ENCOUNTER — APPOINTMENT (OUTPATIENT)
Dept: RADIATION ONCOLOGY | Facility: HOSPITAL | Age: 71
End: 2021-10-22
Attending: RADIOLOGY
Payer: MEDICARE

## 2021-10-22 ENCOUNTER — APPOINTMENT (OUTPATIENT)
Dept: LAB | Facility: HOSPITAL | Age: 71
End: 2021-10-22
Payer: MEDICARE

## 2021-10-22 ENCOUNTER — APPOINTMENT (OUTPATIENT)
Dept: RADIATION ONCOLOGY | Facility: HOSPITAL | Age: 71
End: 2021-10-22
Payer: MEDICARE

## 2021-10-22 ENCOUNTER — OFFICE VISIT (OUTPATIENT)
Dept: PHYSICAL THERAPY | Facility: CLINIC | Age: 71
End: 2021-10-22
Payer: MEDICARE

## 2021-10-22 DIAGNOSIS — C50.412 MALIGNANT NEOPLASM OF UPPER-OUTER QUADRANT OF LEFT BREAST IN FEMALE, ESTROGEN RECEPTOR POSITIVE (HCC): Primary | ICD-10-CM

## 2021-10-22 DIAGNOSIS — I89.0 LYMPHEDEMA OF LEFT UPPER EXTREMITY: ICD-10-CM

## 2021-10-22 DIAGNOSIS — Z17.0 MALIGNANT NEOPLASM OF UPPER-OUTER QUADRANT OF LEFT BREAST IN FEMALE, ESTROGEN RECEPTOR POSITIVE (HCC): Primary | ICD-10-CM

## 2021-10-22 DIAGNOSIS — Z90.12 STATUS POST LEFT MASTECTOMY: ICD-10-CM

## 2021-10-22 PROCEDURE — 97110 THERAPEUTIC EXERCISES: CPT | Performed by: PHYSICAL THERAPIST

## 2021-10-22 PROCEDURE — 97140 MANUAL THERAPY 1/> REGIONS: CPT | Performed by: PHYSICAL THERAPIST

## 2021-10-22 PROCEDURE — 77387 GUIDANCE FOR RADJ TX DLVR: CPT | Performed by: STUDENT IN AN ORGANIZED HEALTH CARE EDUCATION/TRAINING PROGRAM

## 2021-10-22 PROCEDURE — 77412 RADIATION TX DELIVERY LVL 3: CPT | Performed by: STUDENT IN AN ORGANIZED HEALTH CARE EDUCATION/TRAINING PROGRAM

## 2021-10-22 PROCEDURE — 77336 RADIATION PHYSICS CONSULT: CPT | Performed by: STUDENT IN AN ORGANIZED HEALTH CARE EDUCATION/TRAINING PROGRAM

## 2021-10-22 PROCEDURE — 97112 NEUROMUSCULAR REEDUCATION: CPT | Performed by: PHYSICAL THERAPIST

## 2021-10-25 ENCOUNTER — APPOINTMENT (OUTPATIENT)
Dept: RADIATION ONCOLOGY | Facility: HOSPITAL | Age: 71
End: 2021-10-25
Attending: RADIOLOGY
Payer: MEDICARE

## 2021-10-25 PROCEDURE — 77412 RADIATION TX DELIVERY LVL 3: CPT | Performed by: STUDENT IN AN ORGANIZED HEALTH CARE EDUCATION/TRAINING PROGRAM

## 2021-10-25 PROCEDURE — 77387 GUIDANCE FOR RADJ TX DLVR: CPT | Performed by: STUDENT IN AN ORGANIZED HEALTH CARE EDUCATION/TRAINING PROGRAM

## 2021-10-25 PROCEDURE — 77427 RADIATION TX MANAGEMENT X5: CPT | Performed by: RADIOLOGY

## 2021-10-26 ENCOUNTER — APPOINTMENT (OUTPATIENT)
Dept: RADIATION ONCOLOGY | Facility: HOSPITAL | Age: 71
End: 2021-10-26
Attending: RADIOLOGY
Payer: MEDICARE

## 2021-10-26 PROCEDURE — 77387 GUIDANCE FOR RADJ TX DLVR: CPT | Performed by: STUDENT IN AN ORGANIZED HEALTH CARE EDUCATION/TRAINING PROGRAM

## 2021-10-26 PROCEDURE — 77412 RADIATION TX DELIVERY LVL 3: CPT | Performed by: STUDENT IN AN ORGANIZED HEALTH CARE EDUCATION/TRAINING PROGRAM

## 2021-10-27 ENCOUNTER — APPOINTMENT (OUTPATIENT)
Dept: RADIATION ONCOLOGY | Facility: HOSPITAL | Age: 71
End: 2021-10-27
Attending: RADIOLOGY
Payer: MEDICARE

## 2021-10-27 ENCOUNTER — OFFICE VISIT (OUTPATIENT)
Dept: PHYSICAL THERAPY | Facility: CLINIC | Age: 71
End: 2021-10-27
Payer: MEDICARE

## 2021-10-27 DIAGNOSIS — Z90.12 STATUS POST LEFT MASTECTOMY: ICD-10-CM

## 2021-10-27 DIAGNOSIS — I89.0 LYMPHEDEMA OF LEFT UPPER EXTREMITY: ICD-10-CM

## 2021-10-27 DIAGNOSIS — C50.412 MALIGNANT NEOPLASM OF UPPER-OUTER QUADRANT OF LEFT BREAST IN FEMALE, ESTROGEN RECEPTOR POSITIVE (HCC): Primary | ICD-10-CM

## 2021-10-27 DIAGNOSIS — Z17.0 MALIGNANT NEOPLASM OF UPPER-OUTER QUADRANT OF LEFT BREAST IN FEMALE, ESTROGEN RECEPTOR POSITIVE (HCC): Primary | ICD-10-CM

## 2021-10-27 PROCEDURE — 97110 THERAPEUTIC EXERCISES: CPT | Performed by: PHYSICAL THERAPIST

## 2021-10-27 PROCEDURE — 97140 MANUAL THERAPY 1/> REGIONS: CPT | Performed by: PHYSICAL THERAPIST

## 2021-10-27 PROCEDURE — 77412 RADIATION TX DELIVERY LVL 3: CPT | Performed by: STUDENT IN AN ORGANIZED HEALTH CARE EDUCATION/TRAINING PROGRAM

## 2021-10-27 PROCEDURE — 77387 GUIDANCE FOR RADJ TX DLVR: CPT | Performed by: STUDENT IN AN ORGANIZED HEALTH CARE EDUCATION/TRAINING PROGRAM

## 2021-10-27 PROCEDURE — 97112 NEUROMUSCULAR REEDUCATION: CPT | Performed by: PHYSICAL THERAPIST

## 2021-10-28 ENCOUNTER — APPOINTMENT (OUTPATIENT)
Dept: RADIATION ONCOLOGY | Facility: HOSPITAL | Age: 71
End: 2021-10-28
Attending: RADIOLOGY
Payer: MEDICARE

## 2021-10-28 ENCOUNTER — APPOINTMENT (OUTPATIENT)
Dept: RADIATION ONCOLOGY | Facility: HOSPITAL | Age: 71
End: 2021-10-28
Payer: MEDICARE

## 2021-10-28 PROCEDURE — G6017 INTRAFRACTION TRACK MOTION: HCPCS | Performed by: STUDENT IN AN ORGANIZED HEALTH CARE EDUCATION/TRAINING PROGRAM

## 2021-10-28 PROCEDURE — 77412 RADIATION TX DELIVERY LVL 3: CPT | Performed by: STUDENT IN AN ORGANIZED HEALTH CARE EDUCATION/TRAINING PROGRAM

## 2021-10-28 PROCEDURE — 77387 GUIDANCE FOR RADJ TX DLVR: CPT | Performed by: STUDENT IN AN ORGANIZED HEALTH CARE EDUCATION/TRAINING PROGRAM

## 2021-10-29 ENCOUNTER — OFFICE VISIT (OUTPATIENT)
Dept: PHYSICAL THERAPY | Facility: CLINIC | Age: 71
End: 2021-10-29
Payer: MEDICARE

## 2021-10-29 ENCOUNTER — APPOINTMENT (OUTPATIENT)
Dept: RADIATION ONCOLOGY | Facility: HOSPITAL | Age: 71
End: 2021-10-29
Attending: RADIOLOGY
Payer: MEDICARE

## 2021-10-29 DIAGNOSIS — C50.412 MALIGNANT NEOPLASM OF UPPER-OUTER QUADRANT OF LEFT BREAST IN FEMALE, ESTROGEN RECEPTOR POSITIVE (HCC): Primary | ICD-10-CM

## 2021-10-29 DIAGNOSIS — Z90.12 STATUS POST LEFT MASTECTOMY: ICD-10-CM

## 2021-10-29 DIAGNOSIS — I89.0 LYMPHEDEMA OF LEFT UPPER EXTREMITY: ICD-10-CM

## 2021-10-29 DIAGNOSIS — Z17.0 MALIGNANT NEOPLASM OF UPPER-OUTER QUADRANT OF LEFT BREAST IN FEMALE, ESTROGEN RECEPTOR POSITIVE (HCC): Primary | ICD-10-CM

## 2021-10-29 PROCEDURE — 77387 GUIDANCE FOR RADJ TX DLVR: CPT | Performed by: STUDENT IN AN ORGANIZED HEALTH CARE EDUCATION/TRAINING PROGRAM

## 2021-10-29 PROCEDURE — 97140 MANUAL THERAPY 1/> REGIONS: CPT | Performed by: PHYSICAL THERAPIST

## 2021-10-29 PROCEDURE — 77336 RADIATION PHYSICS CONSULT: CPT | Performed by: RADIOLOGY

## 2021-10-29 PROCEDURE — 97112 NEUROMUSCULAR REEDUCATION: CPT | Performed by: PHYSICAL THERAPIST

## 2021-10-29 PROCEDURE — 97110 THERAPEUTIC EXERCISES: CPT | Performed by: PHYSICAL THERAPIST

## 2021-10-29 PROCEDURE — 77412 RADIATION TX DELIVERY LVL 3: CPT | Performed by: STUDENT IN AN ORGANIZED HEALTH CARE EDUCATION/TRAINING PROGRAM

## 2021-11-01 ENCOUNTER — APPOINTMENT (OUTPATIENT)
Dept: RADIATION ONCOLOGY | Facility: HOSPITAL | Age: 71
End: 2021-11-01
Attending: RADIOLOGY
Payer: MEDICARE

## 2021-11-01 PROCEDURE — 77427 RADIATION TX MANAGEMENT X5: CPT | Performed by: RADIOLOGY

## 2021-11-01 PROCEDURE — 77387 GUIDANCE FOR RADJ TX DLVR: CPT | Performed by: STUDENT IN AN ORGANIZED HEALTH CARE EDUCATION/TRAINING PROGRAM

## 2021-11-01 PROCEDURE — 77412 RADIATION TX DELIVERY LVL 3: CPT | Performed by: STUDENT IN AN ORGANIZED HEALTH CARE EDUCATION/TRAINING PROGRAM

## 2021-11-02 ENCOUNTER — APPOINTMENT (OUTPATIENT)
Dept: RADIATION ONCOLOGY | Facility: HOSPITAL | Age: 71
End: 2021-11-02
Attending: RADIOLOGY
Payer: MEDICARE

## 2021-11-02 PROCEDURE — 77412 RADIATION TX DELIVERY LVL 3: CPT | Performed by: STUDENT IN AN ORGANIZED HEALTH CARE EDUCATION/TRAINING PROGRAM

## 2021-11-02 PROCEDURE — 77387 GUIDANCE FOR RADJ TX DLVR: CPT | Performed by: STUDENT IN AN ORGANIZED HEALTH CARE EDUCATION/TRAINING PROGRAM

## 2021-11-03 ENCOUNTER — OFFICE VISIT (OUTPATIENT)
Dept: PHYSICAL THERAPY | Facility: CLINIC | Age: 71
End: 2021-11-03
Payer: MEDICARE

## 2021-11-03 ENCOUNTER — APPOINTMENT (OUTPATIENT)
Dept: RADIATION ONCOLOGY | Facility: HOSPITAL | Age: 71
End: 2021-11-03
Attending: RADIOLOGY
Payer: MEDICARE

## 2021-11-03 DIAGNOSIS — I89.0 LYMPHEDEMA OF LEFT UPPER EXTREMITY: ICD-10-CM

## 2021-11-03 DIAGNOSIS — Z90.12 STATUS POST LEFT MASTECTOMY: ICD-10-CM

## 2021-11-03 DIAGNOSIS — C50.412 MALIGNANT NEOPLASM OF UPPER-OUTER QUADRANT OF LEFT BREAST IN FEMALE, ESTROGEN RECEPTOR POSITIVE (HCC): Primary | ICD-10-CM

## 2021-11-03 DIAGNOSIS — Z17.0 MALIGNANT NEOPLASM OF UPPER-OUTER QUADRANT OF LEFT BREAST IN FEMALE, ESTROGEN RECEPTOR POSITIVE (HCC): Primary | ICD-10-CM

## 2021-11-03 PROCEDURE — 77412 RADIATION TX DELIVERY LVL 3: CPT | Performed by: STUDENT IN AN ORGANIZED HEALTH CARE EDUCATION/TRAINING PROGRAM

## 2021-11-03 PROCEDURE — 97140 MANUAL THERAPY 1/> REGIONS: CPT | Performed by: PHYSICAL THERAPIST

## 2021-11-03 PROCEDURE — 97112 NEUROMUSCULAR REEDUCATION: CPT | Performed by: PHYSICAL THERAPIST

## 2021-11-03 PROCEDURE — 77387 GUIDANCE FOR RADJ TX DLVR: CPT | Performed by: STUDENT IN AN ORGANIZED HEALTH CARE EDUCATION/TRAINING PROGRAM

## 2021-11-03 PROCEDURE — 97110 THERAPEUTIC EXERCISES: CPT | Performed by: PHYSICAL THERAPIST

## 2021-11-04 ENCOUNTER — APPOINTMENT (OUTPATIENT)
Dept: RADIATION ONCOLOGY | Facility: HOSPITAL | Age: 71
End: 2021-11-04
Attending: RADIOLOGY
Payer: MEDICARE

## 2021-11-04 ENCOUNTER — APPOINTMENT (OUTPATIENT)
Dept: RADIATION ONCOLOGY | Facility: HOSPITAL | Age: 71
End: 2021-11-04
Payer: MEDICARE

## 2021-11-04 PROCEDURE — 77412 RADIATION TX DELIVERY LVL 3: CPT | Performed by: STUDENT IN AN ORGANIZED HEALTH CARE EDUCATION/TRAINING PROGRAM

## 2021-11-04 PROCEDURE — 77387 GUIDANCE FOR RADJ TX DLVR: CPT | Performed by: STUDENT IN AN ORGANIZED HEALTH CARE EDUCATION/TRAINING PROGRAM

## 2021-11-05 ENCOUNTER — APPOINTMENT (OUTPATIENT)
Dept: PHYSICAL THERAPY | Facility: CLINIC | Age: 71
End: 2021-11-05
Payer: MEDICARE

## 2021-11-05 ENCOUNTER — APPOINTMENT (OUTPATIENT)
Dept: LAB | Facility: HOSPITAL | Age: 71
End: 2021-11-05
Payer: MEDICARE

## 2021-11-05 ENCOUNTER — APPOINTMENT (OUTPATIENT)
Dept: RADIATION ONCOLOGY | Facility: HOSPITAL | Age: 71
End: 2021-11-05
Attending: RADIOLOGY
Payer: MEDICARE

## 2021-11-05 PROCEDURE — 77336 RADIATION PHYSICS CONSULT: CPT | Performed by: RADIOLOGY

## 2021-11-05 PROCEDURE — 77387 GUIDANCE FOR RADJ TX DLVR: CPT | Performed by: STUDENT IN AN ORGANIZED HEALTH CARE EDUCATION/TRAINING PROGRAM

## 2021-11-05 PROCEDURE — 77412 RADIATION TX DELIVERY LVL 3: CPT | Performed by: STUDENT IN AN ORGANIZED HEALTH CARE EDUCATION/TRAINING PROGRAM

## 2021-11-06 ENCOUNTER — IMMUNIZATIONS (OUTPATIENT)
Dept: FAMILY MEDICINE CLINIC | Facility: HOSPITAL | Age: 71
End: 2021-11-06

## 2021-11-06 DIAGNOSIS — Z23 ENCOUNTER FOR IMMUNIZATION: Primary | ICD-10-CM

## 2021-11-06 PROCEDURE — 0001A COVID-19 PFIZER VACC 0.3 ML: CPT

## 2021-11-06 PROCEDURE — 91300 COVID-19 PFIZER VACC 0.3 ML: CPT

## 2021-11-08 ENCOUNTER — OFFICE VISIT (OUTPATIENT)
Dept: PHYSICAL THERAPY | Facility: CLINIC | Age: 71
End: 2021-11-08
Payer: MEDICARE

## 2021-11-08 ENCOUNTER — APPOINTMENT (OUTPATIENT)
Dept: RADIATION ONCOLOGY | Facility: HOSPITAL | Age: 71
End: 2021-11-08
Attending: RADIOLOGY
Payer: MEDICARE

## 2021-11-08 DIAGNOSIS — C50.412 MALIGNANT NEOPLASM OF UPPER-OUTER QUADRANT OF LEFT BREAST IN FEMALE, ESTROGEN RECEPTOR POSITIVE (HCC): ICD-10-CM

## 2021-11-08 DIAGNOSIS — Z17.0 MALIGNANT NEOPLASM OF UPPER-OUTER QUADRANT OF LEFT BREAST IN FEMALE, ESTROGEN RECEPTOR POSITIVE (HCC): ICD-10-CM

## 2021-11-08 DIAGNOSIS — Z90.12 STATUS POST LEFT MASTECTOMY: Primary | ICD-10-CM

## 2021-11-08 DIAGNOSIS — I89.0 LYMPHEDEMA OF LEFT UPPER EXTREMITY: ICD-10-CM

## 2021-11-08 PROCEDURE — 97110 THERAPEUTIC EXERCISES: CPT | Performed by: PHYSICAL THERAPIST

## 2021-11-08 PROCEDURE — 97112 NEUROMUSCULAR REEDUCATION: CPT | Performed by: PHYSICAL THERAPIST

## 2021-11-08 PROCEDURE — 77427 RADIATION TX MANAGEMENT X5: CPT | Performed by: RADIOLOGY

## 2021-11-08 PROCEDURE — 77387 GUIDANCE FOR RADJ TX DLVR: CPT | Performed by: RADIOLOGY

## 2021-11-08 PROCEDURE — 97140 MANUAL THERAPY 1/> REGIONS: CPT | Performed by: PHYSICAL THERAPIST

## 2021-11-08 PROCEDURE — 77412 RADIATION TX DELIVERY LVL 3: CPT | Performed by: RADIOLOGY

## 2021-11-09 ENCOUNTER — APPOINTMENT (OUTPATIENT)
Dept: RADIATION ONCOLOGY | Facility: HOSPITAL | Age: 71
End: 2021-11-09
Attending: RADIOLOGY
Payer: MEDICARE

## 2021-11-09 PROCEDURE — 77387 GUIDANCE FOR RADJ TX DLVR: CPT | Performed by: RADIOLOGY

## 2021-11-09 PROCEDURE — 77412 RADIATION TX DELIVERY LVL 3: CPT | Performed by: RADIOLOGY

## 2021-11-10 ENCOUNTER — OFFICE VISIT (OUTPATIENT)
Dept: PHYSICAL THERAPY | Facility: CLINIC | Age: 71
End: 2021-11-10
Payer: MEDICARE

## 2021-11-10 ENCOUNTER — APPOINTMENT (OUTPATIENT)
Dept: RADIATION ONCOLOGY | Facility: HOSPITAL | Age: 71
End: 2021-11-10
Attending: RADIOLOGY
Payer: MEDICARE

## 2021-11-10 DIAGNOSIS — Z17.0 MALIGNANT NEOPLASM OF UPPER-OUTER QUADRANT OF LEFT BREAST IN FEMALE, ESTROGEN RECEPTOR POSITIVE (HCC): ICD-10-CM

## 2021-11-10 DIAGNOSIS — Z90.12 STATUS POST LEFT MASTECTOMY: Primary | ICD-10-CM

## 2021-11-10 DIAGNOSIS — I89.0 LYMPHEDEMA OF LEFT UPPER EXTREMITY: ICD-10-CM

## 2021-11-10 DIAGNOSIS — C50.412 MALIGNANT NEOPLASM OF UPPER-OUTER QUADRANT OF LEFT BREAST IN FEMALE, ESTROGEN RECEPTOR POSITIVE (HCC): ICD-10-CM

## 2021-11-10 PROCEDURE — 97140 MANUAL THERAPY 1/> REGIONS: CPT | Performed by: PHYSICAL THERAPIST

## 2021-11-10 PROCEDURE — 77387 GUIDANCE FOR RADJ TX DLVR: CPT | Performed by: STUDENT IN AN ORGANIZED HEALTH CARE EDUCATION/TRAINING PROGRAM

## 2021-11-10 PROCEDURE — 97112 NEUROMUSCULAR REEDUCATION: CPT | Performed by: PHYSICAL THERAPIST

## 2021-11-10 PROCEDURE — 97110 THERAPEUTIC EXERCISES: CPT | Performed by: PHYSICAL THERAPIST

## 2021-11-10 PROCEDURE — 77412 RADIATION TX DELIVERY LVL 3: CPT | Performed by: STUDENT IN AN ORGANIZED HEALTH CARE EDUCATION/TRAINING PROGRAM

## 2021-11-11 ENCOUNTER — APPOINTMENT (OUTPATIENT)
Dept: RADIATION ONCOLOGY | Facility: HOSPITAL | Age: 71
End: 2021-11-11
Attending: RADIOLOGY
Payer: MEDICARE

## 2021-11-11 ENCOUNTER — APPOINTMENT (OUTPATIENT)
Dept: RADIATION ONCOLOGY | Facility: HOSPITAL | Age: 71
End: 2021-11-11
Payer: MEDICARE

## 2021-11-11 DIAGNOSIS — Z17.0 MALIGNANT NEOPLASM OF UPPER-OUTER QUADRANT OF LEFT BREAST IN FEMALE, ESTROGEN RECEPTOR POSITIVE (HCC): Primary | ICD-10-CM

## 2021-11-11 DIAGNOSIS — C50.412 MALIGNANT NEOPLASM OF UPPER-OUTER QUADRANT OF LEFT BREAST IN FEMALE, ESTROGEN RECEPTOR POSITIVE (HCC): Primary | ICD-10-CM

## 2021-11-11 PROCEDURE — 77387 GUIDANCE FOR RADJ TX DLVR: CPT | Performed by: STUDENT IN AN ORGANIZED HEALTH CARE EDUCATION/TRAINING PROGRAM

## 2021-11-11 PROCEDURE — 77412 RADIATION TX DELIVERY LVL 3: CPT | Performed by: STUDENT IN AN ORGANIZED HEALTH CARE EDUCATION/TRAINING PROGRAM

## 2021-11-12 ENCOUNTER — APPOINTMENT (OUTPATIENT)
Dept: RADIATION ONCOLOGY | Facility: HOSPITAL | Age: 71
End: 2021-11-12
Attending: RADIOLOGY
Payer: MEDICARE

## 2021-11-12 PROCEDURE — 77336 RADIATION PHYSICS CONSULT: CPT | Performed by: RADIOLOGY

## 2021-11-12 PROCEDURE — 77387 GUIDANCE FOR RADJ TX DLVR: CPT | Performed by: RADIOLOGY

## 2021-11-12 PROCEDURE — G6017 INTRAFRACTION TRACK MOTION: HCPCS | Performed by: RADIOLOGY

## 2021-11-12 PROCEDURE — 77412 RADIATION TX DELIVERY LVL 3: CPT | Performed by: RADIOLOGY

## 2021-11-15 ENCOUNTER — APPOINTMENT (OUTPATIENT)
Dept: RADIATION ONCOLOGY | Facility: HOSPITAL | Age: 71
End: 2021-11-15
Attending: RADIOLOGY
Payer: MEDICARE

## 2021-11-15 PROCEDURE — 77412 RADIATION TX DELIVERY LVL 3: CPT | Performed by: STUDENT IN AN ORGANIZED HEALTH CARE EDUCATION/TRAINING PROGRAM

## 2021-11-15 PROCEDURE — 77427 RADIATION TX MANAGEMENT X5: CPT | Performed by: RADIOLOGY

## 2021-11-15 PROCEDURE — 77387 GUIDANCE FOR RADJ TX DLVR: CPT | Performed by: STUDENT IN AN ORGANIZED HEALTH CARE EDUCATION/TRAINING PROGRAM

## 2021-11-16 ENCOUNTER — APPOINTMENT (OUTPATIENT)
Dept: RADIATION ONCOLOGY | Facility: HOSPITAL | Age: 71
End: 2021-11-16
Attending: RADIOLOGY
Payer: MEDICARE

## 2021-11-16 ENCOUNTER — APPOINTMENT (OUTPATIENT)
Dept: LAB | Facility: HOSPITAL | Age: 71
End: 2021-11-16
Payer: MEDICARE

## 2021-11-16 PROCEDURE — 77387 GUIDANCE FOR RADJ TX DLVR: CPT | Performed by: RADIOLOGY

## 2021-11-16 PROCEDURE — 77412 RADIATION TX DELIVERY LVL 3: CPT | Performed by: RADIOLOGY

## 2021-11-16 PROCEDURE — G6017 INTRAFRACTION TRACK MOTION: HCPCS | Performed by: RADIOLOGY

## 2021-11-17 ENCOUNTER — OFFICE VISIT (OUTPATIENT)
Dept: PHYSICAL THERAPY | Facility: CLINIC | Age: 71
End: 2021-11-17
Payer: MEDICARE

## 2021-11-17 ENCOUNTER — APPOINTMENT (OUTPATIENT)
Dept: RADIATION ONCOLOGY | Facility: HOSPITAL | Age: 71
End: 2021-11-17
Attending: RADIOLOGY
Payer: MEDICARE

## 2021-11-17 DIAGNOSIS — Z90.12 STATUS POST LEFT MASTECTOMY: Primary | ICD-10-CM

## 2021-11-17 DIAGNOSIS — C50.412 MALIGNANT NEOPLASM OF UPPER-OUTER QUADRANT OF LEFT BREAST IN FEMALE, ESTROGEN RECEPTOR POSITIVE (HCC): ICD-10-CM

## 2021-11-17 DIAGNOSIS — Z17.0 MALIGNANT NEOPLASM OF UPPER-OUTER QUADRANT OF LEFT BREAST IN FEMALE, ESTROGEN RECEPTOR POSITIVE (HCC): ICD-10-CM

## 2021-11-17 DIAGNOSIS — I89.0 LYMPHEDEMA OF LEFT UPPER EXTREMITY: ICD-10-CM

## 2021-11-17 PROCEDURE — 77387 GUIDANCE FOR RADJ TX DLVR: CPT | Performed by: STUDENT IN AN ORGANIZED HEALTH CARE EDUCATION/TRAINING PROGRAM

## 2021-11-17 PROCEDURE — 97110 THERAPEUTIC EXERCISES: CPT | Performed by: PHYSICAL THERAPIST

## 2021-11-17 PROCEDURE — 97140 MANUAL THERAPY 1/> REGIONS: CPT | Performed by: PHYSICAL THERAPIST

## 2021-11-17 PROCEDURE — 97112 NEUROMUSCULAR REEDUCATION: CPT | Performed by: PHYSICAL THERAPIST

## 2021-11-17 PROCEDURE — 77412 RADIATION TX DELIVERY LVL 3: CPT | Performed by: STUDENT IN AN ORGANIZED HEALTH CARE EDUCATION/TRAINING PROGRAM

## 2021-11-18 ENCOUNTER — APPOINTMENT (OUTPATIENT)
Dept: RADIATION ONCOLOGY | Facility: HOSPITAL | Age: 71
End: 2021-11-18
Attending: RADIOLOGY
Payer: MEDICARE

## 2021-11-18 DIAGNOSIS — Z17.0 MALIGNANT NEOPLASM OF UPPER-OUTER QUADRANT OF LEFT BREAST IN FEMALE, ESTROGEN RECEPTOR POSITIVE (HCC): Primary | ICD-10-CM

## 2021-11-18 DIAGNOSIS — C50.412 MALIGNANT NEOPLASM OF UPPER-OUTER QUADRANT OF LEFT BREAST IN FEMALE, ESTROGEN RECEPTOR POSITIVE (HCC): Primary | ICD-10-CM

## 2021-11-18 PROCEDURE — 77387 GUIDANCE FOR RADJ TX DLVR: CPT | Performed by: STUDENT IN AN ORGANIZED HEALTH CARE EDUCATION/TRAINING PROGRAM

## 2021-11-18 PROCEDURE — 77412 RADIATION TX DELIVERY LVL 3: CPT | Performed by: STUDENT IN AN ORGANIZED HEALTH CARE EDUCATION/TRAINING PROGRAM

## 2021-11-19 ENCOUNTER — APPOINTMENT (OUTPATIENT)
Dept: RADIATION ONCOLOGY | Facility: HOSPITAL | Age: 71
End: 2021-11-19
Attending: RADIOLOGY
Payer: MEDICARE

## 2021-11-19 ENCOUNTER — APPOINTMENT (OUTPATIENT)
Dept: PHYSICAL THERAPY | Facility: CLINIC | Age: 71
End: 2021-11-19
Payer: MEDICARE

## 2021-11-19 PROCEDURE — G6017 INTRAFRACTION TRACK MOTION: HCPCS | Performed by: RADIOLOGY

## 2021-11-19 PROCEDURE — 77387 GUIDANCE FOR RADJ TX DLVR: CPT | Performed by: RADIOLOGY

## 2021-11-19 PROCEDURE — 77412 RADIATION TX DELIVERY LVL 3: CPT | Performed by: RADIOLOGY

## 2021-11-19 PROCEDURE — 77336 RADIATION PHYSICS CONSULT: CPT | Performed by: RADIOLOGY

## 2021-11-24 ENCOUNTER — APPOINTMENT (OUTPATIENT)
Dept: PHYSICAL THERAPY | Facility: CLINIC | Age: 71
End: 2021-11-24
Payer: MEDICARE

## 2021-12-01 ENCOUNTER — OFFICE VISIT (OUTPATIENT)
Dept: PHYSICAL THERAPY | Facility: CLINIC | Age: 71
End: 2021-12-01
Payer: MEDICARE

## 2021-12-01 DIAGNOSIS — C50.412 MALIGNANT NEOPLASM OF UPPER-OUTER QUADRANT OF LEFT BREAST IN FEMALE, ESTROGEN RECEPTOR POSITIVE (HCC): ICD-10-CM

## 2021-12-01 DIAGNOSIS — Z17.0 MALIGNANT NEOPLASM OF UPPER-OUTER QUADRANT OF LEFT BREAST IN FEMALE, ESTROGEN RECEPTOR POSITIVE (HCC): ICD-10-CM

## 2021-12-01 DIAGNOSIS — I89.0 LYMPHEDEMA OF LEFT UPPER EXTREMITY: ICD-10-CM

## 2021-12-01 DIAGNOSIS — Z90.12 STATUS POST LEFT MASTECTOMY: Primary | ICD-10-CM

## 2021-12-01 PROCEDURE — 97110 THERAPEUTIC EXERCISES: CPT | Performed by: PHYSICAL THERAPIST

## 2021-12-01 PROCEDURE — 97140 MANUAL THERAPY 1/> REGIONS: CPT | Performed by: PHYSICAL THERAPIST

## 2021-12-01 PROCEDURE — 97112 NEUROMUSCULAR REEDUCATION: CPT | Performed by: PHYSICAL THERAPIST

## 2021-12-03 ENCOUNTER — OFFICE VISIT (OUTPATIENT)
Dept: PHYSICAL THERAPY | Facility: CLINIC | Age: 71
End: 2021-12-03
Payer: MEDICARE

## 2021-12-03 DIAGNOSIS — Z90.12 STATUS POST LEFT MASTECTOMY: Primary | ICD-10-CM

## 2021-12-03 DIAGNOSIS — C50.412 MALIGNANT NEOPLASM OF UPPER-OUTER QUADRANT OF LEFT BREAST IN FEMALE, ESTROGEN RECEPTOR POSITIVE (HCC): ICD-10-CM

## 2021-12-03 DIAGNOSIS — I89.0 LYMPHEDEMA OF LEFT UPPER EXTREMITY: ICD-10-CM

## 2021-12-03 DIAGNOSIS — Z17.0 MALIGNANT NEOPLASM OF UPPER-OUTER QUADRANT OF LEFT BREAST IN FEMALE, ESTROGEN RECEPTOR POSITIVE (HCC): ICD-10-CM

## 2021-12-03 PROCEDURE — 97112 NEUROMUSCULAR REEDUCATION: CPT | Performed by: PHYSICAL THERAPIST

## 2021-12-03 PROCEDURE — 97110 THERAPEUTIC EXERCISES: CPT | Performed by: PHYSICAL THERAPIST

## 2021-12-03 PROCEDURE — 97140 MANUAL THERAPY 1/> REGIONS: CPT | Performed by: PHYSICAL THERAPIST

## 2021-12-08 ENCOUNTER — OFFICE VISIT (OUTPATIENT)
Dept: PHYSICAL THERAPY | Facility: CLINIC | Age: 71
End: 2021-12-08
Payer: MEDICARE

## 2021-12-08 DIAGNOSIS — Z17.0 MALIGNANT NEOPLASM OF UPPER-OUTER QUADRANT OF LEFT BREAST IN FEMALE, ESTROGEN RECEPTOR POSITIVE (HCC): ICD-10-CM

## 2021-12-08 DIAGNOSIS — Z90.12 STATUS POST LEFT MASTECTOMY: Primary | ICD-10-CM

## 2021-12-08 DIAGNOSIS — C50.412 MALIGNANT NEOPLASM OF UPPER-OUTER QUADRANT OF LEFT BREAST IN FEMALE, ESTROGEN RECEPTOR POSITIVE (HCC): ICD-10-CM

## 2021-12-08 DIAGNOSIS — I89.0 LYMPHEDEMA OF LEFT UPPER EXTREMITY: ICD-10-CM

## 2021-12-08 PROCEDURE — 97110 THERAPEUTIC EXERCISES: CPT | Performed by: PHYSICAL THERAPIST

## 2021-12-08 PROCEDURE — 97112 NEUROMUSCULAR REEDUCATION: CPT | Performed by: PHYSICAL THERAPIST

## 2021-12-08 PROCEDURE — 97140 MANUAL THERAPY 1/> REGIONS: CPT | Performed by: PHYSICAL THERAPIST

## 2021-12-10 ENCOUNTER — OFFICE VISIT (OUTPATIENT)
Dept: PHYSICAL THERAPY | Facility: CLINIC | Age: 71
End: 2021-12-10
Payer: MEDICARE

## 2021-12-10 DIAGNOSIS — C50.412 MALIGNANT NEOPLASM OF UPPER-OUTER QUADRANT OF LEFT BREAST IN FEMALE, ESTROGEN RECEPTOR POSITIVE (HCC): ICD-10-CM

## 2021-12-10 DIAGNOSIS — Z90.12 STATUS POST LEFT MASTECTOMY: Primary | ICD-10-CM

## 2021-12-10 DIAGNOSIS — Z17.0 MALIGNANT NEOPLASM OF UPPER-OUTER QUADRANT OF LEFT BREAST IN FEMALE, ESTROGEN RECEPTOR POSITIVE (HCC): ICD-10-CM

## 2021-12-10 DIAGNOSIS — I89.0 LYMPHEDEMA OF LEFT UPPER EXTREMITY: ICD-10-CM

## 2021-12-10 PROCEDURE — 97112 NEUROMUSCULAR REEDUCATION: CPT | Performed by: PHYSICAL THERAPIST

## 2021-12-10 PROCEDURE — 97140 MANUAL THERAPY 1/> REGIONS: CPT | Performed by: PHYSICAL THERAPIST

## 2021-12-10 PROCEDURE — 97110 THERAPEUTIC EXERCISES: CPT | Performed by: PHYSICAL THERAPIST

## 2021-12-16 ENCOUNTER — OFFICE VISIT (OUTPATIENT)
Dept: PHYSICAL THERAPY | Facility: CLINIC | Age: 71
End: 2021-12-16
Payer: MEDICARE

## 2021-12-16 DIAGNOSIS — I89.0 LYMPHEDEMA OF LEFT UPPER EXTREMITY: ICD-10-CM

## 2021-12-16 DIAGNOSIS — Z90.12 STATUS POST LEFT MASTECTOMY: Primary | ICD-10-CM

## 2021-12-16 DIAGNOSIS — Z17.0 MALIGNANT NEOPLASM OF UPPER-OUTER QUADRANT OF LEFT BREAST IN FEMALE, ESTROGEN RECEPTOR POSITIVE (HCC): ICD-10-CM

## 2021-12-16 DIAGNOSIS — C50.412 MALIGNANT NEOPLASM OF UPPER-OUTER QUADRANT OF LEFT BREAST IN FEMALE, ESTROGEN RECEPTOR POSITIVE (HCC): ICD-10-CM

## 2021-12-16 PROCEDURE — 97112 NEUROMUSCULAR REEDUCATION: CPT | Performed by: PHYSICAL THERAPIST

## 2021-12-16 PROCEDURE — 97140 MANUAL THERAPY 1/> REGIONS: CPT | Performed by: PHYSICAL THERAPIST

## 2021-12-16 PROCEDURE — 97110 THERAPEUTIC EXERCISES: CPT | Performed by: PHYSICAL THERAPIST

## 2021-12-21 ENCOUNTER — OFFICE VISIT (OUTPATIENT)
Dept: PHYSICAL THERAPY | Facility: CLINIC | Age: 71
End: 2021-12-21
Payer: MEDICARE

## 2021-12-21 DIAGNOSIS — Z90.12 STATUS POST LEFT MASTECTOMY: Primary | ICD-10-CM

## 2021-12-21 DIAGNOSIS — Z17.0 MALIGNANT NEOPLASM OF UPPER-OUTER QUADRANT OF LEFT BREAST IN FEMALE, ESTROGEN RECEPTOR POSITIVE (HCC): ICD-10-CM

## 2021-12-21 DIAGNOSIS — I89.0 LYMPHEDEMA OF LEFT UPPER EXTREMITY: ICD-10-CM

## 2021-12-21 DIAGNOSIS — C50.412 MALIGNANT NEOPLASM OF UPPER-OUTER QUADRANT OF LEFT BREAST IN FEMALE, ESTROGEN RECEPTOR POSITIVE (HCC): ICD-10-CM

## 2021-12-21 PROCEDURE — 97140 MANUAL THERAPY 1/> REGIONS: CPT | Performed by: PHYSICAL THERAPIST

## 2021-12-21 PROCEDURE — 97110 THERAPEUTIC EXERCISES: CPT | Performed by: PHYSICAL THERAPIST

## 2021-12-21 PROCEDURE — 97112 NEUROMUSCULAR REEDUCATION: CPT | Performed by: PHYSICAL THERAPIST

## 2021-12-23 ENCOUNTER — TELEMEDICINE (OUTPATIENT)
Dept: RADIATION ONCOLOGY | Facility: HOSPITAL | Age: 71
End: 2021-12-23
Attending: RADIOLOGY

## 2021-12-23 DIAGNOSIS — C50.412 MALIGNANT NEOPLASM OF UPPER-OUTER QUADRANT OF LEFT BREAST IN FEMALE, ESTROGEN RECEPTOR POSITIVE (HCC): Primary | ICD-10-CM

## 2021-12-23 DIAGNOSIS — Z17.0 MALIGNANT NEOPLASM OF UPPER-OUTER QUADRANT OF LEFT BREAST IN FEMALE, ESTROGEN RECEPTOR POSITIVE (HCC): Primary | ICD-10-CM

## 2021-12-23 PROCEDURE — 99024 POSTOP FOLLOW-UP VISIT: CPT | Performed by: RADIOLOGY

## 2021-12-23 NOTE — PROGRESS NOTES
Virtual Brief Visit    Patient is located in the following state in which I hold an active license [place]      Assessment/Plan:  The patient is 6 weeks post adjuvant radiation therapy for left breast carcinoma  She states she experience mild desquamation post treatment however this has resolved  She does have a sleeve and attends lymphedema PT  overall she feels well  She will follow-up in 6 months    Problem List Items Addressed This Visit        Other    Malignant neoplasm of upper-outer quadrant of left breast in female, estrogen receptor positive ([facility] [address] ) - Primary          Recent Visits  No visits were found meeting these conditions  Showing recent visits within past 7 days and meeting all other requirements  Future Appointments  No visits were found meeting these conditions    Showing future appointments within next 150 days and meeting all other requirements         I spent 15 minutes directly with the patient during this visit

## 2021-12-28 ENCOUNTER — OFFICE VISIT (OUTPATIENT)
Dept: PHYSICAL THERAPY | Facility: CLINIC | Age: 71
End: 2021-12-28
Payer: MEDICARE

## 2021-12-28 DIAGNOSIS — C50.412 MALIGNANT NEOPLASM OF UPPER-OUTER QUADRANT OF LEFT BREAST IN FEMALE, ESTROGEN RECEPTOR POSITIVE (HCC): ICD-10-CM

## 2021-12-28 DIAGNOSIS — Z90.12 STATUS POST LEFT MASTECTOMY: Primary | ICD-10-CM

## 2021-12-28 DIAGNOSIS — I89.0 LYMPHEDEMA OF LEFT UPPER EXTREMITY: ICD-10-CM

## 2021-12-28 DIAGNOSIS — Z17.0 MALIGNANT NEOPLASM OF UPPER-OUTER QUADRANT OF LEFT BREAST IN FEMALE, ESTROGEN RECEPTOR POSITIVE (HCC): ICD-10-CM

## 2021-12-28 PROCEDURE — 97110 THERAPEUTIC EXERCISES: CPT | Performed by: PHYSICAL THERAPIST

## 2021-12-28 PROCEDURE — 97140 MANUAL THERAPY 1/> REGIONS: CPT | Performed by: PHYSICAL THERAPIST

## 2022-01-03 ENCOUNTER — OFFICE VISIT (OUTPATIENT)
Dept: PHYSICAL THERAPY | Facility: CLINIC | Age: 72
End: 2022-01-03
Payer: MEDICARE

## 2022-01-03 DIAGNOSIS — C50.412 MALIGNANT NEOPLASM OF UPPER-OUTER QUADRANT OF LEFT BREAST IN FEMALE, ESTROGEN RECEPTOR POSITIVE (HCC): ICD-10-CM

## 2022-01-03 DIAGNOSIS — Z17.0 MALIGNANT NEOPLASM OF UPPER-OUTER QUADRANT OF LEFT BREAST IN FEMALE, ESTROGEN RECEPTOR POSITIVE (HCC): ICD-10-CM

## 2022-01-03 DIAGNOSIS — Z90.12 STATUS POST LEFT MASTECTOMY: Primary | ICD-10-CM

## 2022-01-03 DIAGNOSIS — I89.0 LYMPHEDEMA OF LEFT UPPER EXTREMITY: ICD-10-CM

## 2022-01-03 PROCEDURE — 97140 MANUAL THERAPY 1/> REGIONS: CPT | Performed by: PHYSICAL THERAPIST

## 2022-01-03 PROCEDURE — 97110 THERAPEUTIC EXERCISES: CPT | Performed by: PHYSICAL THERAPIST

## 2022-01-03 NOTE — PROGRESS NOTES
Daily Note     Today's date: 1/3/2022  Patient name: Rachell Jimenez  : 1950  MRN: [id number]  Referring provider: Wayne Mason MD  Dx:   Encounter Diagnosis     ICD-10-CM    1  Status post left mastectomy  Z90 12    2  Malignant neoplasm of upper-outer quadrant of left breast in female, estrogen receptor positive ([facility] [address] )  C50 412     Z17 0    3  Lymphedema of left upper extremity  I89 0                   Subjective: Myra Campos reports "okay, I thought the arm was going to be sore after carrying laundry baskets and [place] yesterday, but it's not"  upon arrival to therapy today  COVID-19 Questions: Myra Campos denies leaving the country or the state within the past 2 weeks  she denies any of the following symptoms: symptoms of upper respiratory infection (including runny nose, sore throat, cough), fever or flu-like symptoms, new headaches, new SOB, new diarrhea, vomiting, nausea, new loss of taste or smell  Patient's temperature taken by infrared no-touch thermometer upon arrival to therapy today and WNL  Objective: See treatment diary below      Assessment: Tolerated treatment fair  Patient would benefit from continued PT  PT initiating Strength After Breast Cancer program with patient today, initiating stretching and core exercises  Certain TE modified for patient including superwoman's and quad stretch, patient reporting minimal back pain with bridges, lessened following cueing for core engagement and glute activation  Patient given Exercise packet with lymphedema symptoms with patient verbalizing understanding to complete same exercises one more time this week  Plan: Continue per plan of care  Progress treatment as tolerated                 Precautions: L breast cancer with adjuvant chemotherapy and radiation  HEP: review educational literature provided by PT, order short stretch bandages as highlighted by PT   Daily Treatment Diary       Daily Treatment Diary     Therapeutic Exercise 1/3 Warm-up             Treadmill  5 min                          Stretching             Chest stretch :15x5            Shoulder stretch :15x5            Triceps stretch :15x5            Calf stretch :15x5            Quad stretch standing :15x5 strap            Butterfly stretch :15x5            Hamstring stretch :15x5            LTR :15x5                         Neuro Re-ed             Core exercises             Bird dogs standing 10 ea            Ab curl on ball 2x10            Bridges  2x10                         Weight training             DB bench press             Squat to chair             Bent over row             RDL             Scaption             Step ups             Tricep kick backs             Calf raises             Bicep curls DB                          Self-care/ home management             Lymphedema education

## 2022-01-10 ENCOUNTER — OFFICE VISIT (OUTPATIENT)
Dept: PHYSICAL THERAPY | Facility: CLINIC | Age: 72
End: 2022-01-10
Payer: MEDICARE

## 2022-01-10 DIAGNOSIS — C50.412 MALIGNANT NEOPLASM OF UPPER-OUTER QUADRANT OF LEFT BREAST IN FEMALE, ESTROGEN RECEPTOR POSITIVE (HCC): ICD-10-CM

## 2022-01-10 DIAGNOSIS — Z90.12 STATUS POST LEFT MASTECTOMY: Primary | ICD-10-CM

## 2022-01-10 DIAGNOSIS — Z17.0 MALIGNANT NEOPLASM OF UPPER-OUTER QUADRANT OF LEFT BREAST IN FEMALE, ESTROGEN RECEPTOR POSITIVE (HCC): ICD-10-CM

## 2022-01-10 DIAGNOSIS — I89.0 LYMPHEDEMA OF LEFT UPPER EXTREMITY: ICD-10-CM

## 2022-01-10 PROCEDURE — 97110 THERAPEUTIC EXERCISES: CPT | Performed by: PHYSICAL THERAPIST

## 2022-01-10 NOTE — PROGRESS NOTES
Daily Note     Today's date: 1/10/2022  Patient name: Bettye Ventura  : 1950  MRN: [id number]  Referring provider: Greg Peña MD  Dx:   Encounter Diagnosis     ICD-10-CM    1  Status post left mastectomy  Z90 12    2  Malignant neoplasm of upper-outer quadrant of left breast in female, estrogen receptor positive ([facility] [address] )  C50 412     Z17 0    3  Lymphedema of left upper extremity  I89 0                   Subjective: Janel Hendricks reports "okay," denying any swelling or adverse effects since most recent PT appointment, verbalizing compliance with stretches as assigned from [facility] program    COVID-19 Questions: Janel Hendricks denies leaving the country or the state within the past 2 weeks  she denies any of the following symptoms: symptoms of upper respiratory infection (including runny nose, sore throat, cough), fever or flu-like symptoms, new headaches, new SOB, new diarrhea, vomiting, nausea, new loss of taste or smell  Patient's temperature taken by infrared no-touch thermometer upon arrival to therapy today and WNL  Objective: See treatment diary below      Assessment: Tolerated treatment fair  Patient would benefit from continued PT  Patient needing cueing throughout session for proper technique with previous and new exercises, denying any L UE pain or heaviness while performed  Patient given flowsheet to aid with resistance used for additional strengthening exercises added today  Patient given Exercise packet with lymphedema symptoms with patient verbalizing understanding to complete same exercises one more time this week  Plan: Continue per plan of care  Progress treatment as tolerated                 Precautions: L breast cancer with adjuvant chemotherapy and radiation  HEP: review educational literature provided by PT, order short stretch bandages as highlighted by PT   Daily Treatment Diary       Daily Treatment Diary     Therapeutic Exercise 1/3 1/10           Warm-up             Treadmill 5 min  7 min                         Stretching             Chest stretch :15x5 :15x5           Shoulder stretch :15x5 :15x5           Triceps stretch :15x5 :15x5           Calf stretch :15x5 :15x5           Quad stretch standing :15x5 strap :15x5           Butterfly stretch :15x5 :15x5           Hamstring stretch :15x5 :15x5           LTR :15x5 :15x5                        Neuro Re-ed             Core exercises             Bird dogs standing 10 ea standing 10 ea           Ab curl on ball 2x10 2x10           Bridges  2x10 2x10                        Weight training             DB bench press  5# 2x10           Squat to chair  Body weight 10  5# 10           Bent over row  5# 2x10 ea           RDL             Scaption             Step ups             Tricep kick backs             Calf raises             Bicep curls DB                          Self-care/ home management             Lymphedema education

## 2022-01-11 RX ORDER — ANASTROZOLE 1 MG/1
TABLET ORAL
Qty: 90 TABLET | Refills: 1 | Status: SHIPPED | OUTPATIENT
Start: 2022-01-11 | End: 2022-04-05

## 2022-01-17 ENCOUNTER — APPOINTMENT (OUTPATIENT)
Dept: PHYSICAL THERAPY | Facility: CLINIC | Age: 72
End: 2022-01-17
Payer: MEDICARE

## 2022-01-18 ENCOUNTER — OFFICE VISIT (OUTPATIENT)
Dept: PHYSICAL THERAPY | Facility: CLINIC | Age: 72
End: 2022-01-18
Payer: MEDICARE

## 2022-01-18 DIAGNOSIS — Z90.12 STATUS POST LEFT MASTECTOMY: Primary | ICD-10-CM

## 2022-01-18 DIAGNOSIS — C50.412 MALIGNANT NEOPLASM OF UPPER-OUTER QUADRANT OF LEFT BREAST IN FEMALE, ESTROGEN RECEPTOR POSITIVE (HCC): ICD-10-CM

## 2022-01-18 DIAGNOSIS — Z17.0 MALIGNANT NEOPLASM OF UPPER-OUTER QUADRANT OF LEFT BREAST IN FEMALE, ESTROGEN RECEPTOR POSITIVE (HCC): ICD-10-CM

## 2022-01-18 DIAGNOSIS — I89.0 LYMPHEDEMA OF LEFT UPPER EXTREMITY: ICD-10-CM

## 2022-01-18 PROCEDURE — 97110 THERAPEUTIC EXERCISES: CPT | Performed by: PHYSICAL THERAPIST

## 2022-01-18 NOTE — PROGRESS NOTES
Daily Note     Today's date: 2022  Patient name: Rachell Jimenez  : 1950  MRN: [id number]  Referring provider: Wayne Mason MD  Dx:   Encounter Diagnosis     ICD-10-CM    1  Status post left mastectomy  Z90 12    2  Malignant neoplasm of upper-outer quadrant of left breast in female, estrogen receptor positive ([facility] [address] )  C50 412     Z17 0    3  Lymphedema of left upper extremity  I89 0                   Subjective: Myra Campos reports "okay," denying any swelling or adverse effects since most recent PT appointment, verbalizing compliance with stretches as assigned from [facility] program    COVID-19 Questions: Myra Campos denies leaving the country or the state within the past 2 weeks  she denies any of the following symptoms: symptoms of upper respiratory infection (including runny nose, sore throat, cough), fever or flu-like symptoms, new headaches, new SOB, new diarrhea, vomiting, nausea, new loss of taste or smell  Patient's temperature taken by infrared no-touch thermometer upon arrival to therapy today and WNL  Objective: See treatment diary below      Assessment: Tolerated treatment fair  Patient would benefit from continued PT  Patient needing cueing throughout session for proper technique with previous and new exercises, denying any L UE pain or heaviness while performed  Cueing for patient to avoid UT shrugging with return to standing with RDL's today  Cueing for hold times needed with exercises  PT recommending patient wash garment and stretch out over object near wrist to aid with tightness patient still experiencing  Patient given Exercise packet with lymphedema symptoms with patient verbalizing understanding to complete same exercises one more time this week  Plan: Continue per plan of care  Progress treatment as tolerated                 Precautions: L breast cancer with adjuvant chemotherapy and radiation  HEP: review educational literature provided by PT, order short stretch bandages as highlighted by PT   Daily Treatment Diary       Daily Treatment Diary     Therapeutic Exercise 1/3 1/10 1/18          Warm-up             Treadmill  5 min  7 min  7 min                        Stretching             Chest stretch :15x5 :15x5 15x5          Shoulder stretch :15x5 :15x5 15x5          Triceps stretch :15x5 :15x5 15x5          Calf stretch :15x5 :15x5 15x5          Quad stretch standing :15x5 strap :15x5 15x5          Butterfly stretch :15x5 :15x5 15x5          Hamstring stretch :15x5 :15x5 15x5          LTR :15x5 :15x5 15x5                       Neuro Re-ed             Core exercises             Bird dogs standing 10 ea standing 10 ea standing 10 ea          Ab curl on ball 2x10 2x10 2x10          Bridges  2x10 2x10 2x10                       Weight training             DB bench press  5# 2x10 5# 3x10          Squat to chair  Body weight 10  5# 10 5# 2x10          Bent over row  5# 2x10 ea 5# 2x10 ea          RDL   5# 2x10          Scaption   3# 2x10          Step ups   0# 10, 3# 10          Tricep kick backs             Calf raises             Bicep curls DB                          Self-care/ home management             Lymphedema education

## 2022-01-19 ENCOUNTER — TELEPHONE (OUTPATIENT)
Dept: FAMILY MEDICINE CLINIC | Facility: CLINIC | Age: 72
End: 2022-01-19

## 2022-01-19 NOTE — TELEPHONE ENCOUNTER
Left message on machine for patient to call office  Patient is due for Annual Wellness Visit    Bruna Ramos
Instructions: This plan will send the code FBSE to the PM system.  DO NOT or CHANGE the price.
Detail Level: Simple
Price (Do Not Change): 0.00

## 2022-01-24 ENCOUNTER — OFFICE VISIT (OUTPATIENT)
Dept: PHYSICAL THERAPY | Facility: CLINIC | Age: 72
End: 2022-01-24
Payer: MEDICARE

## 2022-01-24 DIAGNOSIS — Z90.12 STATUS POST LEFT MASTECTOMY: Primary | ICD-10-CM

## 2022-01-24 DIAGNOSIS — Z17.0 MALIGNANT NEOPLASM OF UPPER-OUTER QUADRANT OF LEFT BREAST IN FEMALE, ESTROGEN RECEPTOR POSITIVE (HCC): ICD-10-CM

## 2022-01-24 DIAGNOSIS — I89.0 LYMPHEDEMA OF LEFT UPPER EXTREMITY: ICD-10-CM

## 2022-01-24 DIAGNOSIS — C50.412 MALIGNANT NEOPLASM OF UPPER-OUTER QUADRANT OF LEFT BREAST IN FEMALE, ESTROGEN RECEPTOR POSITIVE (HCC): ICD-10-CM

## 2022-01-24 PROCEDURE — 97110 THERAPEUTIC EXERCISES: CPT | Performed by: PHYSICAL THERAPIST

## 2022-01-24 NOTE — PROGRESS NOTES
Daily Note     Today's date: 2022  Patient name: Carine Cuevas  : 1950  MRN: [id number]  Referring provider: Aaliyah Chand MD  Dx:   Encounter Diagnosis     ICD-10-CM    1  Status post left mastectomy  Z90 12    2  Malignant neoplasm of upper-outer quadrant of left breast in female, estrogen receptor positive ([facility] [address] )  C50 412     Z17 0    3  Lymphedema of left upper extremity  I89 0                   Subjective: Mitch Pozo reports "okay," denying any swelling or adverse effects since most recent PT appointment, verbalizing compliance with stretches as assigned from [facility] program    COVID-19 Questions: Mitch Pozo denies leaving the country or the state within the past 2 weeks  she denies any of the following symptoms: symptoms of upper respiratory infection (including runny nose, sore throat, cough), fever or flu-like symptoms, new headaches, new SOB, new diarrhea, vomiting, nausea, new loss of taste or smell  Patient's temperature taken by infrared no-touch thermometer upon arrival to therapy today and WNL  Objective: See treatment diary below      Assessment: Tolerated treatment fair  Patient would benefit from continued PT  Patient needing cueing throughout session for proper technique with previous and new exercises, denying any L UE pain or heaviness while performed  PT re-educating patient on signs and symptoms of lymphedema, patient reporting "doing good "   Patient given Exercise packet with lymphedema symptoms with patient verbalizing understanding to complete same exercises one more time this week  Plan: Continue per plan of care  Progress treatment as tolerated                 Precautions: L breast cancer with adjuvant chemotherapy and radiation  HEP: review educational literature provided by PT, order short stretch bandages as highlighted by PT   Daily Treatment Diary       Daily Treatment Diary     Therapeutic Exercise 1/3 1/10 1/18 1/24         Warm-up             Treadmill  5 min  7 min  7 min  7 min                       Stretching             Chest stretch :15x5 :15x5 15x5 :15x5         Shoulder stretch :15x5 :15x5 15x5 :15x5         Triceps stretch :15x5 :15x5 15x5 :15x5         Calf stretch :15x5 :15x5 15x5 :15x5         Quad stretch standing :15x5 strap :15x5 15x5 :15x5         Butterfly stretch :15x5 :15x5 15x5 :15x5         Hamstring stretch :15x5 :15x5 15x5 :15x5         LTR :15x5 :15x5 15x5 :15x5                      Neuro Re-ed             Core exercises             Bird dogs standing 10 ea standing 10 ea standing 10 ea standing 10 ea         Ab curl on ball 2x10 2x10 2x10 2x10         Bridges  2x10 2x10 2x10 2x10                      Weight training             DB bench press  5# 2x10 5# 3x10 5# 3x10         Squat to chair  Body weight 10  5# 10 5# 2x10 5# 3x10         Bent over row  5# 2x10 ea 5# 2x10 ea 5# 3x10         RDL   5# 2x10 5# 3x10         Scaption   3# 2x10 3# 2x10         Step ups   0# 10, 3# 10 0# 10,  3# 10         Tricep kick backs    3# 2x10         Calf raises    5# 2x10         Bicep curls DB    5# 2x10                      Self-care/ home management             Lymphedema education

## 2022-02-04 ENCOUNTER — OFFICE VISIT (OUTPATIENT)
Dept: FAMILY MEDICINE CLINIC | Facility: CLINIC | Age: 72
End: 2022-02-04
Payer: MEDICARE

## 2022-02-04 VITALS
TEMPERATURE: 97.6 F | DIASTOLIC BLOOD PRESSURE: 70 MMHG | OXYGEN SATURATION: 99 % | BODY MASS INDEX: 38.62 KG/M2 | HEIGHT: 63 IN | HEART RATE: 67 BPM | SYSTOLIC BLOOD PRESSURE: 132 MMHG | RESPIRATION RATE: 16 BRPM | WEIGHT: 218 LBS

## 2022-02-04 DIAGNOSIS — E66.01 MORBID OBESITY (HCC): ICD-10-CM

## 2022-02-04 DIAGNOSIS — Z13.89 SCREENING FOR CARDIOVASCULAR, RESPIRATORY, AND GENITOURINARY DISEASES: ICD-10-CM

## 2022-02-04 DIAGNOSIS — Z13.83 SCREENING FOR CARDIOVASCULAR, RESPIRATORY, AND GENITOURINARY DISEASES: ICD-10-CM

## 2022-02-04 DIAGNOSIS — Z00.00 MEDICARE ANNUAL WELLNESS VISIT, SUBSEQUENT: Primary | ICD-10-CM

## 2022-02-04 DIAGNOSIS — Z78.0 POST-MENOPAUSAL: ICD-10-CM

## 2022-02-04 DIAGNOSIS — Z13.6 SCREENING FOR CARDIOVASCULAR, RESPIRATORY, AND GENITOURINARY DISEASES: ICD-10-CM

## 2022-02-04 DIAGNOSIS — Z12.11 SCREENING FOR COLON CANCER: ICD-10-CM

## 2022-02-04 PROCEDURE — G0438 PPPS, INITIAL VISIT: HCPCS | Performed by: FAMILY MEDICINE

## 2022-02-04 NOTE — PROGRESS NOTES
Assessment and Plan:     Problem List Items Addressed This Visit        Other    Morbid obesity ([address] )      Other Visit Diagnoses     Medicare annual wellness visit, subsequent    -  Primary    Screening for cardiovascular, respiratory, and genitourinary diseases        Relevant Orders    Lipid Panel with Direct LDL reflex    Screening for colon cancer        Relevant Orders    Ambulatory referral to Gastroenterology    Post-menopausal        Relevant Orders    DXA bone density spine hip and pelvis        BMI Counseling: Body mass index is 38 62 kg/m²  The BMI is above normal  Nutrition recommendations include encouraging healthy choices of fruits and vegetables and consuming healthier snacks  Exercise recommendations include strength training exercises  Rationale for BMI follow-up plan is due to patient being overweight or obese  Depression Screening and Follow-up Plan: Patient's depression screening was positive with a PHQ-2 score of 4  Their PHQ-9 score was 12  Patient assessed for underlying major depression  Brief counseling provided and recommend additional follow-up/re-evaluation next office visit  Preventive health issues were discussed with patient, and age appropriate screening tests were ordered as noted in patient's After Visit Summary  Personalized health advice and appropriate referrals for health education or preventive services given if needed, as noted in patient's After Visit Summary       History of Present Illness:     Patient presents for Medicare Annual Wellness visit    Patient Care Team:  De Velasco MD as PCP - General (Internal Medicine)  Darinel Dan MD as Surgeon (Surgical Oncology)  Jayden Gant MD (Family Medicine)  Radha Marx RN as Registered Nurse (Oncology)  James Lan MD as Medical Oncologist (Hematology)  Nu Stroud MD (Radiation Oncology)     Problem List:     Patient Active Problem List   Diagnosis    Benign essential hypertension    Chronic rhinitis  Hidradenitis suppurativa    Hypothyroidism    Impaired fasting glucose    Morbid obesity (HCC)    Venous insufficiency (chronic) (peripheral)    Malignant neoplasm of upper-outer quadrant of left breast in female, estrogen receptor positive (HCC)    Chemotherapy induced neutropenia (HCC)      Past Medical and Surgical History:     Past Medical History:   Diagnosis Date    BRCA gene mutation negative 05/19/2021    [employer]; 36 gene panel    Breast cancer ([address] )     Hypertension      Past Surgical History:   Procedure Laterality Date    AXILLARY SURGERY Right     sweat glands removed -     BREAST CYST EXCISION Right 2000    benign    BREAST SURGERY Right     CHOLECYSTECTOMY      COLONOSCOPY      FL MASTECTOMY, MODIFIED RADICAL Left 6/8/2021    Procedure: BREAST MODIFIED RADICAL MASTECTOMY; ANAND  DIRECTED AXILLARY DISSECTION;  Surgeon: Jake Sales MD;  Location: [facility];  Service: Surgical Oncology    US BREAST NEEDLE LOC LEFT Left 6/2/2021    US GUIDED BREAST BIOPSY LEFT COMPLETE Left 4/19/2021    US GUIDED BREAST LYMPH NODE BIOPSY LEFT Left 4/19/2021      Family History:     Family History   Problem Relation Age of Onset    Dementia Mother     Colon cancer Mother         55's-59's    Lung cancer Father         55's-59's    No Known Problems Sister     Breast cancer Paternal Aunt     No Known Problems Sister     Stomach cancer Paternal Uncle         66's      Social History:     Social History     Socioeconomic History    Marital status: /Civil Union     Spouse name: None    Number of children: None    Years of education: None    Highest education level: None   Occupational History    None   Tobacco Use    Smoking status: Never Smoker    Smokeless tobacco: Never Used   Vaping Use    Vaping Use: Never used   Substance and Sexual Activity    Alcohol use: No    Drug use: No    Sexual activity: Not Currently   Other Topics Concern    None   Social History Narrative    None     Social Determinants of Health     Financial Resource Strain: Not on file   Food Insecurity: Not on file   Transportation Needs: Not on file   Physical Activity: Not on file   Stress: Not on file   Social Connections: Not on file   Intimate Partner Violence: Not on file   Housing Stability: Not on file      Medications and Allergies:     Current Outpatient Medications   Medication Sig Dispense Refill    anastrozole (ARIMIDEX) 1 mg tablet TAKE ONE TABLET BY MOUTH EVERY DAY 90 tablet 1    aspirin 81 MG tablet Take by mouth daily      CRANIAL PROSTHESIS, RX, One wig as needed 1 each 0    fluticasone (FLONASE) 50 mcg/act nasal spray 2 puffs into each nostril daily        metoprolol tartrate (LOPRESSOR) 50 mg tablet TAKE ONE TABLET BY MOUTH EVERY DAY 90 tablet 3    Multiple Vitamins-Minerals (MULTIVITAMIN ADULT PO) Take by mouth daily      ondansetron (ZOFRAN) 4 mg tablet Take 1 tablet (4 mg total) by mouth every 8 (eight) hours as needed for nausea or vomiting 30 tablet 1    saccharomyces boulardii (FLORASTOR) 250 mg capsule Take 250 mg by mouth daily       silver sulfadiazine (SILVADENE,SSD) 1 % cream Apply topically 2 (two) times a day Apply to affected area(s) as directed 400 g 1    triamcinolone (KENALOG) 0 1 % ointment Apply topically 2 (two) times a day Apply to affected site as directed  30 g 1    oxyCODONE-acetaminophen (PERCOCET) 5-325 mg per tablet Take 1 tablet by mouth every 6 (six) hours as needed for moderate painMax Daily Amount: 4 tablets (Patient not taking: Reported on 6/2/2021) 20 tablet 0     No current facility-administered medications for this visit       No Known Allergies   Immunizations:     Immunization History   Administered Date(s) Administered    COVID-19 PFIZER VACCINE 0 3 ML IM 03/07/2021, 03/26/2021, 11/06/2021    INFLUENZA 10/02/2020, 10/02/2021    Influenza Split High Dose Preservative Free IM 10/28/2017    Pneumococcal Conjugate 13-Valent 10/28/2017    Pneumococcal Polysaccharide PPV23 11/05/2015    Tdap 10/12/2009, 01/10/2013      Health Maintenance:         Topic Date Due    Colorectal Cancer Screening  05/26/2020    Breast Cancer Screening: Mammogram  03/26/2022    Cervical Cancer Screening  03/16/2022 (Originally 1/19/2020)    Hepatitis C Screening  Completed         Topic Date Due    Pneumococcal Vaccine: 65+ Years (3 of 4 - PPSV23) 11/05/2020      Medicare Health Risk Assessment:     /70   Pulse 67   Temp 97 6 °F (36 4 °C)   Resp 16   Ht 5' 3" (1 6 m)   Wt 98 9 kg (218 lb)   SpO2 99%   BMI 38 62 kg/m²      Rere Anna is here for her Subsequent Wellness visit  Health Risk Assessment:   Patient rates overall health as fair  Patient feels that their physical health rating is same  Patient is satisfied with their life  Eyesight was rated as same  Hearing was rated as same  Patient feels that their emotional and mental health rating is much worse  Patients states they are never, rarely angry  Patient states they are often unusually tired/fatigued  Pain experienced in the last 7 days has been some  Patient's pain rating has been 5/10  Patient states that she has experienced no weight loss or gain in last 6 months  Depression Screening:   PHQ-2 Score: 4  PHQ-9 Score: 12      Fall Risk Screening: In the past year, patient has experienced: no history of falling in past year      Urinary Incontinence Screening:   Patient has not leaked urine accidently in the last six months  Home Safety:  Patient does not have trouble with stairs inside or outside of their home  Patient has working smoke alarms and has working carbon monoxide detector  Home safety hazards include: none  Nutrition:   Current diet is Regular, Low Saturated Fat, Low Cholesterol and Low Carb  Medications:   Patient is currently taking over-the-counter supplements  OTC medications include: see medication list  Patient is able to manage medications       Activities of Daily Living (ADLs)/Instrumental Activities of Daily Living (IADLs):   Walk and transfer into and out of bed and chair?: Yes  Dress and groom yourself?: Yes    Bathe or shower yourself?: Yes    Feed yourself?  Yes  Do your laundry/housekeeping?: Yes  Manage your money, pay your bills and track your expenses?: Yes  Make your own meals?: Yes    Do your own shopping?: Yes    Previous Hospitalizations:   Any hospitalizations or ED visits within the last 12 months?: No      Advance Care Planning:   Living will: Yes    Advanced directive: Yes      PREVENTIVE SCREENINGS      Cardiovascular Screening:    General: Screening Current      Diabetes Screening:     General: Screening Current      Colorectal Cancer Screening:     General: Screening Current    Due for: Colonoscopy - Low Risk      Breast Cancer Screening:     General: History Breast Cancer      Cervical Cancer Screening:    General: Screening Not Indicated      Osteoporosis Screening:    General: Risks and Benefits Discussed    Due for: DXA Axial      Abdominal Aortic Aneurysm (AAA) Screening:        General: Screening Not Indicated      Lung Cancer Screening:     General: Screening Not Indicated      Hepatitis C Screening:    General: Screening Current    Screening, Brief Intervention, and Referral to Treatment (SBIRT)    Screening      AUDIT-C Screenin) How often did you have a drink containing alcohol in the past year? never  2) How many drinks did you have on a typical day when you were drinking in the past year? 0  3) How often did you have 6 or more drinks on one occasion in the past year? never    AUDIT-C Score: 0  Interpretation: Score 0-2 (female): Negative screen for alcohol misuse    Single Item Drug Screening:  How often have you used an illegal drug (including marijuana) or a prescription medication for non-medical reasons in the past year? never    Single Item Drug Screen Score: 0  Interpretation: Negative screen for possible drug use disorder    Brief Intervention  Alcohol & drug use screenings were reviewed  No concerns regarding substance use disorder identified         Nancie Gipson MD

## 2022-02-04 NOTE — PATIENT INSTRUCTIONS
Medicare Preventive Visit Patient Instructions  Thank you for completing your Welcome to Medicare Visit or Medicare Annual Wellness Visit today  Your next wellness visit will be due in one year (2/5/2023)  The screening/preventive services that you may require over the next 5-10 years are detailed below  Some tests may not apply to you based off risk factors and/or age  Screening tests ordered at today's visit but not completed yet may show as past due  Also, please note that scanned in results may not display below  Preventive Screenings:  Service Recommendations Previous Testing/Comments   Colorectal Cancer Screening  * Colonoscopy    * Fecal Occult Blood Test (FOBT)/Fecal Immunochemical Test (FIT)  * Fecal DNA/Cologuard Test  * Flexible Sigmoidoscopy Age: 54-65 years old   Colonoscopy: every 10 years (may be performed more frequently if at higher risk)  OR  FOBT/FIT: every 1 year  OR  Cologuard: every 3 years  OR  Sigmoidoscopy: every 5 years  Screening may be recommended earlier than age 48 if at higher risk for colorectal cancer  Also, an individualized decision between you and your healthcare provider will decide whether screening between the ages of 74-80 would be appropriate  Colonoscopy: 05/26/2015  FOBT/FIT: Not on file  Cologuard: Not on file  Sigmoidoscopy: Not on file    Screening Current     Breast Cancer Screening Age: 36 years old  Frequency: every 1-2 years  Not required if history of left and right mastectomy Mammogram: 03/26/2021    History Breast Cancer   Cervical Cancer Screening Between the ages of 21-29, pap smear recommended once every 3 years  Between the ages of 33-67, can perform pap smear with HPV co-testing every 5 years     Recommendations may differ for women with a history of total hysterectomy, cervical cancer, or abnormal pap smears in past  Pap Smear: 01/19/2017    Screening Not Indicated   Hepatitis C Screening Once for adults born between 1945 and 1965  More frequently in patients at high risk for Hepatitis C Hep C Antibody: 08/26/2020    Screening Current   Diabetes Screening 1-2 times per year if you're at risk for diabetes or have pre-diabetes Fasting glucose: 121 mg/dL   A1C: 5 9 %    Screening Current   Cholesterol Screening Once every 5 years if you don't have a lipid disorder  May order more often based on risk factors  Lipid panel: 08/26/2020    Screening Current     Other Preventive Screenings Covered by Medicare:  1  Abdominal Aortic Aneurysm (AAA) Screening: covered once if your at risk  You're considered to be at risk if you have a family history of AAA  2  Lung Cancer Screening: covers low dose CT scan once per year if you meet all of the following conditions: (1) Age 50-69; (2) No signs or symptoms of lung cancer; (3) Current smoker or have quit smoking within the last 15 years; (4) You have a tobacco smoking history of at least 30 pack years (packs per day multiplied by number of years you smoked); (5) You get a written order from a healthcare provider  3  Glaucoma Screening: covered annually if you're considered high risk: (1) You have diabetes OR (2) Family history of glaucoma OR (3)  aged 48 and older OR (3)  American aged 72 and older  3  Osteoporosis Screening: covered every 2 years if you meet one of the following conditions: (1) You're estrogen deficient and at risk for osteoporosis based off medical history and other findings; (2) Have a vertebral abnormality; (3) On glucocorticoid therapy for more than 3 months; (4) Have primary hyperparathyroidism; (5) On osteoporosis medications and need to assess response to drug therapy  · Last bone density test (DXA Scan): Not on file  5  HIV Screening: covered annually if you're between the age of 12-76  Also covered annually if you are younger than 13 and older than 72 with risk factors for HIV infection   For pregnant patients, it is covered up to 3 times per pregnancy  Immunizations:  Immunization Recommendations   Influenza Vaccine Annual influenza vaccination during flu season is recommended for all persons aged >= 6 months who do not have contraindications   Pneumococcal Vaccine (Prevnar and Pneumovax)  * Prevnar = PCV13  * Pneumovax = PPSV23   Adults 25-60 years old: 1-3 doses may be recommended based on certain risk factors  Adults 72 years old: Prevnar (PCV13) vaccine recommended followed by Pneumovax (PPSV23) vaccine  If already received PPSV23 since turning 65, then PCV13 recommended at least one year after PPSV23 dose  Hepatitis B Vaccine 3 dose series if at intermediate or high risk (ex: diabetes, end stage renal disease, liver disease)   Tetanus (Td) Vaccine - COST NOT COVERED BY MEDICARE PART B Following completion of primary series, a booster dose should be given every 10 years to maintain immunity against tetanus  Td may also be given as tetanus wound prophylaxis  Tdap Vaccine - COST NOT COVERED BY MEDICARE PART B Recommended at least once for all adults  For pregnant patients, recommended with each pregnancy  Shingles Vaccine (Shingrix) - COST NOT COVERED BY MEDICARE PART B  2 shot series recommended in those aged 48 and above     Health Maintenance Due:      Topic Date Due    Colorectal Cancer Screening  05/26/2020    Breast Cancer Screening: Mammogram  03/26/2022    Cervical Cancer Screening  03/16/2022 (Originally 1/19/2020)    Hepatitis C Screening  Completed     Immunizations Due:      Topic Date Due    Pneumococcal Vaccine: 65+ Years (3 of 4 - PPSV23) 11/05/2020     Advance Directives   What are advance directives? Advance directives are legal documents that state your wishes and plans for medical care  These plans are made ahead of time in case you lose your ability to make decisions for yourself  Advance directives can apply to any medical decision, such as the treatments you want, and if you want to donate organs     What are the types of advance directives? There are many types of advance directives, and each state has rules about how to use them  You may choose a combination of any of the following:  · Living will: This is a written record of the treatment you want  You can also choose which treatments you do not want, which to limit, and which to stop at a certain time  This includes surgery, medicine, IV fluid, and tube feedings  · Durable power of  for healthcare [facility] [place]): This is a written record that states who you want to make healthcare choices for you when you are unable to make them for yourself  This person, called a proxy, is usually a family member or a friend  You may choose more than 1 proxy  · Do not resuscitate (DNR) order:  A DNR order is used in case your heart stops beating or you stop breathing  It is a request not to have certain forms of treatment, such as CPR  A DNR order may be included in other types of advance directives  · Medical directive: This covers the care that you want if you are in a coma, near death, or unable to make decisions for yourself  You can list the treatments you want for each condition  Treatment may include pain medicine, surgery, blood transfusions, dialysis, IV or tube feedings, and a ventilator (breathing machine)  · Values history: This document has questions about your views, beliefs, and how you feel and think about life  This information can help others choose the care that you would choose  Why are advance directives important? An advance directive helps you control your care  Although spoken wishes may be used, it is better to have your wishes written down  Spoken wishes can be misunderstood, or not followed  Treatments may be given even if you do not want them  An advance directive may make it easier for your family to make difficult choices about your care     Depression   Depression  is a medical condition that causes feelings of sadness or hopelessness that do not go away  Depression may cause you to lose interest in things you used to enjoy  These feelings may interfere with your daily life  Call your local emergency number (911 in the [address] [place] [address] [address]) if:   · You think about harming yourself or someone else  · You have done something on purpose to hurt yourself  The following resources are available at any time to help you, if needed:   · [facility]: 4-881.373.8771 (2-710-625-SBMF)   · Suicide Hotline: 6-578.648.3810 (7-535-PFXEIXD)   · For a list of international numbers: https://save org/find-help/international-resources/  Treatment for depression may include medicine to relieve depression  Medicine is often used together with therapy  Therapy is a way for you to talk about your feelings and anything that may be causing depression  Therapy can be done alone or in a group  It may also be done with family members or a significant other  · Get regular physical activity  · Create a regular sleep schedule  · Eat a variety of healthy foods  · Do not drink alcohol or use drugs  Weight Management   Why it is important to manage your weight:  Being overweight increases your risk of health conditions such as heart disease, high blood pressure, type 2 diabetes, and certain types of cancer  It can also increase your risk for osteoarthritis, sleep apnea, and other respiratory problems  Aim for a slow, steady weight loss  Even a small amount of weight loss can lower your risk of health problems  How to lose weight safely:  A safe and healthy way to lose weight is to eat fewer calories and get regular exercise  You can lose up about 1 pound a week by decreasing the number of calories you eat by 500 calories each day  Healthy meal plan for weight management:  A healthy meal plan includes a variety of foods, contains fewer calories, and helps you stay healthy  A healthy meal plan includes the following:  · Eat whole-grain foods more often    A healthy meal plan should contain fiber  Fiber is the part of grains, fruits, and vegetables that is not broken down by your body  Whole-grain foods are healthy and provide extra fiber in your diet  Some examples of whole-grain foods are whole-wheat breads and pastas, oatmeal, brown rice, and bulgur  · Eat a variety of vegetables every day  Include dark, leafy greens such as spinach, kale, angel greens, and mustard greens  Eat yellow and orange vegetables such as carrots, sweet potatoes, and winter squash  · Eat a variety of fruits every day  Choose fresh or canned fruit (canned in its own juice or light syrup) instead of juice  Fruit juice has very little or no fiber  · Eat low-fat dairy foods  Drink fat-free (skim) milk or 1% milk  Eat fat-free yogurt and low-fat cottage cheese  Try low-fat cheeses such as mozzarella and other reduced-fat cheeses  · Choose meat and other protein foods that are low in fat  Choose beans or other legumes such as split peas or lentils  Choose fish, skinless poultry (chicken or turkey), or lean cuts of red meat (beef or pork)  Before you cook meat or poultry, cut off any visible fat  · Use less fat and oil  Try baking foods instead of frying them  Add less fat, such as margarine, sour cream, regular salad dressing and mayonnaise to foods  Eat fewer high-fat foods  Some examples of high-fat foods include french fries, doughnuts, ice cream, and cakes  · Eat fewer sweets  Limit foods and drinks that are high in sugar  This includes candy, cookies, regular soda, and sweetened drinks  Exercise:  Exercise at least 30 minutes per day on most days of the week  Some examples of exercise include walking, biking, dancing, and swimming  You can also fit in more physical activity by taking the stairs instead of the elevator or parking farther away from stores  Ask your healthcare provider about the best exercise plan for you        © Copyright [employer] 2018 Information is for End User's use only and may not be sold, redistributed or otherwise used for commercial purposes   All illustrations and images included in CareNotes® are the copyrighted property of A D A M , Inc  or Mike Soliman

## 2022-02-21 ENCOUNTER — HOSPITAL ENCOUNTER (OUTPATIENT)
Dept: RADIOLOGY | Facility: HOSPITAL | Age: 72
Discharge: HOME | End: 2022-02-21
Attending: FAMILY MEDICINE
Payer: MEDICARE

## 2022-02-21 DIAGNOSIS — Z78.0 POST-MENOPAUSAL: ICD-10-CM

## 2022-02-21 PROCEDURE — 77080 DXA BONE DENSITY AXIAL: CPT

## 2022-02-25 ENCOUNTER — APPOINTMENT (OUTPATIENT)
Dept: LAB | Facility: HOSPITAL | Age: 72
End: 2022-02-25
Attending: FAMILY MEDICINE
Payer: MEDICARE

## 2022-02-25 DIAGNOSIS — Z01.818 OTHER SPECIFIED PRE-OPERATIVE EXAMINATION: ICD-10-CM

## 2022-02-25 DIAGNOSIS — Z13.89 SCREENING FOR CARDIOVASCULAR, RESPIRATORY, AND GENITOURINARY DISEASES: ICD-10-CM

## 2022-02-25 DIAGNOSIS — Z01.810 PRE-OPERATIVE CARDIOVASCULAR EXAMINATION: ICD-10-CM

## 2022-02-25 DIAGNOSIS — Z13.83 SCREENING FOR CARDIOVASCULAR, RESPIRATORY, AND GENITOURINARY DISEASES: ICD-10-CM

## 2022-02-25 DIAGNOSIS — N65.0 DEFORMITY OF RECONSTRUCTED BREAST: ICD-10-CM

## 2022-02-25 DIAGNOSIS — Z13.6 SCREENING FOR CARDIOVASCULAR, RESPIRATORY, AND GENITOURINARY DISEASES: ICD-10-CM

## 2022-02-25 LAB
ANION GAP SERPL CALCULATED.3IONS-SCNC: 7 MMOL/L (ref 4–13)
BUN SERPL-MCNC: 17 MG/DL (ref 5–25)
CALCIUM SERPL-MCNC: 8.7 MG/DL (ref 8.3–10.1)
CHLORIDE SERPL-SCNC: 106 MMOL/L (ref 100–108)
CHOLEST SERPL-MCNC: 161 MG/DL
CO2 SERPL-SCNC: 25 MMOL/L (ref 21–32)
CREAT SERPL-MCNC: 0.68 MG/DL (ref 0.6–1.3)
ERYTHROCYTE [DISTWIDTH] IN BLOOD BY AUTOMATED COUNT: 14.1 % (ref 11.6–15.1)
GFR SERPL CREATININE-BSD FRML MDRD: 89 ML/MIN/1.73SQ M
GLUCOSE P FAST SERPL-MCNC: 128 MG/DL (ref 65–99)
HCT VFR BLD AUTO: 43.6 % (ref 34.8–46.1)
HDLC SERPL-MCNC: 39 MG/DL
HGB BLD-MCNC: 14.7 G/DL (ref 11.5–15.4)
LDLC SERPL CALC-MCNC: 102 MG/DL (ref 0–100)
MCH RBC QN AUTO: 29.6 PG (ref 26.8–34.3)
MCHC RBC AUTO-ENTMCNC: 33.7 G/DL (ref 31.4–37.4)
MCV RBC AUTO: 88 FL (ref 82–98)
PLATELET # BLD AUTO: 231 THOUSANDS/UL (ref 149–390)
PMV BLD AUTO: 10.5 FL (ref 8.9–12.7)
POTASSIUM SERPL-SCNC: 3.9 MMOL/L (ref 3.5–5.3)
RBC # BLD AUTO: 4.96 MILLION/UL (ref 3.81–5.12)
SODIUM SERPL-SCNC: 138 MMOL/L (ref 136–145)
TRIGL SERPL-MCNC: 99 MG/DL
WBC # BLD AUTO: 5.96 THOUSAND/UL (ref 4.31–10.16)

## 2022-02-25 PROCEDURE — 80061 LIPID PANEL: CPT

## 2022-02-25 PROCEDURE — 93005 ELECTROCARDIOGRAM TRACING: CPT

## 2022-02-25 PROCEDURE — 80048 BASIC METABOLIC PNL TOTAL CA: CPT

## 2022-02-25 PROCEDURE — 85027 COMPLETE CBC AUTOMATED: CPT

## 2022-02-25 PROCEDURE — 36415 COLL VENOUS BLD VENIPUNCTURE: CPT

## 2022-02-26 LAB
ATRIAL RATE: 64 BPM
P AXIS: 14 DEGREES
PR INTERVAL: 150 MS
QRS AXIS: 2 DEGREES
QRSD INTERVAL: 90 MS
QT INTERVAL: 416 MS
QTC INTERVAL: 429 MS
T WAVE AXIS: 56 DEGREES
VENTRICULAR RATE: 64 BPM

## 2022-02-26 PROCEDURE — 93010 ELECTROCARDIOGRAM REPORT: CPT | Performed by: INTERNAL MEDICINE

## 2022-02-28 NOTE — PROGRESS NOTES
PT Discharge    Today's date: 2022  Patient name: Clara Calvo  : 1950  MRN: [id number]  Referring provider: Geni Cortés MD  Dx:   Encounter Diagnosis     ICD-10-CM    1  Status post left mastectomy  Z90 12    2  Malignant neoplasm of upper-outer quadrant of left breast in female, estrogen receptor positive ([facility] [address] )  C50 412     Z17 0    3  Lymphedema of left upper extremity  I89 0        Start Time: 1615  Stop Time: 7672  Total time in clinic (min): 50 minutes    Assessment  Assessment details: Clara Calvo is a 73y o  year old female who presents to IE with:   Malignant neoplasm of upper-outer quadrant of left breast in female, estrogen receptor positive ([facility] [address] )  (primary encounter diagnosis)  Status post left mastectomy     Kerwin Neville has made the following improvements since beginning PT: decreased pain, decreased fibrosis, increased ROM, increased strength, increased tolerance to activities and knowledge of lymphedema   Kerwin Neville completing both OP PT for L axillary cording and lymphedema and UE pain and concluding [facility] program  Patient discharged to fitness center to continue with exercises independently and encouraged to contact PT with any questions or concerns in the future  Impairments: abnormal or restricted ROM, activity intolerance, impaired physical strength, lacks appropriate home exercise program, pain with function and scapular dyskinesis  Functional limitations: reaching, lifting, overhead activity, use of L UEBarriers to therapy: L breast cancer, adjuvant chemotherapy and radiation   Understanding of Dx/Px/POC: good   Prognosis: good    Goals  Short-term goals:   1  Patient's pain will be decreased to 3/10 within 4 weeks - Met  2  Patient's strength will increase to 4/5 within 4 weeks - Progressing     Long-term goals:   1  Patient will be able to perform lift L UE with minimal to no pain at time of discharge- Progressing   2   Patient will be able to perform perform IADL with minimal to no pain at time of discharge - Progressing   3  Patient will be independent in HEP at time of discharge  - Progressing       Plan  Plan details: Thank you for referring Alphonso Burgos to Physical Therapy at [place] [address] and for the opportunity to coordinate care  Patient would benefit from: skilled PT and PT eval  Planned modality interventions: electrical stimulation/Russian stimulation, cryotherapy and unattended electrical stimulation  Planned therapy interventions: home exercise program, manual therapy, patient education, postural training, strengthening, stretching, therapeutic activities, therapeutic exercise, joint mobilization, IADL retraining and ADL training  Frequency: 2x week  Treatment plan discussed with: patient and PTA        Subjective Evaluation    History of Present Illness  Mechanism of injury: Discharge 1/24/22  Aries Blevins has been seen for total of 16 visits for OP PT for L breast cancer with surgery, adjuvant chemotherapy and radiation  Patient currently undergoing week 3 of radiation, reporting skin soreness and sensitivity, no open sores at this time  Patient's global rating of change for L UE pain is " Quite a bit better (5) " Patient verbalizing compliance with L UE sleeve and knowledge of lymphedema  Patient reports improvements with overall ROM, strength, pain levels, and tolerance for L UE activities including reaching, lifting, overhead activity, and yard work  [facility] and is discharged from OP PT at this time to continue exercising independently, encouraged to contact PT with questions or concerns in the future  Aries Blevins is a 73 y o  female who presents to IE with diagnosis of L breast cancer  HER2+: no  ER/WA + : yes  Family history of cancer: yes; aunt with breast cancer  Patient was diagnosed: March 2021 secondary to palpating a lump on left side     Mastectomy: yes; 6/8/21 with lymph node removal   Chemotherapy: yes , how many rounds: 4 , side effects: yes; hair loss, fatigue, appetite; Arimidex beginning next week   Radiation: yes, simulation tomorrow, how many rounds: 5 weeks every day,   Fatigue: yes  Neuropathy: no  Lymphedema/ swelling: yes  Breast pain: no   Breast discharge: no  Current symptoms: L UE pain   Patient is LHD  Patient reports about 3-4 weeks ago she went to reach for alarm clock and "felt a pull and pop in the arm and I think it's been swollen since "           Pain  Current pain ratin  At best pain ratin  At worst pain rating: 3  Location: L shoulder           Objective     Postural Observations  Seated posture: fair  Standing posture: fair    Additional Postural Observation Details  Patient demonstrates increased thoracic kyphosis, cervical protrusion, and rounded shoulders     Observations   Left Shoulder   Positive for edema and incision  Additional Observation Details  Patient's incisions demonstrate proper healing at this time  No bleeding or drainage observed at this time  Patient denies any constitutional symptoms at this time  Patient demonstrating swelling at this time  Reviewed symptoms of DVT with patient with verbalized understanding and compliance  Less edema and fibrosis observed and palpated in L upper arm and forearm compared to IE  Patient demonstrating increase in redness across radiation field  Palpation   Left   Hypertonic in the pectoralis major, pectoralis minor and upper trapezius  Trigger point to upper trapezius       Additional Palpation Details  Observable and palpable cord along medial left upper arm no longer present     Neurological Testing     Sensation     Shoulder   Left Shoulder   Intact: light touch    Right Shoulder   Intact: light touch    Active Range of Motion   Left Shoulder   Flexion: 155 degrees   Abduction: 160 degrees   External rotation BTH: C7   Internal rotation BTB: T12     Right Shoulder   Flexion: 160 degrees   Abduction: 160 degrees External rotation BTH: C7   Internal rotation BTB: T12     Strength/Myotome Testing     Left Shoulder     Planes of Motion   Flexion: 4-   Abduction: 4-   External rotation at 0°: 4-   Internal rotation at 0°: 4-     Right Shoulder     Planes of Motion   Flexion: 4   Abduction: 4   External rotation at 0°: 4   Internal rotation at 0°: 4       Flowsheet Rows      Most Recent Value   PT/OT G-Codes    Current Score 72   Projected Score 74

## 2022-03-08 ENCOUNTER — TELEPHONE (OUTPATIENT)
Dept: FAMILY MEDICINE CLINIC | Facility: CLINIC | Age: 72
End: 2022-03-08

## 2022-03-08 NOTE — TELEPHONE ENCOUNTER
Patient needs form filled out by Dr Blanca Baez to clear her for surgery  Please fax when complete, fax number on form  In nurse pending 1    Debra Huerta

## 2022-03-08 NOTE — TELEPHONE ENCOUNTER
Patient has a pre-op exam scheduled with Dr Clyde Mustafa on Friday  Forms placed in Dr Ochoa Anderson folder  Patient is aware they will be filled out then     Kaylie Miranda MA

## 2022-03-09 ENCOUNTER — OFFICE VISIT (OUTPATIENT)
Dept: HEMATOLOGY ONCOLOGY | Facility: CLINIC | Age: 72
End: 2022-03-09
Payer: MEDICARE

## 2022-03-09 ENCOUNTER — TELEPHONE (OUTPATIENT)
Dept: HEMATOLOGY ONCOLOGY | Facility: CLINIC | Age: 72
End: 2022-03-09

## 2022-03-09 VITALS
HEIGHT: 63 IN | RESPIRATION RATE: 18 BRPM | OXYGEN SATURATION: 97 % | TEMPERATURE: 97.2 F | BODY MASS INDEX: 38.45 KG/M2 | HEART RATE: 70 BPM | SYSTOLIC BLOOD PRESSURE: 126 MMHG | WEIGHT: 217 LBS | DIASTOLIC BLOOD PRESSURE: 70 MMHG

## 2022-03-09 DIAGNOSIS — Z17.0 MALIGNANT NEOPLASM OF UPPER-OUTER QUADRANT OF LEFT BREAST IN FEMALE, ESTROGEN RECEPTOR POSITIVE (HCC): Primary | ICD-10-CM

## 2022-03-09 DIAGNOSIS — C50.412 MALIGNANT NEOPLASM OF UPPER-OUTER QUADRANT OF LEFT BREAST IN FEMALE, ESTROGEN RECEPTOR POSITIVE (HCC): Primary | ICD-10-CM

## 2022-03-09 PROCEDURE — 99214 OFFICE O/P EST MOD 30 MIN: CPT | Performed by: INTERNAL MEDICINE

## 2022-03-09 NOTE — PROGRESS NOTES
Hematology / Oncology Outpatient Follow Up Note    Martínez Varghese 73 y o  female NVR:[id number] YEA:[id number]         Date:  3/9/2022    Assessment / Plan:    A 68-year-old postmenopausal woman with stage III A left breast cancer, grade 2 with invasive lobular histology, ER 90% positive, UT 1% positive, HER2 negative disease   She is negative for BRCA gene mutation   She had 4 positive lymph nodes   She underwent mastectomy and axillary lymph node dissection, resulting in MADAN  She completed adjuvant chemotherapy with Taxotere and cyclophosphamide followed by postmastectomy radiation therapy  She is currently on adjuvant hormonal therapy with anastrozole with no toxicity  Clinically, she has no evidence recurrent disease  I recommended her to continue anastrozole 1 mg once a day  She is in agreement with my recommendation  I will see her again in 6 months for routine follow-up         Subjective:      HPI:   A 68-year-old postmenopausal woman who recently noticed a lump in her left breast which she brought to medical attention  Mauricio Noel was found to have large left breast mass radiographically which was biopsied in April 19, 2021 which showed invasive ductal carcinoma, grade 2  This was ER 90% positive, UT 1% positive, HER2 negative disease   She subsequently underwent mastectomy and axillary lymph node dissection by Dr Violeta Delgado in June 8, 2021  She had 60 x 38 x 30 mm of invasive lobular carcinoma, grade 2   Lymphovascular invasion was present   4/10 axillary lymph nodes were positive for metastatic disease with largest measuring  35 mm   Extranodal extension was present   She did not have reconstruction at the same time of mastectomy   She presents today to discuss adjuvant treatment options   She feels well  Mauricio Noel is afebrile  Mauricio Noel has no complaint of pain   She still have limited range of motion in her left shoulder   She has 2 drain still in place   She has no respiratory symptoms   Her CT scan showed 7 mm pulmonary nodule which is indeterminate   Bone scan was negative for osseous metastasis   Her performance status is normal   She is negative for BRCA gene mutation            Interval History:  A 68-year-old postmenopausal woman with stage III A left breast cancer, grade 2 with invasive lobular histology, ER 90% positive, ME 1% positive, HER2 negative disease   She is negative for BRCA gene mutation   She had 4 positive lymph nodes   She underwent mastectomy and axillary lymph node dissection, resulting in MADAN  She underwent adjuvant chemotherapy with Taxotere and cyclophosphamide with excellent tolerance which was completed in September 2021  She subsequently had postmastectomy radiation therapy with mild skin toxicity  Since October 2021, she has been on adjuvant hormonal therapy with anastrozole  She presents today for follow-up  She has not noticed any major side effects from anastrozole  She has no hot flashes or musculoskeletal symptoms  She denied any bone pain  Her weight is stable  She has no respiratory symptoms  Her performance status is normal        Objective:      Primary Diagnosis:     1    Left breast cancer, stage III A (pT3, pN2, M0 ) grade 2 with invasive lobular histology, ER 90% positive, ME 1% positive, HER2 negative disease   Diagnosed in June 2021      2   BRCA gene mutation negative      Cancer Staging:  Cancer Staging  No matching staging information was found for the patient         Previous Hematologic/ Oncologic Treatment:       Adjuvant chemotherapy with Taxotere and cyclophosphamide x4 cycles    Completed in September 2021      Current Hematologic/ Oncologic Treatment:       Adjuvant hormonal therapy with anastrozole since October 2021      Disease Status:       MADAN status post mastectomy and axillary lymph node dissection      Test Results:     Pathology:       60 x 38 x 30 mm of invasive lobular carcinoma, grade 2   Lymphovascular invasion was present   4/10 axillary lymph nodes were positive for metastatic disease with largest tumor measuring 3 5 cm   Extranodal extension was positive   ER 90% positive, PA 1% positive, HER2 negative disease   Stage III A (pT3, pN2, M0)     Radiology:      CT scan of chest abdomen pelvis showed a 7 mm of pulmonary nodules        Bone scan was negative for osseous metastasis      Laboratory:       See below      Physical Exam:        General Appearance:    Alert, oriented          Eyes:    PERRL   Ears:    Normal external ear canals, both ears   Nose:   Nares normal, septum midline   Throat:   Mucosa moist  Pharynx without injection  Neck:   Supple         Lungs:     Clear to auscultation bilaterally   Chest Wall:    No tenderness or deformity    Heart:    Regular rate and rhythm         Abdomen:     Soft, non-tender, bowel sounds +, no organomegaly               Extremities:   Extremities no cyanosis or edema         Skin:   no rash or icterus  Lymph nodes:   Cervical, supraclavicular, and axillary nodes normal   Neurologic:   CNII-XII intact, normal strength, sensation and reflexes     Throughout             Breast exam:     Status post left mastectomy without reconstruction   No palpable abnormality in her left chest wall   Right breast exam is negative  ROS: Review of Systems   All other systems reviewed and are negative  Imaging: DXA bone density spine hip and pelvis    Result Date: 2/21/2022  Narrative: CENTRAL  DXA SCAN CLINICAL HISTORY:  30-year-old postmenopausal female  OTHER RISK FACTORS:  Arimidex therapy  PHARMACOLOGIC THERAPY FOR OSTEOPOROSIS:  None  TECHNIQUE: Bone densitometry was performed using a [employer]   bone densitometer  Regions of interest appear properly placed  COMPARISON: 1/21/2013   RESULTS: LUMBAR SPINE L1-L4 : BMD  1 218  gm/cm2 T-score 0 2 LEFT TOTAL HIP: BMD: 1 005  gm/cm2 T-score: 0 0 LEFT FEMORAL NECK: BMD: 0 835  gm/cm2 T score: -1 5 RIGHT TOTAL HIP: BMD:  0 99  gm/cm2 T-score: -0 1 RIGHT FEMORAL NECK: BMD:  0 868  gm/cm2  T score: -1 2     Impression: 1  Low bone mass (osteopenia)  2   Since a DXA study from 1/21/2013, there has been: A  STATISTICALLY SIGNIFICANT DECREASE in bone mineral density of  -0 047 g/cm2 (-4 5)% in the hips  3   The 10 year risk of hip fracture is 1 1% with the 10 year risk of major osteoporotic fracture being 8 8% as calculated by the [facility]/WHO fracture risk assessment tool (FRAX)  4   The current NOF guidelines recommend treating patients with a T-score of -2 5 or less in the lumbar spine or hips, or in post-menopausal women and men over the age of 48 with low bone mass (osteopenia) and a FRAX 10 year risk score of >3% for hip fracture and/or >20% for major osteoporotic fracture  5   The NOF recommends follow-up DXA in 1-2 years after initiating therapy for osteoporosis and every 2 years thereafter  More frequent evaluation is appropriate for patients with conditions associated with rapid bone loss, such as glucocorticoid therapy  The interval between DXA screenings may be longer for individuals without major risk factors and initial T-score in the normal or upper low bone mass range  The FRAX algorithm has certain limitations: -FRAX has not been validated in patients currently or previously treated with pharmacotherapy for osteoporosis  In such patients, clinical judgment must be exercised in interpreting FRAX scores  -Prior hip, vertebral and humeral fragility fractures appear to confer greater risk of subsequent fracture than fractures at other sites (this is especially true for individuals with severe vertebral fractures), but quantification of this incremental risk is not possible with FRAX  -FRAX underestimates fracture risk in patients with history of multiple fragility fractures  -FRAX may underestimate fracture risk in patients with history of frequent falls  -It is not appropriate to use FRAX to monitor treatment response   WHO CLASSIFICATION: Normal (a T-score of -1 0 or higher) Low bone mineral density (a T-score of less than -1 0 but higher than -2 5) Osteoporosis (a T-score of -2 5 or less) Severe osteoporosis (a T-score of -2 5 or less with a fragility fracture) LEAST SIGNIFICANT CHANGE AT 95% C I: Lumbar spine: 0 036 gm/cm2 (3 2%)  Total hip: 0 018 gm/cm2 (2 0%)  Forearm: 0 024 gm/cm2 (3 2%)  Workstation performed: [id number]         Labs:   Lab Results   Component Value Date    WBC 5 96 02/25/2022    HGB 14 7 02/25/2022    HCT 43 6 02/25/2022    MCV 88 02/25/2022     02/25/2022     Lab Results   Component Value Date     01/27/2017    K 3 9 02/25/2022     02/25/2022    CO2 25 02/25/2022    BUN 17 02/25/2022    CREATININE 0 68 02/25/2022    GLUCOSE 131 (H) 01/27/2017    GLUF 128 (H) 02/25/2022    CALCIUM 8 7 02/25/2022    CORRECTEDCA 9 2 08/24/2021    AST 19 09/16/2021    ALT 43 09/16/2021    ALKPHOS 76 09/16/2021    PROT 6 5 01/27/2017    BILITOT 0 8 01/27/2017    EGFR 89 02/25/2022           Current Medications: Reviewed  Allergies: Reviewed  PMH/FH/SH:  Reviewed      Vital Sign:    Body surface area is 2 meters squared      Wt Readings from Last 3 Encounters:   03/09/22 98 4 kg (217 lb)   02/04/22 98 9 kg (218 lb)   09/30/21 98 8 kg (217 lb 12 8 oz)        Temp Readings from Last 3 Encounters:   03/09/22 (!) 97 2 °F (36 2 °C) (Tympanic Core)   02/04/22 97 6 °F (36 4 °C)   09/30/21 (!) 97 4 °F (36 3 °C) (Temporal)        BP Readings from Last 3 Encounters:   03/09/22 126/70   02/04/22 132/70   09/30/21 122/90         Pulse Readings from Last 3 Encounters:   03/09/22 70   02/04/22 67   09/30/21 60     @LASTSAO2(3)@

## 2022-03-09 NOTE — TELEPHONE ENCOUNTER
Patient was given date, time and location of follow up appointment in 6 months  If patient needs to reschedule she was also given hopelines number

## 2022-03-11 ENCOUNTER — OFFICE VISIT (OUTPATIENT)
Dept: FAMILY MEDICINE CLINIC | Facility: CLINIC | Age: 72
End: 2022-03-11
Payer: MEDICARE

## 2022-03-11 VITALS
RESPIRATION RATE: 16 BRPM | TEMPERATURE: 97.8 F | DIASTOLIC BLOOD PRESSURE: 84 MMHG | WEIGHT: 219 LBS | OXYGEN SATURATION: 100 % | BODY MASS INDEX: 43 KG/M2 | SYSTOLIC BLOOD PRESSURE: 130 MMHG | HEART RATE: 76 BPM | HEIGHT: 60 IN

## 2022-03-11 DIAGNOSIS — Z17.0 MALIGNANT NEOPLASM OF UPPER-OUTER QUADRANT OF LEFT BREAST IN FEMALE, ESTROGEN RECEPTOR POSITIVE (HCC): ICD-10-CM

## 2022-03-11 DIAGNOSIS — C50.412 MALIGNANT NEOPLASM OF UPPER-OUTER QUADRANT OF LEFT BREAST IN FEMALE, ESTROGEN RECEPTOR POSITIVE (HCC): ICD-10-CM

## 2022-03-11 DIAGNOSIS — Z01.818 PREOP EXAMINATION: Primary | ICD-10-CM

## 2022-03-11 DIAGNOSIS — N65.0 DEFORMITY OF RECONSTRUCTED BREAST: ICD-10-CM

## 2022-03-11 PROCEDURE — 99214 OFFICE O/P EST MOD 30 MIN: CPT | Performed by: FAMILY MEDICINE

## 2022-03-11 RX ORDER — ONDANSETRON 4 MG/1
TABLET, ORALLY DISINTEGRATING ORAL
COMMUNITY
Start: 2022-03-09 | End: 2022-03-11 | Stop reason: ALTCHOICE

## 2022-03-11 NOTE — PROGRESS NOTES
FAMILY PRACTICE PRE-OPERATIVE EVALUATION  [facility]    NAME: Vic Maharaj  AGE: 73 y o  SEX: female  : 1950     DATE: 3/11/2022        Surgeon:  Guillermina Farooq MD  Planned procedure: RIGHT BREAST REDUCTION; LEFT BREAST EXCISION STANDING SKIN DEFORMITY; LOCAL FLAP   Diagnosis for procedure:   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] Personal history of malignant neoplasm of breast [Z85 3]  Procedure date: 3/18/22    Assessment/Plan:    Patient is medically optimized (cleared) for the planned procedure  Further testing/evaluation is not required  Postop concerns: no    Problem List Items Addressed This Visit        Other    Malignant neoplasm of upper-outer quadrant of left breast in female, estrogen receptor positive ([facility] [address] )      Other Visit Diagnoses     Preop examination    -  Primary    Deformity of reconstructed breast                Pre-Surgery Instructions:   Medication Instructions    anastrozole (ARIMIDEX) 1 mg tablet per anesthesia guidelines     aspirin 81 MG tablet Stop taking 1 week prior to surgery    CRANIAL PROSTHESIS, RX, per anesthesia guidelines     fluticasone (FLONASE) 50 mcg/act nasal spray per anesthesia guidelines     metoprolol tartrate (LOPRESSOR) 50 mg tablet per anesthesia guidelines     Multiple Vitamins-Minerals (MULTIVITAMIN ADULT PO) per anesthesia guidelines     ondansetron (ZOFRAN) 4 mg tablet per anesthesia guidelines     oxyCODONE-acetaminophen (PERCOCET) 5-325 mg per tablet per anesthesia guidelines     saccharomyces boulardii (FLORASTOR) 250 mg capsule per anesthesia guidelines     silver sulfadiazine (SILVADENE,SSD) 1 % cream per anesthesia guidelines     triamcinolone (KENALOG) 0 1 % ointment per anesthesia guidelines         Subjective:      Patient ID: Vic Maharaj is a 73 y o  female      Chief Complaint   Patient presents with    Pre-op Exam     mz cma       HPI    The following portions of the patient's history were reviewed and updated as appropriate: allergies, current medications, past family history, past medical history, past social history, past surgical history and problem list       Prior anesthesia: Yes   General; Complications:  None / Tolerated well    CAD History: None   NOTE: Patient should see Cardiology if time available before surgery, and if appropriate  Pulmonary History: None    Renal history: None    Diabetes History:  None- impaired fasting glucose for the past year  Neurological History: None       Review of Systems   Constitutional: Negative  HENT: Negative  Eyes: Negative  Respiratory: Negative  Cardiovascular: Negative  Gastrointestinal: Negative  Endocrine: Negative  Genitourinary: Negative  Musculoskeletal: Negative  Skin: Negative  Allergic/Immunologic: Negative  Neurological: Negative  Hematological: Negative  Psychiatric/Behavioral: Negative            Current Outpatient Medications   Medication Sig Dispense Refill    anastrozole (ARIMIDEX) 1 mg tablet TAKE ONE TABLET BY MOUTH EVERY DAY 90 tablet 1    aspirin 81 MG tablet Take by mouth daily      CRANIAL PROSTHESIS, RX, One wig as needed 1 each 0    fluticasone (FLONASE) 50 mcg/act nasal spray 2 puffs into each nostril daily        metoprolol tartrate (LOPRESSOR) 50 mg tablet TAKE ONE TABLET BY MOUTH EVERY DAY 90 tablet 3    Multiple Vitamins-Minerals (MULTIVITAMIN ADULT PO) Take by mouth daily      ondansetron (ZOFRAN) 4 mg tablet Take 1 tablet (4 mg total) by mouth every 8 (eight) hours as needed for nausea or vomiting 30 tablet 1    oxyCODONE-acetaminophen (PERCOCET) 5-325 mg per tablet Take 1 tablet by mouth every 6 (six) hours as needed for moderate painMax Daily Amount: 4 tablets 20 tablet 0    saccharomyces boulardii (FLORASTOR) 250 mg capsule Take 250 mg by mouth daily       silver sulfadiazine (SILVADENE,SSD) 1 % cream Apply topically 2 (two) times a day Apply to affected area(s) as directed 400 g 1    triamcinolone (KENALOG) 0 1 % ointment Apply topically 2 (two) times a day Apply to affected site as directed  30 g 1     No current facility-administered medications for this visit  Allergies on file:   Patient has no known allergies  Patient Active Problem List   Diagnosis    Benign essential hypertension    Chronic rhinitis    Hidradenitis suppurativa    Hypothyroidism    Impaired fasting glucose    Morbid obesity ([facility] [address] )    Venous insufficiency (chronic) (peripheral)    Malignant neoplasm of upper-outer quadrant of left breast in female, estrogen receptor positive ([facility] [address] )    Chemotherapy induced neutropenia (HCC)        Past Medical History:   Diagnosis Date    BRCA gene mutation negative 05/19/2021    [employer]; 36 gene panel    Breast cancer ([facility] [address] )     Hypertension        Past Surgical History:   Procedure Laterality Date    AXILLARY SURGERY Right     sweat glands removed -     BREAST CYST EXCISION Right 2000    benign    BREAST SURGERY Right     CHOLECYSTECTOMY      COLONOSCOPY      NE MASTECTOMY, MODIFIED RADICAL Left 6/8/2021    Procedure: BREAST MODIFIED RADICAL MASTECTOMY; ANAND  DIRECTED AXILLARY DISSECTION;  Surgeon: Ellie Walters MD;  [facility];  Service: Surgical Oncology    US BREAST NEEDLE LOC LEFT Left 6/2/2021    US GUIDED BREAST BIOPSY LEFT COMPLETE Left 4/19/2021    US GUIDED BREAST LYMPH NODE BIOPSY LEFT Left 4/19/2021       Family History   Problem Relation Age of Onset    Dementia Mother     Colon cancer Mother         55's-59's    Lung cancer Father         55's-59's    No Known Problems Sister     Breast cancer Paternal Aunt     No Known Problems Sister     Stomach cancer Paternal Uncle         66's       Social History     Tobacco Use    Smoking status: Never Smoker    Smokeless tobacco: Never Used   Vaping Use    Vaping Use: Never used   Substance Use Topics    Alcohol use: No    Drug use:  No Objective:    Vitals:    03/11/22 1553   BP: 130/84   Pulse: 76   Resp: 16   Temp: 97 8 °F (36 6 °C)   SpO2: 100%   Weight: 99 3 kg (219 lb)   Height: 5' 0 28" (1 531 m)        Physical Exam  Constitutional:       General: She is not in acute distress  Appearance: Normal appearance  She is well-developed  She is not diaphoretic  HENT:      Head: Normocephalic and atraumatic  Right Ear: Tympanic membrane, ear canal and external ear normal  There is no impacted cerumen  Left Ear: Tympanic membrane, ear canal and external ear normal  There is no impacted cerumen  Eyes:      General: No scleral icterus  Right eye: No discharge  Left eye: No discharge  Extraocular Movements: Extraocular movements intact  Conjunctiva/sclera: Conjunctivae normal       Pupils: Pupils are equal, round, and reactive to light  Cardiovascular:      Rate and Rhythm: Normal rate and regular rhythm  Heart sounds: Normal heart sounds  No murmur heard  No friction rub  No gallop  Pulmonary:      Effort: Pulmonary effort is normal  No respiratory distress  Breath sounds: Normal breath sounds  No wheezing or rales  Chest:      Chest wall: No tenderness  Abdominal:      General: Bowel sounds are normal  There is no distension  Palpations: Abdomen is soft  There is no mass  Tenderness: There is no abdominal tenderness  There is no guarding or rebound  Musculoskeletal:         General: No deformity  Normal range of motion  Cervical back: Normal range of motion and neck supple  Skin:     General: Skin is warm and dry  Findings: No erythema or rash  Neurological:      Mental Status: She is alert and oriented to person, place, and time  Psychiatric:         Behavior: Behavior normal          Thought Content:  Thought content normal          Judgment: Judgment normal            Preop labs/testing available and reviewed: yes    eGFR   Date Value Ref Range Status 2022 89 ml/min/1 73sq m Final     WBC   Date Value Ref Range Status   2022 5 96 4 31 - 10 16 Thousand/uL Final          EKG yes    Echo no    Stress test/cath no    PFT/Marcus no    Functional capacity: Walking , 4-5 MPH               4 Mets   Pick the highest level patient can comfortably perform   4 mets or greater for surgery    RCRI  High Risk surgery? 1 Point  CAD History:         1 Point   MI; Positive Stress Test; CP due to Mi;  Nitrate Usage to control Angina;  Pathologic Q wave on EKG  CHF Active:         1 Point   Pulm Edema; Paroxysmal Nocturnal Dyspnea;  Bibasilar Rales (crackles);S3; CHF on CXR  Cerebrovascular Disease (TIA or CVA):     1 Point  DM on Insulin:        1 Point  Serum Creat >2 0 mg/dl:       1 Point          Total Points: 0     Scorin: Class I, Very Low Risk (0 4%)     1: Class II, Low risk (0 9%)     2: Class III Moderate (6 6%)     3: Class IV High (>11%)      ASHLIE Risk:  GFR:   eGFR   Date Value Ref Range Status   2022 89 ml/min/1 73sq m Final         Marylee Shuck, MD

## 2022-03-15 RX ORDER — CEPHALEXIN 500 MG/1
CAPSULE ORAL
COMMUNITY
Start: 2022-03-09 | End: 2022-06-29

## 2022-03-15 NOTE — PRE-PROCEDURE INSTRUCTIONS
Pre-Surgery Instructions:   Medication Instructions    anastrozole (ARIMIDEX) 1 mg tablet Instructed patient per Anesthesia Guidelines   Ascorbic Acid (VITAMIN C PO) Instructed patient per Anesthesia Guidelines   aspirin 81 MG tablet Patient was instructed by Physician and understands  per pt was instructed by Dr Fady Lange last dose 3/10    fluticasone (FLONASE) 50 mcg/act nasal spray Instructed patient per Anesthesia Guidelines   metoprolol tartrate (LOPRESSOR) 50 mg tablet Instructed patient per Anesthesia Guidelines   Multiple Vitamins-Minerals (MULTIVITAMIN ADULT PO) Instructed patient per Anesthesia Guidelines   ondansetron (ZOFRAN) 4 mg tablet Instructed patient per Anesthesia Guidelines   oxyCODONE-acetaminophen (PERCOCET) 5-325 mg per tablet Instructed patient per Anesthesia Guidelines   VITAMIN D PO Instructed patient per Anesthesia Guidelines  Patient  Instructed to take*metoprolol and arimidex*with a sip of water the morning of surgery and if needed tylenol  Patient  instructed on use of chlorhexidine soap per hospital protocol    Patient instructed to stop all  NSAIDS, vitamins and herbal supplements from now to surgery or per Dr Nurys Silva

## 2022-03-17 ENCOUNTER — ANESTHESIA EVENT (OUTPATIENT)
Dept: PERIOP | Facility: HOSPITAL | Age: 72
End: 2022-03-17
Payer: MEDICARE

## 2022-03-18 ENCOUNTER — ANESTHESIA (OUTPATIENT)
Dept: PERIOP | Facility: HOSPITAL | Age: 72
End: 2022-03-18
Payer: MEDICARE

## 2022-03-18 ENCOUNTER — HOSPITAL ENCOUNTER (OUTPATIENT)
Facility: HOSPITAL | Age: 72
Setting detail: OUTPATIENT SURGERY
Discharge: HOME | End: 2022-03-18
Attending: PLASTIC SURGERY | Admitting: PLASTIC SURGERY
Payer: MEDICARE

## 2022-03-18 VITALS
DIASTOLIC BLOOD PRESSURE: 58 MMHG | TEMPERATURE: 97.5 F | SYSTOLIC BLOOD PRESSURE: 121 MMHG | HEIGHT: 61 IN | WEIGHT: 218.92 LBS | HEART RATE: 74 BPM | OXYGEN SATURATION: 94 % | BODY MASS INDEX: 41.33 KG/M2 | RESPIRATION RATE: 20 BRPM

## 2022-03-18 DIAGNOSIS — N65.0 DEFORMITY OF RECONSTRUCTED BREAST: ICD-10-CM

## 2022-03-18 DIAGNOSIS — Z85.3 PERSONAL HISTORY OF MALIGNANT NEOPLASM OF BREAST: ICD-10-CM

## 2022-03-18 PROCEDURE — 88305 TISSUE EXAM BY PATHOLOGIST: CPT | Performed by: PATHOLOGY

## 2022-03-18 RX ORDER — SODIUM CHLORIDE 9 MG/ML
125 INJECTION, SOLUTION INTRAVENOUS CONTINUOUS
Status: DISCONTINUED | OUTPATIENT
Start: 2022-03-18 | End: 2022-03-18 | Stop reason: HOSPADM

## 2022-03-18 RX ORDER — FENTANYL CITRATE/PF 50 MCG/ML
50 SYRINGE (ML) INJECTION
Status: DISCONTINUED | OUTPATIENT
Start: 2022-03-18 | End: 2022-03-18 | Stop reason: HOSPADM

## 2022-03-18 RX ORDER — NEOSTIGMINE METHYLSULFATE 1 MG/ML
INJECTION INTRAVENOUS AS NEEDED
Status: DISCONTINUED | OUTPATIENT
Start: 2022-03-18 | End: 2022-03-18

## 2022-03-18 RX ORDER — ROCURONIUM BROMIDE 10 MG/ML
INJECTION, SOLUTION INTRAVENOUS AS NEEDED
Status: DISCONTINUED | OUTPATIENT
Start: 2022-03-18 | End: 2022-03-18

## 2022-03-18 RX ORDER — CEFAZOLIN SODIUM 1 G/50ML
1000 SOLUTION INTRAVENOUS
Status: DISCONTINUED | OUTPATIENT
Start: 2022-03-18 | End: 2022-03-18 | Stop reason: HOSPADM

## 2022-03-18 RX ORDER — CEFAZOLIN SODIUM 1 G/50ML
SOLUTION INTRAVENOUS AS NEEDED
Status: DISCONTINUED | OUTPATIENT
Start: 2022-03-18 | End: 2022-03-18

## 2022-03-18 RX ORDER — PROPOFOL 10 MG/ML
INJECTION, EMULSION INTRAVENOUS AS NEEDED
Status: DISCONTINUED | OUTPATIENT
Start: 2022-03-18 | End: 2022-03-18

## 2022-03-18 RX ORDER — MIDAZOLAM HYDROCHLORIDE 2 MG/2ML
INJECTION, SOLUTION INTRAMUSCULAR; INTRAVENOUS AS NEEDED
Status: DISCONTINUED | OUTPATIENT
Start: 2022-03-18 | End: 2022-03-18

## 2022-03-18 RX ORDER — ONDANSETRON 2 MG/ML
4 INJECTION INTRAMUSCULAR; INTRAVENOUS EVERY 8 HOURS PRN
Status: DISCONTINUED | OUTPATIENT
Start: 2022-03-18 | End: 2022-03-18 | Stop reason: HOSPADM

## 2022-03-18 RX ORDER — FENTANYL CITRATE 50 UG/ML
INJECTION, SOLUTION INTRAMUSCULAR; INTRAVENOUS AS NEEDED
Status: DISCONTINUED | OUTPATIENT
Start: 2022-03-18 | End: 2022-03-18

## 2022-03-18 RX ORDER — GLYCOPYRROLATE 0.2 MG/ML
INJECTION INTRAMUSCULAR; INTRAVENOUS AS NEEDED
Status: DISCONTINUED | OUTPATIENT
Start: 2022-03-18 | End: 2022-03-18

## 2022-03-18 RX ORDER — DEXAMETHASONE SODIUM PHOSPHATE 4 MG/ML
INJECTION, SOLUTION INTRA-ARTICULAR; INTRALESIONAL; INTRAMUSCULAR; INTRAVENOUS; SOFT TISSUE AS NEEDED
Status: DISCONTINUED | OUTPATIENT
Start: 2022-03-18 | End: 2022-03-18

## 2022-03-18 RX ORDER — ONDANSETRON 2 MG/ML
INJECTION INTRAMUSCULAR; INTRAVENOUS AS NEEDED
Status: DISCONTINUED | OUTPATIENT
Start: 2022-03-18 | End: 2022-03-18

## 2022-03-18 RX ORDER — ONDANSETRON 2 MG/ML
4 INJECTION INTRAMUSCULAR; INTRAVENOUS ONCE AS NEEDED
Status: DISCONTINUED | OUTPATIENT
Start: 2022-03-18 | End: 2022-03-18 | Stop reason: HOSPADM

## 2022-03-18 RX ORDER — OXYCODONE HYDROCHLORIDE AND ACETAMINOPHEN 5; 325 MG/1; MG/1
2 TABLET ORAL EVERY 4 HOURS PRN
Status: DISCONTINUED | OUTPATIENT
Start: 2022-03-18 | End: 2022-03-18 | Stop reason: HOSPADM

## 2022-03-18 RX ADMIN — SODIUM CHLORIDE: 9 INJECTION, SOLUTION INTRAVENOUS at 13:44

## 2022-03-18 RX ADMIN — FENTANYL CITRATE 50 MCG: 50 INJECTION INTRAMUSCULAR; INTRAVENOUS at 12:28

## 2022-03-18 RX ADMIN — NEOSTIGMINE METHYLSULFATE 2.5 MG: 1 INJECTION INTRAVENOUS at 14:53

## 2022-03-18 RX ADMIN — FENTANYL CITRATE 25 MCG: 50 INJECTION INTRAMUSCULAR; INTRAVENOUS at 14:34

## 2022-03-18 RX ADMIN — CEFAZOLIN SODIUM 2000 MG: 1 SOLUTION INTRAVENOUS at 12:28

## 2022-03-18 RX ADMIN — FENTANYL CITRATE 50 MCG: 50 INJECTION INTRAMUSCULAR; INTRAVENOUS at 13:45

## 2022-03-18 RX ADMIN — FENTANYL CITRATE 25 MCG: 50 INJECTION INTRAMUSCULAR; INTRAVENOUS at 14:29

## 2022-03-18 RX ADMIN — GLYCOPYRROLATE 0.3 MG: 0.2 INJECTION, SOLUTION INTRAMUSCULAR; INTRAVENOUS at 14:53

## 2022-03-18 RX ADMIN — SODIUM CHLORIDE 125 ML/HR: 9 INJECTION, SOLUTION INTRAVENOUS at 10:54

## 2022-03-18 RX ADMIN — ROCURONIUM BROMIDE 40 MG: 50 INJECTION, SOLUTION INTRAVENOUS at 12:35

## 2022-03-18 RX ADMIN — ROCURONIUM BROMIDE 10 MG: 50 INJECTION, SOLUTION INTRAVENOUS at 13:29

## 2022-03-18 RX ADMIN — PROPOFOL 150 MG: 10 INJECTION, EMULSION INTRAVENOUS at 12:35

## 2022-03-18 RX ADMIN — ONDANSETRON 4 MG: 2 INJECTION INTRAMUSCULAR; INTRAVENOUS at 14:27

## 2022-03-18 RX ADMIN — FENTANYL CITRATE 25 MCG: 50 INJECTION INTRAMUSCULAR; INTRAVENOUS at 15:24

## 2022-03-18 RX ADMIN — DEXAMETHASONE SODIUM PHOSPHATE 4 MG: 4 INJECTION INTRA-ARTICULAR; INTRALESIONAL; INTRAMUSCULAR; INTRAVENOUS; SOFT TISSUE at 12:45

## 2022-03-18 RX ADMIN — FENTANYL CITRATE 50 MCG: 50 INJECTION INTRAMUSCULAR; INTRAVENOUS at 12:53

## 2022-03-18 RX ADMIN — MIDAZOLAM 2 MG: 1 INJECTION INTRAMUSCULAR; INTRAVENOUS at 12:28

## 2022-03-18 NOTE — ANESTHESIA POSTPROCEDURE EVALUATION
Post-Op Assessment Note    CV Status:  Stable    Pain management: adequate     Mental Status:  Alert and awake   Hydration Status:  Euvolemic   PONV Controlled:  Controlled   Airway Patency:  Patent      Post Op Vitals Reviewed: Yes      Staff: Anesthesiologist         No complications documented      /59 (03/18/22 1610)    Temp      Pulse 68 (03/18/22 1610)   Resp 18 (03/18/22 1610)    SpO2 93 % (03/18/22 1610)    /59   Pulse 68   Temp 97 5 °F (36 4 °C)   Resp 18   Ht 5' 1" (1 549 m)   Wt 99 3 kg (218 lb 14 7 oz)   SpO2 93%   BMI 41 36 kg/m²

## 2022-03-18 NOTE — DISCHARGE INSTRUCTIONS
[address], [address] [facility], [address] [facility], [facility], [address] [facility] /J / [facility]  com       No heavy lifting >20 pounds    Do not raise hands above head    No ice or heating pack to chest    Wear the surgical bra at all times except the shower   If the bra is tight and is leaving marks on the skin unclasp the bottom clasps of the bra    Ok to shower    No bathing    Change dressing daily    Do not lay on stomach    No smoking    No pushing or pulling    Monitor your nipples for color change (pale white or eggplant purple), if there is a significant change call my office/answering service    Call the office for an appointment in 5-14 days - [phone]

## 2022-03-18 NOTE — DISCHARGE SUMMARY
Discharge Summary - Medical Abdirahman Christy 73 y o  female MRN: [id number]    [address] Room / Bed: OR POOL/OR POOL Encounter: [id number]    BRIEF OVERVIEW  Admitting Provider: Prachi Sandoval MD  Discharge Provider: Prachi Sandoval MD  Primary Care Physician at Discharge: Sarita Tobin [phone]    Discharge To: Home      Admission Date: 3/18/2022     Discharge Date: No discharge date for patient encounter  Code Status: Prior  Advance Directive and Living Will: <no information>  Power of :        Primary Discharge Diagnosis  Active Problems:    Limb alert care status    Dental crowns present  Resolved Problems:    * No resolved hospital problems   *        Discharge Disposition: [address] [place] [address]    Presenting Problem/History of Present Illness  <principal problem not specified>      Discharge Condition: stable    Patient tolerated the procedure well, recovered and was discharged home in stable condition    Prachi Sandoval MD  3/18/2022  12:36 PM

## 2022-03-18 NOTE — OP NOTE
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]     * Personal history of malignant neoplasm of breast [Z85 3]    Procedure(s) (LRB):  R BREAST REDUCTION; L BREAST EXCISION STANDING SKIN DEFORMITY; LOCAL FLAP (N/A)    Specimen(s):  * No specimens in log *    Estimated Blood Loss:   Minimal    Drains:  Closed/Suction Drain Left;Lateral Chest Bulb 19 Fr  (Active)   Number of days: 283       Closed/Suction Drain Left;Medial Chest Bulb 19 Fr  (Active)   Number of days: 283       Anesthesia Type:   General    Operative Indications:  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]  Personal history of malignant neoplasm of breast [Z85 3]      Operative Findings:      Complications:   None    Procedure and Technique:  The patient was marked while standing in the preoperative holding area  The patient was brought to the operating room and placed supine on the operating table  A time-out procedure was performed  Sequential compression devices were applied  IV antibiotics were given  After adequate anesthesia was obtained, the chest was prepped and draped using standard surgical    technique  Attention was turned to the right breast  The patient had been marked with Wise pattern inferior pedicle technique with 8 cm vertical limbs  An 8 cm wide inferior-based pedicle was designed at the breast meridian  The pedicle was de-epithelialized after designing    a 38 mm nipple areolar cut out  The medial and lateral borders of the pedicle were dissected down to just superficial to the pectoral fascia  The medial and lateral triangles were excised   Following this, the superior border of the pedicle was divided down to the muscular fascia  At this point, the pedicle was examined  Excellent vascularity was noted at the distal aspect  At this point the decision was made to excise this superior portion of the Villeda pattern  After this tumescent anesthesia was infiltrated into the lateral breast  Direct trimming of the superior breast flap was performed using a curved Hunt scissor to obtain healthy flap thickness  At this point liposuction using a 3 Western Polina Mercedes style cannula was performed of the lateral breast for a total of 300mL  The direct excision of breast tissue was 792 grams  At this point, the wounds were copiously irrigated  Excellent hemostasis was achieved using electrocautery  The inferior pedicle was tacked to the pectoral fascia using 2-0 Vicryl suture  The vertical limbs were secured to the inframammary fold using 2-0 Vicryl suture in half buried mattress technique  The skin was closed using 2-0 Vicryl, 3-0 Vicryl and 3-0 PDS suture in interrupted deep dermal technique  The superficial skin was closed using 3-0 Monocryl suture in running subcuticular technique  There was excellent vascularity of the nipple    at the end of the closure  Attention was turned to the left chest   Attention was turned to the medial standing skin deformity  An incision was carried superiorly and medially from the medial extent of the previous incision  The superior skin was elevated in a subcutaneous plane and was advanced inferiorly and medially for a flap plus defect of 8cm2 to remove the bulging tissue  Attention was turned to the lateral standing cone deformity  An incision was carried superiorly and laterally  The superior skin was elevated in a subcutaneous plane and advance inferiorly and medially   for a flap plus defect of 20cm2  The flaps were inset using 2-0 vicryl in deep dermal technique followed by 3-0 monocryl in subcuticular technique       The wounds were cleaned and dried skin glue was applied followed by a sterile gauze and a surgical bra     I was present for the entire procedure and A qualified resident physician was not available    Patient Disposition:  hemodynamically stable and extubated and stable      SIGNATURE: Hakeem Vásquez MD  DATE: March 18, 2022  TIME: 12:36 PM

## 2022-03-18 NOTE — ANESTHESIA PREPROCEDURE EVALUATION
Procedure:  R BREAST REDUCTION; L BREAST EXCISION STANDING SKIN DEFORMITY; LOCAL FLAP (N/A Breast)    Relevant Problems   ANESTHESIA (within normal limits)      CARDIO   (+) Benign essential hypertension      ENDO   (+) Hypothyroidism      GYN   (+) Malignant neoplasm of upper-outer quadrant of left breast in female, estrogen receptor positive (HCC)      PULMONARY (within normal limits)        Physical Exam    Airway    Mallampati score: II  TM Distance: >3 FB  Neck ROM: full     Dental       Cardiovascular  Rhythm: regular, Rate: normal, Cardiovascular exam normal    Pulmonary  Pulmonary exam normal     Other Findings  Several missing teeth      Anesthesia Plan  ASA Score- 3     Anesthesia Type- general with ASA Monitors  Additional Monitors:   Airway Plan:           Plan Factors-Exercise tolerance (METS): >4 METS  Chart reviewed  EKG reviewed  Existing labs reviewed  Patient summary reviewed  Patient is not a current smoker  Patient not instructed to abstain from smoking on day of procedure  Patient did not smoke on day of surgery  Induction- intravenous  Postoperative Plan-     Informed Consent- Anesthetic plan and risks discussed with patient and spouse

## 2022-04-05 DIAGNOSIS — C50.412 MALIGNANT NEOPLASM OF UPPER-OUTER QUADRANT OF LEFT BREAST IN FEMALE, ESTROGEN RECEPTOR POSITIVE (HCC): ICD-10-CM

## 2022-04-05 DIAGNOSIS — Z17.0 MALIGNANT NEOPLASM OF UPPER-OUTER QUADRANT OF LEFT BREAST IN FEMALE, ESTROGEN RECEPTOR POSITIVE (HCC): ICD-10-CM

## 2022-04-05 RX ORDER — ANASTROZOLE 1 MG/1
TABLET ORAL
Qty: 90 TABLET | Refills: 1 | Status: SHIPPED | OUTPATIENT
Start: 2022-04-05 | End: 2022-07-11

## 2022-04-12 ENCOUNTER — TELEPHONE (OUTPATIENT)
Dept: GASTROENTEROLOGY | Facility: CLINIC | Age: 72
End: 2022-04-12

## 2022-04-12 ENCOUNTER — OFFICE VISIT (OUTPATIENT)
Dept: SURGICAL ONCOLOGY | Facility: CLINIC | Age: 72
End: 2022-04-12
Payer: MEDICARE

## 2022-04-12 VITALS
BODY MASS INDEX: 42.1 KG/M2 | TEMPERATURE: 97.2 F | WEIGHT: 223 LBS | RESPIRATION RATE: 16 BRPM | HEIGHT: 61 IN | OXYGEN SATURATION: 97 % | DIASTOLIC BLOOD PRESSURE: 90 MMHG | SYSTOLIC BLOOD PRESSURE: 144 MMHG | HEART RATE: 72 BPM

## 2022-04-12 DIAGNOSIS — Z17.0 MALIGNANT NEOPLASM OF UPPER-OUTER QUADRANT OF LEFT BREAST IN FEMALE, ESTROGEN RECEPTOR POSITIVE (HCC): Primary | ICD-10-CM

## 2022-04-12 DIAGNOSIS — C50.412 MALIGNANT NEOPLASM OF UPPER-OUTER QUADRANT OF LEFT BREAST IN FEMALE, ESTROGEN RECEPTOR POSITIVE (HCC): Primary | ICD-10-CM

## 2022-04-12 DIAGNOSIS — Z79.811 USE OF ANASTROZOLE: ICD-10-CM

## 2022-04-12 DIAGNOSIS — Z90.12 S/P MASTECTOMY, LEFT: ICD-10-CM

## 2022-04-12 PROCEDURE — 99214 OFFICE O/P EST MOD 30 MIN: CPT | Performed by: NURSE PRACTITIONER

## 2022-04-12 NOTE — PROGRESS NOTES
Surgical Oncology Follow Up       [address] [place] [address],[address] [facility]  [facility] [place]  [address]  [facility] [address]    Estevan Po  1950  [id number]  [address] [place] [address],[address] [place] [address]  [facility] [place]  [address] [address]    Chief Complaint   Patient presents with    Breast Cancer       Assessment/Plan:  1  Malignant neoplasm of upper-outer quadrant of left breast in female, estrogen receptor positive ([address] )  - 6 mo follow up  - Mammo diagnostic right w 3d & cad; Future    2  Use of anastrozole  - continue use per medical onc    3  S/P mastectomy, left  - Breast Prothesis         Discussion/Summary:  Patient is a 72-year-old female presenting today for six-month follow-up her left breast cancer diagnosed in April of 2021  Pathology revealed invasive mammary carcinoma ER 90%, WV 1%, HER2 negative  She also had a left axillary lymph node which revealed metastatic disease  She underwent genetic testing which was the negative  She underwent a left breast modified radical mastectomy with ANAND  directed axillary dissection  She had 4 positive lymph nodes  She underwent adjuvant chemotherapy and completed whole breast radiation therapy  She is currently on anastrozole  She had a right breast reduction with Dr Davina Webster on 3/18/2022  On exam, inferior aspect on the newly reduced right breast is reddened, warm, and slightly swollen  The inframammary fold of the right breast where incision lies is open approximately 3 cm x 2 cm with a small pin-sized hole  Wound bed is yellow, appearing to be more like granulation tissue  I cleaned this area with normal saline and recommended antibiotic ointment and to keep it covered  Patient is not experiencing any fevers or chills  She states her bra tends to rub on this area  She is scheduled to a follow up w Dr Davina Webster in a few weeks  I told her to monitor area for any further changes    She scheduled for a mammogram of the right breast at this time however she is waiting for the okay from Dr Stephanie Bryant   She said she will call central scheduling when it is okay to receive her mammogram   There are no other concerns on her breast exam  I placed an order for her to get a mastectomy bra and prosthesis and recommended her to [facility]  I will see her back in 6 months or sooner should the need arise  She was instructed to call with any questions or concerns prior to time  All questions were answered today  History of Present Illness:     Oncology History Overview Note        Malignant neoplasm of upper-outer quadrant of left breast in female, estrogen receptor positive ([address] )   4/19/2021 Biopsy    Left breast US guided biopsy:  A  2 o'clock 8 cm from the nipple  Invasive mammary carcinoma of no special type  Grade 2  ER 90  OH 1  HER2 1+  Lymphovascular invasion: not identified    B  Left Axillary lymph node biopsy:  Invasive/ metastatic adenocarcinoma, compatible with breast primary involving fibroadipose tissues and a portion of lymphoid pranchyma  ER 90  OH 1  HER2 1+    Concordant  Malignancy appears multifocal  The 2 adjacent masses span an area of approximately 4 cm  Right breast clear  5/3/2021 Genetic Testing    The following genes were evaluated: OLINDA, BRCA1, BRCA2, CDH1, CHEK2, PALB2, PTEN, STK11, TP53  Additional genes evaluated for a total of 36 genes analyzed  Negative result   No pathogenic sequence variants or deletions/dupllications identified  [employer]     6/8/2021 Surgery    Left breast modified radical mastectomy with ANAND  directed axillary dissection  Invasive carcinoma of no special type (ductal)  Grade 2  6 cm  Lymphovascular invasion present  Margins negative  4/10 Lymph nodes (3 5 cm)  Anatomic Stage IIIA  Prognostic Stage IB     7/16/2021 -  Chemotherapy    pegfilgrastim (NEULASTA ONPRO), 6 mg, Subcutaneous, Once, 4 of 4 cycles  Administration: 6 mg (7/16/2021), 6 mg (8/6/2021), 6 mg (8/27/2021), 6 mg (9/17/2021)  cyclophosphamide (CYTOXAN) IVPB, 600 mg/m2 = 1,212 mg, Intravenous, Once, 4 of 4 cycles  Administration: 1,212 mg (7/16/2021), 1,212 mg (8/6/2021), 1,212 mg (8/27/2021), 1,212 mg (9/17/2021)  DOCEtaxel (TAXOTERE) chemo infusion, 75 mg/m2 = 151 6 mg, Intravenous, Once, 4 of 4 cycles  Administration: 151 6 mg (7/16/2021), 151 6 mg (8/6/2021), 151 6 mg (8/27/2021), 151 6 mg (9/17/2021)     10/18/2021 - 11/19/2021 Radiation    BH L CW 10X/6X 13 / 13 200 0 2,600 32   BH L CW BOLUS 10X/6X 12 / 12 200 0 2,400 30   BH L PAB 10X 25 / 25 60 0 1,500 32   BH L Sclav 10X 25 / 25 200 0 5,000 32      Treatment Dates:  10/18/2021 - 11/19/2021            -Interval History: Patient is a 77-year-old female presenting today for six-month follow-up her left breast cancer diagnosed in April of 2021  She is currently on anastrozole  She had a right breast reduction with Dr Kemi Capps on 3/18/2022  She denies changes on her breast exam  She denies persistent headache, bone pain, back pain, sob, abd pain  Review of Systems:  Review of Systems   Constitutional: Negative for activity change, appetite change, fatigue and unexpected weight change  Respiratory: Negative for cough and shortness of breath  Cardiovascular: Negative for chest pain  Gastrointestinal: Negative for abdominal pain, diarrhea, nausea and vomiting  Endocrine: Negative for heat intolerance  Musculoskeletal: Negative for arthralgias, back pain and myalgias  Skin: Negative for rash  Neurological: Negative for weakness and headaches  Hematological: Negative for adenopathy         Patient Active Problem List   Diagnosis    Benign essential hypertension    Chronic rhinitis    Hidradenitis suppurativa    Hypothyroidism    Impaired fasting glucose    Morbid obesity ([address] )    Venous insufficiency (chronic) (peripheral)    Malignant neoplasm of upper-outer quadrant of left breast in female, estrogen receptor positive ([facility] )    Chemotherapy induced neutropenia (HCC)    Limb alert care status    Dental crowns present     Past Medical History:   Diagnosis Date    BRCA gene mutation negative 05/19/2021    [employer]; 36 gene panel    Breast cancer ([facility] )     Left breast    Colon polyp     Dental crowns present     Exercise involving walking     the dogs    History of angina     per pt "years ago and related to stress"    History of cancer chemotherapy     History of radiation therapy     [place] (hard of hearing)     wears hearing aids/will wear DOS    Hypertension     Limb alert care status     No BP/IV Left Arm    Lymphedema of left arm     Prediabetes     Wears glasses      Past Surgical History:   Procedure Laterality Date    AXILLARY SURGERY Right     sweat glands removed -     BREAST CYST EXCISION Right 2000    benign    BREAST SURGERY Right     CHOLECYSTECTOMY      COLONOSCOPY      DILATION AND CURETTAGE OF UTERUS      [place] BREAST REDUCTION N/A 3/18/2022    Procedure: R BREAST REDUCTION; L BREAST EXCISION STANDING SKIN DEFORMITY; LOCAL FLAP;  Surgeon: Jaspreet Webb MD;  Location: [facility];  Service: Plastics    [place] MASTECTOMY, MODIFIED RADICAL Left 6/8/2021    Procedure: BREAST MODIFIED RADICAL MASTECTOMY; ANAND  DIRECTED AXILLARY DISSECTION;  Surgeon: Ronal Nava MD;  [facility];  Service: Surgical Oncology    US BREAST NEEDLE LOC LEFT Left 6/2/2021    US GUIDED BREAST BIOPSY LEFT COMPLETE Left 4/19/2021    US GUIDED BREAST LYMPH NODE BIOPSY LEFT Left 4/19/2021    WISDOM TOOTH EXTRACTION       Family History   Problem Relation Age of Onset    Dementia Mother     Colon cancer Mother         55's-59's    Lung cancer Father         55's-59's    No Known Problems Sister     Breast cancer Paternal Aunt     No Known Problems Sister     Stomach cancer Paternal Uncle         66's     Social History     Socioeconomic History    Marital status: /Civil Union     Spouse name: Not on file    Number of children: Not on file    Years of education: Not on file    Highest education level: Not on file   Occupational History    Not on file   Tobacco Use    Smoking status: Never Smoker    Smokeless tobacco: Never Used   Vaping Use    Vaping Use: Never used   Substance and Sexual Activity    Alcohol use: No    Drug use: No    Sexual activity: Not on file     Comment: defer   Other Topics Concern    Not on file   Social History Narrative    Not on file     Social Determinants of Health     Financial Resource Strain: Not on file   Food Insecurity: Not on file   Transportation Needs: Not on file   Physical Activity: Not on file   Stress: Not on file   Social Connections: Not on file   Intimate Partner Violence: Not on file   Housing Stability: Not on file       Current Outpatient Medications:     anastrozole (ARIMIDEX) 1 mg tablet, TAKE ONE TABLET BY MOUTH EVERY DAY, Disp: 90 tablet, Rfl: 1    Ascorbic Acid (VITAMIN C PO), Take by mouth, Disp: , Rfl:     cephalexin (KEFLEX) 500 mg capsule, , Disp: , Rfl:     CRANIAL PROSTHESIS, RX,, One wig as needed, Disp: 1 each, Rfl: 0    fluticasone (FLONASE) 50 mcg/act nasal spray, 2 puffs into each nostril daily  , Disp: , Rfl:     metoprolol tartrate (LOPRESSOR) 50 mg tablet, TAKE ONE TABLET BY MOUTH EVERY DAY, Disp: 90 tablet, Rfl: 1    Multiple Vitamins-Minerals (MULTIVITAMIN ADULT PO), Take by mouth daily, Disp: , Rfl:     ondansetron (ZOFRAN) 4 mg tablet, Take 1 tablet (4 mg total) by mouth every 8 (eight) hours as needed for nausea or vomiting, Disp: 30 tablet, Rfl: 1    oxyCODONE-acetaminophen (PERCOCET) 5-325 mg per tablet, Take 1 tablet by mouth every 6 (six) hours as needed for moderate painMax Daily Amount: 4 tablets, Disp: 20 tablet, Rfl: 0    saccharomyces boulardii (FLORASTOR) 250 mg capsule, Take 250 mg by mouth daily , Disp: , Rfl:     silver sulfadiazine (SILVADENE,SSD) 1 % cream, Apply topically 2 (two) times a day Apply to affected area(s) as directed, Disp: 400 g, Rfl: 1    VITAMIN D PO, Take by mouth, Disp: , Rfl:   No Known Allergies  There were no vitals filed for this visit  Physical Exam  Constitutional:       General: She is not in acute distress  Appearance: Normal appearance  Cardiovascular:      Rate and Rhythm: Normal rate and regular rhythm  Pulses: Normal pulses  Heart sounds: Normal heart sounds  Pulmonary:      Effort: Pulmonary effort is normal       Breath sounds: Normal breath sounds  Chest:      Chest wall: No mass  Breasts:      Right: No swelling, bleeding, inverted nipple, mass, nipple discharge, skin change, tenderness, axillary adenopathy or supraclavicular adenopathy  Left: No swelling, mass, skin change, tenderness, axillary adenopathy or supraclavicular adenopathy  Comments: Left left breast mastectomy scar  Right breast reduction scars  No masses, adenopathy, skin changes, nipple changes or discharge  Abdominal:      General: Abdomen is flat  Palpations: Abdomen is soft  Lymphadenopathy:      Upper Body:      Right upper body: No supraclavicular, axillary or pectoral adenopathy  Left upper body: No supraclavicular, axillary or pectoral adenopathy  Skin:     General: Skin is warm  Neurological:      General: No focal deficit present  Mental Status: She is alert and oriented to person, place, and time  Psychiatric:         Mood and Affect: Mood normal          Behavior: Behavior normal            Results:    Imaging  No results found  I reviewed the above imaging data  Advance Care Planning/Advance Directives:  Discussed disease status, cancer treatment plans and/or cancer treatment goals with the patient

## 2022-04-12 NOTE — TELEPHONE ENCOUNTER
Scheduled date of colonoscopy (as of today):07 06 22  Physician performing colonoscopy:DR PEÑA  Location of colonoscopy:[facility]  Bowel prep reviewed with patient:DULCOLAX/MIRALAX  Instructions reviewed with patient by:PRABHA VERBALLY/MAILED  Clearances: N/A    04/12/22  Screened by: Gavi Prasad    Referring Provider NIDA CARRENO    Pre- Screening: Body mass index is 41 36 kg/m²  Has patient been referred for a routine screening Colonoscopy? yes  Is the patient between 39-70 years old? yes      Previous Colonoscopy yes   If yes:    Date: [facility]:     Reason:       SCHEDULING STAFF: If the patient is between 45yrs-49yrs, please advise patient to confirm benefits/coverage with their insurance company for a routine screening colonoscopy, some insurance carriers will only cover at [address] or older  If the patient is over 66years old, please schedule an office visit  Does the patient want to see a Gastroenterologist prior to their procedure OR are they having any GI symptoms? no    Has the patient been hospitalized or had abdominal surgery in the past 6 months? no    Does the patient use supplemental oxygen? no    Does the patient take Coumadin, Lovenox, Plavix, Elliquis, Xarelto, or other blood thinning medication? no    Has the patient had a stroke, cardiac event, or stent placed in the past year? no    SCHEDULING STAFF: If patient answers NO to above questions, then schedule procedure  If patient answers YES to above questions, then schedule office appointment  If patient is between 45yrs - 49yrs, please advise patient that we will have to confirm benefits & coverage with their insurance company for a routine screening colonoscopy

## 2022-04-13 ENCOUNTER — DOCUMENTATION (OUTPATIENT)
Dept: HEMATOLOGY ONCOLOGY | Facility: MEDICAL CENTER | Age: 72
End: 2022-04-13

## 2022-04-13 NOTE — PROGRESS NOTES
I left voicemail for patient that she has been scheduled for her mammo on Fri 5/13/22 at [address] [place] [address] at the [facility]  location  Her follow up with Tara Hall has been scheduled for Tues   10/11/22 at 2:30pm

## 2022-06-07 ENCOUNTER — APPOINTMENT (OUTPATIENT)
Dept: RADIOLOGY | Facility: CLINIC | Age: 72
End: 2022-06-07
Payer: MEDICARE

## 2022-06-07 ENCOUNTER — OFFICE VISIT (OUTPATIENT)
Dept: URGENT CARE | Facility: CLINIC | Age: 72
End: 2022-06-07
Payer: MEDICARE

## 2022-06-07 VITALS
HEIGHT: 61 IN | TEMPERATURE: 97.9 F | BODY MASS INDEX: 41.54 KG/M2 | WEIGHT: 220 LBS | RESPIRATION RATE: 20 BRPM | HEART RATE: 79 BPM | OXYGEN SATURATION: 98 %

## 2022-06-07 DIAGNOSIS — M25.562 ACUTE PAIN OF LEFT KNEE: ICD-10-CM

## 2022-06-07 DIAGNOSIS — S86.912A KNEE STRAIN, LEFT, INITIAL ENCOUNTER: Primary | ICD-10-CM

## 2022-06-07 PROCEDURE — 73564 X-RAY EXAM KNEE 4 OR MORE: CPT

## 2022-06-07 PROCEDURE — 99213 OFFICE O/P EST LOW 20 MIN: CPT | Performed by: PHYSICIAN ASSISTANT

## 2022-06-07 NOTE — PATIENT INSTRUCTIONS
My interpretation of x-ray is no acute fracture or dislocation, official read is pending  Recommend continued use of Ace wrap, icing the area and elevating 1 symptoms of discomfort are present  Provided referral to Orthopedics to schedule appointment of symptoms are unresolved in the next 1-2 weeks  Can take over-the-counter Tylenol as needed for any discomfort

## 2022-06-07 NOTE — PROGRESS NOTES
[facility]        NAME: Kimberly Escobedo is a 73 y o  female  : 1950    MRN: [id number]  DATE: 2022  TIME: 5:11 PM    Assessment and Plan   Knee strain, left, initial encounter [[id number]]  1  Knee strain, left, initial encounter  XR knee 4+ vw left injury    Ambulatory referral to Orthopedic Surgery         Patient Instructions     Patient Instructions   My interpretation of x-ray is no acute fracture or dislocation, official read is pending  Recommend continued use of Ace wrap, icing the area and elevating 1 symptoms of discomfort are present  Provided referral to Orthopedics to schedule appointment of symptoms are unresolved in the next 1-2 weeks  Can take over-the-counter Tylenol as needed for any discomfort  Follow up with PCP in 3-5 days  Proceed to  ER if symptoms worsen  Chief Complaint     Chief Complaint   Patient presents with    Knee Pain     Sprained or twisted left knee while cutting grass last week  Has pain medially no noticeable swelling or discoloration  Has been icing and elevating          History of Present Illness       Patient is a 68-year-old female presenting today with left knee pain times week  Patient notes while cutting grass last week that she twisted her left knee, states that she has been experiencing some pain and discomfort on the inside of her left knee since the incident, has been wearing Ace wrap, icing the area and elevating occasionally which has provided some relief of her symptoms, notes she is still experiencing some mild aching and discomfort of the inside of her knee particularly after long periods of standing or walking  Admits to a couple episodes of her knee feeling like it is going to give out  Denies numbness, tingling, bruising, swelling, weakness  Review of Systems   Review of Systems   Constitutional: Negative for chills and fever  HENT: Negative for ear pain and sore throat      Eyes: Negative for pain and visual disturbance  Respiratory: Negative for cough and shortness of breath  Cardiovascular: Negative for chest pain and palpitations  Gastrointestinal: Negative for abdominal pain and vomiting  Genitourinary: Negative for dysuria and hematuria  Musculoskeletal:        See HPI   Skin: Negative for color change and rash  Neurological: Negative for seizures and syncope  All other systems reviewed and are negative          Current Medications       Current Outpatient Medications:     anastrozole (ARIMIDEX) 1 mg tablet, TAKE ONE TABLET BY MOUTH EVERY DAY, Disp: 90 tablet, Rfl: 1    Ascorbic Acid (VITAMIN C PO), Take by mouth, Disp: , Rfl:     metoprolol tartrate (LOPRESSOR) 50 mg tablet, TAKE ONE TABLET BY MOUTH EVERY DAY, Disp: 90 tablet, Rfl: 1    Multiple Vitamins-Minerals (MULTIVITAMIN ADULT PO), Take by mouth daily, Disp: , Rfl:     VITAMIN D PO, Take by mouth, Disp: , Rfl:     cephalexin (KEFLEX) 500 mg capsule, , Disp: , Rfl:     CRANIAL PROSTHESIS, RX,, One wig as needed (Patient not taking: Reported on 6/7/2022), Disp: 1 each, Rfl: 0    fluticasone (FLONASE) 50 mcg/act nasal spray, 2 puffs into each nostril daily   (Patient not taking: Reported on 6/7/2022), Disp: , Rfl:     ondansetron (ZOFRAN) 4 mg tablet, Take 1 tablet (4 mg total) by mouth every 8 (eight) hours as needed for nausea or vomiting, Disp: 30 tablet, Rfl: 1    oxyCODONE-acetaminophen (PERCOCET) 5-325 mg per tablet, Take 1 tablet by mouth every 6 (six) hours as needed for moderate painMax Daily Amount: 4 tablets (Patient not taking: Reported on 6/7/2022), Disp: 20 tablet, Rfl: 0    saccharomyces boulardii (FLORASTOR) 250 mg capsule, Take 250 mg by mouth daily  (Patient not taking: Reported on 6/7/2022), Disp: , Rfl:     silver sulfadiazine (SILVADENE,SSD) 1 % cream, Apply topically 2 (two) times a day Apply to affected area(s) as directed (Patient not taking: Reported on 6/7/2022), Disp: 400 g, Rfl: 1    Current Allergies Allergies as of 06/07/2022    (No Known Allergies)            The following portions of the patient's history were reviewed and updated as appropriate: allergies, current medications, past family history, past medical history, past social history, past surgical history and problem list      Past Medical History:   Diagnosis Date    BRCA gene mutation negative 05/19/2021    [employer]; 36 gene panel    Breast cancer ([address] )     Left breast    Colon polyp     Dental crowns present     Exercise involving walking     the dogs    History of angina     per pt "years ago and related to stress"    History of cancer chemotherapy     History of radiation therapy     [place] (hard of hearing)     wears hearing aids/will wear DOS    Hypertension     Limb alert care status     No BP/IV Left Arm    Lymphedema of left arm     Prediabetes     Wears glasses        Past Surgical History:   Procedure Laterality Date    AXILLARY SURGERY Right     sweat glands removed -     BREAST CYST EXCISION Right 2000    benign    BREAST SURGERY Right     CHOLECYSTECTOMY      COLONOSCOPY      DILATION AND CURETTAGE OF UTERUS      CA BREAST REDUCTION N/A 3/18/2022    Procedure: R BREAST REDUCTION; L BREAST EXCISION STANDING SKIN DEFORMITY; LOCAL FLAP;  Surgeon: Lenka Rodas MD;  [facility];  Service: Plastics    CA MASTECTOMY, MODIFIED RADICAL Left 6/8/2021    Procedure: BREAST MODIFIED RADICAL MASTECTOMY; ANAND  DIRECTED AXILLARY DISSECTION;  Surgeon: Giovanni Bearden MD;  [facility];  Service: Surgical Oncology    US BREAST NEEDLE LOC LEFT Left 6/2/2021    US GUIDED BREAST BIOPSY LEFT COMPLETE Left 4/19/2021    US GUIDED BREAST LYMPH NODE BIOPSY LEFT Left 4/19/2021    WISDOM TOOTH EXTRACTION         Family History   Problem Relation Age of Onset    Dementia Mother     Colon cancer Mother         55's-59's    Lung cancer Father         55's-59's    No Known Problems Sister     Breast cancer Paternal Aunt     No Known Problems Sister     Stomach cancer Paternal Uncle         66's         Medications have been verified  Objective   Pulse 79   Temp 97 9 °F (36 6 °C)   Resp 20   Ht 5' 1" (1 549 m)   Wt 99 8 kg (220 lb)   SpO2 98%   BMI 41 57 kg/m²        Physical Exam     Physical Exam  Vitals and nursing note reviewed  Constitutional:       General: She is not in acute distress  Appearance: Normal appearance  She is not toxic-appearing  HENT:      Head: Normocephalic and atraumatic  Right Ear: External ear normal       Left Ear: External ear normal    Cardiovascular:      Rate and Rhythm: Normal rate  Pulses: Normal pulses  Pulmonary:      Effort: Pulmonary effort is normal    Musculoskeletal:      Comments: No obvious deformity of left knee, no bruising, no swelling, mild TTP of medial knee joint space as well as over medial collateral ligament, slight valgus tenderness, negative Lachman's, negative posterior drawer, normal strength of left lower extremity, full active ROM of lower extremities bilaterally, negative Maxime, gross sensation intact, 2+ posterior tibialis and dorsalis pedis pulses of affected leg  Skin:     General: Skin is warm  Capillary Refill: Capillary refill takes less than 2 seconds  Neurological:      Mental Status: She is alert

## 2022-06-08 ENCOUNTER — HOSPITAL ENCOUNTER (OUTPATIENT)
Dept: RADIOLOGY | Facility: HOSPITAL | Age: 72
Discharge: HOME | End: 2022-06-08
Payer: MEDICARE

## 2022-06-08 VITALS — HEIGHT: 61 IN | WEIGHT: 220 LBS | BODY MASS INDEX: 41.54 KG/M2

## 2022-06-08 DIAGNOSIS — Z17.0 MALIGNANT NEOPLASM OF UPPER-OUTER QUADRANT OF LEFT BREAST IN FEMALE, ESTROGEN RECEPTOR POSITIVE (HCC): ICD-10-CM

## 2022-06-08 DIAGNOSIS — C50.412 MALIGNANT NEOPLASM OF UPPER-OUTER QUADRANT OF LEFT BREAST IN FEMALE, ESTROGEN RECEPTOR POSITIVE (HCC): ICD-10-CM

## 2022-06-08 PROCEDURE — 77065 DX MAMMO INCL CAD UNI: CPT

## 2022-06-08 PROCEDURE — G0279 TOMOSYNTHESIS, MAMMO: HCPCS

## 2022-06-11 DIAGNOSIS — I10 BENIGN ESSENTIAL HYPERTENSION: ICD-10-CM

## 2022-06-11 RX ORDER — METOPROLOL TARTRATE 50 MG/1
TABLET, FILM COATED ORAL
Qty: 90 TABLET | Refills: 3 | Status: SHIPPED | OUTPATIENT
Start: 2022-06-11

## 2022-07-04 NOTE — TELEPHONE ENCOUNTER
Patient called to see if lab orders are in chart   I did confirm lab orders are in   Patient understood 
Abnormal Lactate: > 2

## 2022-07-05 RX ORDER — SODIUM CHLORIDE, SODIUM LACTATE, POTASSIUM CHLORIDE, CALCIUM CHLORIDE 600; 310; 30; 20 MG/100ML; MG/100ML; MG/100ML; MG/100ML
75 INJECTION, SOLUTION INTRAVENOUS CONTINUOUS
Status: CANCELLED | OUTPATIENT
Start: 2022-07-05

## 2022-07-06 ENCOUNTER — HOSPITAL ENCOUNTER (OUTPATIENT)
Dept: GASTROENTEROLOGY | Facility: AMBULARY SURGERY CENTER | Age: 72
Setting detail: OUTPATIENT SURGERY
Discharge: HOME | End: 2022-07-06
Attending: INTERNAL MEDICINE | Admitting: INTERNAL MEDICINE
Payer: MEDICARE

## 2022-07-06 ENCOUNTER — ANESTHESIA (OUTPATIENT)
Dept: GASTROENTEROLOGY | Facility: AMBULARY SURGERY CENTER | Age: 72
End: 2022-07-06

## 2022-07-06 ENCOUNTER — ANESTHESIA EVENT (OUTPATIENT)
Dept: GASTROENTEROLOGY | Facility: AMBULARY SURGERY CENTER | Age: 72
End: 2022-07-06

## 2022-07-06 VITALS
HEIGHT: 61 IN | HEART RATE: 56 BPM | TEMPERATURE: 97.8 F | OXYGEN SATURATION: 100 % | DIASTOLIC BLOOD PRESSURE: 89 MMHG | SYSTOLIC BLOOD PRESSURE: 167 MMHG | RESPIRATION RATE: 21 BRPM | WEIGHT: 220 LBS | BODY MASS INDEX: 41.54 KG/M2

## 2022-07-06 DIAGNOSIS — Z12.11 SCREEN FOR COLON CANCER: ICD-10-CM

## 2022-07-06 PROCEDURE — 88305 TISSUE EXAM BY PATHOLOGIST: CPT | Performed by: PATHOLOGY

## 2022-07-06 PROCEDURE — 45380 COLONOSCOPY AND BIOPSY: CPT | Performed by: INTERNAL MEDICINE

## 2022-07-06 PROCEDURE — 45385 COLONOSCOPY W/LESION REMOVAL: CPT | Performed by: INTERNAL MEDICINE

## 2022-07-06 RX ORDER — SODIUM CHLORIDE, SODIUM LACTATE, POTASSIUM CHLORIDE, CALCIUM CHLORIDE 600; 310; 30; 20 MG/100ML; MG/100ML; MG/100ML; MG/100ML
75 INJECTION, SOLUTION INTRAVENOUS CONTINUOUS
Status: DISCONTINUED | OUTPATIENT
Start: 2022-07-06 | End: 2022-07-10 | Stop reason: HOSPADM

## 2022-07-06 RX ORDER — PROPOFOL 10 MG/ML
INJECTION, EMULSION INTRAVENOUS CONTINUOUS PRN
Status: DISCONTINUED | OUTPATIENT
Start: 2022-07-06 | End: 2022-07-06

## 2022-07-06 RX ORDER — SODIUM CHLORIDE, SODIUM LACTATE, POTASSIUM CHLORIDE, CALCIUM CHLORIDE 600; 310; 30; 20 MG/100ML; MG/100ML; MG/100ML; MG/100ML
INJECTION, SOLUTION INTRAVENOUS CONTINUOUS PRN
Status: DISCONTINUED | OUTPATIENT
Start: 2022-07-06 | End: 2022-07-06

## 2022-07-06 RX ORDER — PROPOFOL 10 MG/ML
INJECTION, EMULSION INTRAVENOUS AS NEEDED
Status: DISCONTINUED | OUTPATIENT
Start: 2022-07-06 | End: 2022-07-06

## 2022-07-06 RX ORDER — LIDOCAINE HYDROCHLORIDE 10 MG/ML
INJECTION, SOLUTION EPIDURAL; INFILTRATION; INTRACAUDAL; PERINEURAL AS NEEDED
Status: DISCONTINUED | OUTPATIENT
Start: 2022-07-06 | End: 2022-07-06

## 2022-07-06 RX ADMIN — SODIUM CHLORIDE, SODIUM LACTATE, POTASSIUM CHLORIDE, AND CALCIUM CHLORIDE: .6; .31; .03; .02 INJECTION, SOLUTION INTRAVENOUS at 12:10

## 2022-07-06 RX ADMIN — PROPOFOL 100 MG: 10 INJECTION, EMULSION INTRAVENOUS at 12:13

## 2022-07-06 RX ADMIN — PROPOFOL 80 MCG/KG/MIN: 10 INJECTION, EMULSION INTRAVENOUS at 12:13

## 2022-07-06 RX ADMIN — SODIUM CHLORIDE, SODIUM LACTATE, POTASSIUM CHLORIDE, AND CALCIUM CHLORIDE 75 ML/HR: .6; .31; .03; .02 INJECTION, SOLUTION INTRAVENOUS at 11:28

## 2022-07-06 RX ADMIN — LIDOCAINE HYDROCHLORIDE 50 MG: 10 INJECTION, SOLUTION EPIDURAL; INFILTRATION; INTRACAUDAL; PERINEURAL at 12:13

## 2022-07-06 NOTE — ANESTHESIA POSTPROCEDURE EVALUATION
Post-Op Assessment Note    CV Status:  Stable  Pain Score: 0    Pain management: adequate     Mental Status:  Alert and awake   Hydration Status:  Euvolemic   PONV Controlled:  Controlled   Airway Patency:  Patent      Post Op Vitals Reviewed: Yes      Staff: CRNA         No complications documented      /55 (07/06/22 1236)    Temp      Pulse 69 (07/06/22 1236)   Resp 18 (07/06/22 1236)    SpO2 100 % (07/06/22 1236)

## 2022-07-06 NOTE — H&P
History and Physical - [facility]  Frankie Blackburn 73 y o  female MRN: [id number]    HPI: Frankie Blcakburn is a 73y o  year old female who presents with screening colonoscopy, fam hx of colon cancer in mother    Review of Systems    Historical Information   Past Medical History:   Diagnosis Date    BRCA gene mutation negative 05/19/2021    [employer]; 36 gene panel    Breast cancer ([address] )     Left breast    Colon polyp     Dental crowns present     Exercise involving walking     the dogs    History of angina     per pt "years ago and related to stress"    History of cancer chemotherapy     History of chemotherapy     breast cancer (left) 2021      History of radiation therapy     History of radiation therapy     [place] (hard of hearing)     wears hearing aids/will wear DOS bilat    Hypertension     Limb alert care status     No BP/IV Left Arm    Lymphedema of left arm     Prediabetes     Wears glasses      Past Surgical History:   Procedure Laterality Date    AXILLARY SURGERY Right     sweat glands removed -     BREAST CYST EXCISION Right 2000    benign    BREAST SURGERY Right     CHOLECYSTECTOMY      COLONOSCOPY      DILATION AND CURETTAGE OF UTERUS      MASTECTOMY Left 06/08/2021    GA BREAST REDUCTION N/A 03/18/2022    Procedure: R BREAST REDUCTION; L BREAST EXCISION STANDING SKIN DEFORMITY; LOCAL FLAP;  Surgeon: Zaria Martino MD;  [facility];  Service: Plastics    GA MASTECTOMY, MODIFIED RADICAL Left 06/08/2021    Procedure: BREAST MODIFIED RADICAL MASTECTOMY; ANAND  DIRECTED AXILLARY DISSECTION;  Surgeon: Sil Smith MD;  [facility];  Service: Surgical Oncology    REDUCTION MAMMAPLASTY Right     March 2022    US BREAST NEEDLE LOC LEFT Left 06/02/2021    US GUIDED BREAST BIOPSY LEFT COMPLETE Left 04/19/2021    US GUIDED BREAST LYMPH NODE BIOPSY LEFT Left 04/19/2021    WISDOM TOOTH EXTRACTION       Social History   Social History     Substance and Sexual Activity   Alcohol Use No     Social History     Substance and Sexual Activity   Drug Use No     Social History     Tobacco Use   Smoking Status Never Smoker   Smokeless Tobacco Never Used     Family History   Problem Relation Age of Onset    Dementia Mother     Colon cancer Mother         55's-59's    Lung cancer Father         55's-59's    No Known Problems Sister     Breast cancer Paternal Aunt     No Known Problems Sister     Stomach cancer Paternal Uncle         66's       Meds/Allergies     (Not in a hospital admission)      No Known Allergies    Objective     BP (!) 181/76   Pulse 66   Temp 97 8 °F (36 6 °C) (Temporal)   Resp 20   Ht 5' 1" (1 549 m)   Wt 99 8 kg (220 lb)   SpO2 97%   BMI 41 57 kg/m²       PHYSICAL EXAM    Gen: NAD  CV: RRR  CHEST: Clear  ABD: soft, NT/ND  EXT: no edema  Neuro: AAO      ASSESSMENT/PLAN:  This is a 73y o  year old female here for screening colonoscopy    PLAN:   Procedure: colonoscopy

## 2022-07-06 NOTE — ANESTHESIA PREPROCEDURE EVALUATION
Procedure:  COLONOSCOPY    Relevant Problems   ANESTHESIA (within normal limits)      CARDIO   (+) Benign essential hypertension      ENDO   (+) Hypothyroidism      GYN   (+) Malignant neoplasm of upper-outer quadrant of left breast in female, estrogen receptor positive (HCC)        Physical Exam    Airway    Mallampati score: II  TM Distance: >3 FB  Neck ROM: full     Dental   No notable dental hx     Cardiovascular  Cardiovascular exam normal    Pulmonary  Pulmonary exam normal     Other Findings        Anesthesia Plan  ASA Score- 2     Anesthesia Type- IV sedation with anesthesia with ASA Monitors  Additional Monitors:   Airway Plan:           Plan Factors-Exercise tolerance (METS): >4 METS  Chart reviewed  EKG reviewed  Existing labs reviewed  Patient summary reviewed  Patient is not a current smoker  Induction-     Postoperative Plan-     Informed Consent- Anesthetic plan and risks discussed with patient  I personally reviewed this patient with the CRNA  Discussed and agreed on the Anesthesia Plan with the CRNA  Evangelista Saenz

## 2022-07-07 ENCOUNTER — RADIATION ONCOLOGY FOLLOW-UP (OUTPATIENT)
Dept: RADIATION ONCOLOGY | Facility: HOSPITAL | Age: 72
End: 2022-07-07
Attending: RADIOLOGY
Payer: MEDICARE

## 2022-07-07 VITALS
DIASTOLIC BLOOD PRESSURE: 84 MMHG | WEIGHT: 217 LBS | SYSTOLIC BLOOD PRESSURE: 142 MMHG | BODY MASS INDEX: 40.97 KG/M2 | OXYGEN SATURATION: 98 % | HEIGHT: 61 IN | RESPIRATION RATE: 16 BRPM | HEART RATE: 71 BPM | TEMPERATURE: 97.1 F

## 2022-07-07 DIAGNOSIS — Z17.0 MALIGNANT NEOPLASM OF UPPER-OUTER QUADRANT OF LEFT BREAST IN FEMALE, ESTROGEN RECEPTOR POSITIVE (HCC): Primary | ICD-10-CM

## 2022-07-07 DIAGNOSIS — C50.412 MALIGNANT NEOPLASM OF UPPER-OUTER QUADRANT OF LEFT BREAST IN FEMALE, ESTROGEN RECEPTOR POSITIVE (HCC): Primary | ICD-10-CM

## 2022-07-07 PROCEDURE — 99211 OFF/OP EST MAY X REQ PHY/QHP: CPT | Performed by: RADIOLOGY

## 2022-07-07 PROCEDURE — 99213 OFFICE O/P EST LOW 20 MIN: CPT | Performed by: RADIOLOGY

## 2022-07-07 NOTE — PROGRESS NOTES
Robbie Jung 1950 is a 73 y o  female     Follow up visit     73year old female presented to PCP with non tender left breast lump  Diagnostic mammogram reveals a 2 2 cm x 2 4 cm x 4 4 cm irregularly shaped, hypoechoic mass with angular margins seen in the left breast at 2 o'clock, 8 cm from the nipple  There is a lymph node seen in the left axilla, 13 cm from the nipple  Cortical thickness measures 4 mm on the left  This borderline abnormal lymph node is quite deep within the axilla and may be difficult to access for sampling if clinically desired  Biospy revealed invasive mammary carcinoma no special type, invasive carcinoma involves 3 of 3 submitted core biopsies, max  Dimension= 13 mm, LVI not involved  Axillary biopsy revealed invasive/metastatic adenocarcinoma, compatible with breast primary involving fibroadipose tissues and a portion of lymphoid parenchyma, ER/HI positive, HER 2 negative  Genetic testing was negative  June 8, 2021 patient underwent left breast modified radical mastectomy, ANAND  directed axillary dissection  Results revealed Invasive carcinoma, Grade 2, Greatest dimension 60 mm, DCIS present, LVI present, Margins uninvolved, Closest margin >20 mm, 4/10 lymph nodes micrometastases, ER/HI positive, HER2 negative  The pt completed a course of radiation to the L chest wall and regional lymphatics on 11/19/2021  She started adjuvant hormonal therapy with anastrozole in October 2021  She was seen by PT for left upper extremity lymphedema  03/09/22 - Hem OncJulio César  Continue anastrozole 1 mg once a day      3/18/22 Right breast reduction, Left breast excision standing skin deformity, Local flap - Dr Jamar Núñez      4/12/22 04/12/22 - Surg OncFelicity  S/p right breast reduction w/Dr Pineda - advised abx ointment to open area of incision  Referred for mastectomy bra and prosthesis       6/8/22 Mammo diagnostic right w 3d & cad  IMPRESSION:   No evidence for malignancy  Postoperative changes  ASSESSMENT/BI-RADS CATEGORY:  Right: 2 - Benign  Overall: 2 - Benign     RECOMMENDATION: Diagnostic mammogram in 1 year for the right breast       22 Colonoscopy  IMPRESSION:  5 mm ascending colon polyp removed by cold biopsy f snare  Diminutive rectal polyp removed by cold biopsy  Left-sided diverticulosis  Internal hemorrhoids  Otherwise normal colonoscopy with good prep good visualization        Upcom[facility]22 Dr Yesica Cuadra  10/11/22 Agustina Carbajal, [address]       Oncology History   Malignant neoplasm of upper-outer quadrant of left breast in female, estrogen receptor positive ([facility] [address] )   2021 Biopsy    Left breast US guided biopsy:  A  2 o'clock 8 cm from the nipple  Invasive mammary carcinoma of no special type  Grade 2  ER 90  OH 1  HER2 1+  Lymphovascular invasion: not identified    B  Left Axillary lymph node biopsy:  Invasive/ metastatic adenocarcinoma, compatible with breast primary involving fibroadipose tissues and a portion of lymphoid pranchyma  ER 90  OH 1  HER2 1+    Concordant  Malignancy appears multifocal  The 2 adjacent masses span an area of approximately 4 cm  Right breast clear  5/3/2021 Genetic Testing    The following genes were evaluated: OLINDA, BRCA1, BRCA2, CDH1, CHEK2, PALB2, PTEN, STK11, TP53  Additional genes evaluated for a total of 36 genes analyzed  Negative result   No pathogenic sequence variants or deletions/dupllications identified  [employer]     2021 Surgery    Left breast modified radical mastectomy with ANAND  directed axillary dissection  Invasive carcinoma of no special type (ductal)  Grade 2  6 cm  Lymphovascular invasion present  Margins negative  4/10 Lymph nodes (3 5 cm)  Anatomic Stage IIIA  Prognostic Stage IB     2021 - 2021 Chemotherapy    pegfilgrastim (NEULASTA ONPRO), 6 mg, Subcutaneous, Once, 4 of 4 cycles  Administration: 6 mg (2021), 6 mg (2021), 6 mg (2021), 6 mg (9/17/2021)  cyclophosphamide (CYTOXAN) IVPB, 600 mg/m2 = 1,212 mg, Intravenous, Once, 4 of 4 cycles  Administration: 1,212 mg (7/16/2021), 1,212 mg (8/6/2021), 1,212 mg (8/27/2021), 1,212 mg (9/17/2021)  DOCEtaxel (TAXOTERE) chemo infusion, 75 mg/m2 = 151 6 mg, Intravenous, Once, 4 of 4 cycles  Administration: 151 6 mg (7/16/2021), 151 6 mg (8/6/2021), 151 6 mg (8/27/2021), 151 6 mg (9/17/2021)     10/18/2021 - 11/19/2021 Radiation    BH L CW 10X/6X 13 / 13 200 0 2,600 32   BH L CW BOLUS 10X/6X 12 / 12 200 0 2,400 30   BH L PAB 10X 25 / 25 60 0 1,500 32   BH L Sclav 10X 25 / 25 200 0 5,000 32      Treatment Dates:  10/18/2021 - 11/19/2021  Dr Eulalia Dwyer     10/2021 -  Hormone Therapy    anastrozole 1 mg once a day    Dr Olivier Reyes         Review of Systems:  Review of Systems   Constitutional: Positive for fatigue (occasional, mild)  Negative for appetite change, fever and unexpected weight change  HENT: Negative  Eyes: Negative  Wears glasses   Respiratory: Negative for cough, chest tightness (along incisional areas), shortness of breath and wheezing  Cardiovascular: Positive for leg swelling (Occasional mild BLE)  Negative for chest pain  Gastrointestinal: Negative  Negative for abdominal pain, constipation, diarrhea, nausea and vomiting  Endocrine: Negative  Genitourinary: Negative  Negative for difficulty urinating and vaginal bleeding  Musculoskeletal: Negative  Skin: Negative  Allergic/Immunologic: Positive for environmental allergies (maple trees)  Neurological: Negative  Negative for dizziness, weakness, light-headedness and numbness  Hematological: Positive for adenopathy (left hand/arm, wearing compression sleeve)  Does not bruise/bleed easily  Psychiatric/Behavioral: Positive for dysphoric mood (mild)  Negative for sleep disturbance  The patient is not nervous/anxious          Clinical Trial: no    Teaching    Covid Vaccine Status: up to date    Health Maintenance   Topic Date Due    Cervical Cancer Screening  01/19/2020    Pneumococcal Vaccine: 65+ Years (3 - PPSV23 or PCV20) 11/05/2020    COVID-19 Vaccine (4 - Booster for [place] series) 02/06/2022    Influenza Vaccine (1) 09/01/2022    DTaP,Tdap,and Td Vaccines (3 - Td or Tdap) 01/10/2023    Fall Risk  02/04/2023    Depression Screening  02/04/2023    Medicare Annual Wellness Visit (AWV)  02/04/2023    BMI: Followup Plan  02/04/2023    Depression Follow-up Plan  02/04/2023    Breast Cancer Screening: Mammogram  06/08/2023    BMI: Adult  07/06/2023    Colorectal Cancer Screening  07/05/2027    Hepatitis C Screening  Completed    Osteoporosis Screening  Completed    HIB Vaccine  Aged Out    Hepatitis B Vaccine  Aged Out    IPV Vaccine  Aged Out    Hepatitis A Vaccine  Aged Out    Meningococcal ACWY Vaccine  Aged Out    HPV Vaccine  Aged Out     Patient Active Problem List   Diagnosis    Benign essential hypertension    Chronic rhinitis    Hidradenitis suppurativa    Hypothyroidism    Impaired fasting glucose    Morbid obesity ([address] )    Venous insufficiency (chronic) (peripheral)    Malignant neoplasm of upper-outer quadrant of left breast in female, estrogen receptor positive ([address] )    Chemotherapy induced neutropenia ([facility] [address] )    Limb alert care status    Dental crowns present    Use of anastrozole     Past Medical History:   Diagnosis Date    BRCA gene mutation negative 05/19/2021    [employer]; 36 gene panel    Breast cancer ([facility] [address] )     Left breast    Colon polyp     Dental crowns present     Exercise involving walking     the dogs    History of angina     per pt "years ago and related to stress"    History of cancer chemotherapy     History of chemotherapy     breast cancer (left) 2021      History of radiation therapy     History of radiation therapy     [place] (hard of hearing)     wears hearing aids/will wear DOS bilat    Hypertension     Limb alert care status     No BP/IV Left Arm    Lymphedema of left arm     Prediabetes     Wears glasses      Past Surgical History:   Procedure Laterality Date    AXILLARY SURGERY Right     sweat glands removed -     BREAST CYST EXCISION Right 2000    benign    BREAST SURGERY Right     CHOLECYSTECTOMY      COLONOSCOPY      DILATION AND CURETTAGE OF UTERUS      MASTECTOMY Left 06/08/2021    [place] BREAST REDUCTION N/A 03/18/2022    Procedure: R BREAST REDUCTION; L BREAST EXCISION STANDING SKIN DEFORMITY; LOCAL FLAP;  Surgeon: Zaria Martino MD;  [facility];  Service: Plastics    KS MASTECTOMY, MODIFIED RADICAL Left 06/08/2021    Procedure: BREAST MODIFIED RADICAL MASTECTOMY; ANAND  DIRECTED AXILLARY DISSECTION;  Surgeon: Sil Smith MD;  [facility];  Service: Surgical Oncology    REDUCTION MAMMAPLASTY Right     March 2022    US BREAST NEEDLE LOC LEFT Left 06/02/2021    US GUIDED BREAST BIOPSY LEFT COMPLETE Left 04/19/2021    US GUIDED BREAST LYMPH NODE BIOPSY LEFT Left 04/19/2021    WISDOM TOOTH EXTRACTION       Family History   Problem Relation Age of Onset    Dementia Mother     Colon cancer Mother         55's-59's    Lung cancer Father         55's-59's    No Known Problems Sister     Breast cancer Paternal Aunt     No Known Problems Sister     Stomach cancer Paternal Uncle         66's     Social History     Socioeconomic History    Marital status: /Civil Union     Spouse name: Not on file    Number of children: Not on file    Years of education: Not on file    Highest education level: Not on file   Occupational History    Not on file   Tobacco Use    Smoking status: Never Smoker    Smokeless tobacco: Never Used   Vaping Use    Vaping Use: Never used   Substance and Sexual Activity    Alcohol use: No    Drug use: No    Sexual activity: Not on file     Comment: defer   Other Topics Concern    Not on file   Social History Narrative    Not on file     Social Determinants of Health     Financial Resource Strain: Not on file   Food Insecurity: Not on file   Transportation Needs: Not on file   Physical Activity: Not on file   Stress: Not on file   Social Connections: Not on file   Intimate Partner Violence: Not on file   Housing Stability: Not on file       Current Outpatient Medications:     anastrozole (ARIMIDEX) 1 mg tablet, TAKE ONE TABLET BY MOUTH EVERY DAY, Disp: 90 tablet, Rfl: 1    Ascorbic Acid (VITAMIN C PO), Take by mouth, Disp: , Rfl:     CRANIAL PROSTHESIS, RX,, One wig as needed, Disp: 1 each, Rfl: 0    fluticasone (FLONASE) 50 mcg/act nasal spray, 2 puffs into each nostril daily, Disp: , Rfl:     metoprolol tartrate (LOPRESSOR) 50 mg tablet, TAKE ONE TABLET BY MOUTH EVERY DAY, Disp: 90 tablet, Rfl: 3    Multiple Vitamins-Minerals (MULTIVITAMIN ADULT PO), Take by mouth daily, Disp: , Rfl:     ondansetron (ZOFRAN) 4 mg tablet, Take 1 tablet (4 mg total) by mouth every 8 (eight) hours as needed for nausea or vomiting, Disp: 30 tablet, Rfl: 1    saccharomyces boulardii (FLORASTOR) 250 mg capsule, Take 250 mg by mouth daily, Disp: , Rfl:     silver sulfadiazine (SILVADENE,SSD) 1 % cream, Apply topically 2 (two) times a day Apply to affected area(s) as directed, Disp: 400 g, Rfl: 1    VITAMIN D PO, Take by mouth, Disp: , Rfl:   No current facility-administered medications for this visit  Facility-Administered Medications Ordered in Other Visits:     lactated ringers infusion, 75 mL/hr, Intravenous, Continuous, Duarte Abdalla MD, Last Rate: 75 mL/hr at 07/06/22 1128, 75 mL/hr at 07/06/22 1128  No Known Allergies  There were no vitals filed for this visit

## 2022-07-07 NOTE — PROGRESS NOTES
Follow-up - Radiation Oncology   Carine Cuevas 1950 73 y o  female [id number]      History of Present Illness   Cancer Staging  No matching staging information was found for the patient  Interval History:  73year old female presented to PCP with non tender left breast lump  Diagnostic mammogram reveals a 2 2 cm x 2 4 cm x 4 4 cm irregularly shaped, hypoechoic mass with angular margins seen in the left breast at 2 o'clock, 8 cm from the nipple  There is a lymph node seen in the left axilla, 13 cm from the nipple  Cortical thickness measures 4 mm on the left   This borderline abnormal lymph node is quite deep within the axilla and may be difficult to access for sampling if clinically desired  Biospy revealed invasive mammary carcinoma no special type, invasive carcinoma involves 3 of 3 submitted core biopsies, max  Dimension= 13 mm, LVI not involved   Axillary biopsy revealed invasive/metastatic adenocarcinoma, compatible with breast primary involving fibroadipose tissues and a portion of lymphoid parenchyma, ER/OR positive, HER 2 negative   Genetic testing was negative  June 8, 2021 patient underwent left breast modified radical mastectomy, ANAND  directed axillary dissection  Results revealed Invasive carcinoma, Grade 2, Greatest dimension 60 mm, DCIS present, LVI present, Margins uninvolved, Closest margin >20 mm, 4/10 lymph nodes micrometastases, ER/OR positive, HER2 negative  The pt completed a course of radiation to the L chest wall and regional lymphatics on 11/19/2021     She started adjuvant hormonal therapy with anastrozole in October 2021      She was seen by PT for left upper extremity lymphedema      03/09/22 - Hem OncJulio César  Continue anastrozole 1 mg once a day        3/18/22 Right breast reduction, Left breast excision standing skin deformity, Local flap - Dr Michael Britton Jan 4/12/22 04/12/22 - Surg OncBebeto CRNP  S/p right breast reduction w/Dr Pineda - advised abx ointment to open area of incision  Referred for mastectomy bra and prosthesis         22 Mammo diagnostic right w 3d & cad  IMPRESSION:   No evidence for malignancy   Postoperative changes       ASSESSMENT/BI-RADS CATEGORY:  Right: 2 - Benign  Overall: 2 - Benign     RECOMMENDATION: Diagnostic mammogram in 1 year for the right breast         22 Colonoscopy  IMPRESSION:  5 mm ascending colon polyp removed by cold biopsy f snare  Diminutive rectal polyp removed by cold biopsy  Left-sided diverticulosis  Internal hemorrhoids  Otherwise normal colonoscopy with good prep good visualization           Upcomin22 Dr Jose Cuadra  10/11/22 DANILO Duran        Historical Information   Oncology History   Malignant neoplasm of upper-outer quadrant of left breast in female, estrogen receptor positive ([facility] [address] )   2021 Biopsy    Left breast US guided biopsy:  A  2 o'clock 8 cm from the nipple  Invasive mammary carcinoma of no special type  Grade 2  ER 90  FL 1  HER2 1+  Lymphovascular invasion: not identified    B  Left Axillary lymph node biopsy:  Invasive/ metastatic adenocarcinoma, compatible with breast primary involving fibroadipose tissues and a portion of lymphoid pranchyma  ER 90  FL 1  HER2 1+    Concordant  Malignancy appears multifocal  The 2 adjacent masses span an area of approximately 4 cm  Right breast clear  5/3/2021 Genetic Testing    The following genes were evaluated: OLINDA, BRCA1, BRCA2, CDH1, CHEK2, PALB2, PTEN, STK11, TP53  Additional genes evaluated for a total of 36 genes analyzed  Negative result   No pathogenic sequence variants or deletions/dupllications identified  [employer]     2021 Surgery    Left breast modified radical mastectomy with ANAND  directed axillary dissection  Invasive carcinoma of no special type (ductal)  Grade 2  6 cm  Lymphovascular invasion present  Margins negative  4/10 Lymph nodes (3 5 cm)  Anatomic Stage IIIA  Prognostic Stage IB     2021 - 9/17/2021 Chemotherapy    pegfilgrastim (NEULASTA ONPRO), 6 mg, Subcutaneous, Once, 4 of 4 cycles  Administration: 6 mg (7/16/2021), 6 mg (8/6/2021), 6 mg (8/27/2021), 6 mg (9/17/2021)  cyclophosphamide (CYTOXAN) IVPB, 600 mg/m2 = 1,212 mg, Intravenous, Once, 4 of 4 cycles  Administration: 1,212 mg (7/16/2021), 1,212 mg (8/6/2021), 1,212 mg (8/27/2021), 1,212 mg (9/17/2021)  DOCEtaxel (TAXOTERE) chemo infusion, 75 mg/m2 = 151 6 mg, Intravenous, Once, 4 of 4 cycles  Administration: 151 6 mg (7/16/2021), 151 6 mg (8/6/2021), 151 6 mg (8/27/2021), 151 6 mg (9/17/2021)     10/18/2021 - 11/19/2021 Radiation    BH L CW 10X/6X 13 / 13 200 0 2,600 32   BH L CW BOLUS 10X/6X 12 / 12 200 0 2,400 30   BH L PAB 10X 25 / 25 60 0 1,500 32   BH L Sclav 10X 25 / 25 200 0 5,000 32      Treatment Dates:  10/18/2021 - 11/19/2021  Dr Carri Carr     10/2021 -  Hormone Therapy    anastrozole 1 mg once a day    Dr Virginia Ronquillo         Past Medical History:   Diagnosis Date    BRCA gene mutation negative 05/19/2021    [employer]; 36 gene panel    Breast cancer ([facility] [address] )     Left breast    Colon polyp     Dental crowns present     Exercise involving walking     the dogs    History of angina     per pt "years ago and related to stress"    History of cancer chemotherapy     History of chemotherapy     breast cancer (left) 2021      History of radiation therapy     History of radiation therapy     Andreafski (hard of hearing)     wears hearing aids/will wear DOS bilat    Hypertension     Limb alert care status     No BP/IV Left Arm    Lymphedema of left arm     Prediabetes     Wears glasses      Past Surgical History:   Procedure Laterality Date    AXILLARY SURGERY Right     sweat glands removed -     BREAST CYST EXCISION Right 2000    benign    BREAST SURGERY Right     CHOLECYSTECTOMY      COLONOSCOPY      DILATION AND CURETTAGE OF UTERUS      MASTECTOMY Left 06/08/2021    [facility] BREAST REDUCTION N/A 03/18/2022    Procedure: R BREAST REDUCTION; L BREAST EXCISION STANDING SKIN DEFORMITY; LOCAL FLAP;  Surgeon: aRy Rosenberg MD;  [facility];  Service: Plastics    MT MASTECTOMY, MODIFIED RADICAL Left 06/08/2021    Procedure: BREAST MODIFIED RADICAL MASTECTOMY; ANAND  DIRECTED AXILLARY DISSECTION;  Surgeon: Roni Carlin MD;  [facility];  Service: Surgical Oncology    REDUCTION MAMMAPLASTY Right     March 2022    US BREAST NEEDLE LOC LEFT Left 06/02/2021    US GUIDED BREAST BIOPSY LEFT COMPLETE Left 04/19/2021    US GUIDED BREAST LYMPH NODE BIOPSY LEFT Left 04/19/2021    WISDOM TOOTH EXTRACTION         Social History   Social History     Substance and Sexual Activity   Alcohol Use No     Social History     Substance and Sexual Activity   Drug Use No     Social History     Tobacco Use   Smoking Status Never Smoker   Smokeless Tobacco Never Used         Meds/Allergies     Current Outpatient Medications:     anastrozole (ARIMIDEX) 1 mg tablet, TAKE ONE TABLET BY MOUTH EVERY DAY, Disp: 90 tablet, Rfl: 1    Ascorbic Acid (VITAMIN C PO), Take by mouth in the morning, Disp: , Rfl:     fluticasone (FLONASE) 50 mcg/act nasal spray, 2 puffs into each nostril daily, Disp: , Rfl:     metoprolol tartrate (LOPRESSOR) 50 mg tablet, TAKE ONE TABLET BY MOUTH EVERY DAY, Disp: 90 tablet, Rfl: 3    Multiple Vitamins-Minerals (MULTIVITAMIN ADULT PO), Take by mouth daily, Disp: , Rfl:     VITAMIN D PO, Take by mouth in the morning, Disp: , Rfl:     CRANIAL PROSTHESIS, RX,, One wig as needed, Disp: 1 each, Rfl: 0    ondansetron (ZOFRAN) 4 mg tablet, Take 1 tablet (4 mg total) by mouth every 8 (eight) hours as needed for nausea or vomiting (Patient not taking: Reported on 7/7/2022), Disp: 30 tablet, Rfl: 1    saccharomyces boulardii (FLORASTOR) 250 mg capsule, Take 250 mg by mouth daily (Patient not taking: Reported on 7/7/2022), Disp: , Rfl:     silver sulfadiazine (SILVADENE,SSD) 1 % cream, Apply topically 2 (two) times a day Apply to affected area(s) as directed (Patient not taking: Reported on 7/7/2022), Disp: 400 g, Rfl: 1  No current facility-administered medications for this visit  Facility-Administered Medications Ordered in Other Visits:     lactated ringers infusion, 75 mL/hr, Intravenous, Continuous, Kelly Valiente MD, Last Rate: 75 mL/hr at 07/06/22 1128, 75 mL/hr at 07/06/22 1128  No Known Allergies      Review of Systems   Constitutional: Positive for fatigue (occasional, mild)  Negative for appetite change, fever and unexpected weight change  HENT: Negative  Eyes: Negative  Wears glasses   Respiratory: Negative for cough, chest tightness (along incisional areas), shortness of breath and wheezing  Cardiovascular: Positive for leg swelling (Occasional mild BLE)  Negative for chest pain  Gastrointestinal: Negative  Negative for abdominal pain, constipation, diarrhea, nausea and vomiting  Endocrine: Negative  Genitourinary: Negative  Negative for difficulty urinating and vaginal bleeding  Musculoskeletal: Negative  Skin: Negative  Allergic/Immunologic: Positive for environmental allergies (maple trees)  Neurological: Negative  Negative for dizziness, weakness, light-headedness and numbness  Hematological: Positive for adenopathy (left hand/arm, wearing compression sleeve)  Does not bruise/bleed easily  Psychiatric/Behavioral: Positive for dysphoric mood (mild)  Negative for sleep disturbance  The patient is not nervous/anxious      OBJECTIVE:   /84 (BP Location: Right arm, Patient Position: Sitting)   Pulse 71   Temp (!) 97 1 °F (36 2 °C) (Temporal)   Resp 16   Ht 5' 1" (1 549 m)   Wt 98 4 kg (217 lb)   SpO2 98%   BMI 41 00 kg/m²   Pain Assessment:  0  ECOG/Zubrod/WHO: 1 - Symptomatic but completely ambulatory    Physical Exam   There is no supraclavicular axillary adenopathy palpable the skin in the radiated field is in good condition    She is had breast reduction on the right side and the incisions are well healed  She is wearing a sleeve on her right arm without any distal edema identified  Good range of motion  Her breathing is unlabored  Ambulating independently  Abdomen obese soft nontender        RESULTS    Lab Results: No results found for this or any previous visit (from the past 672 hour(s))  Imaging Studies:Colonoscopy    Result Date: 7/6/2022  Narrative: [address] [place] [id number] [phone] DATE OF SERVICE: 7/06/22 PHYSICIAN(S): Attending: Marii Hopkins MD Fellow: No Staff Documented INDICATION: Screen for colon cancer POST-OP DIAGNOSIS: See the impression below  HISTORY: Prior colonoscopy: 7 years ago  BOWEL PREPARATION: Miralax/Dulcolax PREPROCEDURE: Informed consent was obtained for the procedure, including sedation  Risks including but not limited to bleeding, infection, perforation, adverse drug reaction and aspiration were explained in detail  Also explained about less than 100% sensitivity with the exam and other alternatives  The patient was placed in the left lateral decubitus position  DETAILS OF PROCEDURE: Patient was taken to the procedure room where a time out was performed to confirm correct patient and correct procedure  The patient underwent monitored anesthesia care, which was administered by an anesthesia professional  The patient's blood pressure, heart rate, level of consciousness, oxygen and respirations were monitored throughout the procedure  A digital rectal exam was performed  The scope was introduced through the anus and advanced to the cecum  Retroflexion was performed in the rectum  The quality of bowel preparation was evaluated using the [facility] Bowel Preparation Scale with scores of: right colon = 2, transverse colon = 2, left colon = 2  The total BBPS score was 6  Bowel prep was adequate  The patient experienced no blood loss  The procedure was not difficult   The patient tolerated the procedure well  There were no apparent complications  ANESTHESIA INFORMATION: ASA: ASA status not filed in the log  Anesthesia Type: Anesthesia type not filed in the log  MEDICATIONS: No administrations occurring from 1206 to 1234 on 07/06/22 FINDINGS: One sessile polyp in the ascending colon; removed en bloc by cold snare and retrieved specimen Polyp measuring smaller than 5 mm in the rectum; performed cold forceps biopsy Few diverticula in the sigmoid colon Internal hemorrhoids Otherwise normal colon EVENTS: Procedure Events Event Event Time ENDO CECUM REACHED 7/6/2022 12:17 PM ENDO SCOPE OUT TIME 7/6/2022 12:31 PM SPECIMENS: ID Type Source Tests Collected by Time Destination 1 : ascending polyp cold snare Tissue Large Intestine, Right/Ascending Colon TISSUE EXAM Joann De Jesus MD 7/6/2022 12:25 PM  2 : rectal polyp cold bx Tissue Rectum TISSUE EXAM Joann De Jesus MD 7/6/2022 12:30 PM  EQUIPMENT: Colonoscope - SportsgritUFF VISION MED BLUE ID [id number]     Impression: 5 mm ascending colon polyp removed by cold biopsy f snare Diminutive rectal polyp removed by cold biopsy Left-sided diverticulosis Internal hemorrhoids Otherwise normal colonoscopy with good prep good visualization RECOMMENDATION: Repeat colonoscopy in 5 years due to a personal history of colon polyps Discharge home Resume regular diet Resume home medications Follow-up biopsy results Call with any abdominal pain, bleeding, fevers  Joann De Jesus MD     XR knee 4+ vw left injury    Result Date: 6/7/2022  Narrative: LEFT KNEE INDICATION:   [id number]: Pain in left knee  COMPARISON:  None VIEWS:  XR KNEE 4+ VW LEFT INJURY FINDINGS: There is no acute fracture with no dislocation and no suspicious bone lesions  Mild to moderate degree of tricompartmental arthrosis is visualized  There is no joint effusion within the suprapatellar recess  There is no radiopaque foreign body  Impression: No acute osseous abnormality   Workstation performed: [id number]     Mammo diagnostic right w 3d & cad    Result Date: 6/8/2022  Narrative: DIAGNOSIS: Malignant neoplasm of upper-outer quadrant of left breast in female, estrogen receptor positive ([address] ) TECHNIQUE: Digital diagnostic mammography was performed  Computer Aided Detection (CAD) analyzed all applicable images  Computer Aided Detection (CAD) analyzed all applicable images  COMPARISONS: Prior breast imaging dated: 06/02/2021, 06/02/2021, 04/19/2021, 04/19/2021, 04/19/2021, 03/26/2021, 03/26/2021, 06/22/2018, and 02/22/2017 RELEVANT HISTORY: Family Breast Cancer History: History of breast cancer in Paternal Aunt  Family Medical History: Family medical history includes breast cancer in paternal aunt and colon cancer in mother  Personal History: Hormone history includes other  Surgical history includes breast reduction, mastectomy, and breast excisional biopsy  Medical history includes breast cancer and history of chemotherapy  RISK ASSESSMENT: [facility] risk assessment reporting was suppressed due to the patient's history and/or demographic factors  TISSUE DENSITY: There are scattered areas of fibroglandular density  INDICATION: Bettye Ventura is a 73 y o  female presenting for annual  FINDINGS: RIGHT E) POST-SURGICAL FINDING: There are post-surgical findings from a previous breast reduction seen in the right breast      Impression:  No evidence for malignancy  Postoperative changes  ASSESSMENT/BI-RADS CATEGORY: Right: 2 - Benign Overall: 2 - Benign RECOMMENDATION:      - Diagnostic mammogram in 1 year for the right breast  Workstation ID: [id number]          Assessment/Plan:  No orders of the defined types were placed in this encounter  Bettye Ventura is a 73y o  year old female who is 9 months status post adjuvant radiation therapy for left breast carcinoma  She has no clinical evidence of recurrence  She has undergone lymphedema therapy with good control  She is wearing a sleeve    She has had right breast reduction implants to utilize a prosthesis for the left side  She will return for follow-up visit in 1 year she will be seeing Medical and Surgical Oncology in the interim  Jim Ellis MD  7/7/2022,2:01 PM    Portions of the record may have been created with voice recognition software   Occasional wrong word or "sound a like" substitutions may have occurred due to the inherent limitations of voice recognition software   Read the chart carefully and recognize, using context, where substitutions have occurred

## 2022-07-09 DIAGNOSIS — C50.412 MALIGNANT NEOPLASM OF UPPER-OUTER QUADRANT OF LEFT BREAST IN FEMALE, ESTROGEN RECEPTOR POSITIVE (HCC): ICD-10-CM

## 2022-07-09 DIAGNOSIS — Z17.0 MALIGNANT NEOPLASM OF UPPER-OUTER QUADRANT OF LEFT BREAST IN FEMALE, ESTROGEN RECEPTOR POSITIVE (HCC): ICD-10-CM

## 2022-07-11 RX ORDER — ANASTROZOLE 1 MG/1
TABLET ORAL
Qty: 90 TABLET | Refills: 1 | Status: SHIPPED | OUTPATIENT
Start: 2022-07-11

## 2022-10-12 ENCOUNTER — OFFICE VISIT (OUTPATIENT)
Dept: HEMATOLOGY ONCOLOGY | Facility: CLINIC | Age: 72
End: 2022-10-12
Payer: MEDICARE

## 2022-10-12 ENCOUNTER — OFFICE VISIT (OUTPATIENT)
Dept: SURGICAL ONCOLOGY | Facility: CLINIC | Age: 72
End: 2022-10-12
Payer: MEDICARE

## 2022-10-12 VITALS
HEIGHT: 62 IN | BODY MASS INDEX: 40.85 KG/M2 | HEART RATE: 70 BPM | TEMPERATURE: 98 F | WEIGHT: 222 LBS | RESPIRATION RATE: 18 BRPM | DIASTOLIC BLOOD PRESSURE: 76 MMHG | SYSTOLIC BLOOD PRESSURE: 124 MMHG

## 2022-10-12 VITALS
BODY MASS INDEX: 39.42 KG/M2 | RESPIRATION RATE: 18 BRPM | OXYGEN SATURATION: 97 % | SYSTOLIC BLOOD PRESSURE: 124 MMHG | HEART RATE: 70 BPM | WEIGHT: 222.5 LBS | DIASTOLIC BLOOD PRESSURE: 76 MMHG | HEIGHT: 63 IN | TEMPERATURE: 98 F

## 2022-10-12 DIAGNOSIS — Z17.0 MALIGNANT NEOPLASM OF UPPER-OUTER QUADRANT OF LEFT BREAST IN FEMALE, ESTROGEN RECEPTOR POSITIVE (HCC): Primary | ICD-10-CM

## 2022-10-12 DIAGNOSIS — Z79.811 USE OF ANASTROZOLE: ICD-10-CM

## 2022-10-12 DIAGNOSIS — C50.412 MALIGNANT NEOPLASM OF UPPER-OUTER QUADRANT OF LEFT BREAST IN FEMALE, ESTROGEN RECEPTOR POSITIVE (HCC): Primary | ICD-10-CM

## 2022-10-12 PROCEDURE — 99214 OFFICE O/P EST MOD 30 MIN: CPT | Performed by: INTERNAL MEDICINE

## 2022-10-12 PROCEDURE — 99214 OFFICE O/P EST MOD 30 MIN: CPT

## 2022-10-12 NOTE — PROGRESS NOTES
Hematology / Oncology Outpatient Follow Up Note    Travis Mclean 81 y o  female [id number]         Date:  10/12/2022    Assessment / Plan:    A 68-year-old postmenopausal woman with stage III A left breast cancer, grade 2 with invasive lobular histology, ER 90% positive, WA 1% positive, HER2 negative disease   She is negative for BRCA gene mutation   She had 4 positive lymph nodes   She underwent mastectomy and axillary lymph node dissection, resulting in MADAN  She completed adjuvant chemotherapy with Taxotere and cyclophosphamide followed by postmastectomy radiation therapy  She is currently on adjuvant hormonal therapy with anastrozole with no toxicity  She has no evidence recurrent disease  I recommended her to continue anastrozole 1 mg once a day  I will see her again in a year for routine follow-up  She is in agreement with my recommendations       Subjective:      HPI:   A 61-year-old postmenopausal woman who recently noticed a lump in her left breast which she brought to medical attention  Elias Echo was found to have large left breast mass radiographically which was biopsied in 2021 which showed invasive ductal carcinoma, grade 2  This was ER 90% positive, WA 1% positive, HER2 negative disease   She subsequently underwent mastectomy and axillary lymph node dissection by Dr Reyes Brand in 2021  She had 60 x 38 x 30 mm of invasive lobular carcinoma, grade 2   Lymphovascular invasion was present   4/10 axillary lymph nodes were positive for metastatic disease with largest measuring  35 mm   Extranodal extension was present   She did not have reconstruction at the same time of mastectomy   She presents today to discuss adjuvant treatment options   She feels well  Elias Echo is afebrile  Elias Echo has no complaint of pain   She still have limited range of motion in her left shoulder   She has 2 drain still in place   She has no respiratory symptoms   Her CT scan showed 7 mm pulmonary nodule which is indeterminate   Bone scan was negative for osseous metastasis   Her performance status is normal   She is negative for BRCA gene mutation            Interval History:  A 27-year-old postmenopausal woman with stage III A left breast cancer, grade 2 with invasive lobular histology, ER 90% positive, WI 1% positive, HER2 negative disease   She is negative for BRCA gene mutation   She had 4 positive lymph nodes   She underwent mastectomy and axillary lymph node dissection, resulting in MADAN  She underwent adjuvant chemotherapy with Taxotere and cyclophosphamide with excellent tolerance which was completed in September 2021  She subsequently had postmastectomy radiation therapy with mild skin toxicity  Since October 2021, she has been on adjuvant hormonal therapy with anastrozole  She presents today for follow-up  She is tolerating anastrozole well  She has no complaint hot flashes or musculoskeletal symptoms  She denied any pain  Her weight is stable  She has no respiratory symptoms  Her performance status is normal         Objective:      Primary Diagnosis:     1    Left breast cancer, stage III A (pT3, pN2, M0 ) grade 2 with invasive lobular histology, ER 90% positive, WI 1% positive, HER2 negative disease   Diagnosed in June 2021      2   BRCA gene mutation negative      Cancer Staging:  Cancer Staging  No matching staging information was found for the patient         Previous Hematologic/ Oncologic Treatment:       Adjuvant chemotherapy with Taxotere and cyclophosphamide x4 cycles    Completed in September 2021      Current Hematologic/ Oncologic Treatment:       Adjuvant hormonal therapy with anastrozole since October 2021      Disease Status:       MADAN status post mastectomy and axillary lymph node dissection      Test Results:     Pathology:       60 x 38 x 30 mm of invasive lobular carcinoma, grade 2   Lymphovascular invasion was present   4/10 axillary lymph nodes were positive for metastatic disease with largest tumor measuring 3 5 cm   Extranodal extension was positive   ER 90% positive, CO 1% positive, HER2 negative disease   Stage III A (pT3, pN2, M0)     Radiology:      CT scan of chest abdomen pelvis showed a 7 mm of pulmonary nodules      Mammography in June 2022 was benign  Bi rad 2       Laboratory:       See below      Physical Exam:        General Appearance:    Alert, oriented          Eyes:    PERRL   Ears:    Normal external ear canals, both ears   Nose:   Nares normal, septum midline   Throat:   Mucosa moist  Pharynx without injection  Neck:   Supple         Lungs:     Clear to auscultation bilaterally   Chest Wall:    No tenderness or deformity    Heart:    Regular rate and rhythm         Abdomen:     Soft, non-tender, bowel sounds +, no organomegaly               Extremities:   Extremities no cyanosis or edema         Skin:   no rash or icterus  Lymph nodes:   Cervical, supraclavicular, and axillary nodes normal   Neurologic:   CNII-XII intact, normal strength, sensation and reflexes     Throughout             Breast exam:     Status post left mastectomy without reconstruction   No palpable abnormality in her left chest wall   Right breast exam is negative  ROS: Review of Systems   All other systems reviewed and are negative  Imaging: No results found        Labs:   Lab Results   Component Value Date    WBC 5 96 02/25/2022    HGB 14 7 02/25/2022    HCT 43 6 02/25/2022    MCV 88 02/25/2022     02/25/2022     Lab Results   Component Value Date     01/27/2017    K 3 9 02/25/2022     02/25/2022    CO2 25 02/25/2022    BUN 17 02/25/2022    CREATININE 0 68 02/25/2022    GLUCOSE 131 (H) 01/27/2017    GLUF 128 (H) 02/25/2022    CALCIUM 8 7 02/25/2022    CORRECTEDCA 9 2 08/24/2021    AST 19 09/16/2021    ALT 43 09/16/2021    ALKPHOS 76 09/16/2021    PROT 6 5 01/27/2017    BILITOT 0 8 01/27/2017    EGFR 89 02/25/2022         Current Medications: Reviewed  Allergies: Reviewed  PMH/FH/SH:  Reviewed      Vital Sign:    Body surface area is 2 02 meters squared      Wt Readings from Last 3 Encounters:   10/12/22 101 kg (222 lb 8 oz)   07/07/22 98 4 kg (217 lb)   07/06/22 99 8 kg (220 lb)        Temp Readings from Last 3 Encounters:   10/12/22 98 °F (36 7 °C) (Tympanic)   07/07/22 (!) 97 1 °F (36 2 °C) (Temporal)   07/06/22 97 8 °F (36 6 °C) (Temporal)        BP Readings from Last 3 Encounters:   10/12/22 124/76   07/07/22 142/84   07/06/22 167/89         Pulse Readings from Last 3 Encounters:   10/12/22 70   07/07/22 71   07/06/22 56     @LASTSAO2(3)@

## 2022-10-12 NOTE — PROGRESS NOTES
Surgical Oncology Follow Up       [address] [facility]  CANCER [facility] SURGICAL ONCOLOGY [address]  [facility] [address]    Travis Vinay  1950  [id number]  [address] [facility] SURGICAL ONCOLOGY [place] [address] [place] [address]    Chief Complaint   Patient presents with   • office visit       Assessment/Plan:  1  Malignant neoplasm of upper-outer quadrant of left breast in female, estrogen receptor positive ([facility] [address] )  - 6 month follow up    2  Use of anastrozole  - continue use per medical oncology       Discussion/Summary: Patient is a 61-year-old female presenting today for six-month follow-up her left breast cancer diagnosed in April of 2021  Pathology revealed invasive mammary carcinoma ER 90%, OR 1%, HER2 negative  She also had a left axillary lymph node which revealed metastatic disease  She underwent genetic testing which was the negative  She underwent a left breast modified radical mastectomy with axillary dissection  She had 4 positive lymph nodes  She underwent adjuvant chemotherapy and completed whole breast radiation therapy  She is currently on anastrozole  She had a right breast reduction with Dr Mary Monge on 3/18/2022  She had a diagnostic mammogram of the right breast on 06/08/2022 which was BI-RADS 2 category 2 density  There were no concerns on her clinical breast exam  Patient did have left axillary tightness and cording however she has completed PT and states she performs stretches at home  I will see the patient back in 6 months or sooner should the need arise  She was instructed to call with any questions or concerns prior to this time  All questions were answered today        History of Present Illness:     Oncology History   Malignant neoplasm of upper-outer quadrant of left breast in female, estrogen receptor positive ([facility] [address] )   4/19/2021 Biopsy    Left breast US guided biopsy:  A  2 o'clock 8 cm from the nipple  Invasive mammary carcinoma of no special type  Grade 2  ER 90  GA 1  HER2 1+  Lymphovascular invasion: not identified    B  Left Axillary lymph node biopsy:  Invasive/ metastatic adenocarcinoma, compatible with breast primary involving fibroadipose tissues and a portion of lymphoid pranchyma  ER 90  GA 1  HER2 1+    Concordant  Malignancy appears multifocal  The 2 adjacent masses span an area of approximately 4 cm  Right breast clear  5/3/2021 Genetic Testing    The following genes were evaluated: OLINDA, BRCA1, BRCA2, CDH1, CHEK2, PALB2, PTEN, STK11, TP53  Additional genes evaluated for a total of 36 genes analyzed  Negative result  No pathogenic sequence variants or deletions/dupllications identified  [employer]     6/8/2021 Surgery    Left breast modified radical mastectomy with ANAND  directed axillary dissection  Invasive carcinoma of no special type (ductal)  Grade 2  6 cm  Lymphovascular invasion present  Margins negative  4/10 Lymph nodes (3 5 cm)  Anatomic Stage IIIA  Prognostic Stage IB     7/16/2021 - 9/17/2021 Chemotherapy    pegfilgrastim (NEULASTA ONPRO), 6 mg, Subcutaneous, Once, 4 of 4 cycles  Administration: 6 mg (7/16/2021), 6 mg (8/6/2021), 6 mg (8/27/2021), 6 mg (9/17/2021)  cyclophosphamide (CYTOXAN) IVPB, 600 mg/m2 = 1,212 mg, Intravenous, Once, 4 of 4 cycles  Administration: 1,212 mg (7/16/2021), 1,212 mg (8/6/2021), 1,212 mg (8/27/2021), 1,212 mg (9/17/2021)  DOCEtaxel (TAXOTERE) chemo infusion, 75 mg/m2 = 151 6 mg, Intravenous, Once, 4 of 4 cycles  Administration: 151 6 mg (7/16/2021), 151 6 mg (8/6/2021), 151 6 mg (8/27/2021), 151 6 mg (9/17/2021)     10/18/2021 - 11/19/2021 Radiation    BH L CW 10X/6X 13 / 13 200 0 2,600 32   BH L CW BOLUS 10X/6X 12 / 12 200 0 2,400 30   BH L PAB 10X 25 / 25 60 0 1,500 32   BH L Sclav 10X 25 / 25 200 0 5,000 32      Treatment Dates:  10/18/2021 - 11/19/2021     Dr Carlos Alberto Hinds     10/2021 -  Hormone Therapy    anastrozole 1 mg once a day     Jose Beck          -Interval History: Patient is a 72-year-old female presenting today for six-month follow-up her left breast cancer diagnosed in April of 2021  She had a diagnostic mammogram of the right breast on 06/08/2022 which was BI-RADS 2 category 2 density  Patient denies changes on her breast exam  She denies persistent headaches, bone pain, back pain, shortness of breath, or abdominal pain  Review of Systems:  Review of Systems   Constitutional: Negative for activity change, appetite change, fatigue and unexpected weight change  Respiratory: Negative for cough and shortness of breath  Cardiovascular: Negative for chest pain  Gastrointestinal: Negative for abdominal pain, diarrhea, nausea and vomiting  Endocrine: Negative for heat intolerance  Musculoskeletal: Negative for arthralgias, back pain and myalgias  Skin: Negative for rash  Neurological: Negative for weakness and headaches  Hematological: Negative for adenopathy  Patient Active Problem List   Diagnosis   • Benign essential hypertension   • Chronic rhinitis   • Hidradenitis suppurativa   • Hypothyroidism   • Impaired fasting glucose   • Morbid obesity ([facility] [address] )   • Venous insufficiency (chronic) (peripheral)   • Malignant neoplasm of upper-outer quadrant of left breast in female, estrogen receptor positive ([facility] [address] )   • Chemotherapy induced neutropenia (HCC)   • Limb alert care status   • Dental crowns present   • Use of anastrozole     Past Medical History:   Diagnosis Date   • BRCA gene mutation negative 05/19/2021    [employer]; 36 gene panel   • Breast cancer ([facility] )     Left breast   • Colon polyp    • Dental crowns present    • Exercise involving walking     the dogs   • History of angina     per pt "years ago and related to stress"   • History of cancer chemotherapy    • History of chemotherapy     breast cancer (left) 2021     • History of radiation therapy    • History of radiation therapy    • [place] (hard of hearing)     wears hearing aids/will wear DOS bilat   • Hypertension    • Limb alert care status     No BP/IV Left Arm   • Lymphedema of left arm    • Prediabetes    • Wears glasses      Past Surgical History:   Procedure Laterality Date   • AXILLARY SURGERY Right     sweat glands removed -    • BREAST CYST EXCISION Right 2000    benign   • BREAST SURGERY Right    • CHOLECYSTECTOMY     • COLONOSCOPY     • DILATION AND CURETTAGE OF UTERUS     • MASTECTOMY Left 06/08/2021   • WA BREAST REDUCTION N/A 03/18/2022    Procedure: R BREAST REDUCTION; L BREAST EXCISION STANDING SKIN DEFORMITY; LOCAL FLAP;  Surgeon: Efra Bryan MD;  Location: [facility];  Service: Plastics   • WA MASTECTOMY, MODIFIED RADICAL Left 06/08/2021    Procedure: BREAST MODIFIED RADICAL MASTECTOMY; ANAND  DIRECTED AXILLARY DISSECTION;  Surgeon: Gilda Durant MD;  Location: [facility];  Service: Surgical Oncology   • REDUCTION MAMMAPLASTY Right     March 2022   • US BREAST NEEDLE LOC LEFT Left 06/02/2021   • US GUIDED BREAST BIOPSY LEFT COMPLETE Left 04/19/2021   • US GUIDED BREAST LYMPH NODE BIOPSY LEFT Left 04/19/2021   • WISDOM TOOTH EXTRACTION       Family History   Problem Relation Age of Onset   • Dementia Mother    • Colon cancer Mother         55's-59's   • Lung cancer Father         55's-59's   • No Known Problems Sister    • Breast cancer Paternal Aunt    • No Known Problems Sister    • Stomach cancer Paternal Uncle         66's     Social History     Socioeconomic History   • Marital status: /Civil Union     Spouse name: Not on file   • Number of children: Not on file   • Years of education: Not on file   • Highest education level: Not on file   Occupational History   • Not on file   Tobacco Use   • Smoking status: Never Smoker   • Smokeless tobacco: Never Used   Vaping Use   • Vaping Use: Never used   Substance and Sexual Activity   • Alcohol use: No   • Drug use: No   • Sexual activity: Not on file     Comment: defer   Other Topics Concern   • Not on file   Social History Narrative   • Not on file     Social Determinants of Health     Financial Resource Strain: Not on file   Food Insecurity: Not on file   Transportation Needs: Not on file   Physical Activity: Not on file   Stress: Not on file   Social Connections: Not on file   Intimate Partner Violence: Not on file   Housing Stability: Not on file       Current Outpatient Medications:   •  anastrozole (ARIMIDEX) 1 mg tablet, TAKE ONE TABLET BY MOUTH EVERY DAY, Disp: 90 tablet, Rfl: 1  •  Ascorbic Acid (VITAMIN C PO), Take by mouth in the morning, Disp: , Rfl:   •  CRANIAL PROSTHESIS, RX,, One wig as needed, Disp: 1 each, Rfl: 0  •  fluticasone (FLONASE) 50 mcg/act nasal spray, 2 puffs into each nostril daily, Disp: , Rfl:   •  metoprolol tartrate (LOPRESSOR) 50 mg tablet, TAKE ONE TABLET BY MOUTH EVERY DAY, Disp: 90 tablet, Rfl: 3  •  Multiple Vitamins-Minerals (MULTIVITAMIN ADULT PO), Take by mouth daily, Disp: , Rfl:   •  ondansetron (ZOFRAN) 4 mg tablet, Take 1 tablet (4 mg total) by mouth every 8 (eight) hours as needed for nausea or vomiting, Disp: 30 tablet, Rfl: 1  •  saccharomyces boulardii (FLORASTOR) 250 mg capsule, Take 250 mg by mouth daily, Disp: , Rfl:   •  silver sulfadiazine (SILVADENE,SSD) 1 % cream, Apply topically 2 (two) times a day Apply to affected area(s) as directed (Patient not taking: Reported on 7/7/2022), Disp: 400 g, Rfl: 1  •  VITAMIN D PO, Take by mouth in the morning, Disp: , Rfl:   No Known Allergies  Vitals:    10/12/22 1431   BP: 124/76   Pulse: 70   Resp: 18   Temp: 98 °F (36 7 °C)       Physical Exam  Constitutional:       General: She is not in acute distress  Appearance: Normal appearance  She is obese  Cardiovascular:      Rate and Rhythm: Normal rate and regular rhythm  Pulses: Normal pulses  Heart sounds: Normal heart sounds  Pulmonary:      Effort: Pulmonary effort is normal       Breath sounds: Normal breath sounds     Chest:      Chest wall: No mass  Breasts:      Right: No swelling, bleeding, inverted nipple, mass, nipple discharge, skin change, tenderness, axillary adenopathy or supraclavicular adenopathy  Left: No swelling, bleeding, mass, skin change, tenderness, axillary adenopathy or supraclavicular adenopathy  Comments: Left breast mastectomy scar  No masses, nodularity, skin changes, nipple changes or discharge (of right breast), or adenopathy appreciated on physical exam      Abdominal:      General: Abdomen is flat  Palpations: Abdomen is soft  Lymphadenopathy:      Upper Body:      Right upper body: No supraclavicular, axillary or pectoral adenopathy  Left upper body: No supraclavicular, axillary or pectoral adenopathy  Skin:     General: Skin is warm  Neurological:      General: No focal deficit present  Mental Status: She is alert and oriented to person, place, and time  Psychiatric:         Mood and Affect: Mood normal          Behavior: Behavior normal            Results:    Imaging  No results found  I reviewed the above imaging data  Advance Care Planning/Advance Directives:  Discussed disease status, cancer treatment plans and/or cancer treatment goals with the patient

## 2022-11-03 ENCOUNTER — RA CDI HCC (OUTPATIENT)
Dept: OTHER | Facility: HOSPITAL | Age: 72
End: 2022-11-03

## 2022-11-03 NOTE — PROGRESS NOTES
[address]  coding opportunities       Chart reviewed, no opportunity found: CHART REVIEWED, NO OPPORTUNITY FOUND        Patients Insurance     Medicare Insurance: Medicare

## 2022-11-10 ENCOUNTER — OFFICE VISIT (OUTPATIENT)
Dept: FAMILY MEDICINE CLINIC | Facility: CLINIC | Age: 72
End: 2022-11-10

## 2022-11-10 ENCOUNTER — APPOINTMENT (OUTPATIENT)
Dept: RADIOLOGY | Facility: CLINIC | Age: 72
End: 2022-11-10

## 2022-11-10 VITALS
TEMPERATURE: 97.9 F | DIASTOLIC BLOOD PRESSURE: 80 MMHG | OXYGEN SATURATION: 98 % | HEIGHT: 62 IN | WEIGHT: 224 LBS | HEART RATE: 84 BPM | RESPIRATION RATE: 16 BRPM | BODY MASS INDEX: 41.22 KG/M2 | SYSTOLIC BLOOD PRESSURE: 124 MMHG

## 2022-11-10 DIAGNOSIS — E66.01 MORBID OBESITY (HCC): ICD-10-CM

## 2022-11-10 DIAGNOSIS — I10 BENIGN ESSENTIAL HYPERTENSION: Primary | ICD-10-CM

## 2022-11-10 DIAGNOSIS — R05.9 COUGH, UNSPECIFIED TYPE: ICD-10-CM

## 2022-11-10 DIAGNOSIS — R53.83 OTHER FATIGUE: ICD-10-CM

## 2022-11-10 DIAGNOSIS — E03.9 HYPOTHYROIDISM, UNSPECIFIED TYPE: ICD-10-CM

## 2022-11-10 DIAGNOSIS — Z17.0 MALIGNANT NEOPLASM OF UPPER-OUTER QUADRANT OF LEFT BREAST IN FEMALE, ESTROGEN RECEPTOR POSITIVE (HCC): ICD-10-CM

## 2022-11-10 DIAGNOSIS — C50.412 MALIGNANT NEOPLASM OF UPPER-OUTER QUADRANT OF LEFT BREAST IN FEMALE, ESTROGEN RECEPTOR POSITIVE (HCC): ICD-10-CM

## 2022-11-10 DIAGNOSIS — J32.9 CHRONIC SINUSITIS, UNSPECIFIED LOCATION: ICD-10-CM

## 2022-11-10 PROBLEM — D70.1 CHEMOTHERAPY INDUCED NEUTROPENIA (HCC): Status: RESOLVED | Noted: 2021-06-18 | Resolved: 2022-11-10

## 2022-11-10 PROBLEM — T45.1X5A CHEMOTHERAPY INDUCED NEUTROPENIA (HCC): Status: RESOLVED | Noted: 2021-06-18 | Resolved: 2022-11-10

## 2022-11-10 RX ORDER — METOPROLOL TARTRATE 50 MG/1
50 TABLET, FILM COATED ORAL DAILY
Qty: 90 TABLET | Refills: 3 | Status: SHIPPED | OUTPATIENT
Start: 2022-11-10

## 2022-11-10 RX ORDER — AMOXICILLIN 875 MG/1
875 TABLET, COATED ORAL 2 TIMES DAILY
Qty: 20 TABLET | Refills: 0 | Status: SHIPPED | OUTPATIENT
Start: 2022-11-10 | End: 2022-11-20

## 2022-11-10 NOTE — PROGRESS NOTES
Name: Piedad Somers      : 1950      MRN: [id number]  Encounter Provider: Wero Kaminski MD  Encounter Date: 11/10/2022   Encounter department: [address] [facility] [address] [facility] [address]     1  Benign essential hypertension  Assessment & Plan:  Well controlled and will continue beta blocker as ordered  Encouraged weight loss and exercise  Orders:  -     CBC and differential; Future  -     Comprehensive metabolic panel; Future  -     Lipid panel; Future  -     metoprolol tartrate (LOPRESSOR) 50 mg tablet; Take 1 tablet (50 mg total) by mouth daily    2  Cough, unspecified type  -     XR chest pa & lateral; Future; Expected date: 11/10/2022    3  Chronic sinusitis, unspecified location  Comments:  Due to constitutional symptoms, will treat with antibiotics  Advised to start saline NS as well and use mucinex for mucolytic  Orders:  -     amoxicillin (AMOXIL) 875 mg tablet; Take 1 tablet (875 mg total) by mouth 2 (two) times a day for 10 days    4  Morbid obesity ([facility] [address] )  Assessment & Plan:  Encouraged exercise and weight loss  5  Other fatigue  -     TSH, 3rd generation with Free T4 reflex; Future    6  Hypothyroidism, unspecified type  Assessment & Plan: Will check TSH to r/o need of levothyroxine, especially in light of her fatigue and malaise  7  Malignant neoplasm of upper-outer quadrant of left breast in female, estrogen receptor positive ([facility] [address] )  Assessment & Plan:  She continues to follow with oncology and is up to date with mammography  Subjective      Here for bp follow up  Has been having sinus congestion and post nasal drip for about 2 months  It is clear or yellow, not green  Tried flonase, claritin, robitussin, no relief of symptoms  No fever but she has been feeling malaised and fatigued  Would like "a full panel" of labwork  Has a cough for about 2 months as well    Will intermittently feel dyspneic for a few seconds, not necessarily exertional     fThere is no chest pain, no palpitations  Is up to date with oncology follow up, colonoscopy, mammogram     Review of Systems   Constitutional: Positive for fatigue  Negative for fever and unexpected weight change  HENT: Positive for congestion, postnasal drip and sinus pressure  Negative for sinus pain  Respiratory: Positive for cough and shortness of breath  Cardiovascular: Negative          Current Outpatient Medications on File Prior to Visit   Medication Sig   • anastrozole (ARIMIDEX) 1 mg tablet TAKE ONE TABLET BY MOUTH EVERY DAY   • Ascorbic Acid (VITAMIN C PO) Take by mouth in the morning   • fluticasone (FLONASE) 50 mcg/act nasal spray 2 puffs into each nostril daily   • Multiple Vitamins-Minerals (MULTIVITAMIN ADULT PO) Take by mouth daily   • VITAMIN D PO Take by mouth in the morning   • [DISCONTINUED] metoprolol tartrate (LOPRESSOR) 50 mg tablet TAKE ONE TABLET BY MOUTH EVERY DAY   • [DISCONTINUED] CRANIAL PROSTHESIS, RX, One wig as needed   • [DISCONTINUED] ondansetron (ZOFRAN) 4 mg tablet Take 1 tablet (4 mg total) by mouth every 8 (eight) hours as needed for nausea or vomiting   • [DISCONTINUED] saccharomyces boulardii (FLORASTOR) 250 mg capsule Take 250 mg by mouth daily (Patient not taking: Reported on 11/10/2022)   • [DISCONTINUED] silver sulfadiazine (SILVADENE,SSD) 1 % cream Apply topically 2 (two) times a day Apply to affected area(s) as directed (Patient not taking: Reported on 7/7/2022)       Objective     /80   Pulse 84   Temp 97 9 °F (36 6 °C)   Resp 16   Ht 5' 2" (1 575 m)   Wt 102 kg (224 lb)   SpO2 98%   BMI 40 97 kg/m²     Physical Exam  Arielle Gutierrez MD

## 2022-11-12 ENCOUNTER — APPOINTMENT (OUTPATIENT)
Dept: LAB | Facility: HOSPITAL | Age: 72
End: 2022-11-12

## 2022-11-12 DIAGNOSIS — R73.09 ELEVATED GLUCOSE: ICD-10-CM

## 2022-11-12 DIAGNOSIS — R53.83 OTHER FATIGUE: ICD-10-CM

## 2022-11-12 DIAGNOSIS — I10 BENIGN ESSENTIAL HYPERTENSION: ICD-10-CM

## 2022-11-12 LAB
ALBUMIN SERPL BCP-MCNC: 3.3 G/DL (ref 3.5–5)
ALP SERPL-CCNC: 98 U/L (ref 46–116)
ALT SERPL W P-5'-P-CCNC: 30 U/L (ref 12–78)
ANION GAP SERPL CALCULATED.3IONS-SCNC: 7 MMOL/L (ref 4–13)
AST SERPL W P-5'-P-CCNC: 14 U/L (ref 5–45)
BASOPHILS # BLD AUTO: 0.02 THOUSANDS/ÂΜL (ref 0–0.1)
BASOPHILS NFR BLD AUTO: 0 % (ref 0–1)
BILIRUB SERPL-MCNC: 0.71 MG/DL (ref 0.2–1)
BUN SERPL-MCNC: 15 MG/DL (ref 5–25)
CALCIUM ALBUM COR SERPL-MCNC: 9.8 MG/DL (ref 8.3–10.1)
CALCIUM SERPL-MCNC: 9.2 MG/DL (ref 8.3–10.1)
CHLORIDE SERPL-SCNC: 107 MMOL/L (ref 96–108)
CHOLEST SERPL-MCNC: 150 MG/DL
CO2 SERPL-SCNC: 26 MMOL/L (ref 21–32)
CREAT SERPL-MCNC: 0.74 MG/DL (ref 0.6–1.3)
EOSINOPHIL # BLD AUTO: 0.11 THOUSAND/ÂΜL (ref 0–0.61)
EOSINOPHIL NFR BLD AUTO: 1 % (ref 0–6)
ERYTHROCYTE [DISTWIDTH] IN BLOOD BY AUTOMATED COUNT: 13.3 % (ref 11.6–15.1)
GFR SERPL CREATININE-BSD FRML MDRD: 82 ML/MIN/1.73SQ M
GLUCOSE P FAST SERPL-MCNC: 179 MG/DL (ref 65–99)
HCT VFR BLD AUTO: 43.4 % (ref 34.8–46.1)
HDLC SERPL-MCNC: 39 MG/DL
HGB BLD-MCNC: 14.7 G/DL (ref 11.5–15.4)
IMM GRANULOCYTES # BLD AUTO: 0.04 THOUSAND/UL (ref 0–0.2)
IMM GRANULOCYTES NFR BLD AUTO: 1 % (ref 0–2)
LDLC SERPL CALC-MCNC: 90 MG/DL (ref 0–100)
LYMPHOCYTES # BLD AUTO: 0.81 THOUSANDS/ÂΜL (ref 0.6–4.47)
LYMPHOCYTES NFR BLD AUTO: 11 % (ref 14–44)
MCH RBC QN AUTO: 30.4 PG (ref 26.8–34.3)
MCHC RBC AUTO-ENTMCNC: 33.9 G/DL (ref 31.4–37.4)
MCV RBC AUTO: 90 FL (ref 82–98)
MONOCYTES # BLD AUTO: 0.46 THOUSAND/ÂΜL (ref 0.17–1.22)
MONOCYTES NFR BLD AUTO: 6 % (ref 4–12)
NEUTROPHILS # BLD AUTO: 6.19 THOUSANDS/ÂΜL (ref 1.85–7.62)
NEUTS SEG NFR BLD AUTO: 81 % (ref 43–75)
NONHDLC SERPL-MCNC: 111 MG/DL
NRBC BLD AUTO-RTO: 0 /100 WBCS
PLATELET # BLD AUTO: 231 THOUSANDS/UL (ref 149–390)
PMV BLD AUTO: 10 FL (ref 8.9–12.7)
POTASSIUM SERPL-SCNC: 3.8 MMOL/L (ref 3.5–5.3)
PROT SERPL-MCNC: 7.4 G/DL (ref 6.4–8.4)
RBC # BLD AUTO: 4.84 MILLION/UL (ref 3.81–5.12)
SODIUM SERPL-SCNC: 140 MMOL/L (ref 135–147)
TRIGL SERPL-MCNC: 104 MG/DL
TSH SERPL DL<=0.05 MIU/L-ACNC: 4.27 UIU/ML (ref 0.45–4.5)
WBC # BLD AUTO: 7.63 THOUSAND/UL (ref 4.31–10.16)

## 2022-11-14 DIAGNOSIS — R73.09 ELEVATED GLUCOSE: Primary | ICD-10-CM

## 2022-11-14 LAB
EST. AVERAGE GLUCOSE BLD GHB EST-MCNC: 128 MG/DL
HBA1C MFR BLD: 6.1 %

## 2023-02-03 ENCOUNTER — OFFICE VISIT (OUTPATIENT)
Dept: FAMILY MEDICINE CLINIC | Facility: CLINIC | Age: 73
End: 2023-02-03

## 2023-02-03 VITALS
SYSTOLIC BLOOD PRESSURE: 140 MMHG | TEMPERATURE: 97.6 F | DIASTOLIC BLOOD PRESSURE: 90 MMHG | RESPIRATION RATE: 16 BRPM | WEIGHT: 223 LBS | HEART RATE: 89 BPM | BODY MASS INDEX: 41.04 KG/M2 | OXYGEN SATURATION: 97 % | HEIGHT: 62 IN

## 2023-02-03 DIAGNOSIS — J20.9 ACUTE BRONCHITIS, UNSPECIFIED ORGANISM: Primary | ICD-10-CM

## 2023-02-03 DIAGNOSIS — C50.412 MALIGNANT NEOPLASM OF UPPER-OUTER QUADRANT OF LEFT BREAST IN FEMALE, ESTROGEN RECEPTOR POSITIVE (HCC): ICD-10-CM

## 2023-02-03 DIAGNOSIS — E66.01 MORBID OBESITY (HCC): ICD-10-CM

## 2023-02-03 DIAGNOSIS — Z17.0 MALIGNANT NEOPLASM OF UPPER-OUTER QUADRANT OF LEFT BREAST IN FEMALE, ESTROGEN RECEPTOR POSITIVE (HCC): ICD-10-CM

## 2023-02-03 RX ORDER — ALBUTEROL SULFATE 90 UG/1
2 AEROSOL, METERED RESPIRATORY (INHALATION) EVERY 4 HOURS PRN
Qty: 8 G | Refills: 0 | Status: SHIPPED | OUTPATIENT
Start: 2023-02-03

## 2023-02-03 RX ORDER — CEFUROXIME AXETIL 500 MG/1
500 TABLET ORAL EVERY 12 HOURS SCHEDULED
Qty: 20 TABLET | Refills: 0 | Status: SHIPPED | OUTPATIENT
Start: 2023-02-03 | End: 2023-02-13

## 2023-02-03 RX ORDER — DEXTROMETHORPHAN HYDROBROMIDE AND PROMETHAZINE HYDROCHLORIDE 15; 6.25 MG/5ML; MG/5ML
5 SOLUTION ORAL 4 TIMES DAILY PRN
Qty: 240 ML | Refills: 0 | Status: SHIPPED | OUTPATIENT
Start: 2023-02-03 | End: 2023-02-10

## 2023-02-03 NOTE — PROGRESS NOTES
Assessment/Plan:    Will treat as below  F/u for any persistent/worsening symptoms    1  Acute bronchitis, unspecified organism  -     albuterol (ProAir HFA) 90 mcg/act inhaler; Inhale 2 puffs every 4 (four) hours as needed for wheezing or shortness of breath  -     cefuroxime (CEFTIN) 500 mg tablet; Take 1 tablet (500 mg total) by mouth every 12 (twelve) hours for 10 days  -     Promethazine-DM (PHENERGAN-DM) 6 25-15 mg/5 mL oral syrup; Take 5 mL by mouth 4 (four) times a day as needed for cough for up to 7 days    2  Morbid obesity ([facility] [address] )    3  Malignant neoplasm of upper-outer quadrant of left breast in female, estrogen receptor positive ([facility] [address] )          There are no Patient Instructions on file for this visit  Return if symptoms worsen or fail to improve  Subjective:      Patient ID: Annelise Zepeda is a 81 y o  female  Chief Complaint   Patient presents with   • Cough   • Cold Like Symptoms     wmcma       She has had a productive cough for the past month  Occasionally has some SOB  Has sinus congestion as well  No recent fevers  Had something similar in November which cleared with antibiotics  The following portions of the patient's history were reviewed and updated as appropriate: allergies, current medications, past family history, past medical history, past social history, past surgical history and problem list     Review of Systems   Constitutional: Negative for chills, fatigue and fever  HENT: Positive for congestion  Negative for ear pain, postnasal drip, rhinorrhea, sinus pressure and sore throat  Respiratory: Positive for cough and shortness of breath  Negative for wheezing  Cardiovascular: Negative for chest pain  Gastrointestinal: Negative for abdominal pain, diarrhea, nausea and vomiting  Musculoskeletal: Negative for arthralgias  Skin: Negative for rash  Neurological: Negative for headaches           Current Outpatient Medications   Medication Sig Dispense Refill • albuterol (ProAir HFA) 90 mcg/act inhaler Inhale 2 puffs every 4 (four) hours as needed for wheezing or shortness of breath 8 g 0   • anastrozole (ARIMIDEX) 1 mg tablet TAKE ONE TABLET BY MOUTH EVERY DAY 90 tablet 1   • Ascorbic Acid (VITAMIN C PO) Take by mouth in the morning     • cefuroxime (CEFTIN) 500 mg tablet Take 1 tablet (500 mg total) by mouth every 12 (twelve) hours for 10 days 20 tablet 0   • fluticasone (FLONASE) 50 mcg/act nasal spray 2 puffs into each nostril daily     • metoprolol tartrate (LOPRESSOR) 50 mg tablet Take 1 tablet (50 mg total) by mouth daily 90 tablet 3   • Multiple Vitamins-Minerals (MULTIVITAMIN ADULT PO) Take by mouth daily     • Promethazine-DM (PHENERGAN-DM) 6 25-15 mg/5 mL oral syrup Take 5 mL by mouth 4 (four) times a day as needed for cough for up to 7 days 240 mL 0   • VITAMIN D PO Take by mouth in the morning       No current facility-administered medications for this visit  Objective:    /90 (BP Location: Right arm, Patient Position: Sitting, Cuff Size: Large)   Pulse 89   Temp 97 6 °F (36 4 °C) (Temporal)   Resp 16   Ht 5' 2" (1 575 m)   Wt 101 kg (223 lb)   SpO2 97%   BMI 40 79 kg/m²        Physical Exam  Vitals and nursing note reviewed  Constitutional:       Appearance: Normal appearance  She is well-developed  HENT:      Right Ear: Tympanic membrane normal       Left Ear: Tympanic membrane normal       Nose: Congestion present  Mouth/Throat:      Pharynx: Oropharynx is clear  Eyes:      Conjunctiva/sclera: Conjunctivae normal    Cardiovascular:      Rate and Rhythm: Normal rate and regular rhythm  Pulses: Normal pulses  Heart sounds: Normal heart sounds  No murmur heard  Pulmonary:      Effort: Pulmonary effort is normal       Breath sounds: Wheezing (mild expiratory) present  Skin:     General: Skin is warm and dry  Neurological:      Mental Status: She is alert     Psychiatric:         Mood and Affect: Mood normal  Behavior: Behavior normal                 DANILO Espinosa

## 2023-04-19 PROBLEM — Z90.12 S/P MASTECTOMY, LEFT: Status: ACTIVE | Noted: 2023-04-19

## 2023-05-03 ENCOUNTER — OFFICE VISIT (OUTPATIENT)
Dept: FAMILY MEDICINE CLINIC | Facility: CLINIC | Age: 73
End: 2023-05-03

## 2023-05-03 VITALS
TEMPERATURE: 99.7 F | HEART RATE: 75 BPM | BODY MASS INDEX: 40.7 KG/M2 | DIASTOLIC BLOOD PRESSURE: 82 MMHG | SYSTOLIC BLOOD PRESSURE: 144 MMHG | WEIGHT: 221.2 LBS | HEIGHT: 62 IN | RESPIRATION RATE: 17 BRPM

## 2023-05-03 DIAGNOSIS — J01.90 ACUTE NON-RECURRENT SINUSITIS, UNSPECIFIED LOCATION: ICD-10-CM

## 2023-05-03 DIAGNOSIS — Z00.00 MEDICARE ANNUAL WELLNESS VISIT, SUBSEQUENT: Primary | ICD-10-CM

## 2023-05-03 DIAGNOSIS — I10 BENIGN ESSENTIAL HYPERTENSION: ICD-10-CM

## 2023-05-03 RX ORDER — AMOXICILLIN 875 MG/1
875 TABLET, COATED ORAL 2 TIMES DAILY
Qty: 20 TABLET | Refills: 0 | Status: SHIPPED | OUTPATIENT
Start: 2023-05-03 | End: 2023-05-13

## 2023-05-03 RX ORDER — BENZONATATE 200 MG/1
200 CAPSULE ORAL 3 TIMES DAILY PRN
Qty: 20 CAPSULE | Refills: 0 | Status: SHIPPED | OUTPATIENT
Start: 2023-05-03

## 2023-05-03 NOTE — PATIENT INSTRUCTIONS
Medicare Preventive Visit Patient Instructions  Thank you for completing your Welcome to Medicare Visit or Medicare Annual Wellness Visit today  Your next wellness visit will be due in one year (5/3/2024)  The screening/preventive services that you may require over the next 5-10 years are detailed below  Some tests may not apply to you based off risk factors and/or age  Screening tests ordered at today's visit but not completed yet may show as past due  Also, please note that scanned in results may not display below  Preventive Screenings:  Service Recommendations Previous Testing/Comments   Colorectal Cancer Screening  * Colonoscopy    * Fecal Occult Blood Test (FOBT)/Fecal Immunochemical Test (FIT)  * Fecal DNA/Cologuard Test  * Flexible Sigmoidoscopy Age: 39-70 years old   Colonoscopy: every 10 years (may be performed more frequently if at higher risk)  OR  FOBT/FIT: every 1 year  OR  Cologuard: every 3 years  OR  Sigmoidoscopy: every 5 years  Screening may be recommended earlier than age 39 if at higher risk for colorectal cancer  Also, an individualized decision between you and your healthcare provider will decide whether screening between the ages of 74-80 would be appropriate  Colonoscopy: 07/06/2022  FOBT/FIT: Not on file  Cologuard: Not on file  Sigmoidoscopy: Not on file    Screening Current     Breast Cancer Screening Age: 36 years old  Frequency: every 1-2 years  Not required if history of left and right mastectomy Mammogram: 06/08/2022    History Breast Cancer   Cervical Cancer Screening Between the ages of 21-29, pap smear recommended once every 3 years  Between the ages of 33-67, can perform pap smear with HPV co-testing every 5 years     Recommendations may differ for women with a history of total hysterectomy, cervical cancer, or abnormal pap smears in past  Pap Smear: 01/19/2017    Screening Not Indicated   Hepatitis C Screening Once for adults born between 1945 and 1965  More frequently in patients at high risk for Hepatitis C Hep C Antibody: 08/26/2020    Screening Current   Diabetes Screening 1-2 times per year if you're at risk for diabetes or have pre-diabetes Fasting glucose: 179 mg/dL (11/12/2022)  A1C: 6 1 % (11/12/2022)  Screening Current   Cholesterol Screening Once every 5 years if you don't have a lipid disorder  May order more often based on risk factors  Lipid panel: 11/12/2022    Screening Current     Other Preventive Screenings Covered by Medicare:  1  Abdominal Aortic Aneurysm (AAA) Screening: covered once if your at risk  You're considered to be at risk if you have a family history of AAA  2  Lung Cancer Screening: covers low dose CT scan once per year if you meet all of the following conditions: (1) Age 50-69; (2) No signs or symptoms of lung cancer; (3) Current smoker or have quit smoking within the last 15 years; (4) You have a tobacco smoking history of at least 20 pack years (packs per day multiplied by number of years you smoked); (5) You get a written order from a healthcare provider  3  Glaucoma Screening: covered annually if you're considered high risk: (1) You have diabetes OR (2) Family history of glaucoma OR (3)  aged 48 and older OR (3)  American aged 72 and older  3  Osteoporosis Screening: covered every 2 years if you meet one of the following conditions: (1) You're estrogen deficient and at risk for osteoporosis based off medical history and other findings; (2) Have a vertebral abnormality; (3) On glucocorticoid therapy for more than 3 months; (4) Have primary hyperparathyroidism; (5) On osteoporosis medications and need to assess response to drug therapy  · Last bone density test (DXA Scan): 02/21/2022   5  HIV Screening: covered annually if you're between the age of 15-65  Also covered annually if you are younger than 13 and older than 72 with risk factors for HIV infection   For pregnant patients, it is covered up to 3 times per pregnancy  Immunizations:  Immunization Recommendations   Influenza Vaccine Annual influenza vaccination during flu season is recommended for all persons aged >= 6 months who do not have contraindications   Pneumococcal Vaccine   * Pneumococcal conjugate vaccine = PCV13 (Prevnar 13), PCV15 (Vaxneuvance), PCV20 (Prevnar 20)  * Pneumococcal polysaccharide vaccine = PPSV23 (Pneumovax) Adults 25-60 years old: 1-3 doses may be recommended based on certain risk factors  Adults 72 years old: 1-2 doses may be recommended based off what pneumonia vaccine you previously received   Hepatitis B Vaccine 3 dose series if at intermediate or high risk (ex: diabetes, end stage renal disease, liver disease)   Tetanus (Td) Vaccine - COST NOT COVERED BY MEDICARE PART B Following completion of primary series, a booster dose should be given every 10 years to maintain immunity against tetanus  Td may also be given as tetanus wound prophylaxis  Tdap Vaccine - COST NOT COVERED BY MEDICARE PART B Recommended at least once for all adults  For pregnant patients, recommended with each pregnancy  Shingles Vaccine (Shingrix) - COST NOT COVERED BY MEDICARE PART B  2 shot series recommended in those aged 48 and above     Health Maintenance Due:      Topic Date Due    Cervical Cancer Screening  01/19/2020    Breast Cancer Screening: Mammogram  06/08/2023    Colorectal Cancer Screening  07/05/2027    Hepatitis C Screening  Completed     Immunizations Due:      Topic Date Due    Pneumococcal Vaccine: 65+ Years (3 - PPSV23 if available, else PCV20) 11/05/2020    COVID-19 Vaccine (5 - Booster for [employer] series) 12/28/2022     Advance Directives   What are advance directives? Advance directives are legal documents that state your wishes and plans for medical care  These plans are made ahead of time in case you lose your ability to make decisions for yourself   Advance directives can apply to any medical decision, such as the treatments you want, and if you want to donate organs  What are the types of advance directives? There are many types of advance directives, and each state has rules about how to use them  You may choose a combination of any of the following:  · Living will: This is a written record of the treatment you want  You can also choose which treatments you do not want, which to limit, and which to stop at a certain time  This includes surgery, medicine, IV fluid, and tube feedings  · Durable power of  for healthcare [facility]): This is a written record that states who you want to make healthcare choices for you when you are unable to make them for yourself  This person, called a proxy, is usually a family member or a friend  You may choose more than 1 proxy  · Do not resuscitate (DNR) order:  A DNR order is used in case your heart stops beating or you stop breathing  It is a request not to have certain forms of treatment, such as CPR  A DNR order may be included in other types of advance directives  · Medical directive: This covers the care that you want if you are in a coma, near death, or unable to make decisions for yourself  You can list the treatments you want for each condition  Treatment may include pain medicine, surgery, blood transfusions, dialysis, IV or tube feedings, and a ventilator (breathing machine)  · Values history: This document has questions about your views, beliefs, and how you feel and think about life  This information can help others choose the care that you would choose  Why are advance directives important? An advance directive helps you control your care  Although spoken wishes may be used, it is better to have your wishes written down  Spoken wishes can be misunderstood, or not followed  Treatments may be given even if you do not want them  An advance directive may make it easier for your family to make difficult choices about your care     Weight Management   Why it is important to manage your weight:  Being overweight increases your risk of health conditions such as heart disease, high blood pressure, type 2 diabetes, and certain types of cancer  It can also increase your risk for osteoarthritis, sleep apnea, and other respiratory problems  Aim for a slow, steady weight loss  Even a small amount of weight loss can lower your risk of health problems  How to lose weight safely:  A safe and healthy way to lose weight is to eat fewer calories and get regular exercise  You can lose up about 1 pound a week by decreasing the number of calories you eat by 500 calories each day  Healthy meal plan for weight management:  A healthy meal plan includes a variety of foods, contains fewer calories, and helps you stay healthy  A healthy meal plan includes the following:  · Eat whole-grain foods more often  A healthy meal plan should contain fiber  Fiber is the part of grains, fruits, and vegetables that is not broken down by your body  Whole-grain foods are healthy and provide extra fiber in your diet  Some examples of whole-grain foods are whole-wheat breads and pastas, oatmeal, brown rice, and bulgur  · Eat a variety of vegetables every day  Include dark, leafy greens such as spinach, kale, angel greens, and mustard greens  Eat yellow and orange vegetables such as carrots, sweet potatoes, and winter squash  · Eat a variety of fruits every day  Choose fresh or canned fruit (canned in its own juice or light syrup) instead of juice  Fruit juice has very little or no fiber  · Eat low-fat dairy foods  Drink fat-free (skim) milk or 1% milk  Eat fat-free yogurt and low-fat cottage cheese  Try low-fat cheeses such as mozzarella and other reduced-fat cheeses  · Choose meat and other protein foods that are low in fat  Choose beans or other legumes such as split peas or lentils  Choose fish, skinless poultry (chicken or turkey), or lean cuts of red meat (beef or pork)   Before you cook meat or poultry, cut off any visible fat  · Use less fat and oil  Try baking foods instead of frying them  Add less fat, such as margarine, sour cream, regular salad dressing and mayonnaise to foods  Eat fewer high-fat foods  Some examples of high-fat foods include french fries, doughnuts, ice cream, and cakes  · Eat fewer sweets  Limit foods and drinks that are high in sugar  This includes candy, cookies, regular soda, and sweetened drinks  Exercise:  Exercise at least 30 minutes per day on most days of the week  Some examples of exercise include walking, biking, dancing, and swimming  You can also fit in more physical activity by taking the stairs instead of the elevator or parking farther away from stores  Ask your healthcare provider about the best exercise plan for you  © Copyright [employer] 2018 Information is for End User's use only and may not be sold, redistributed or otherwise used for commercial purposes   All illustrations and images included in CareNotes® are the copyrighted property of [employer]  or [address] [place] [address]

## 2023-05-03 NOTE — PROGRESS NOTES
Assessment and Plan:     Problem List Items Addressed This Visit        Cardiovascular and Mediastinum    Benign essential hypertension   Other Visit Diagnoses     Medicare annual wellness visit, subsequent    -  Primary    Relevant Medications    amoxicillin (AMOXIL) 875 mg tablet    Acute non-recurrent sinusitis, unspecified location        Supportive care discussed, follow up if not improving  Relevant Medications    amoxicillin (AMOXIL) 875 mg tablet    benzonatate (TESSALON) 200 MG capsule           Preventive health issues were discussed with patient, and age appropriate screening tests were ordered as noted in patient's After Visit Summary  Personalized health advice and appropriate referrals for health education or preventive services given if needed, as noted in patient's After Visit Summary  History of Present Illness:     Patient presents for a Medicare Wellness Visit    Her for acute illness, as well as AWV  Has been having a cough and congestion, intermittent fevers, green sputum  She denies dyspnea  Tried promethazine DM and this did not help  Feels very tired but eating and drinking okay  Patient Care Team:  Ab Hayes MD as PCP - General (Internal Medicine)  Cyndi Wilson MD as Surgeon (Surgical Oncology)  Jadyn Rosa MD (Family Medicine)  Jovita Kitchen RN as Registered Nurse (Oncology)  Higinio Gaviria MD as Medical Oncologist (Hematology)  Sweetie Wang MD (Radiation Oncology)     Review of Systems:     Review of Systems   Constitutional: Positive for fatigue and fever  Negative for chills  HENT: Positive for congestion, rhinorrhea and sore throat  Negative for ear pain  Eyes: Negative for pain and visual disturbance  Respiratory: Positive for cough  Negative for shortness of breath  Cardiovascular: Negative for chest pain and palpitations  Gastrointestinal: Negative for abdominal pain and vomiting  Genitourinary: Negative for dysuria and hematuria  "  Musculoskeletal: Negative for arthralgias and back pain  Skin: Negative for color change and rash  Neurological: Negative for seizures and syncope  All other systems reviewed and are negative  Problem List:     Patient Active Problem List   Diagnosis    Benign essential hypertension    Chronic rhinitis    Hidradenitis suppurativa    Hypothyroidism    Impaired fasting glucose    [facility]bid obesity [facility] [address] [address] [facility]    Venous insufficiency (chronic) (peripheral)    Malignant neoplasm of upper-outer quadrant of left breast in female, estrogen receptor positive ([facility] [address] )    Use of anastrozole    S/P mastectomy, left      Past Medical and Surgical History:     Past Medical History:   Diagnosis Date    BRCA gene mutation negative 05/19/2021    [employer]; 36 gene panel    Breast cancer ([facility] [address] )     Left breast    Colon polyp     Dental crowns present     Exercise involving walking     the dogs    History of angina     per pt \"years ago and related to stress\"    History of cancer chemotherapy     History of chemotherapy     breast cancer (left) 2021      History of radiation therapy     History of radiation therapy     [place] (hard of hearing)     wears hearing aids/will wear DOS bilat    Hypertension     Limb alert care status     No BP/IV Left Arm    Lymphedema of left arm     Prediabetes     Wears glasses      Past Surgical History:   Procedure Laterality Date    AXILLARY SURGERY Right     sweat glands removed -     BREAST CYST EXCISION Right 2000    benign    BREAST SURGERY Right     CHOLECYSTECTOMY      COLONOSCOPY      DILATION AND CURETTAGE OF UTERUS      MASTECTOMY Left 06/08/2021    MO BREAST REDUCTION N/A 03/18/2022    Procedure: R BREAST REDUCTION; L BREAST EXCISION STANDING SKIN DEFORMITY; LOCAL FLAP;  Surgeon: Aaron Gutierrez MD;  Location: [facility];  Service: Plastics    MO MAST MODF RAD W/AX LYMPH NOD W/WO PECT/NERI MIN Left 06/08/2021    Procedure: BREAST MODIFIED RADICAL MASTECTOMY; " ANAND  DIRECTED AXILLARY DISSECTION;  Surgeon: Vanessa Cannon MD;  [facility];  Service: Surgical Oncology    REDUCTION MAMMAPLASTY Right     March 2022    US BREAST NEEDLE LOC LEFT Left 06/02/2021    US GUIDED BREAST BIOPSY LEFT COMPLETE Left 04/19/2021    US GUIDED BREAST LYMPH NODE BIOPSY LEFT Left 04/19/2021    WISDOM TOOTH EXTRACTION        Family History:     Family History   Problem Relation Age of Onset    Dementia Mother     Colon cancer Mother         55's-59's    Lung cancer Father         55's-59's    No Known Problems Sister     Breast cancer Paternal Aunt     No Known Problems Sister     Stomach cancer Paternal Uncle         66's      Social History:     Social History     Socioeconomic History    Marital status: /Civil Union     Spouse name: None    Number of children: None    Years of education: None    Highest education level: None   Occupational History    None   Tobacco Use    Smoking status: Never    Smokeless tobacco: Never   Vaping Use    Vaping Use: Never used   Substance and Sexual Activity    Alcohol use: No    Drug use: No    Sexual activity: Yes     Partners: Male     Birth control/protection: Male Sterilization     Comment: defer   Other Topics Concern    None   Social History Narrative    None     Social Determinants of Health     Financial Resource Strain: Low Risk     Difficulty of Paying Living Expenses: Not hard at all   Food Insecurity: Not on file   Transportation Needs: No Transportation Needs    Lack of Transportation (Medical): No    Lack of Transportation (Non-Medical):  No   Physical Activity: Not on file   Stress: Not on file   Social Connections: Not on file   Intimate Partner Violence: Not on file   Housing Stability: Not on file      Medications and Allergies:     Current Outpatient Medications   Medication Sig Dispense Refill    albuterol (ProAir HFA) 90 mcg/act inhaler Inhale 2 puffs every 4 (four) hours as needed for wheezing or shortness of breath 8 g 0    amoxicillin (AMOXIL) 875 mg tablet Take 1 tablet (875 mg total) by mouth 2 (two) times a day for 10 days 20 tablet 0    anastrozole (ARIMIDEX) 1 mg tablet TAKE ONE TABLET BY MOUTH EVERY DAY 90 tablet 1    Ascorbic Acid (VITAMIN C PO) Take by mouth in the morning      benzonatate (TESSALON) 200 MG capsule Take 1 capsule (200 mg total) by mouth 3 (three) times a day as needed for cough 20 capsule 0    fluticasone (FLONASE) 50 mcg/act nasal spray 2 puffs into each nostril daily      metoprolol tartrate (LOPRESSOR) 50 mg tablet Take 1 tablet (50 mg total) by mouth daily 90 tablet 3    Multiple Vitamins-Minerals (MULTIVITAMIN ADULT PO) Take by mouth daily      VITAMIN D PO Take by mouth in the morning       No current facility-administered medications for this visit  No Known Allergies   Immunizations:     Immunization History   Administered Date(s) Administered    COVID-19 PFIZER VACCINE 0 3 ML IM 03/07/2021, 03/26/2021, 11/06/2021, 11/02/2022    INFLUENZA 10/02/2020, 10/02/2021, 11/02/2022    Influenza Split High Dose Preservative Free IM 10/28/2017    Pneumococcal Conjugate 13-Valent 10/28/2017    Pneumococcal Polysaccharide PPV23 11/05/2015    Tdap 10/12/2009, 01/10/2013      Health Maintenance:         Topic Date Due    Cervical Cancer Screening  01/19/2020    Breast Cancer Screening: Mammogram  06/08/2023    Colorectal Cancer Screening  07/05/2027    Hepatitis C Screening  Completed         Topic Date Due    Pneumococcal Vaccine: 65+ Years (3 - PPSV23 if available, else PCV20) 11/05/2020    COVID-19 Vaccine (5 - Booster for [employer] series) 12/28/2022      Medicare Screening Tests and Risk Assessments:     Dalia Bazan is here for her Subsequent Wellness visit  Health Risk Assessment:   Patient rates overall health as fair  Patient feels that their physical health rating is same  Patient is satisfied with their life  Eyesight was rated as same   Hearing was rated as same  Patient feels that their emotional and mental health rating is same  Patients states they are never, rarely angry  Patient states they are sometimes unusually tired/fatigued  Pain experienced in the last 7 days has been some  Patient's pain rating has been 4/10  Patient states that she has experienced no weight loss or gain in last 6 months  Depression Screening:   PHQ-2 Score: 0      Fall Risk Screening: In the past year, patient has experienced: no history of falling in past year      Urinary Incontinence Screening:   Patient has not leaked urine accidently in the last six months  Home Safety:  Patient has trouble with stairs inside or outside of their home  Patient has working smoke alarms and has working carbon monoxide detector  Home safety hazards include: none  Nutrition:   Current diet is Regular  Medications:   Patient is currently taking over-the-counter supplements  OTC medications include: see medication list  Patient is able to manage medications  Activities of Daily Living (ADLs)/Instrumental Activities of Daily Living (IADLs):   Walk and transfer into and out of bed and chair?: Yes  Dress and groom yourself?: Yes    Bathe or shower yourself?: Yes    Feed yourself? Yes  Do your laundry/housekeeping?: Yes  Manage your money, pay your bills and track your expenses?: Yes  Make your own meals?: Yes    Do your own shopping?: Yes    Previous Hospitalizations:   Any hospitalizations or ED visits within the last 12 months?: No      Advance Care Planning:   Living will: Yes    Durable POA for healthcare:  Yes    Advanced directive: Yes    End of Life Decisions reviewed with patient: Yes      Cognitive Screening:   Provider or family/friend/caregiver concerned regarding cognition?: No    PREVENTIVE SCREENINGS      Cardiovascular Screening:    General: Screening Current      Diabetes Screening:     General: Screening Current      Colorectal Cancer Screening:     General: Screening "Current      Breast Cancer Screening:     General: History Breast Cancer      Cervical Cancer Screening:    General: Screening Not Indicated      Osteoporosis Screening:    General: Patient Declines      Abdominal Aortic Aneurysm (AAA) Screening:        General: Screening Not Indicated      Lung Cancer Screening:     General: Screening Not Indicated      Hepatitis C Screening:    General: Screening Current    Screening, Brief Intervention, and Referral to Treatment (SBIRT)    Screening  Typical number of drinks in a day: 0  Typical number of drinks in a week: 0  Interpretation: Low risk drinking behavior  Single Item Drug Screening:  How often have you used an illegal drug (including marijuana) or a prescription medication for non-medical reasons in the past year? never    Single Item Drug Screen Score: 0  Interpretation: Negative screen for possible drug use disorder    Brief Intervention  Alcohol & drug use screenings were reviewed  No concerns regarding substance use disorder identified  Other Counseling Topics:   Car/seat belt/driving safety, skin self-exam, sunscreen and calcium and vitamin D intake and regular weightbearing exercise  No results found  Physical Exam:     /82   Pulse 75   Temp 99 7 °F (37 6 °C)   Resp 17   Ht 5' 2\" (1 575 m)   Wt 100 kg (221 lb 3 2 oz)   BMI 40 46 kg/m²     Physical Exam  Constitutional:       Appearance: Normal appearance  HENT:      Right Ear: Tympanic membrane is retracted  Left Ear: Tympanic membrane is retracted  Nose:      Right Turbinates: Swollen  Left Turbinates: Swollen  Mouth/Throat:      Mouth: Mucous membranes are moist    Cardiovascular:      Rate and Rhythm: Normal rate and regular rhythm  Heart sounds: No murmur heard  Friction rub present  No gallop  Pulmonary:      Effort: Pulmonary effort is normal  No respiratory distress  Breath sounds: Normal breath sounds  No wheezing or rales     Abdominal:    " General: Bowel sounds are normal  There is no distension  Tenderness: There is no abdominal tenderness  Musculoskeletal:         General: Normal range of motion  Cervical back: Normal range of motion and neck supple  Skin:     General: Skin is warm and dry  Findings: No rash  Neurological:      General: No focal deficit present  Mental Status: She is alert and oriented to person, place, and time            Dustin Griffiths MD

## 2023-06-08 DIAGNOSIS — I10 BENIGN ESSENTIAL HYPERTENSION: ICD-10-CM

## 2023-06-08 RX ORDER — METOPROLOL TARTRATE 50 MG/1
50 TABLET, FILM COATED ORAL DAILY
Qty: 90 TABLET | Refills: 0 | Status: SHIPPED | OUTPATIENT
Start: 2023-06-08

## 2023-07-19 ENCOUNTER — OFFICE VISIT (OUTPATIENT)
Dept: FAMILY MEDICINE CLINIC | Facility: CLINIC | Age: 73
End: 2023-07-19
Payer: MEDICARE

## 2023-07-19 ENCOUNTER — APPOINTMENT (OUTPATIENT)
Dept: RADIOLOGY | Facility: CLINIC | Age: 73
End: 2023-07-19
Payer: MEDICARE

## 2023-07-19 VITALS
HEART RATE: 70 BPM | DIASTOLIC BLOOD PRESSURE: 90 MMHG | RESPIRATION RATE: 16 BRPM | BODY MASS INDEX: 40.12 KG/M2 | TEMPERATURE: 97.5 F | WEIGHT: 218 LBS | SYSTOLIC BLOOD PRESSURE: 130 MMHG | HEIGHT: 62 IN | OXYGEN SATURATION: 97 %

## 2023-07-19 DIAGNOSIS — M54.32 LEFT SIDED SCIATICA: ICD-10-CM

## 2023-07-19 DIAGNOSIS — M54.32 LEFT SIDED SCIATICA: Primary | ICD-10-CM

## 2023-07-19 PROCEDURE — 99214 OFFICE O/P EST MOD 30 MIN: CPT | Performed by: FAMILY MEDICINE

## 2023-07-19 PROCEDURE — 72110 X-RAY EXAM L-2 SPINE 4/>VWS: CPT

## 2023-07-19 RX ORDER — METHYLPREDNISOLONE 4 MG/1
TABLET ORAL
Qty: 21 EACH | Refills: 0 | Status: SHIPPED | OUTPATIENT
Start: 2023-07-19

## 2023-07-19 NOTE — PROGRESS NOTES
Name: Karly Pickard      : 1950      MRN: [id number]  Encounter Provider: Gunnar Flores MD  Encounter Date: 2023   Encounter department: [facility]     1. Left sided sciatica  -     XR spine lumbar minimum 4 views non injury; Future; Expected date: 2023  -     methylPREDNISolone 4 MG tablet therapy pack; Use as directed on package        If symptoms worsen or fail to improve, can trial NSAID/muscle relaxer and refer to physical therapy. Subjective      HPI   She reports sciatic pain that starts in her left gluteal region and radiates down to her left foot. Sharp, shooting, burning in quality. Goes up to 10/10 intensity. No history of back issues. Denies bowel, bladder issues. Does have some tingling down her left leg. Tried icy hot, advil, tylenol with no relief. Review of Systems   Constitutional: Negative. HENT: Negative. Eyes: Negative. Respiratory: Negative. Cardiovascular: Negative. Gastrointestinal: Negative. Endocrine: Negative. Genitourinary: Negative. Musculoskeletal: Positive for back pain. Skin: Negative. Allergic/Immunologic: Negative. Neurological: Negative. Hematological: Negative. Psychiatric/Behavioral: Negative.         Current Outpatient Medications on File Prior to Visit   Medication Sig   • albuterol (ProAir HFA) 90 mcg/act inhaler Inhale 2 puffs every 4 (four) hours as needed for wheezing or shortness of breath   • anastrozole (ARIMIDEX) 1 mg tablet TAKE ONE TABLET BY MOUTH EVERY DAY   • Ascorbic Acid (VITAMIN C PO) Take by mouth in the morning   • fluticasone (FLONASE) 50 mcg/act nasal spray 2 puffs into each nostril daily   • metoprolol tartrate (LOPRESSOR) 50 mg tablet Take 1 tablet (50 mg total) by mouth daily   • Multiple Vitamins-Minerals (MULTIVITAMIN ADULT PO) Take by mouth daily   • VITAMIN D PO Take by mouth in the morning   • [DISCONTINUED] benzonatate (TESSALON) 200 MG capsule Take 1 capsule (200 mg total) by mouth 3 (three) times a day as needed for cough       Objective     /90 (BP Location: Right arm, Patient Position: Sitting, Cuff Size: Large)   Pulse 70   Temp 97.5 °F (36.4 °C) (Temporal)   Resp 16   Ht 5' 2" (1.575 m)   Wt 98.9 kg (218 lb)   SpO2 97%   BMI 39.87 kg/m²     Physical Exam  Constitutional:       General: She is not in acute distress. Appearance: She is well-developed. She is not diaphoretic. HENT:      Head: Normocephalic and atraumatic. Eyes:      General: No scleral icterus. Right eye: No discharge. Left eye: No discharge. Conjunctiva/sclera: Conjunctivae normal.   Pulmonary:      Effort: Pulmonary effort is normal.   Musculoskeletal:      Cervical back: Normal range of motion. Thoracic back: Normal.      Lumbar back: Tenderness present. No swelling, edema, deformity, signs of trauma, lacerations, spasms or bony tenderness. Normal range of motion. No scoliosis. Skin:     General: Skin is warm. Neurological:      Mental Status: She is alert and oriented to person, place, and time. Psychiatric:         Behavior: Behavior normal.         Thought Content:  Thought content normal.         Judgment: Judgment normal.       Mckayla Benavidez MD

## 2023-07-20 ENCOUNTER — HOSPITAL ENCOUNTER (OUTPATIENT)
Dept: RADIOLOGY | Facility: HOSPITAL | Age: 73
Discharge: HOME | End: 2023-07-20
Payer: MEDICARE

## 2023-07-20 DIAGNOSIS — Z17.0 MALIGNANT NEOPLASM OF UPPER-OUTER QUADRANT OF LEFT BREAST IN FEMALE, ESTROGEN RECEPTOR POSITIVE (HCC): ICD-10-CM

## 2023-07-20 DIAGNOSIS — C50.412 MALIGNANT NEOPLASM OF UPPER-OUTER QUADRANT OF LEFT BREAST IN FEMALE, ESTROGEN RECEPTOR POSITIVE (HCC): ICD-10-CM

## 2023-07-20 DIAGNOSIS — Z87.898 H/O ABNORMAL MAMMOGRAM: ICD-10-CM

## 2023-07-20 PROCEDURE — G0279 TOMOSYNTHESIS, MAMMO: HCPCS

## 2023-07-20 PROCEDURE — 76642 ULTRASOUND BREAST LIMITED: CPT

## 2023-07-20 PROCEDURE — 77065 DX MAMMO INCL CAD UNI: CPT

## 2023-07-31 ENCOUNTER — OFFICE VISIT (OUTPATIENT)
Dept: FAMILY MEDICINE CLINIC | Facility: CLINIC | Age: 73
End: 2023-07-31
Payer: MEDICARE

## 2023-07-31 VITALS
TEMPERATURE: 98.7 F | HEART RATE: 69 BPM | HEIGHT: 62 IN | DIASTOLIC BLOOD PRESSURE: 78 MMHG | WEIGHT: 213 LBS | BODY MASS INDEX: 39.2 KG/M2 | RESPIRATION RATE: 18 BRPM | SYSTOLIC BLOOD PRESSURE: 140 MMHG

## 2023-07-31 DIAGNOSIS — M54.32 LEFT SIDED SCIATICA: Primary | ICD-10-CM

## 2023-07-31 PROCEDURE — 99214 OFFICE O/P EST MOD 30 MIN: CPT | Performed by: FAMILY MEDICINE

## 2023-07-31 RX ORDER — NAPROXEN 500 MG/1
500 TABLET ORAL 2 TIMES DAILY WITH MEALS
Qty: 28 TABLET | Refills: 0 | Status: SHIPPED | OUTPATIENT
Start: 2023-07-31 | End: 2023-08-14

## 2023-07-31 NOTE — PROGRESS NOTES
Name: Pritesh Cha      : 1950      MRN: [id number]  Encounter Provider: Mandy Martinez MD  Encounter Date: 2023   Encounter department: [facility]     1. Left sided sciatica  -     Ambulatory Referral to Physical Therapy; Future  -     naproxen (NAPROSYN) 500 mg tablet; Take 1 tablet (500 mg total) by mouth 2 (two) times a day with meals for 14 days    Continue heat, rest, gentle stretches. Will trial naproxen. Can take tylenol in between. Completed steroid course with some improvement. Will have her start PT. If symptoms persist, then I will order an MRI. Subjective      HPI   She reports sciatic pain that starts in her left gluteal region and radiates down to her left foot. Sharp, shooting, burning in quality. Goes up to 10/10 intensity. No history of back issues. Denies bowel, bladder issues. Does have some tingling down her left leg. Tried icy hot, advil, tylenol with no relief. She was prescribed a steroid course at last visit which did help, but not fully. Review of Systems   Constitutional: Negative. HENT: Negative. Eyes: Negative. Respiratory: Negative. Cardiovascular: Negative. Gastrointestinal: Negative. Endocrine: Negative. Genitourinary: Negative. Musculoskeletal: Positive for back pain. Skin: Negative. Allergic/Immunologic: Negative. Neurological: Positive for weakness and numbness. Hematological: Negative. Psychiatric/Behavioral: Negative.         Current Outpatient Medications on File Prior to Visit   Medication Sig   • albuterol (ProAir HFA) 90 mcg/act inhaler Inhale 2 puffs every 4 (four) hours as needed for wheezing or shortness of breath   • anastrozole (ARIMIDEX) 1 mg tablet TAKE ONE TABLET BY MOUTH EVERY DAY   • Ascorbic Acid (VITAMIN C PO) Take by mouth in the morning   • fluticasone (FLONASE) 50 mcg/act nasal spray 2 puffs into each nostril daily   • metoprolol tartrate (LOPRESSOR) 50 mg tablet Take 1 tablet (50 mg total) by mouth daily   • Multiple Vitamins-Minerals (MULTIVITAMIN ADULT PO) Take by mouth daily   • VITAMIN D PO Take by mouth in the morning   • [DISCONTINUED] methylPREDNISolone 4 MG tablet therapy pack Use as directed on package (Patient not taking: Reported on 7/31/2023)       Objective     /78   Pulse 69   Temp 98.7 °F (37.1 °C)   Resp 18   Ht 5' 2" (1.575 m)   Wt 96.6 kg (213 lb)   BMI 38.96 kg/m²     Physical Exam  Constitutional:       General: She is not in acute distress. Appearance: She is well-developed. She is not diaphoretic. HENT:      Head: Normocephalic and atraumatic. Eyes:      General: No scleral icterus. Right eye: No discharge. Left eye: No discharge. Conjunctiva/sclera: Conjunctivae normal.   Pulmonary:      Effort: Pulmonary effort is normal.   Musculoskeletal:      Cervical back: Normal range of motion. Lumbar back: No swelling, edema, deformity, signs of trauma, lacerations, spasms, tenderness or bony tenderness. Normal range of motion. No scoliosis. Skin:     General: Skin is warm. Neurological:      Mental Status: She is alert and oriented to person, place, and time. Psychiatric:         Behavior: Behavior normal.         Thought Content:  Thought content normal.         Judgment: Judgment normal.       Christina Landis MD

## 2023-08-08 ENCOUNTER — CLINICAL SUPPORT (OUTPATIENT)
Dept: RADIATION ONCOLOGY | Facility: HOSPITAL | Age: 73
End: 2023-08-08
Payer: MEDICARE

## 2023-08-08 ENCOUNTER — RADIATION ONCOLOGY FOLLOW-UP (OUTPATIENT)
Dept: RADIATION ONCOLOGY | Facility: HOSPITAL | Age: 73
End: 2023-08-08
Payer: MEDICARE

## 2023-08-08 VITALS
HEIGHT: 62 IN | SYSTOLIC BLOOD PRESSURE: 128 MMHG | OXYGEN SATURATION: 98 % | HEART RATE: 67 BPM | DIASTOLIC BLOOD PRESSURE: 72 MMHG | TEMPERATURE: 97.1 F | RESPIRATION RATE: 16 BRPM | WEIGHT: 214 LBS | BODY MASS INDEX: 39.38 KG/M2

## 2023-08-08 DIAGNOSIS — Z17.0 MALIGNANT NEOPLASM OF UPPER-OUTER QUADRANT OF LEFT BREAST IN FEMALE, ESTROGEN RECEPTOR POSITIVE (HCC): Primary | ICD-10-CM

## 2023-08-08 DIAGNOSIS — C50.412 MALIGNANT NEOPLASM OF UPPER-OUTER QUADRANT OF LEFT BREAST IN FEMALE, ESTROGEN RECEPTOR POSITIVE (HCC): Primary | ICD-10-CM

## 2023-08-08 PROCEDURE — 99213 OFFICE O/P EST LOW 20 MIN: CPT | Performed by: RADIOLOGY

## 2023-08-08 PROCEDURE — 99211 OFF/OP EST MAY X REQ PHY/QHP: CPT | Performed by: RADIOLOGY

## 2023-08-08 NOTE — PROGRESS NOTES
Follow-up - Radiation Oncology   Bob Oseguera 1950 72 y.o. female [id number]      History of Present Illness   Cancer Staging   No matching staging information was found for the patient. Bob Oseguera is a 72 y.o. female with non tender left breast lump. Diagnostic mammogram reveals a 2.2 cm x 2.4 cm x 4.4 cm irregularly shaped, hypoechoic mass with angular margins seen in the left breast at 2 o'clock, 8 cm from the nipple. There is a lymph node seen in the left axilla, 13 cm from the nipple. Cortical thickness measures 4 mm on the left.  This borderline abnormal lymph node is quite deep within the axilla and may be difficult to access for sampling if clinically desired. Biospy revealed invasive mammary carcinoma no special type, invasive carcinoma involves 3 of 3 submitted core biopsies, max. Dimension= 13 mm, LVI not involved.  Axillary biopsy revealed invasive/metastatic adenocarcinoma, compatible with breast primary involving fibroadipose tissues and a portion of lymphoid parenchyma, ER/NM positive, HER 2 negative.  Genetic testing was negative. June 8, 2021 patient underwent left breast modified radical mastectomy, ANAND  directed axillary dissection. Results revealed Invasive carcinoma, Grade 2, Greatest dimension 60 mm, DCIS present, LVI present, Margins uninvolved, Closest margin >20 mm, 4/10 lymph nodes micrometastases, ER/NM positive, HER2 negative. The patient completed a course of radiation to the left chest wall and regional lymphatics on 11/19/2021.       Interval History:  10/12/22 Medical Oncology Dr. Keys [facility]: BRCA gene mutation, MADAN status s/p mastectomy and axillary lymph node dissection. Completed adjuvant chemotherapy with Taxotere and Cyclophosphamide followed by Radiation therapy. She will continue on adjuvant hormonal therapy with anastrozole 1 mg.  Plan for f/u in 1 year sooner if needed.       4/19/23 Surgical Oncology DANILO Hewitt:    S/P left mastectomy with breast prothesis. Whole breast radiation. Lymphedema with referral to lymphedema PT/OT. Left arm lymphedema feels more full and sometimes causes pain. Continues to wear her compression sleeve. She should inquire about a lymphedema compression pump. No CBE. Experiencing epigastric pain for the last month. She will her PCP for further investigation.      7/20/23 - Mammo diagnostic right w 3d & cad  US breast right limited (diagnostic)  FINDINGS:   Mammo diagnostic right w 3d & cad  There are no suspicious masses, grouped microcalcifications or areas of unexplained architectural distortion. The skin and nipple areolar complex are unremarkable.      There is an oval circumscribed linear nodular structure best appreciated in the posterior right MLO view.  This appears on ultrasound to likely correlate with benign fibroglandular tissue in ductal like structures.  No suspicious features. IMPRESSION:  Postop findings and likely benign tissue asymmetry in the right outer 8:00 region.        Up Coming:    10/18/23 Medical Oncology Dr. Valdez Manning  10/18/23 Surgical Oncology Rodney Heaotn    Upon interview, she endorses the above history. She denies new masses, skin changes or areas of concern. She has stable left upper extremity lymphedema, managed with a compression sleeve. She denies limitations in left arm range of motion and is performing stretching exercises. She is without additional acute concerns. Historical Information   Oncology History   Malignant neoplasm of upper-outer quadrant of left breast in female, estrogen receptor positive ([address])   4/19/2021 Biopsy    Left breast US guided biopsy:  A. 2 o'clock 8 cm from the nipple  Invasive mammary carcinoma of no special type  Grade 2  ER 90  WI 1  HER2 1+  Lymphovascular invasion: not identified    B.  Left Axillary lymph node biopsy:  Invasive/ metastatic adenocarcinoma, compatible with breast primary involving fibroadipose tissues and a portion of lymphoid pranchyma. ER 90  VT 1  HER2 1+    Concordant. Malignancy appears multifocal. The 2 adjacent masses span an area of approximately 4 cm. Right breast clear. 5/3/2021 Genetic Testing    The following genes were evaluated: OLINDA, BRCA1, BRCA2, CDH1, CHEK2, PALB2, PTEN, STK11, TP53  Additional genes evaluated for a total of 36 genes analyzed  Negative result. No pathogenic sequence variants or deletions/dupllications identified  [employer]     6/8/2021 Surgery    Left breast modified radical mastectomy with ANAND  directed axillary dissection  Invasive carcinoma of no special type (ductal)  Grade 2  6 cm  Lymphovascular invasion present  Margins negative  4/10 Lymph nodes (3.5 cm)  Anatomic Stage IIIA  Prognostic Stage IB     7/16/2021 - 9/17/2021 Chemotherapy    pegfilgrastim (NEULASTA ONPRO), 6 mg, Subcutaneous, Once, 4 of 4 cycles  Administration: 6 mg (7/16/2021), 6 mg (8/6/2021), 6 mg (8/27/2021), 6 mg (9/17/2021)  cyclophosphamide (CYTOXAN) IVPB, 600 mg/m2 = 1,212 mg, Intravenous, Once, 4 of 4 cycles  Administration: 1,212 mg (7/16/2021), 1,212 mg (8/6/2021), 1,212 mg (8/27/2021), 1,212 mg (9/17/2021)  DOCEtaxel (TAXOTERE) chemo infusion, 75 mg/m2 = 151.6 mg, Intravenous, Once, 4 of 4 cycles  Administration: 151.6 mg (7/16/2021), 151.6 mg (8/6/2021), 151.6 mg (8/27/2021), 151.6 mg (9/17/2021)     10/18/2021 - 11/19/2021 Radiation    BH L CW 10X/6X 13 / 13 200 0 2,600 32   BH L CW BOLUS 10X/6X 12 / 12 200 0 2,400 30   BH L PAB 10X 25 / 25 60 0 1,500 32   BH L Sclav 10X 25 / 25 200 0 5,000 32      Treatment Dates:  10/18/2021 - 11/19/2021.    Dr Ruddy Mason     10/2021 -  Hormone Therapy    anastrozole 1 mg once a day    Dr Keys [facility]         Past Medical History:   Diagnosis Date   • BRCA gene mutation negative 05/19/2021    [employer]; 36 gene panel   • Breast cancer ([address])     Left breast   • Colon polyp    • Dental crowns present    • Exercise involving walking     the dogs   • History of angina     per pt "years ago and related to stress"   • History of cancer chemotherapy    • History of chemotherapy     breast cancer (left) 2021.    • History of radiation therapy    • History of radiation therapy    • [place] (hard of hearing)     wears hearing aids/will wear DOS bilat   • Hypertension    • Limb alert care status     No BP/IV Left Arm   • Lymphedema of left arm    • Prediabetes    • Wears glasses      Past Surgical History:   Procedure Laterality Date   • AXILLARY SURGERY Right     sweat glands removed -    • BREAST CYST EXCISION Right 2000    benign   • BREAST SURGERY Right    • CHOLECYSTECTOMY     • COLONOSCOPY     • DILATION AND CURETTAGE OF UTERUS     • MASTECTOMY Left 06/08/2021   • [place] BREAST REDUCTION N/A 03/18/2022    Procedure: R BREAST REDUCTION; L BREAST EXCISION STANDING SKIN DEFORMITY; LOCAL FLAP;  Surgeon: Sera Jarvis MD;  Location: [facility];  Service: Plastics   • [place] MAST MODF RAD W/AX LYMPH NOD W/WO PECT/NERI MIN Left 06/08/2021    Procedure: BREAST MODIFIED RADICAL MASTECTOMY; ANAND  DIRECTED AXILLARY DISSECTION;  Surgeon: Rabia Khan MD;  Location: [facility];  Service: Surgical Oncology   • REDUCTION MAMMAPLASTY Right     March 2022   • US BREAST NEEDLE LOC LEFT Left 06/02/2021   • US GUIDED BREAST BIOPSY LEFT COMPLETE Left 04/19/2021   • US GUIDED BREAST LYMPH NODE BIOPSY LEFT Left 04/19/2021   • WISDOM TOOTH EXTRACTION         Social History   Social History     Substance and Sexual Activity   Alcohol Use No     Social History     Substance and Sexual Activity   Drug Use No     Social History     Tobacco Use   Smoking Status Never   • Passive exposure: Never   Smokeless Tobacco Never         Meds/Allergies     Current Outpatient Medications:   •  albuterol (ProAir HFA) 90 mcg/act inhaler, Inhale 2 puffs every 4 (four) hours as needed for wheezing or shortness of breath, Disp: 8 g, Rfl: 0  •  anastrozole (ARIMIDEX) 1 mg tablet, TAKE ONE TABLET BY MOUTH EVERY DAY, Disp: 90 tablet, Rfl: 1  •  Ascorbic Acid (VITAMIN C PO), Take by mouth in the morning, Disp: , Rfl:   •  fluticasone (FLONASE) 50 mcg/act nasal spray, 2 puffs into each nostril daily, Disp: , Rfl:   •  metoprolol tartrate (LOPRESSOR) 50 mg tablet, Take 1 tablet (50 mg total) by mouth daily, Disp: 90 tablet, Rfl: 0  •  Multiple Vitamins-Minerals (MULTIVITAMIN ADULT PO), Take by mouth daily, Disp: , Rfl:   •  naproxen (NAPROSYN) 500 mg tablet, Take 1 tablet (500 mg total) by mouth 2 (two) times a day with meals for 14 days, Disp: 28 tablet, Rfl: 0  •  VITAMIN D PO, Take by mouth in the morning, Disp: , Rfl:   No Known Allergies      Review of Systems   Constitutional: Negative. HENT: Negative. Eyes: Negative. Wears glasses    Respiratory: Positive for cough, shortness of breath and wheezing (when breathing deep ). Cardiovascular: Negative. Gastrointestinal: Negative. Endocrine: Negative. Genitourinary: Negative. Musculoskeletal: Positive for arthralgias and back pain. Skin: Negative. Allergic/Immunologic: Positive for environmental allergies. Neurological: Negative. Hematological: Negative. Psychiatric/Behavioral: Negative. OBJECTIVE:   /72 (BP Location: Right arm, Patient Position: Sitting, Cuff Size: Large)   Pulse 67   Temp (!) 97.1 °F (36.2 °C) (Temporal)   Resp 16   Ht 5' 2" (1.575 m)   Wt 97.1 kg (214 lb)   SpO2 98%   BMI 39.14 kg/m²   Pain Assessment:  0  Karnofsky: 90 - Able to carry on normal activity; minor signs or symptoms of disease     Physical Exam   Breast examination performed in the supine and seated positions with chaperone present. There is no supraclavicular axillary adenopathy palpable. Minimal post radiation changes/fibrosis across the left chest wall with mild axillary tightness. She has had breast reduction on the right and the incisions are well healed. The right breast is without discrete nodules or masses.   She is wearing a sleeve on her left arm without any distal edema identified. Good range of motion. Her breathing is unlabored. Ambulating independently.       RESULTS    Lab Results: No results found for this or any previous visit (from the past 672 hour(s)). Imaging Studies:XR spine lumbar minimum 4 views non injury    Result Date: 7/29/2023  Narrative: LUMBAR SPINE INDICATION:   M54.32: Sciatica, left side. COMPARISON:  None VIEWS:  XR SPINE LUMBAR MINIMUM 4 VIEWS NON INJURY FINDINGS: There are 5 non rib bearing lumbar vertebral bodies. There is no evidence of acute fracture or destructive osseous lesion. Alignment is unremarkable. Age-appropriate lumbar degenerative changes are seen. The pedicles appear intact. There are atherosclerotic calcifications. Soft tissues are otherwise unremarkable. Impression: No acute osseous abnormality. Degenerative changes as described. Workstation performed: [id number]     Mammo diagnostic right w 3d & cad, US breast right limited (diagnostic)    Result Date: 7/20/2023  Narrative: DIAGNOSIS: Malignant neoplasm of upper-outer quadrant of left breast in female, estrogen receptor positive ([address]) TECHNIQUE: Digital diagnostic mammography was performed. Computer Aided Detection (CAD) analyzed all applicable images. Ultrasound of the right breast(s) was performed. COMPARISONS: Prior breast imaging dated: 06/08/2022, 06/08/2021, 06/02/2021, 06/02/2021, 04/19/2021, 04/19/2021, 04/19/2021, 03/26/2021, 03/26/2021, 06/22/2018, and 02/22/2017 RELEVANT HISTORY: Family Breast Cancer History: History of breast cancer in Paternal Aunt. Family Medical History: Family medical history includes breast cancer in paternal aunt and colon cancer in mother. Personal History: Hormone history includes other. Surgical history includes breast reduction, mastectomy, and breast excisional biopsy. Medical history includes breast cancer and history of chemotherapy.  RISK ASSESSMENT: Tyrer-Cuzick risk assessment reporting was suppressed due to the patient's history and/or demographic factors. TISSUE DENSITY: There are scattered areas of fibroglandular density. INDICATION: Micah Vick is a 72 y.o. female presenting for annual. FINDINGS: Mammo diagnostic right w 3d & cad There are no suspicious masses, grouped microcalcifications or areas of unexplained architectural distortion. The skin and nipple areolar complex are unremarkable. There is an oval circumscribed linear nodular structure best appreciated in the posterior right MLO view. This appears on ultrasound to likely correlate with benign fibroglandular tissue in ductal like structures. No suspicious features. Impression: Postop findings and likely benign tissue asymmetry in the right outer 8:00 region. ASSESSMENT/BI-RADS CATEGORY: Right: 2 - Benign Overall: 2 - Benign RECOMMENDATION:      - Diagnostic mammogram in 1 year for the right breast. Workstation ID: [id number]           Assessment/Plan:  No orders of the defined types were placed in this encounter. Micah Vick is a 72 y.o. female who presents for routine follow up status post adjuvant radiation therapy for left breast carcinoma. She has no clinical evidence of recurrence. She continues to wear a compression sleeve for management of left arm lymphedema, well controlled. She will return for follow-up visit in 1 year, and she will continue follow up with medical oncology and her breast surgeon. Ny Devine MD  8/8/2023,11:02 AM    Portions of the record may have been created with voice recognition software.  Occasional wrong word or "sound a like" substitutions may have occurred due to the inherent limitations of voice recognition software.  Read the chart carefully and recognize, using context, where substitutions have occurred.

## 2023-08-30 ENCOUNTER — APPOINTMENT (INPATIENT)
Dept: RADIOLOGY | Facility: HOSPITAL | Age: 73
DRG: 478 | End: 2023-08-30
Payer: MEDICARE

## 2023-08-30 ENCOUNTER — APPOINTMENT (EMERGENCY)
Dept: RADIOLOGY | Facility: HOSPITAL | Age: 73
DRG: 478 | End: 2023-08-30
Payer: MEDICARE

## 2023-08-30 ENCOUNTER — HOSPITAL ENCOUNTER (INPATIENT)
Facility: HOSPITAL | Age: 73
LOS: 3 days | Discharge: HOME HEALTH | DRG: 478 | End: 2023-09-02
Attending: EMERGENCY MEDICINE | Admitting: INTERNAL MEDICINE
Payer: MEDICARE

## 2023-08-30 DIAGNOSIS — I10 BENIGN ESSENTIAL HYPERTENSION: ICD-10-CM

## 2023-08-30 DIAGNOSIS — M25.551 RIGHT HIP PAIN: Primary | ICD-10-CM

## 2023-08-30 DIAGNOSIS — M79.18 BILATERAL BUTTOCK PAIN: ICD-10-CM

## 2023-08-30 DIAGNOSIS — M53.3 SACRAL LESION: ICD-10-CM

## 2023-08-30 DIAGNOSIS — M54.30 SCIATICA: ICD-10-CM

## 2023-08-30 DIAGNOSIS — S32.10XA SACRAL FRACTURE, CLOSED (HCC): ICD-10-CM

## 2023-08-30 PROBLEM — E66.9 OBESITY (BMI 30-39.9): Status: ACTIVE | Noted: 2023-08-30

## 2023-08-30 LAB
ALBUMIN SERPL BCP-MCNC: 4.2 G/DL (ref 3.5–5)
ALP SERPL-CCNC: 97 U/L (ref 34–104)
ALT SERPL W P-5'-P-CCNC: 20 U/L (ref 7–52)
ANION GAP SERPL CALCULATED.3IONS-SCNC: 10 MMOL/L
AST SERPL W P-5'-P-CCNC: 17 U/L (ref 13–39)
BASOPHILS # BLD AUTO: 0.02 THOUSANDS/ÂΜL (ref 0–0.1)
BASOPHILS NFR BLD AUTO: 0 % (ref 0–1)
BILIRUB SERPL-MCNC: 0.68 MG/DL (ref 0.2–1)
BUN SERPL-MCNC: 20 MG/DL (ref 5–25)
CALCIUM SERPL-MCNC: 9.6 MG/DL (ref 8.4–10.2)
CHLORIDE SERPL-SCNC: 102 MMOL/L (ref 96–108)
CO2 SERPL-SCNC: 24 MMOL/L (ref 21–32)
CREAT SERPL-MCNC: 0.58 MG/DL (ref 0.6–1.3)
EOSINOPHIL # BLD AUTO: 0.11 THOUSAND/ÂΜL (ref 0–0.61)
EOSINOPHIL NFR BLD AUTO: 1 % (ref 0–6)
ERYTHROCYTE [DISTWIDTH] IN BLOOD BY AUTOMATED COUNT: 13.4 % (ref 11.6–15.1)
GFR SERPL CREATININE-BSD FRML MDRD: 93 ML/MIN/1.73SQ M
GLUCOSE SERPL-MCNC: 134 MG/DL (ref 65–140)
HCT VFR BLD AUTO: 42.8 % (ref 34.8–46.1)
HGB BLD-MCNC: 15 G/DL (ref 11.5–15.4)
IMM GRANULOCYTES # BLD AUTO: 0.03 THOUSAND/UL (ref 0–0.2)
IMM GRANULOCYTES NFR BLD AUTO: 0 % (ref 0–2)
LYMPHOCYTES # BLD AUTO: 0.91 THOUSANDS/ÂΜL (ref 0.6–4.47)
LYMPHOCYTES NFR BLD AUTO: 11 % (ref 14–44)
MCH RBC QN AUTO: 30.1 PG (ref 26.8–34.3)
MCHC RBC AUTO-ENTMCNC: 35 G/DL (ref 31.4–37.4)
MCV RBC AUTO: 86 FL (ref 82–98)
MONOCYTES # BLD AUTO: 0.57 THOUSAND/ÂΜL (ref 0.17–1.22)
MONOCYTES NFR BLD AUTO: 7 % (ref 4–12)
NEUTROPHILS # BLD AUTO: 6.74 THOUSANDS/ÂΜL (ref 1.85–7.62)
NEUTS SEG NFR BLD AUTO: 81 % (ref 43–75)
NRBC BLD AUTO-RTO: 0 /100 WBCS
PLATELET # BLD AUTO: 227 THOUSANDS/UL (ref 149–390)
PMV BLD AUTO: 10.2 FL (ref 8.9–12.7)
POTASSIUM SERPL-SCNC: 3.6 MMOL/L (ref 3.5–5.3)
PROT SERPL-MCNC: 7 G/DL (ref 6.4–8.4)
RBC # BLD AUTO: 4.98 MILLION/UL (ref 3.81–5.12)
SARS-COV-2 RNA RESP QL NAA+PROBE: NEGATIVE
SODIUM SERPL-SCNC: 136 MMOL/L (ref 135–147)
WBC # BLD AUTO: 8.38 THOUSAND/UL (ref 4.31–10.16)

## 2023-08-30 PROCEDURE — 80053 COMPREHEN METABOLIC PANEL: CPT | Performed by: EMERGENCY MEDICINE

## 2023-08-30 PROCEDURE — 99285 EMERGENCY DEPT VISIT HI MDM: CPT | Performed by: EMERGENCY MEDICINE

## 2023-08-30 PROCEDURE — G1004 CDSM NDSC: HCPCS

## 2023-08-30 PROCEDURE — 87635 SARS-COV-2 COVID-19 AMP PRB: CPT | Performed by: EMERGENCY MEDICINE

## 2023-08-30 PROCEDURE — 96374 THER/PROPH/DIAG INJ IV PUSH: CPT

## 2023-08-30 PROCEDURE — 99223 1ST HOSP IP/OBS HIGH 75: CPT | Performed by: INTERNAL MEDICINE

## 2023-08-30 PROCEDURE — 36415 COLL VENOUS BLD VENIPUNCTURE: CPT | Performed by: EMERGENCY MEDICINE

## 2023-08-30 PROCEDURE — 72131 CT LUMBAR SPINE W/O DYE: CPT

## 2023-08-30 PROCEDURE — 72192 CT PELVIS W/O DYE: CPT

## 2023-08-30 PROCEDURE — 85025 COMPLETE CBC W/AUTO DIFF WBC: CPT | Performed by: EMERGENCY MEDICINE

## 2023-08-30 PROCEDURE — 99284 EMERGENCY DEPT VISIT MOD MDM: CPT

## 2023-08-30 PROCEDURE — 97163 PT EVAL HIGH COMPLEX 45 MIN: CPT

## 2023-08-30 PROCEDURE — 99222 1ST HOSP IP/OBS MODERATE 55: CPT | Performed by: INTERNAL MEDICINE

## 2023-08-30 PROCEDURE — 99223 1ST HOSP IP/OBS HIGH 75: CPT | Performed by: ORTHOPAEDIC SURGERY

## 2023-08-30 RX ORDER — OXYCODONE HYDROCHLORIDE 5 MG/1
5 TABLET ORAL EVERY 4 HOURS PRN
Status: DISCONTINUED | OUTPATIENT
Start: 2023-08-30 | End: 2023-08-30

## 2023-08-30 RX ORDER — HYDROMORPHONE HCL/PF 1 MG/ML
0.5 SYRINGE (ML) INJECTION EVERY 4 HOURS PRN
Status: DISCONTINUED | OUTPATIENT
Start: 2023-08-30 | End: 2023-09-02 | Stop reason: HOSPADM

## 2023-08-30 RX ORDER — OXYCODONE HYDROCHLORIDE 10 MG/1
10 TABLET ORAL EVERY 4 HOURS PRN
Status: DISCONTINUED | OUTPATIENT
Start: 2023-08-30 | End: 2023-09-02 | Stop reason: HOSPADM

## 2023-08-30 RX ORDER — HYDROMORPHONE HCL/PF 1 MG/ML
0.2 SYRINGE (ML) INJECTION EVERY 4 HOURS PRN
Status: DISCONTINUED | OUTPATIENT
Start: 2023-08-30 | End: 2023-08-30

## 2023-08-30 RX ORDER — LORAZEPAM 2 MG/ML
1 INJECTION INTRAMUSCULAR ONCE AS NEEDED
Status: COMPLETED | OUTPATIENT
Start: 2023-08-30 | End: 2023-09-01

## 2023-08-30 RX ORDER — TRAMADOL HYDROCHLORIDE 50 MG/1
50 TABLET ORAL EVERY 6 HOURS PRN
Status: DISCONTINUED | OUTPATIENT
Start: 2023-08-30 | End: 2023-08-30

## 2023-08-30 RX ORDER — MORPHINE SULFATE 4 MG/ML
4 INJECTION, SOLUTION INTRAMUSCULAR; INTRAVENOUS ONCE
Status: COMPLETED | OUTPATIENT
Start: 2023-08-30 | End: 2023-08-30

## 2023-08-30 RX ORDER — ASCORBIC ACID 500 MG
500 TABLET ORAL DAILY
Status: DISCONTINUED | OUTPATIENT
Start: 2023-08-30 | End: 2023-09-02 | Stop reason: HOSPADM

## 2023-08-30 RX ORDER — MELATONIN
1000 DAILY
Status: DISCONTINUED | OUTPATIENT
Start: 2023-08-30 | End: 2023-09-02 | Stop reason: HOSPADM

## 2023-08-30 RX ORDER — HYDRALAZINE HYDROCHLORIDE 10 MG/1
10 TABLET, FILM COATED ORAL EVERY 6 HOURS PRN
Status: DISCONTINUED | OUTPATIENT
Start: 2023-08-30 | End: 2023-09-02 | Stop reason: HOSPADM

## 2023-08-30 RX ORDER — METOPROLOL TARTRATE 50 MG/1
50 TABLET, FILM COATED ORAL DAILY
Status: DISCONTINUED | OUTPATIENT
Start: 2023-08-30 | End: 2023-09-02 | Stop reason: HOSPADM

## 2023-08-30 RX ORDER — ENOXAPARIN SODIUM 100 MG/ML
40 INJECTION SUBCUTANEOUS DAILY
Status: COMPLETED | OUTPATIENT
Start: 2023-08-30 | End: 2023-08-31

## 2023-08-30 RX ORDER — CYCLOBENZAPRINE HCL 10 MG
10 TABLET ORAL ONCE
Status: COMPLETED | OUTPATIENT
Start: 2023-08-30 | End: 2023-08-30

## 2023-08-30 RX ORDER — OXYCODONE HYDROCHLORIDE 5 MG/1
5 TABLET ORAL EVERY 4 HOURS PRN
Status: DISCONTINUED | OUTPATIENT
Start: 2023-08-30 | End: 2023-09-02 | Stop reason: HOSPADM

## 2023-08-30 RX ORDER — LIDOCAINE 50 MG/G
2 PATCH TOPICAL DAILY
Status: DISCONTINUED | OUTPATIENT
Start: 2023-08-30 | End: 2023-09-02 | Stop reason: HOSPADM

## 2023-08-30 RX ORDER — DOCUSATE SODIUM 100 MG/1
100 CAPSULE, LIQUID FILLED ORAL 2 TIMES DAILY
Status: DISCONTINUED | OUTPATIENT
Start: 2023-08-30 | End: 2023-09-02 | Stop reason: HOSPADM

## 2023-08-30 RX ORDER — ONDANSETRON 2 MG/ML
4 INJECTION INTRAMUSCULAR; INTRAVENOUS EVERY 6 HOURS PRN
Status: DISCONTINUED | OUTPATIENT
Start: 2023-08-30 | End: 2023-09-02 | Stop reason: HOSPADM

## 2023-08-30 RX ORDER — LIDOCAINE 50 MG/G
1 PATCH TOPICAL ONCE
Status: COMPLETED | OUTPATIENT
Start: 2023-08-30 | End: 2023-08-30

## 2023-08-30 RX ORDER — ACETAMINOPHEN 325 MG/1
975 TABLET ORAL EVERY 8 HOURS SCHEDULED
Status: DISCONTINUED | OUTPATIENT
Start: 2023-08-30 | End: 2023-09-02 | Stop reason: HOSPADM

## 2023-08-30 RX ORDER — ANASTROZOLE 1 MG/1
1 TABLET ORAL DAILY
Status: DISCONTINUED | OUTPATIENT
Start: 2023-08-30 | End: 2023-09-02 | Stop reason: HOSPADM

## 2023-08-30 RX ORDER — KETOROLAC TROMETHAMINE 30 MG/ML
15 INJECTION, SOLUTION INTRAMUSCULAR; INTRAVENOUS ONCE
Status: COMPLETED | OUTPATIENT
Start: 2023-08-30 | End: 2023-08-30

## 2023-08-30 RX ADMIN — KETOROLAC TROMETHAMINE 15 MG: 30 INJECTION, SOLUTION INTRAMUSCULAR at 00:48

## 2023-08-30 RX ADMIN — Medication 1000 UNITS: at 08:25

## 2023-08-30 RX ADMIN — ENOXAPARIN SODIUM 40 MG: 40 INJECTION SUBCUTANEOUS at 08:25

## 2023-08-30 RX ADMIN — Medication 1 TABLET: at 08:25

## 2023-08-30 RX ADMIN — METOPROLOL TARTRATE 50 MG: 50 TABLET, FILM COATED ORAL at 08:25

## 2023-08-30 RX ADMIN — HYDRALAZINE HYDROCHLORIDE 10 MG: 10 TABLET, FILM COATED ORAL at 06:19

## 2023-08-30 RX ADMIN — ACETAMINOPHEN 975 MG: 325 TABLET ORAL at 14:54

## 2023-08-30 RX ADMIN — LIDOCAINE 5% 2 PATCH: 700 PATCH TOPICAL at 08:25

## 2023-08-30 RX ADMIN — OXYCODONE HYDROCHLORIDE AND ACETAMINOPHEN 500 MG: 500 TABLET ORAL at 08:25

## 2023-08-30 RX ADMIN — DOCUSATE SODIUM 100 MG: 100 CAPSULE, LIQUID FILLED ORAL at 17:18

## 2023-08-30 RX ADMIN — TRAMADOL HYDROCHLORIDE 50 MG: 50 TABLET, COATED ORAL at 10:31

## 2023-08-30 RX ADMIN — ACETAMINOPHEN 975 MG: 325 TABLET ORAL at 22:14

## 2023-08-30 RX ADMIN — ANASTROZOLE 1 MG: 1 TABLET, COATED ORAL at 08:25

## 2023-08-30 RX ADMIN — LIDOCAINE 1 PATCH: 50 PATCH CUTANEOUS at 00:49

## 2023-08-30 RX ADMIN — MORPHINE SULFATE 4 MG: 4 INJECTION INTRAVENOUS at 04:07

## 2023-08-30 RX ADMIN — DOCUSATE SODIUM 100 MG: 100 CAPSULE, LIQUID FILLED ORAL at 08:25

## 2023-08-30 RX ADMIN — HYDROMORPHONE HYDROCHLORIDE 0.5 MG: 1 INJECTION, SOLUTION INTRAMUSCULAR; INTRAVENOUS; SUBCUTANEOUS at 19:52

## 2023-08-30 RX ADMIN — HYDROMORPHONE HYDROCHLORIDE 0.5 MG: 1 INJECTION, SOLUTION INTRAMUSCULAR; INTRAVENOUS; SUBCUTANEOUS at 12:03

## 2023-08-30 RX ADMIN — ACETAMINOPHEN 975 MG: 325 TABLET ORAL at 06:19

## 2023-08-30 RX ADMIN — OXYCODONE HYDROCHLORIDE 5 MG: 5 TABLET ORAL at 17:28

## 2023-08-30 RX ADMIN — CYCLOBENZAPRINE HYDROCHLORIDE 10 MG: 10 TABLET, FILM COATED ORAL at 00:47

## 2023-08-30 NOTE — ASSESSMENT & PLAN NOTE
Patient presents with left buttock pain started a month ago with radiation down to left leg at times, right buttock pain started on Sunday 2 days ago after trying to get up from the couch and caught herself from falling and reports heard a sound at the time. Patient reports unable to stand on right leg due to pain and having trouble walking started today. Patient suspects left buttock pain might have be related to yard work. Denies low back pain. · History of left breast cancer, diagnosed in April 2021, status post modified radical mastectomy, patient underwent chemoradiation, currently on anastrozole. Recent right breast ultrasound was unremarkable. · CT lumbar spine showed - There is abnormal lytic destruction of the left sacrum extending across the left sacroiliac joint and involving the left medial iliac bone. Associated pathological fracture of the left sacrum and mild soft tissue density mass in the left presacral region. This could be secondary to focal osseous metastatic disease/myeloma.   · CT pelvis pending  · Pain control with ATC Tylenol, lidocaine patch, tramadol and low-dose Dilaudid as needed  · Bed rest tonight  · Consult orthopedic surgery

## 2023-08-30 NOTE — PHYSICAL THERAPY NOTE
PT EVALUATION     08/30/23 1401   PT Last Visit   PT Visit Date 08/30/23   Note Type   Note type Evaluation   Pain Assessment   Pain Assessment Tool Frias-Baker FACES   Frias-Baker FACES Pain Rating 4   Pain Location/Orientation   (R buttocks and thigh;  pt had pain meds 2 hours prior to PT)   Restrictions/Precautions   Weight Bearing Precautions Per Order Yes   RLE Weight Bearing Per Order WBAT   LLE Weight Bearing Per Order WBAT   Other Precautions   (pain)   Home Living   Type of [address] [place] [address]  One level  (1+1 FLAKITA and 2 steps into bedroom.)   Home Equipment Walker;Cane   Prior Function   Level of [place] Independent with ADLs; Independent with functional mobility  (Pt reports progressive pain over the past 2 weeks requiring one then 2 canes to ambulate;  pt also has a history of sciatica)   Lives With [address] in the last 6 months 0   General   Additional Pertinent History Pt admitted with SI pain. Pt found to have a pathological fx of the sacrum. Oncology work up pending. Pt has a history of breast cancer. Ortho OK'd WBAT.    Family/Caregiver Present Yes  (daughter)   Cognition   Overall Cognitive Status WFL   Arousal/Participation Alert   Orientation Level Oriented X4   Subjective   Subjective "the pain medicine helps"   RLE Assessment   RLE Assessment   (ROM WFL, MMT L2-S1 myotomes 4+/5)   LLE Assessment   LLE Assessment   (ROM WFL, MMT L2-S1 myotomes 4+/5)   Light Touch   RLE Light Touch Grossly intact   LLE Light Touch Grossly intact   Bed Mobility   Supine to Sit 4  Minimal assistance   Sit to Supine 5  Supervision   Transfers   Sit to Stand 5  Supervision   Stand to Sit 5  Supervision   Ambulation/Elevation   Gait pattern   (antalgic B)   Gait Assistance 5  Supervision   Assistive Device Standard walker   Distance 3 feet forward and back  (Attempted gait training with the walker for use of UEs to decrease WB B LEs but pt has 10/10 pain in her sacrum when sitting and standing so pt returned to bed.)   Balance   Static Sitting Good   Dynamic Sitting Fair +   Static Standing Fair   Dynamic Standing [facility] -   Activity Tolerance   Activity Tolerance Patient limited by pain   Assessment   Prognosis Good   Problem List Decreased strength;Decreased range of motion; Impaired balance;Decreased mobility;Pain;Orthopedic restrictions   Assessment Patient is an 72y.o. year old female seen for Physical Therapy evaluation. Patient admitted with Sacro-iliac pain. Comorbidities affecting patient's physical performance include: breast cancer, obesity. Personal factors affecting patient at time of initial evaluation include: stairs to enter home, inability to ambulate household distances, inability to perform physical activity and inability to perform ADLS. Prior to admission, patient was independent with functional mobility without assistive device and independent with ADLS. Please find objective findings from Physical Therapy assessment regarding body systems outlined above with impairments and limitations including weakness, pain, decreased activity tolerance, decreased functional mobility tolerance and fall risk. The Barthel Index was used as a functional outcome tool presenting with a score of Barthel Index Score: 50 today indicating marked limitations of functional mobility and ADLS. Patient's clinical presentation is currently unstable/unpredictable as seen in patient's presentation of changing level of pain, increased fall risk, new onset of impairment of functional mobility and new onset of weakness. Pt would benefit from continued Physical Therapy treatment to address deficits as defined above and maximize level of functional mobility. As demonstrated by objective findings, the assigned level of complexity for this evaluation is high. The patient's -[facility] Basic Mobility Inpatient Short Form Raw Score is 14.  A Raw score of less 16 suggests the patient may benefit from discharge to post-acute rehabilitation services, however hopefull that pt's pain will improve so that pt can tolerate standing and walking and that pt would be able to go home with home PT and family support. Goals   Patient Goals "less pain"   STG Expiration Date 09/06/23   Short Term Goal #1 independent bed mobility, independent transfers, modified independent ambulation with walker 40 feet   LTG Expiration Date 09/13/23   Long Term Goal #1 indepenent up and down 2 steps so pt can get to her bedroom at home, pt will ambulate with walker with a steady minimally antalgic gait 100 feet   Plan   Treatment/Interventions Elevations;LE strengthening/ROM; Functional transfer training; Therapeutic exercise;Gait training;Bed mobility; Equipment eval/education;Patient/family training;Spoke to nursing   PT Frequency 5-7x/wk   Recommendation   PT Discharge Recommendation Home with home health rehabilitation   Equipment Recommended   (pt reports her  obtained a walker for her)   [address]   Turning in Flat Bed Without Bedrails 3   Lying on Back to Sitting on Edge of Flat Bed Without Bedrails 3   Moving Bed to Chair 3   Standing Up From Chair Using Arms 3   Walk in Room 1   Climb 3-5 Stairs With Railing 1   Basic Mobility Inpatient Raw Score 14   Basic Mobility Standardized Score 35.55   Highest Level Of Mobility   -HL Goal 4: Move to chair/commode   -HL Achieved 4: Move to chair/commode   Barthel Index   Feeding 10   Bathing 0   Grooming Score 0   Dressing Score 5   Bladder Score 10   Bowels Score 10   Toilet Use Score 5   Transfers (Bed/Chair) Score 10   Mobility (Level Surface) Score 0   Stairs Score 0   Barthel Index Score 50   End of Consult   Patient Position at End of Consult All needs within reach;Bed/Chair alarm activated;Supine   Licensure   [place] License Number  [id number] [email or web address] [id number]

## 2023-08-30 NOTE — ED PROVIDER NOTES
Patient w/ pathologic fracture of left sacrum/sacroiliac. Also w/ lesion there, possible metastatic disease. Right hip not visualized in imaging, which is where her most acute pain is, so will go for pelvic CT to evaluate for additional lesions/fractures. Patient with lots of pain with mobility. Will be admitted to .       Veronica Valero MD  08/30/23 1002

## 2023-08-30 NOTE — CONSULTS
Regine Tristen  1950    HEMATOLOGY/ONCOLOGY CONSULTATION REPORT    DISCUSSION/SUMMARY:    79-year-old female previously diagnosed with stage IIIA (pT3 pN2 cM0 R0 G2 ER = 10% WY = 1-2% HER2/leonela negative) left-sided breast cancer status postmastectomy, adjuvant chemotherapy, adjuvant RT and hormonal manipulation. Issues:    Sacral fracture. Patient has been seen/evaluated by orthopedics. No surgical intervention indicated at this time for the pathologic fracture. Pain control. Patient states that her pain is +/- controlled. Mrs. Keyon Benson is likely going to need long-term pain control, pain specialty or palliative care. Pain control medications will also need to be manipulated so that they can be used in the outpatient setting. Patient was previously seen by radiation oncology as part of the adjuvant treatment for the breast cancer. A copy of this note has been sent to Dr. Georges Quinonez, possibly palliative RT will be needed. Diagnosis. Patient had locally advanced breast cancer, this would be high on the differential diagnosis but a biopsy and/or other work-up is necessary. Breast cancer tumor markers may be helpful, SPEP has also been requested (to help rule out a plasma cell dyscrasia). The recent normal CBC parameters, renal function and calcium level argues against myeloma. LDH, uric acid and beta-2 microglobulin also requested. PET/CT, low-dose myeloma scans, other scans etc to be determined once the diagnosis is more clear. Patient can continue with the Arimidex for the time being but if this is metastatic breast cancer, patient and Dr. Lencho West will discuss the treatment change in the outpatient setting. Additionally patient may be a candidate for anti-resorption therapy, dental clearance will be needed. The above was discussed with the patient and her ; all questions were answered.   Mrs. Keyon Benson demonstrated a good understanding of the situation and understands that the concern is metastatic breast cancer, new malignancy etc.      When patient is stable and ready for discharge, please contact Silvino Whitman, hematology/oncology discharge coordinator for [facility]. She will arrange the follow-up appointment in the outpatient setting. Please do not hesitate to contact me if you have any questions or need additional information. Thank you for this consult.  _________________________________________________________________________________    Chief Complaint   Patient presents with   • Hip Pain     Patient c/o b/l hip pain. States she was dx with Sciatica in July. R hip pain just began; L hip pain x1 mth     Oncology History   Malignant neoplasm of upper-outer quadrant of left breast in female, estrogen receptor positive ([address])   4/19/2021 Biopsy    Left breast US guided biopsy:  A. 2 o'clock 8 cm from the nipple  Invasive mammary carcinoma of no special type  Grade 2  ER 90  AZ 1  HER2 1+  Lymphovascular invasion: not identified    B. Left Axillary lymph node biopsy:  Invasive/ metastatic adenocarcinoma, compatible with breast primary involving fibroadipose tissues and a portion of lymphoid pranchyma. ER 90  AZ 1  HER2 1+    Concordant. Malignancy appears multifocal. The 2 adjacent masses span an area of approximately 4 cm. Right breast clear. 5/3/2021 Genetic Testing    The following genes were evaluated: OLINDA, BRCA1, BRCA2, CDH1, CHEK2, PALB2, PTEN, STK11, TP53  Additional genes evaluated for a total of 36 genes analyzed  Negative result.  No pathogenic sequence variants or deletions/dupllications identified  [facility]     6/8/2021 Surgery    Left breast modified radical mastectomy with ANAND  directed axillary dissection  Invasive carcinoma of no special type (ductal)  Grade 2  6 cm  Lymphovascular invasion present  Margins negative  4/10 Lymph nodes (3.5 cm)  Anatomic Stage IIIA  Prognostic Stage IB     7/16/2021 - 9/17/2021 Chemotherapy    pegfilgrastim (NEULASTA ONPRO), 6 mg, Subcutaneous, Once, 4 of 4 cycles  Administration: 6 mg (7/16/2021), 6 mg (8/6/2021), 6 mg (8/27/2021), 6 mg (9/17/2021)  cyclophosphamide (CYTOXAN) IVPB, 600 mg/m2 = 1,212 mg, Intravenous, Once, 4 of 4 cycles  Administration: 1,212 mg (7/16/2021), 1,212 mg (8/6/2021), 1,212 mg (8/27/2021), 1,212 mg (9/17/2021)  DOCEtaxel (TAXOTERE) chemo infusion, 75 mg/m2 = 151.6 mg, Intravenous, Once, 4 of 4 cycles  Administration: 151.6 mg (7/16/2021), 151.6 mg (8/6/2021), 151.6 mg (8/27/2021), 151.6 mg (9/17/2021)     10/18/2021 - 11/19/2021 Radiation    BH L CW 10X/6X 13 / 13 200 0 2,600 32   BH L CW BOLUS 10X/6X 12 / 12 200 0 2,400 30   BH L PAB 10X 25 / 25 60 0 1,500 32   BH L Sclav 10X 25 / 25 200 0 5,000 32      Treatment Dates:  10/18/2021 - 11/19/2021. Dr Kianna Lama     10/2021 -  Hormone Therapy    anastrozole 1 mg once a day    Dr Corby Bills       History of Present Illness: 62-year-old female with history of IIIA L breast cancer followed by Dr. Corby Bills admitted with bilateral buttocks pain. Patient also with a history of hypertension, obesity. Pain started approximately 2 days prior to admission. Patient heard a popping sound when she was rising from her couch. Patient initially had lower back pain; this is progressed to numbness and tingling in the lower extremities. Patient was found in bed in moderate distress. Pain medications have decreased the pain significantly but not entirely. No recent weight loss, no recent fevers or bleeding. No respiratory issues. No headaches, blurred vision or dizziness. Oncology history: Patient has a history of stage IIIA left breast cancer, grade 2, lobular, ER positive, HER2 negative disease status postmastectomy, lymph node sampling (June 2021). Patient was negative for the BRCA gene mutation. Pathology results (copied below) demonstrated four positive lymph nodes. Patient was treated with adjuvant Taxotere and Cytoxan (completed in September 2021) followed by radiation. Patient was subsequently placed on anastrozole and had tolerated this well. Review of Systems   Constitutional: Positive for activity change and fatigue. HENT: Negative. Eyes: Negative. Respiratory: Negative. Cardiovascular: Negative. Gastrointestinal: Negative. Endocrine: Negative. Genitourinary: Negative. Musculoskeletal: Positive for arthralgias and back pain. Skin: Negative. Allergic/Immunologic: Negative. Neurological: Negative. Hematological: Negative. Psychiatric/Behavioral: Negative. All other systems reviewed and are negative. Patient Active Problem List   Diagnosis   • Benign essential hypertension   • Chronic rhinitis   • Hidradenitis suppurativa   • Hypothyroidism   • Impaired fasting glucose   • Morbid obesity ([address])   • Venous insufficiency (chronic) (peripheral)   • Malignant neoplasm of upper-outer quadrant of left breast in female, estrogen receptor positive (HCC)   • Use of anastrozole   • S/P mastectomy, left   • Obesity (BMI 30-39. 9)   • Sacro-iliac pain     Past Medical History:   Diagnosis Date   • BRCA gene mutation negative 05/19/2021    [employer]; 36 gene panel   • Breast cancer ([address])     Left breast   • Colon polyp    • Dental crowns present    • Exercise involving walking     the dogs   • History of angina     per pt "years ago and related to stress"   • History of chemotherapy     breast cancer (left) 2021.    • History of radiation therapy    • [place] (hard of hearing)     wears hearing aids/will wear DOS bilat   • Hypertension    • Limb alert care status     No BP/IV Left Arm   • Lymphedema of left arm    • Prediabetes    • Wears glasses      Past Surgical History:   Procedure Laterality Date   • AXILLARY SURGERY Right     sweat glands removed -    • BREAST CYST EXCISION Right 2000    benign   • BREAST SURGERY Right    • CHOLECYSTECTOMY     • COLONOSCOPY     • DILATION AND CURETTAGE OF UTERUS     • MASTECTOMY Left 06/08/2021   • AR BREAST REDUCTION N/A 03/18/2022    Procedure: R BREAST REDUCTION; L BREAST EXCISION STANDING SKIN DEFORMITY; LOCAL FLAP;  Surgeon: Krishna Chandra MD;  Location: [facility];  Service: Plastics   • DC MAST MODF RAD W/AX LYMPH NOD W/WO PECT/NERI MIN Left 06/08/2021    Procedure: BREAST MODIFIED RADICAL MASTECTOMY; ANAND  DIRECTED AXILLARY DISSECTION;  Surgeon: Carlos Beavre MD;  Location: [facility];  Service: Surgical Oncology   • REDUCTION MAMMAPLASTY Right     March 2022   • US BREAST NEEDLE LOC LEFT Left 06/02/2021   • US GUIDED BREAST BIOPSY LEFT COMPLETE Left 04/19/2021   • US GUIDED BREAST LYMPH NODE BIOPSY LEFT Left 04/19/2021   • WISDOM TOOTH EXTRACTION       Family History   Problem Relation Age of Onset   • Dementia Mother    • Colon cancer Mother         49's-62's   • Lung cancer Father         49's-62's   • No Known Problems Sister    • Breast cancer Paternal Aunt    • No Known Problems Sister    • Stomach cancer Paternal Uncle         66's     Social History     Socioeconomic History   • Marital status: /Civil Union     Spouse name: Not on file   • Number of children: Not on file   • Years of education: Not on file   • Highest education level: Not on file   Occupational History   • Not on file   Tobacco Use   • Smoking status: Never     Passive exposure: Never   • Smokeless tobacco: Never   Vaping Use   • Vaping Use: Never used   Substance and Sexual Activity   • Alcohol use: No   • Drug use: No   • Sexual activity: Yes     Partners: Male     Birth control/protection: Male Sterilization     Comment: defer   Other Topics Concern   • Not on file   Social History Narrative   • Not on file     Social Determinants of Health     Financial Resource Strain: Low Risk  (5/3/2023)    Overall Financial Resource Strain (CARDIA)    • Difficulty of Paying Living Expenses: Not hard at all   Food Insecurity: Not on file   Transportation Needs: No Transportation Needs (5/3/2023)    PRAPARE - Transportation    • Lack of Transportation (Medical): No    • Lack of Transportation (Non-Medical):  No   Physical Activity: Not on file   Stress: Not on file   Social Connections: Not on file   Intimate Partner Violence: Not on file   Housing Stability: Not on file       Current Facility-Administered Medications:   •  acetaminophen (TYLENOL) tablet 975 mg, 975 mg, Oral, Q8H [address], Cuiliam Cruzrik, CRNP, 975 mg at 08/30/23 1454  •  anastrozole (ARIMIDEX) tablet 1 mg, 1 mg, Oral, Daily, Marina Cruzrik, CRNP, 1 mg at 08/30/23 0825  •  ascorbic acid (VITAMIN C) tablet 500 mg, 500 mg, Oral, Daily, Akosuailiam Cruzrik, CRNP, 500 mg at 08/30/23 0825  •  cholecalciferol (VITAMIN D3) tablet 1,000 Units, 1,000 Units, Oral, Daily, Marina Cruzrieldon, CRNP, 1,000 Units at 08/30/23 0825  •  docusate sodium (COLACE) capsule 100 mg, 100 mg, Oral, BID, Marina Cruzrieldon, CRNP, 100 mg at 08/30/23 0825  •  enoxaparin (LOVENOX) subcutaneous injection 40 mg, 40 mg, Subcutaneous, Daily, Marina Vasquez, CRNP, 40 mg at 08/30/23 0825  •  hydrALAZINE (APRESOLINE) tablet 10 mg, 10 mg, Oral, Q6H PRN, Marina Vasquez, CRNP, 10 mg at 08/30/23 3985  •  HYDROmorphone (DILAUDID) injection 0.5 mg, 0.5 mg, Intravenous, Q4H PRN, Stacey Mcpherson, DO, 0.5 mg at 08/30/23 1203  •  lidocaine (LIDODERM) 5 % patch 2 patch, 2 patch, Topical, Daily, RASHEL BlueNP, 2 patch at 08/30/23 0825  •  metoprolol tartrate (LOPRESSOR) tablet 50 mg, 50 mg, Oral, Daily, Cuiliam Cruzrik, CRNP, 50 mg at 08/30/23 0825  •  multivitamin-minerals (CENTRUM) tablet 1 tablet, 1 tablet, Oral, Daily, Cuiyin Yurik, CRNP, 1 tablet at 08/30/23 0825  •  ondansetron (ZOFRAN) injection 4 mg, 4 mg, Intravenous, Q6H PRN, DANILO Blue  •  oxyCODONE (ROXICODONE) IR tablet 5 mg, 5 mg, Oral, Q4H PRN, Stacey Mcpherson DO  •  traMADol (ULTRAM) tablet 50 mg, 50 mg, Oral, Q6H PRN, Manpreet Hernandez DO    No Known Allergies    Vitals:    08/30/23 1521   BP: (!) 157/116   Pulse: 61   Resp: 18   Temp: 97.5 °F (36.4 °C)   SpO2: 95%     Physical Exam  Constitutional:       Appearance: She is well-developed. HENT:      Head: Normocephalic and atraumatic. Right Ear: External ear normal.      Left Ear: External ear normal.   Eyes:      Conjunctiva/sclera: Conjunctivae normal.      Pupils: Pupils are equal, round, and reactive to light. Cardiovascular:      Rate and Rhythm: Normal rate and regular rhythm. Heart sounds: Normal heart sounds. Pulmonary:      Effort: Pulmonary effort is normal.      Breath sounds: Normal breath sounds. Abdominal:      General: Bowel sounds are normal.      Palpations: Abdomen is soft. Musculoskeletal:         General: Normal range of motion. Cervical back: Normal range of motion and neck supple. Skin:     General: Skin is warm. Neurological:      Mental Status: She is alert and oriented to person, place, and time. Deep Tendon Reflexes: Reflexes are normal and symmetric. Psychiatric:         Behavior: Behavior normal.         Thought Content: Thought content normal.         Judgment: Judgment normal.     Extremities: 0-1+ bilateral lower extreme edema, no cords, pulses are 1+    Labs    8/30/2023 WBC = 8.38 hemoglobin = 15 hematocrit = 42.8 platelet = 089 neutrophil = 81% lymphocyte = 11% monocyte = 7% BUN = 20 creatinine = 0.58 calcium = 9.6 LFTs WNL total protein = 7.0    Imaging    8/30/2023 CAT scan pelvis without contrast    IMPRESSION:     There is abnormal lytic destruction of the left sacrum extending across the left sacroiliac joint and involving the left medial iliac bone. Associated pathological fracture of the left sacrum and mild soft tissue density mass in the left presacralregion. This could be secondary to focal osseous metastatic disease/myeloma. Severe osteomyelitis with associated bony destruction is also possible but felt to be less likely. Recommend clinical correlation.  Soft tissue density extending into the left   sacral neural foramina (S1-S3) with involvement of the respective left sacral nerve roots noted. No intrapelvic visceral mass/fluid collection or lymphadenopathy by size criteria noted.     8/30/2023 CT lumbar spine    IMPRESSION:     There is abnormal lytic destruction of the left sacrum extending across the left sacroiliac joint and involving the left medial iliac bone. Associated pathological fracture of the left sacrum and mild soft tissue density mass in the left presacral   region. This could be secondary to focal osseous metastatic disease/myeloma. Severe osteomyelitis with associated bony destruction is also possible but felt to be less likely. Recommend clinical correlation.     Pathology    Case Report   Surgical Pathology Report                         Case: [id number]                                    Authorizing Provider: Jim Dasilva MD              Collected:           06/08/2021 1057               Ordering Location:     [address]        Received:            06/08/2021 1721                                      [facility]                                                       Pathologist:           Bibiana Figueroa MD                                                                  Specimen:    Breast, Left, Left breast with axillary content - long suture marks lateral, medium                  suture marks medial, short suture marks superior                                           Final Diagnosis   A. 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 - Ductal carcinemia in situ.  - Skin with sebotropic keratosis. - Nipple with no pathologic abnormality.  - Lymphovascular and perineural invasion are present   - All margins are negative  - Four of ten lymph nodes are positive for metastatic carcinoma (4/10).       Comment: Immunohistochemistry was performed on block A7.  The tumor cells are positive for E cadherin and p120 in a membranous stain and negative for calponin and p63, supporting the above diagnosis. AE1/3 is performed on tissue block  A32 to help in the assessment of this case.     Intradepartmental consultation is in agreement.     Note:  1. Ancillary Studies:      - Repeat HER2 testing (2013 ASCO/CAP Recommendations): Not indicated. - Best representative tumor block: A5       -- Sufficient tumor present for          Agendia Mammaprint/Blueprint (1 cm2 of invasive tumor in aggregate): Yes. MI Profile/Foundation One (at least 5 x 5 mm of tumor): Yes.  2.. Pathologic Stage Classification (pTNM, AJCC 8th Edition): 8th ed, AJCC Anatomic Stage:  at least Stage  - pT3 pN2a, G2.  3. 8th ed. AJCC Pathological Prognostic Stage: IB      Electronically signed by Marybeth Osorio MD on 6/15/2021 at  9:14 AM   Note    Interpretation performed at [facility], [facility] [address] [place] [address]      Additional Information    All reported additional testing was performed with appropriately reactive controls.  These tests were developed and their performance characteristics determined by [facility] or appropriate performing facility, though some tests may be performed on tissues which have not been validated for performance characteristics (such as staining performed on alcohol exposed cell blocks and decalcified tissues).  Results should be interpreted with caution and in the context of the patients’ clinical condition. These tests may not be cleared or approved by the U.S. Food and Drug Administration, though the FDA has determined that such clearance or approval is not necessary. These tests are used for clinical purposes and they should not be regarded as investigational or for research. This laboratory has been approved by CLIA 88, designated as a high-complexity laboratory and is qualified to perform these tests.   .   Synoptic Checklist   INVASIVE CARCINOMA OF THE BREAST: Resection  8th Edition - Protocol posted: 2/26/2020  INVASIVE CARCINOMA OF THE BREAST: COMPLETE EXCISION - All Specimens  SPECIMEN   Procedure  Total mastectomy    Specimen Laterality  Left    TUMOR   Tumor Site  Upper outer quadrant    Histologic Type  Invasive carcinoma of no special type (ductal)    Glandular (Acinar) / Tubular Differentiation  Score 3    Nuclear Pleomorphism  Score 2    Mitotic Rate  Score 1    Overall Grade  Grade 2 (scores of 6 or 7)    Tumor Size  Greatest dimension of largest invasive focus (Millimeters): 60 mm   Additional Dimension (Millimeters)  38 mm     30 mm   Tumor Focality  Single focus of invasive carcinoma    Ductal Carcinoma In Situ (DCIS)  Present      Negative for extensive intraductal component (EIC)    Size (Extent) of DCIS     Number of Blocks with DCIS  3    Number of Blocks Examined  18    Architectural Patterns  [place]      Solid    Nuclear Grade  Grade II (intermediate)    Necrosis  Present, central (expansive "comedo" necrosis)    Lobular Carcinoma In Situ (LCIS)  Not identified    Lymphovascular Invasion  Present    Dermal Lymphovascular Invasion  Not identified    Microcalcifications  Present in invasive carcinoma    Treatment Effect in the Breast  No known presurgical therapy    MARGINS   Invasive Carcinoma Margins  Uninvolved by invasive carcinoma    Distance from Closest Margin (Millimeters)  Greater than: 20 mm   Closest Margin(s)  Posterior    DCIS Margins  Uninvolved by DCIS    Distance from Closest Margin (Millimeters)  Greater than: 20 mm   Closest Margin(s)  Posterior    LYMPH NODES   Regional Lymph Nodes  Involved by tumor cells    Number of Lymph Nodes with Macrometastases (> 2 mm)  4    Number of Lymph Nodes with Micrometastases (> 0.2 mm to 2 mm and / or > 200 cells)  0    Number of Lymph Nodes with Isolated Tumor Cells (<= 0.2 mm or <= 200 cells)  0    Size of Largest Metastatic Deposit (Millimeters)  At least: 35 mm   Extranodal Extension  Present    Extent of Extranodal Extension  Greater than 2 mm    Total Number of Lymph Nodes Examined  10    PATHOLOGIC STAGE CLASSIFICATION (pTNM, AJCC 8th Edition)      Primary Tumor (pT)  pT3    Regional Lymph Nodes (pN)  pN2a    ADDITIONAL FINDINGS   Additional Findings  intraductal papilloma    SPECIAL STUDIES   Breast Biomarker Testing Performed on Previous Biopsy     Estrogen Receptor (ER) Status  Positive (greater than 10% of cells demonstrate nuclear positivity)    Percentage of Cells with Nuclear Positivity  %    Breast Biomarker Testing Performed on Previous Biopsy     Progesterone Receptor (PgR) Status  Positive    Percentage of Cells with Nuclear Positivity  1-2 %   Breast Biomarker Testing Performed on Previous Biopsy     HER2 (by immunohistochemistry)  Negative (Score 1+)    Testing Performed on Case Number  [id number]    .

## 2023-08-30 NOTE — ED PROVIDER NOTES
History  Chief Complaint   Patient presents with   • Hip Pain     Patient c/o b/l hip pain. States she was dx with Sciatica in July. R hip pain just began; L hip pain x3mth     45-year-old female presents with right hip pain that started tonight radiating down her right lower extremity and right lower back pain. Sharp continuous currently 8 out of 10 movement makes it worse nothing makes it better. No numbness tingling weakness continuously no saddle anesthesia urine retention bowel incontinence no other neurologic symptoms. She does have left-sided sciatica that has been chronic. No new trauma noted. She does have a history of breast cancer in remission. History provided by:  Patient   used: No        Prior to Admission Medications   Prescriptions Last Dose Informant Patient Reported? Taking?    Ascorbic Acid (VITAMIN C PO)   Yes No   Sig: Take by mouth in the morning   Multiple Vitamins-Minerals (MULTIVITAMIN ADULT PO)  Self Yes No   Sig: Take by mouth daily   VITAMIN D PO   Yes No   Sig: Take by mouth in the morning   albuterol (ProAir HFA) 90 mcg/act inhaler   No No   Sig: Inhale 2 puffs every 4 (four) hours as needed for wheezing or shortness of breath   anastrozole (ARIMIDEX) 1 mg tablet   No No   Sig: TAKE ONE TABLET BY MOUTH EVERY DAY   fluticasone (FLONASE) 50 mcg/act nasal spray   Yes No   Si puffs into each nostril daily   metoprolol tartrate (LOPRESSOR) 50 mg tablet   No No   Sig: Take 1 tablet (50 mg total) by mouth daily   naproxen (NAPROSYN) 500 mg tablet   No No   Sig: Take 1 tablet (500 mg total) by mouth 2 (two) times a day with meals for 14 days      Facility-Administered Medications: None       Past Medical History:   Diagnosis Date   • BRCA gene mutation negative 2021    [employer]; 36 gene panel   • Breast cancer (HCC)     Left breast   • Colon polyp    • Dental crowns present    • Exercise involving walking     the dogs   • History of angina     per pt "years ago and related to stress"   • History of cancer chemotherapy    • History of chemotherapy     breast cancer (left) 2021. • History of radiation therapy    • History of radiation therapy    • [place] (hard of hearing)     wears hearing aids/will wear DOS bilat   • Hypertension    • Limb alert care status     No BP/IV Left Arm   • Lymphedema of left arm    • Prediabetes    • Wears glasses        Past Surgical History:   Procedure Laterality Date   • AXILLARY SURGERY Right     sweat glands removed -    • BREAST CYST EXCISION Right 2000    benign   • BREAST SURGERY Right    • CHOLECYSTECTOMY     • COLONOSCOPY     • DILATION AND CURETTAGE OF UTERUS     • MASTECTOMY Left 06/08/2021   • [place] BREAST REDUCTION N/A 03/18/2022    Procedure: R BREAST REDUCTION; L BREAST EXCISION STANDING SKIN DEFORMITY; LOCAL FLAP;  Surgeon: Pat Rutherford MD;  Location: [facility];  Service: Plastics   • [place] MAST MODF RAD W/AX LYMPH NOD W/WO PECT/NERI MIN Left 06/08/2021    Procedure: BREAST MODIFIED RADICAL MASTECTOMY; ANAND  DIRECTED AXILLARY DISSECTION;  Surgeon: Vance Erickson MD;  [facility];  Service: Surgical Oncology   • REDUCTION MAMMAPLASTY Right     March 2022   • US BREAST NEEDLE LOC LEFT Left 06/02/2021   • US GUIDED BREAST BIOPSY LEFT COMPLETE Left 04/19/2021   • US GUIDED BREAST LYMPH NODE BIOPSY LEFT Left 04/19/2021   • WISDOM TOOTH EXTRACTION         Family History   Problem Relation Age of Onset   • Dementia Mother    • Colon cancer Mother         49's-62's   • Lung cancer Father         49's-62's   • No Known Problems Sister    • Breast cancer Paternal Aunt    • No Known Problems Sister    • Stomach cancer Paternal Uncle         66's     I have reviewed and agree with the history as documented.     E-Cigarette/Vaping   • E-Cigarette Use Never User      E-Cigarette/Vaping Substances   • Nicotine No    • THC No    • CBD No    • Flavoring No    • Other No    • Unknown No      Social History     Tobacco Use   • Smoking status: Never     Passive exposure: Never   • Smokeless tobacco: Never   Vaping Use   • Vaping Use: Never used   Substance Use Topics   • Alcohol use: No   • Drug use: No       Review of Systems   Constitutional: Negative. HENT: Negative. Eyes: Negative. Respiratory: Negative. Cardiovascular: Negative. Gastrointestinal: Negative. Endocrine: Negative. Genitourinary: Negative. Musculoskeletal: Positive for arthralgias and myalgias. Skin: Negative. Allergic/Immunologic: Negative. Neurological: Negative. Hematological: Negative. Psychiatric/Behavioral: Negative. All other systems reviewed and are negative. Physical Exam  Physical Exam  Vitals and nursing note reviewed. Constitutional:       Appearance: Normal appearance. HENT:      Head: Normocephalic and atraumatic. Nose: Nose normal.      Mouth/Throat:      Mouth: Mucous membranes are moist.   Eyes:      Extraocular Movements: Extraocular movements intact. Pupils: Pupils are equal, round, and reactive to light. Cardiovascular:      Rate and Rhythm: Normal rate and regular rhythm. Pulmonary:      Effort: Pulmonary effort is normal.      Breath sounds: Normal breath sounds. Abdominal:      General: Abdomen is flat. Bowel sounds are normal.      Palpations: Abdomen is soft. Musculoskeletal:         General: Normal range of motion. Cervical back: Normal range of motion and neck supple. Comments: Right-sided lower extremity straight leg testing positive. No weakness noted. Reflexes intact. Skin:     General: Skin is warm. Capillary Refill: Capillary refill takes less than 2 seconds. Neurological:      General: No focal deficit present. Mental Status: She is alert and oriented to person, place, and time. Mental status is at baseline. Psychiatric:         Mood and Affect: Mood normal.         Thought Content:  Thought content normal.         Vital Signs  ED Triage Vitals [08/30/23 0030]   Temperature Pulse Respirations Blood Pressure SpO2   97.8 °F (36.6 °C) 70 18 (!) 192/84 98 %      Temp Source Heart Rate Source Patient Position - Orthostatic VS BP Location FiO2 (%)   Oral Monitor -- -- --      Pain Score       10 - Worst Possible Pain           Vitals:    08/30/23 0030   BP: (!) 192/84   Pulse: 70         Visual Acuity      ED Medications  Medications   lidocaine (LIDODERM) 5 % patch 1 patch (1 patch Topical Medication Applied 8/30/23 0049)   ketorolac (TORADOL) injection 15 mg (15 mg Intravenous Given 8/30/23 0048)   cyclobenzaprine (FLEXERIL) tablet 10 mg (10 mg Oral Given 8/30/23 0047)       Diagnostic Studies  Results Reviewed     Procedure Component Value Units Date/Time    Comprehensive metabolic panel [[id number]]  (Abnormal) Collected: 08/30/23 0047    Lab Status: Final result Specimen: Blood from Arm, Right Updated: 08/30/23 0110     Sodium 136 mmol/L      Potassium 3.6 mmol/L      Chloride 102 mmol/L      CO2 24 mmol/L      ANION GAP 10 mmol/L      BUN 20 mg/dL      Creatinine 0.58 mg/dL      Glucose 134 mg/dL      Calcium 9.6 mg/dL      AST 17 U/L      ALT 20 U/L      Alkaline Phosphatase 97 U/L      Total Protein 7.0 g/dL      Albumin 4.2 g/dL      Total Bilirubin 0.68 mg/dL      eGFR 93 ml/min/1.73sq m     Narrative:      [facility] guidelines for Chronic Kidney Disease (CKD):   •  Stage 1 with normal or high GFR (GFR > 90 mL/min/1.73 square meters)  •  Stage 2 Mild CKD (GFR = 60-89 mL/min/1.73 square meters)  •  Stage 3A Moderate CKD (GFR = 45-59 mL/min/1.73 square meters)  •  Stage 3B Moderate CKD (GFR = 30-44 mL/min/1.73 square meters)  •  Stage 4 Severe CKD (GFR = 15-29 mL/min/1.73 square meters)  •  Stage 5 End Stage CKD (GFR <15 mL/min/1.73 square meters)  Note: GFR calculation is accurate only with a steady state creatinine    CBC and differential [[id number]]  (Abnormal) Collected: 08/30/23 0047    Lab Status: Final result Specimen: Blood from Arm, Right Updated: 08/30/23 0056     WBC 8.38 Thousand/uL      RBC 4.98 Million/uL      Hemoglobin 15.0 g/dL      Hematocrit 42.8 %      MCV 86 fL      MCH 30.1 pg      MCHC 35.0 g/dL      RDW 13.4 %      MPV 10.2 fL      Platelets 581 Thousands/uL      nRBC 0 /100 WBCs      Neutrophils Relative 81 %      Immat GRANS % 0 %      Lymphocytes Relative 11 %      Monocytes Relative 7 %      Eosinophils Relative 1 %      Basophils Relative 0 %      Neutrophils Absolute 6.74 Thousands/µL      Immature Grans Absolute 0.03 Thousand/uL      Lymphocytes Absolute 0.91 Thousands/µL      Monocytes Absolute 0.57 Thousand/µL      Eosinophils Absolute 0.11 Thousand/µL      Basophils Absolute 0.02 Thousands/µL                  CT spine lumbar without contrast    (Results Pending)              Procedures  Procedures         ED Course                               SBIRT 22yo+    Flowsheet Row Most Recent Value   Initial Alcohol Screen: US AUDIT-C     1. How often do you have a drink containing alcohol? 0 Filed at: 08/30/2023 0034   2. How many drinks containing alcohol do you have on a typical day you are drinking? 0 Filed at: 08/30/2023 0034   3b. FEMALE Any Age, or MALE 65+: How often do you have 4 or more drinks on one occassion? 0 Filed at: 08/30/2023 0034   Audit-C Score 0 Filed at: 08/30/2023 8052   ADRY: How many times in the past year have you. .. Used an illegal drug or used a prescription medication for non-medical reasons? Never Filed at: 08/30/2023 1540                    Medical Decision Making  CT scan pending care transition to to [facility]. She was given  pain medications and labs done which I reviewed    Amount and/or Complexity of Data Reviewed  External Data Reviewed: notes. Labs: ordered. Decision-making details documented in ED Course. Radiology: ordered. Risk  Prescription drug management.           Disposition  Final diagnoses:   Right hip pain   Sciatica     Time reflects when diagnosis was documented in both MDM as applicable and the Disposition within this note     Time User Action Codes Description Comment    8/30/2023  2:47 AM Anepu, Malinda Countess Add [M25.551] Right hip pain     8/30/2023  2:47 AM Anepu, Nita Add [M54.30] Sciatica       ED Disposition     None      Follow-up Information    None         Patient's Medications   Discharge Prescriptions    No medications on file       No discharge procedures on file.     PDMP Review       Value Time User    PDMP Reviewed  Yes 5/3/2021  2:13 PM Gerardo Garcia MD          ED Provider  Electronically Signed by           Macey Liz DO  08/30/23 6749

## 2023-08-30 NOTE — CONSULTS
Consultation - Orthopedics   Sal Bridges 72 y.o. female MRN: [id number]  Unit/Bed#: 2 [place] [address] Encounter: [id number]        Physician Requesting Consult: Roney Mendes DO    Reason for Consult / Principal Problem: Sacral fracture    HPI:   Sal Bridges is a 72y.o. year old female who history of breast cancer 2 years ago status post mastectomy and chemo/radiation. Patient sees oncology at [facility] and notes no recent concerns from an oncologic perspective. The patient notes on Sunday she started with some right sided buttock pain. She denies any injury or trauma prior to the onset of her symptoms. Her pain is worse with ambulation and activity and better with rest. She notes some mild intermittent paresthesias about the lower extremity if she moves a certain way, which usually only lasts for a short period of time. She martha any numbness of the legs today. She notes good sensation of the lower extremities. CT of the pelvis demonstrated the following: There is abnormal lytic destruction of the left sacrum extending across the left sacroiliac joint and involving the left medial iliac bone. Associated pathological fracture of the left sacrum and mild soft tissue density mass in the left presacral  region. This could be secondary to focal osseous metastatic disease/myeloma. Severe osteomyelitis with associated bony destruction is also possible but felt to be less likely. Recommend clinical correlation. Soft tissue density extending into the left   sacral neural foramina (S1-S3) with involvement of the respective left sacral nerve roots noted. No intrapelvic visceral mass/fluid collection or lymphadenopathy by size criteria noted. Inpatient consult to Orthopedic Surgery  Consult performed by: Shreyas Wright PA-C  Consult ordered by: DANILO Raymundo          Review of Systems   Constitutional: Negative. Negative for chills and fever. HENT: Negative. Negative for ear pain and sore throat. Eyes: Negative. Negative for pain and redness. Respiratory: Negative. Negative for shortness of breath and wheezing. Cardiovascular: Negative for chest pain and palpitations. Gastrointestinal: Negative. Negative for abdominal pain and blood in stool. Endocrine: Negative. Negative for polydipsia and polyuria. Genitourinary: Negative. Negative for difficulty urinating and dysuria. Musculoskeletal:        As noted in HPI   Skin: Negative. Negative for pallor and rash. Neurological: Negative. Negative for dizziness and numbness. Hematological: Negative. Negative for adenopathy. Does not bruise/bleed easily. Psychiatric/Behavioral: Negative. Negative for confusion and suicidal ideas. Historical Information   Past Medical History:   Diagnosis Date   • BRCA gene mutation negative 05/19/2021    [employer]; 36 gene panel   • Breast cancer ([address])     Left breast   • Colon polyp    • Dental crowns present    • Exercise involving walking     the dogs   • History of angina     per pt "years ago and related to stress"   • History of chemotherapy     breast cancer (left) 2021.    • History of radiation therapy    • [place] (hard of hearing)     wears hearing aids/will wear DOS bilat   • Hypertension    • Limb alert care status     No BP/IV Left Arm   • Lymphedema of left arm    • Prediabetes    • Wears glasses      Past Surgical History:   Procedure Laterality Date   • AXILLARY SURGERY Right     sweat glands removed -    • BREAST CYST EXCISION Right 2000    benign   • BREAST SURGERY Right    • CHOLECYSTECTOMY     • COLONOSCOPY     • DILATION AND CURETTAGE OF UTERUS     • MASTECTOMY Left 06/08/2021   • [place] BREAST REDUCTION N/A 03/18/2022    Procedure: R BREAST REDUCTION; L BREAST EXCISION STANDING SKIN DEFORMITY; LOCAL FLAP;  Surgeon: Dneys Drummond MD;  [facility];  Service: Plastics   • [place] MAST MODF RAD W/AX LYMPH NOD W/WO PECT/NERI MIN Left 06/08/2021    Procedure: BREAST MODIFIED RADICAL MASTECTOMY; ANAND  DIRECTED AXILLARY DISSECTION;  Surgeon: Cleve Carl MD;  [facility];  Service: Surgical Oncology   • REDUCTION MAMMAPLASTY Right     March 2022   • US BREAST NEEDLE LOC LEFT Left 06/02/2021   • US GUIDED BREAST BIOPSY LEFT COMPLETE Left 04/19/2021   • US GUIDED BREAST LYMPH NODE BIOPSY LEFT Left 04/19/2021   • WISDOM TOOTH EXTRACTION       Social History   Social History     Substance and Sexual Activity   Alcohol Use No     Social History     Substance and Sexual Activity   Drug Use No     Social History     Tobacco Use   Smoking Status Never   • Passive exposure: Never   Smokeless Tobacco Never     Family History:   Family History   Problem Relation Age of Onset   • Dementia Mother    • Colon cancer Mother         49's-62's   • Lung cancer Father         49's-62's   • No Known Problems Sister    • Breast cancer Paternal Aunt    • No Known Problems Sister    • Stomach cancer Paternal Uncle         66's       Meds/Allergies   all current active meds have been reviewed and current meds:   Current Facility-Administered Medications   Medication Dose Route Frequency   • acetaminophen (TYLENOL) tablet 975 mg  975 mg Oral Q8H [address]   • anastrozole (ARIMIDEX) tablet 1 mg  1 mg Oral Daily   • ascorbic acid (VITAMIN C) tablet 500 mg  500 mg Oral Daily   • cholecalciferol (VITAMIN D3) tablet 1,000 Units  1,000 Units Oral Daily   • docusate sodium (COLACE) capsule 100 mg  100 mg Oral BID   • enoxaparin (LOVENOX) subcutaneous injection 40 mg  40 mg Subcutaneous Daily   • hydrALAZINE (APRESOLINE) tablet 10 mg  10 mg Oral Q6H PRN   • HYDROmorphone (DILAUDID) injection 0.5 mg  0.5 mg Intravenous Q4H PRN   • lidocaine (LIDODERM) 5 % patch 1 patch  1 patch Topical Once   • lidocaine (LIDODERM) 5 % patch 2 patch  2 patch Topical Daily   • metoprolol tartrate (LOPRESSOR) tablet 50 mg  50 mg Oral Daily   • multivitamin-minerals (CENTRUM) tablet 1 tablet  1 tablet Oral Daily   • ondansetron (ZOFRAN) injection 4 mg  4 mg Intravenous Q6H PRN   • oxyCODONE (ROXICODONE) IR tablet 5 mg  5 mg Oral Q4H PRN   • traMADol (ULTRAM) tablet 50 mg  50 mg Oral Q6H PRN     No Known Allergies    Objective   Vitals: Blood pressure 159/77, pulse 72, temperature 97.6 °F (36.4 °C), resp. rate 17, height 5' 1" (1.549 m), weight 93.4 kg (206 lb), SpO2 98 %. ,Body mass index is 38.92 kg/m². Invasive Devices     Peripheral Intravenous Line  Duration           Peripheral IV 08/30/23 Right Antecubital <1 day                Physical Exam  Constitutional:       General: She is not in acute distress. Appearance: She is well-developed. HENT:      Head: Normocephalic and atraumatic. Eyes:      General: No scleral icterus. Conjunctiva/sclera: Conjunctivae normal.   Neck:      Vascular: No JVD. Cardiovascular:      Rate and Rhythm: Normal rate. Pulmonary:      Effort: Pulmonary effort is normal. No respiratory distress. Skin:     General: Skin is warm. Neurological:      Mental Status: She is alert and oriented to person, place, and time. Coordination: Coordination normal.          Ortho Exam   Patient lying comfortably in bed in NAD. When patient tries to roll on her side she does complain of right buttock pain. No tenderness, specifically over sacrum or SI joints. Sensation intact L2-S1 dermatomes bilaterally. 5/5 strength bilateral ankle dorsiflexion/plantar flexion, great toe extension and knee flexion/extension. Negative straight leg raise bilaterally. Lab Results: I have personally reviewed pertinent lab results.   CBC:   Lab Results   Component Value Date    WBC 8.38 08/30/2023    HGB 15.0 08/30/2023    HCT 42.8 08/30/2023    MCV 86 08/30/2023     08/30/2023    RBC 4.98 08/30/2023    MCH 30.1 08/30/2023    MCHC 35.0 08/30/2023    RDW 13.4 08/30/2023    MPV 10.2 08/30/2023    NRBC 0 08/30/2023     CMP:   Lab Results   Component Value Date     01/27/2017     08/30/2023     08/26/2020 CO2 24 08/30/2023    CO2 23 08/26/2020    BUN 20 08/30/2023    BUN 18 08/26/2020    CREATININE 0.58 (L) 08/30/2023    CREATININE 0.63 01/27/2017    GLUCOSE 131 (H) 01/27/2017    CALCIUM 9.6 08/30/2023    CALCIUM 9.0 01/27/2017    AST 17 08/30/2023    AST 18 08/26/2020    ALT 20 08/30/2023    ALT 23 08/26/2020    ALKPHOS 97 08/30/2023    ALKPHOS 77 01/27/2017    PROT 6.5 01/27/2017    BILITOT 0.8 01/27/2017    EGFR 93 08/30/2023     PT/INR: No results found for: "PT", "INR"    Imaging Studies: I have personally reviewed pertinent reports. and I have personally reviewed pertinent films in PACS  CT pelvis: There is abnormal lytic destruction of the left sacrum extending across the left sacroiliac joint and involving the left medial iliac bone. Associated pathological fracture of the left sacrum and mild soft tissue density mass in the left presacral  region. This could be secondary to focal osseous metastatic disease/myeloma. Severe osteomyelitis with associated bony destruction is also possible but felt to be less likely. Recommend clinical correlation. Soft tissue density extending into the left   sacral neural foramina (S1-S3) with involvement of the respective left sacral nerve roots noted. No intrapelvic visceral mass/fluid collection or lymphadenopathy by size criteria noted. Assessment:  Pathologic sacral fracture- suspect etiology metastatic breast cancer given history. Plan:  Although her pathologic lesion is more left sided, the patient is currently more symptomatic on her right side. There may also be a component of sciatica present, as patient does have a history of this. No surgical intervention for pathologic fracture. Will defer further workup of lesions to oncology.  WBAT      Code Status: Level 1 - Full Code  Advance Directive and Living Will:      Power of :    POLST:

## 2023-08-30 NOTE — ASSESSMENT & PLAN NOTE
· Continue metoprolol  · BP elevated in ED, likely due to pain  · We will order hydralazine as needed

## 2023-08-31 ENCOUNTER — APPOINTMENT (OUTPATIENT)
Dept: RADIOLOGY | Facility: HOSPITAL | Age: 73
DRG: 478 | End: 2023-08-31
Payer: MEDICARE

## 2023-08-31 ENCOUNTER — APPOINTMENT (INPATIENT)
Dept: RADIOLOGY | Facility: HOSPITAL | Age: 73
DRG: 478 | End: 2023-08-31
Payer: MEDICARE

## 2023-08-31 LAB
ALBUMIN SERPL BCP-MCNC: 3.9 G/DL (ref 3.5–5)
ALP SERPL-CCNC: 109 U/L (ref 34–104)
ALT SERPL W P-5'-P-CCNC: 26 U/L (ref 7–52)
ANION GAP SERPL CALCULATED.3IONS-SCNC: 10 MMOL/L
AST SERPL W P-5'-P-CCNC: 22 U/L (ref 13–39)
BILIRUB SERPL-MCNC: 0.64 MG/DL (ref 0.2–1)
BUN SERPL-MCNC: 21 MG/DL (ref 5–25)
CALCIUM SERPL-MCNC: 9.3 MG/DL (ref 8.4–10.2)
CHLORIDE SERPL-SCNC: 104 MMOL/L (ref 96–108)
CO2 SERPL-SCNC: 23 MMOL/L (ref 21–32)
CREAT SERPL-MCNC: 0.62 MG/DL (ref 0.6–1.3)
ERYTHROCYTE [DISTWIDTH] IN BLOOD BY AUTOMATED COUNT: 13.5 % (ref 11.6–15.1)
GFR SERPL CREATININE-BSD FRML MDRD: 91 ML/MIN/1.73SQ M
GLUCOSE SERPL-MCNC: 124 MG/DL (ref 65–140)
HCT VFR BLD AUTO: 47.3 % (ref 34.8–46.1)
HGB BLD-MCNC: 15.9 G/DL (ref 11.5–15.4)
LDH SERPL-CCNC: 165 U/L (ref 140–271)
MCH RBC QN AUTO: 29.8 PG (ref 26.8–34.3)
MCHC RBC AUTO-ENTMCNC: 33.6 G/DL (ref 31.4–37.4)
MCV RBC AUTO: 89 FL (ref 82–98)
PLATELET # BLD AUTO: 219 THOUSANDS/UL (ref 149–390)
PMV BLD AUTO: 10.3 FL (ref 8.9–12.7)
POTASSIUM SERPL-SCNC: 3.6 MMOL/L (ref 3.5–5.3)
PROT SERPL-MCNC: 6.9 G/DL (ref 6.4–8.4)
RBC # BLD AUTO: 5.33 MILLION/UL (ref 3.81–5.12)
SODIUM SERPL-SCNC: 137 MMOL/L (ref 135–147)
URATE SERPL-MCNC: 4.5 MG/DL (ref 2–7.5)
WBC # BLD AUTO: 7.31 THOUSAND/UL (ref 4.31–10.16)

## 2023-08-31 PROCEDURE — 80053 COMPREHEN METABOLIC PANEL: CPT | Performed by: INTERNAL MEDICINE

## 2023-08-31 PROCEDURE — 85027 COMPLETE CBC AUTOMATED: CPT | Performed by: INTERNAL MEDICINE

## 2023-08-31 PROCEDURE — 84550 ASSAY OF BLOOD/URIC ACID: CPT | Performed by: INTERNAL MEDICINE

## 2023-08-31 PROCEDURE — 83615 LACTATE (LD) (LDH) ENZYME: CPT | Performed by: INTERNAL MEDICINE

## 2023-08-31 PROCEDURE — 99232 SBSQ HOSP IP/OBS MODERATE 35: CPT | Performed by: PHYSICIAN ASSISTANT

## 2023-08-31 PROCEDURE — 97167 OT EVAL HIGH COMPLEX 60 MIN: CPT

## 2023-08-31 PROCEDURE — 78306 BONE IMAGING WHOLE BODY: CPT

## 2023-08-31 PROCEDURE — 84165 PROTEIN E-PHORESIS SERUM: CPT | Performed by: INTERNAL MEDICINE

## 2023-08-31 PROCEDURE — 97535 SELF CARE MNGMENT TRAINING: CPT

## 2023-08-31 PROCEDURE — 82232 ASSAY OF BETA-2 PROTEIN: CPT | Performed by: INTERNAL MEDICINE

## 2023-08-31 PROCEDURE — 99232 SBSQ HOSP IP/OBS MODERATE 35: CPT | Performed by: INTERNAL MEDICINE

## 2023-08-31 PROCEDURE — G1004 CDSM NDSC: HCPCS

## 2023-08-31 PROCEDURE — 86300 IMMUNOASSAY TUMOR CA 15-3: CPT | Performed by: INTERNAL MEDICINE

## 2023-08-31 PROCEDURE — A9503 TC99M MEDRONATE: HCPCS

## 2023-08-31 RX ORDER — LIDOCAINE 50 MG/G
1 PATCH TOPICAL DAILY
Status: DISCONTINUED | OUTPATIENT
Start: 2023-09-01 | End: 2023-08-31

## 2023-08-31 RX ORDER — HYDROCHLOROTHIAZIDE 12.5 MG/1
12.5 TABLET ORAL DAILY
Status: DISCONTINUED | OUTPATIENT
Start: 2023-08-31 | End: 2023-09-02 | Stop reason: HOSPADM

## 2023-08-31 RX ORDER — LIDOCAINE 50 MG/G
1 PATCH TOPICAL DAILY
Status: DISCONTINUED | OUTPATIENT
Start: 2023-08-31 | End: 2023-09-02 | Stop reason: HOSPADM

## 2023-08-31 RX ORDER — LOSARTAN POTASSIUM 50 MG/1
50 TABLET ORAL DAILY
Status: DISCONTINUED | OUTPATIENT
Start: 2023-08-31 | End: 2023-09-02 | Stop reason: HOSPADM

## 2023-08-31 RX ADMIN — LOSARTAN POTASSIUM 50 MG: 50 TABLET, FILM COATED ORAL at 09:25

## 2023-08-31 RX ADMIN — ACETAMINOPHEN 975 MG: 325 TABLET ORAL at 13:04

## 2023-08-31 RX ADMIN — LIDOCAINE 5% 2 PATCH: 700 PATCH TOPICAL at 08:32

## 2023-08-31 RX ADMIN — OXYCODONE HYDROCHLORIDE 5 MG: 5 TABLET ORAL at 21:21

## 2023-08-31 RX ADMIN — DOCUSATE SODIUM 100 MG: 100 CAPSULE, LIQUID FILLED ORAL at 08:32

## 2023-08-31 RX ADMIN — METOPROLOL TARTRATE 50 MG: 50 TABLET, FILM COATED ORAL at 08:32

## 2023-08-31 RX ADMIN — HYDROMORPHONE HYDROCHLORIDE 0.5 MG: 1 INJECTION, SOLUTION INTRAMUSCULAR; INTRAVENOUS; SUBCUTANEOUS at 04:50

## 2023-08-31 RX ADMIN — ACETAMINOPHEN 975 MG: 325 TABLET ORAL at 21:18

## 2023-08-31 RX ADMIN — OXYCODONE HYDROCHLORIDE AND ACETAMINOPHEN 500 MG: 500 TABLET ORAL at 08:32

## 2023-08-31 RX ADMIN — HYDROCHLOROTHIAZIDE 12.5 MG: 12.5 TABLET ORAL at 09:25

## 2023-08-31 RX ADMIN — Medication 1000 UNITS: at 08:32

## 2023-08-31 RX ADMIN — OXYCODONE HYDROCHLORIDE 5 MG: 5 TABLET ORAL at 01:08

## 2023-08-31 RX ADMIN — LIDOCAINE 1 PATCH: 50 PATCH CUTANEOUS at 23:34

## 2023-08-31 RX ADMIN — DOCUSATE SODIUM 100 MG: 100 CAPSULE, LIQUID FILLED ORAL at 17:05

## 2023-08-31 RX ADMIN — Medication 1 TABLET: at 08:32

## 2023-08-31 RX ADMIN — ACETAMINOPHEN 975 MG: 325 TABLET ORAL at 05:45

## 2023-08-31 RX ADMIN — ENOXAPARIN SODIUM 40 MG: 40 INJECTION SUBCUTANEOUS at 08:31

## 2023-08-31 RX ADMIN — ANASTROZOLE 1 MG: 1 TABLET, COATED ORAL at 08:31

## 2023-08-31 RX ADMIN — OXYCODONE HYDROCHLORIDE 5 MG: 5 TABLET ORAL at 11:00

## 2023-08-31 NOTE — PLAN OF CARE
Problem: PAIN - ADULT  Goal: Verbalizes/displays adequate comfort level or baseline comfort level  Description: Interventions:  - Encourage patient to monitor pain and request assistance  - Assess pain using appropriate pain scale  - Administer analgesics based on type and severity of pain and evaluate response  - Implement non-pharmacological measures as appropriate and evaluate response  - Consider cultural and social influences on pain and pain management  - Notify physician/advanced practitioner if interventions unsuccessful or patient reports new pain  Outcome: Progressing     Problem: SAFETY ADULT  Goal: Maintain or return to baseline ADL function  Description: INTERVENTIONS:  -  Assess patient's ability to carry out ADLs; assess patient's baseline for ADL function and identify physical deficits which impact ability to perform ADLs (bathing, care of mouth/teeth, toileting, grooming, dressing, etc.)  - Assess/evaluate cause of self-care deficits   - Assess range of motion  - Assess patient's mobility; develop plan if impaired  - Assess patient's need for assistive devices and provide as appropriate  - Encourage maximum independence but intervene and supervise when necessary  - Involve family in performance of ADLs  - Assess for home care needs following discharge   - Consider OT consult to assist with ADL evaluation and planning for discharge  - Provide patient education as appropriate  Outcome: Progressing     Problem: MOBILITY - ADULT  Goal: Maintain or return to baseline ADL function  Description: INTERVENTIONS:  -  Assess patient's ability to carry out ADLs; assess patient's baseline for ADL function and identify physical deficits which impact ability to perform ADLs (bathing, care of mouth/teeth, toileting, grooming, dressing, etc.)  - Assess/evaluate cause of self-care deficits   - Assess range of motion  - Assess patient's mobility; develop plan if impaired  - Assess patient's need for assistive devices and provide as appropriate  - Encourage maximum independence but intervene and supervise when necessary  - Involve family in performance of ADLs  - Assess for home care needs following discharge   - Consider OT consult to assist with ADL evaluation and planning for discharge  - Provide patient education as appropriate  Outcome: Progressing

## 2023-08-31 NOTE — PROGRESS NOTES
Spiritual Care Progress Note    2023  Patient: Jos eMiguel Leblanc : 1950  Admission Date & Time: 2023 0023  MRN: [id number] CSN: [id number]     visited patient per physician referral. Val Thao introduced self & role, provided emotional support, and normalized pt's experience. Patient described feelings of overwhelm regarding new diagnosis and noted she is well-supported by family members.  encouraged pt to reach out with any further needs. Spiritual care remains available.              Chaplaincy Interventions Utilized:   Empowerment: Normalized experience of patient/family and Provided chaplaincy education    Exploration: Explored emotional needs & resources    Collaboration: Consulted with interdisciplinary team    Relationship Building: Cultivated a relationship of care and support      Chaplaincy Outcomes Achieved:  Expressed gratitude       23 1200   Clinical Encounter Type   Visited With Patient   Routine Visit Introduction   Referral From Physician

## 2023-08-31 NOTE — ASSESSMENT & PLAN NOTE
· Blood pressure was elevated secondary to pain and acute illness  · Continue metoprolol  · Losartan/HCT added today

## 2023-08-31 NOTE — ASSESSMENT & PLAN NOTE
· History of breast cancer status post chemoradiation follows with Dr. Lencho West  · Continue anastrozole  · Further work-up of bony abnormality as outlined above

## 2023-08-31 NOTE — PROGRESS NOTES
Progress Note - Orthopedics   Para Shove 72 y.o. female MRN: [id number]  Unit/Bed#: 2 [place] [address] Encounter: [id number]        Subjective: The patient notes ongoing pain about the right buttock. She denies any groin or thigh pain  She denies any paresthesias of lower extremities. She notes she did have a month or so of left buttock pain that was radiating down the leg at times, which was attributed to sciatica. This has improved and is minimal at this point though. Patient has been ambulating short distances to her commode. Oncology did see patient and PET scan ordered, which is being performed today. MRI ordered yesterday still pending. No acute overnight events. Vitals: Blood pressure 138/84, pulse 69, temperature 97.7 °F (36.5 °C), resp. rate 16, height 5' 1" (1.549 m), weight 93.4 kg (206 lb), SpO2 98 %. ,Body mass index is 38.92 kg/m². Intake/Output Summary (Last 24 hours) at 8/31/2023 1027  Last data filed at 8/31/2023 1001  Gross per 24 hour   Intake --   Output 300 ml   Net -300 ml       Invasive Devices     Peripheral Intravenous Line  Duration           Peripheral IV 08/30/23 Right Antecubital 1 day                  Ortho Exam: Patient lying comfortably in bed in NAD. No tenderness, specifically over sacrum or SI joints. Sensation intact L2-S1 dermatomes bilaterally. 5/5 strength bilateral ankle dorsiflexion/plantar flexion, great toe extension and knee flexion/extension. Negative straight leg raise bilaterally.        Lab, Imaging and other studies: I have personally reviewed pertinent lab results.   CBC:   Lab Results   Component Value Date    WBC 7.31 08/31/2023    HGB 15.9 (H) 08/31/2023    HCT 47.3 (H) 08/31/2023    MCV 89 08/31/2023     08/31/2023    RBC 5.33 (H) 08/31/2023    MCH 29.8 08/31/2023    MCHC 33.6 08/31/2023    RDW 13.5 08/31/2023    MPV 10.3 08/31/2023    NRBC 0 08/30/2023     CMP:   Lab Results   Component Value Date     01/27/2017     08/31/2023     08/26/2020    CO2 23 08/31/2023    CO2 23 08/26/2020    BUN 21 08/31/2023    BUN 18 08/26/2020    CREATININE 0.62 08/31/2023    CREATININE 0.63 01/27/2017    GLUCOSE 131 (H) 01/27/2017    CALCIUM 9.3 08/31/2023    CALCIUM 9.0 01/27/2017    AST 22 08/31/2023    AST 18 08/26/2020    ALT 26 08/31/2023    ALT 23 08/26/2020    ALKPHOS 109 (H) 08/31/2023    ALKPHOS 77 01/27/2017    PROT 6.5 01/27/2017    BILITOT 0.8 01/27/2017    EGFR 91 08/31/2023     PT/INR: No results found for: "PT", "INR"    Assessment:  Lytic lesions sacrum and pelvis. Pathologic sacral fracture- suspect etiology metastatic breast cancer given history. Plan:  MRI with contrast ordered yesterday is still pending. PET scan being performed today. Orthopedic Oncologist, Dr. Kg Alcantara, did advised biopsy be interventional radiology for definitive diagnosis. This has been ordered. Pain control per primary tear. Ortho will continue to follow. Further oncologic workup per oncology.      Weight bearing: WBAT

## 2023-08-31 NOTE — PROGRESS NOTES
[id number] [facility]  Progress Note  Name: Malika Thornton  MRN: [id number]  Unit/Bed#: 2 [address] Date of Admission: 8/30/2023   Date of Service: 8/31/2023 I Hospital Day: 1    Assessment/Plan   * Sacro-iliac pain  Assessment & Plan  History of hypertension and breast cancer status post radical mastectomy and chemoradiation who presents with sacroiliac pain found to have left sacral pathologic fracture  · Seen by orthopedics and oncology  · Follow-up on bone scan  · Orthopedics ordered IR biopsy and MRI  · Pain better controlled with oxycodone. Obesity (BMI 30-39. 9)  Assessment & Plan  · Body mass index is 38.92 kg/m². Malignant neoplasm of upper-outer quadrant of left breast in female, estrogen receptor positive ([address])  Assessment & Plan  · History of breast cancer status post chemoradiation follows with Dr. Balbina Glasgow  · Continue anastrozole  · Further work-up of bony abnormality as outlined above    Benign essential hypertension  Assessment & Plan  · Blood pressure was elevated secondary to pain and acute illness  · Continue metoprolol  · Losartan/HCT added today    VTE Pharmacologic Prophylaxis:   Moderate Risk (Score 3-4) - Pharmacological DVT Prophylaxis Ordered: enoxaparin (Lovenox). Patient Centered Rounds: I have performed bedside rounds with nursing staff today. Discussions with Specialists or Other Care Team Provider: Case management    Education and Discussions with Family / Patient: Updated  () via phone. Time Spent for Care: This time was spent on one or more of the following: performing physical exam; counseling and coordination of care; obtaining or reviewing history; documenting in the medical record; reviewing/ordering tests, medications or procedures; communicating with other healthcare professionals and discussing with patient's family/caregivers.     Current Length of Stay: 1 day(s)  Current Patient Status: Inpatient   Certification Statement: The patient will continue to require additional inpatient hospital stay due to MRI and IR procedure  Discharge Plan: Anticipate discharge in 24-48 hrs to home. Code Status: Level 1 - Full Code      Subjective:   Patient seen and examined. Pain better controlled with oxycodone. Denies any constipation. Objective:   Vitals: Blood pressure (!) 180/84, pulse 68, temperature 97.7 °F (36.5 °C), resp. rate 16, height 5' 1" (1.549 m), weight 93.4 kg (206 lb), SpO2 98 %. Intake/Output Summary (Last 24 hours) at 8/31/2023 0912  Last data filed at 8/31/2023 0651  Gross per 24 hour   Intake --   Output 100 ml   Net -100 ml       Physical Exam  Vitals reviewed. Constitutional:       General: She is not in acute distress. Appearance: Normal appearance. HENT:      Head: Atraumatic. Cardiovascular:      Rate and Rhythm: Regular rhythm. Heart sounds: Normal heart sounds. Pulmonary:      Breath sounds: No wheezing. Abdominal:      General: Bowel sounds are normal.      Palpations: Abdomen is soft. Tenderness: There is no guarding or rebound. Musculoskeletal:         General: No swelling. Skin:     General: Skin is warm. Neurological:      General: No focal deficit present. Mental Status: She is alert.    Psychiatric:         Mood and Affect: Mood normal.       Additional Data:   Labs:  Results from last 7 days   Lab Units 08/31/23  0643 08/30/23  0047   WBC Thousand/uL 7.31 8.38   HEMOGLOBIN g/dL 15.9* 15.0   PLATELETS Thousands/uL 219 227   MCV fL 89 86     Results from last 7 days   Lab Units 08/31/23  0643 08/30/23  0047   SODIUM mmol/L 137 136   POTASSIUM mmol/L 3.6 3.6   CHLORIDE mmol/L 104 102   CO2 mmol/L 23 24   ANION GAP mmol/L 10 10   BUN mg/dL 21 20   CREATININE mg/dL 0.62 0.58*   CALCIUM mg/dL 9.3 9.6   ALBUMIN g/dL 3.9 4.2   TOTAL BILIRUBIN mg/dL 0.64 0.68   ALK PHOS U/L 109* 97   ALT U/L 26 20   AST U/L 22 17   EGFR ml/min/1.73sq m 91 93   GLUCOSE RANDOM mg/dL 124 134 * I Have Reviewed All Lab Data Listed Above. Cultures:         Results from last 7 days   Lab Units 08/30/23  0410   SARS-COV-2  Negative           Lines/Drains:  Invasive Devices     Peripheral Intravenous Line  Duration           Peripheral IV 08/30/23 Right Antecubital 1 day              Telemetry:      Imaging:  Imaging Reports Reviewed Today Include:   CT pelvis wo contrast    Result Date: 8/30/2023  Impression: There is abnormal lytic destruction of the left sacrum extending across the left sacroiliac joint and involving the left medial iliac bone. Associated pathological fracture of the left sacrum and mild soft tissue density mass in the left presacral region. This could be secondary to focal osseous metastatic disease/myeloma. Severe osteomyelitis with associated bony destruction is also possible but felt to be less likely. Recommend clinical correlation. Soft tissue density extending into the left sacral neural foramina (S1-S3) with involvement of the respective left sacral nerve roots noted. No intrapelvic visceral mass/fluid collection or lymphadenopathy by size criteria noted. Workstation performed: [id number]     CT spine lumbar without contrast    Result Date: 8/30/2023  Impression: There is abnormal lytic destruction of the left sacrum extending across the left sacroiliac joint and involving the left medial iliac bone. Associated pathological fracture of the left sacrum and mild soft tissue density mass in the left presacral region. This could be secondary to focal osseous metastatic disease/myeloma. Severe osteomyelitis with associated bony destruction is also possible but felt to be less likely. Recommend clinical correlation.  Workstation performed: [id number]       Scheduled Meds:  Current Facility-Administered Medications   Medication Dose Route Frequency Provider Last Rate   • acetaminophen  975 mg Oral Q8H [address] DANILO Blue     • anastrozole  1 mg Oral Daily DANILO Blue     • ascorbic acid  500 mg Oral Daily DANILO Blue     • cholecalciferol  1,000 Units Oral Daily DANILO Blue     • docusate sodium  100 mg Oral BID DANILO Blue     • enoxaparin  40 mg Subcutaneous Daily DANILO Blue     • hydrALAZINE  10 mg Oral Q6H PRN DANILO Blue     • HYDROmorphone  0.5 mg Intravenous Q4H PRN Lyndsay Adams DO     • lidocaine  2 patch Topical Daily DANILO Blue     • LORazepam  1 mg Intravenous Once PRN Lyndsay Adams DO     • metoprolol tartrate  50 mg Oral Daily DANILO Blue     • multivitamin-minerals  1 tablet Oral Daily DANILO Blue     • ondansetron  4 mg Intravenous Q6H PRN DANILO Blue     • oxyCODONE  10 mg Oral Q4H PRN Lyndsay Adams DO     • oxyCODONE  5 mg Oral Q4H PRN Lyndsay Adams DO         Today, Patient Was Seen By: Lyndsay Adams DO    ** Please Note: Dictation voice to text software may have been used in the creation of this document.  **

## 2023-08-31 NOTE — OCCUPATIONAL THERAPY NOTE
Occupational Therapy Evaluation/Treatment       08/31/23 1010   OT Last Visit   OT Visit Date 08/31/23   Note Type   Note type Evaluation   Pain Assessment   Pain Assessment Tool 0-10   Pain Score 6   Pain Location/Orientation Orientation: Bilateral;Location: Leg;Location: Buttocks   Restrictions/Precautions   RLE Weight Bearing Per Order WBAT   LLE Weight Bearing Per Order WBAT   Other Precautions Fall Risk;Pain   Home Living   Type of [facility]  One level  (1+1 FLAKITA, 2 steps into bedroom)   Bathroom Shower/Tub Tub/shower unit   Bathroom Toilet Raised   Home Equipment Cane;Walker   Prior Function   Level of [place] Independent with ADLs; Independent with functional mobility; Independent with IADLS   Lives With Spouse   Receives Help From Family   IADLs Independent with driving; Independent with meal prep; Independent with medication management   Falls in the last 6 months 0   Comments Patient with pathologic fracture of sacrum   Subjective   Subjective "I'll try"   ADL   Eating Assistance 7  Independent   Grooming Assistance 7  Independent   UB Bathing Assistance 5  Supervision/Setup   LB Bathing Assistance 4  Minimal Assistance   UB Dressing Assistance 5  Supervision/Setup   LB Dressing Assistance 4  Minimal [address]  5  Supervision/Setup   Bed Mobility   Supine to Sit 5  Supervision   Transfers   Sit to Stand 5  Supervision   Stand to Sit 5  Supervision   Functional Mobility   Functional Mobility 5  Supervision   Additional Comments to bathroom with standard walker   Balance   Static Sitting Good   Dynamic Sitting Fair +   Static Standing Fair   Dynamic Standing Fair   Activity Tolerance   Activity Tolerance Patient limited by fatigue;Patient limited by pain   RUE Assessment   RUE Assessment WFL   LUE Assessment   LUE Assessment WFL   Cognition   Overall Cognitive Status WFL   Arousal/Participation Cooperative   Attention Within functional limits   Orientation Level Oriented X4   Following Commands Follows all commands and directions without difficulty   Assessment   Limitation Decreased ADL status; Decreased Safe judgement during ADL;Decreased endurance;Decreased self-care trans;Decreased high-level ADLs  (decreased balance and mobility)   Prognosis Good   Assessment Patient evaluated by Occupational Therapy. Patient admitted with Sacro-iliac pain. The patients occupational profile, medical and therapy history includes a extensive additional review of physical, cognitive, or psychosocial history related to current functional performance. Comorbidities affecting functional mobility and ADLS include: cancer and hypertension and lymphedema. Prior to admission, patient was independent with functional mobility without assistive device, independent with ADLS and independent with IADLS. The evaluation identifies the following performance deficits: weakness, impaired balance, decreased endurance, increased fall risk, new onset of impairment of functional mobility, decreased ADLS, decreased IADLS, pain, decreased activity tolerance, decreased safety awareness and decreased strength, that result in activity limitations and/or participation restrictions. This evaluation requires clinical decision making of high complexity, because the patient presents with comorbidites that affect occupational performance and required significant modification of tasks or assistance with consideration of multiple treatment options. The Barthel Index was used as a functional outcome tool presenting with a score of Barthel Index Score: 50, indicating marked limitations of functional mobility and ADLS. The patient's raw score on the -PAC Daily Activity Inpatient Short Form is 21. A raw score of greater than or equal to 19 suggests the patient may benefit from discharge to home. Please refer to the recommendation of the Occupational Therapist for safe discharge planning.   Patient will benefit from skilled Occupational Therapy services to address above deficits and facilitate a safe return to prior level of function. Goals   Patient Goals less pain   STG Time Frame   (1-7 days)   Short Term Goal  Goals established to promote Patient Goals: less pain:   Grooming: supervision standing at sink; Bathing: supervision; Lower Body Dressing: supervision; Toileting: independent; Patient will increase ambulatory standard toilet transfer to independent with standard walker to increase performance and safety with ADLS and functional mobility; Patient will increase standing tolerance to 5 minutes during ADL task to decrease assistance level and decrease fall risk; Patient will increase bed mobility to independent in preparation for ADLS and transfers; Patient will increase functional mobility to and from bathroom with standard walker independently to increase performance with ADLS and to use a toilet; Patient will tolerate 5 minutes of UE ROM/strengthening to increase general activity tolerance and performance in ADLS/IADLS; Patient will improve functional activity tolerance to 10 minutes of sustained functional tasks to increase participation in basic self-care and decrease assistance level;   Patient will increase dynamic standing balance to fair+ to improve postural stability and decrease fall risk during standing ADLS and transfers. LTG Time Frame   (8-14 days)   Long Term Goal Grooming: independent standing at sink; Bathing: independent;  Lower Body Dressing: independent;  Patient will increase standing tolerance to 10 minutes during ADL task to decrease assistance level and decrease fall risk;  Patient will tolerate 10 minutes of UE ROM/strengthening to increase general activity tolerance and performance in ADLS/IADLS; Patient will improve functional activity tolerance to 20 minutes of sustained functional tasks to increase participation in basic self-care and decrease assistance level;   Patient will increase dynamic standing balance to good to improve postural stability and decrease fall risk during standing ADLS and transfers. Pt will score >/= 24/24 on AM-PAC Daily Activity Inpatient scale to promote safe independence with ADLs and functional mobility; Pt will score >/= 80/100 on Barthel Index in order to decrease caregiver assistance needed and increase ability to perform ADLs and functional mobility. Plan   Treatment Interventions ADL retraining;Functional transfer training; Endurance training;Patient/family training;Equipment evaluation/education; Activityengagement; Compensatory technique education   Goal Expiration Date 09/14/23   OT Frequency 3-5x/wk   Recommendation   OT Discharge Recommendation Home with home health rehabilitation   Equipment Recommended Bedside commode   Commode Type Standard   Additional Comments  pt reports she is going to purchase a reacher   [facility]   Lower Body Dressing 3   Bathing 3   Toileting 3   Upper Body Dressing 4   Grooming 4   Eating 4   Daily Activity Raw Score 21   Daily Activity Standardized Score (Calc for Raw Score >=11) 44.27   AM-PAC Applied Cognition Inpatient   Following a Speech/Presentation 4   Understanding Ordinary Conversation 4   Taking Medications 4   Remembering Where Things Are Placed or Put Away 4   Remembering List of 4-5 Errands 4   Taking Care of Complicated Tasks 4   Applied Cognition Raw Score 24   Applied Cognition Standardized Score 62.21   Barthel Index   Feeding 10   Bathing 0   Grooming Score 0   Dressing Score 5   Bladder Score 10   Bowels Score 10   Toilet Use Score 5   Transfers (Bed/Chair) Score 10   Mobility (Level Surface) Score 0   Stairs Score 0   Barthel Index Score 50   Additional Treatment Session   Start Time 1000   End Time 1010   Treatment Assessment S: 6/10 B legs and buttock pain O: Completed standard toilet transfer with supervision. Unsuccessful on toilet. Underwear management supervision.   Functional mobility from bathroom with standard walker. Stand to sit supervision. Sit to supine supervision. A: Patient is cooperative and pleasant. Tolerated well. Patient will benefit from a commode and reacher for home, case management was looking into a walker and pt verbalized understanding how to purchase a reacher.   P: home [id number] [facility] Number  [place] [id number] [facility] OTR/L [id number]

## 2023-08-31 NOTE — ASSESSMENT & PLAN NOTE
History of hypertension and breast cancer status post radical mastectomy and chemoradiation who presents with sacroiliac pain found to have left sacral pathologic fracture  · Seen by orthopedics and oncology  · Follow-up on bone scan  · Orthopedics ordered IR biopsy and MRI  · Pain better controlled with oxycodone.

## 2023-08-31 NOTE — CASE MANAGEMENT
Case Management Assessment & Discharge Planning Note    Patient name Alexa Damon  Location [address] [address] MRN [id number]  : 1950 Date 2023       Current Admission Date: 2023  Current Admission Diagnosis:Sacro-iliac pain   Patient Active Problem List    Diagnosis Date Noted   • Obesity (BMI 30-39.9) 2023   • Sacro-iliac pain 2023   • S/P mastectomy, left 2023   • Use of anastrozole 2022   • Malignant neoplasm of upper-outer quadrant of left breast in female, estrogen receptor positive ([address]) 2021   • Hypothyroidism 2017   • Chronic rhinitis 01/10/2013   • Benign essential hypertension 2012   • Hidradenitis suppurativa 2012   • Impaired fasting glucose 2012   • Morbid obesity ([address]) 2012   • Venous insufficiency (chronic) (peripheral) 2012      LOS (days): 1  Geometric Mean LOS (GMLOS) (days): 2.60  Days to GMLOS:1.2     OBJECTIVE:    Risk of Unplanned Readmission Score: 6.66         Current admission status: Inpatient     Preferred Pharmacy:   [id number], [address] [address]  Phone: [phone] Fax: [phone]    Primary Care Provider: Margarie Hammans, MD    Primary Insurance: MEDICARE  Secondary Insurance: COMMERCIAL MISCELLANEOUS    ASSESSMENT:  [address], [address] Floors Representative - Spouse   Primary Phone: [phone] (Mobile)  Home Phone: [phone]               Readmission Root Cause  30 Day Readmission: No    Patient Information  Admitted from[de-identified] Home  Mental Status: Alert  During Assessment patient was accompanied by: Not accompanied during assessment  Assessment information provided by[de-identified] Patient  Primary Caregiver: Family  Caregiver's Name[de-identified] Chidi Serna (spouse)  Caregiver's Relationship to Patient[de-identified] Family Member  Caregiver's Telephone Number[de-identified] [phone]  Support Systems: Spouse/significant other  [place] of Residence: [address] [place] [address] do you live in?: [place]  Type of Current Residence: [facility]  In the last 12 months, was there a time when you were not able to pay the mortgage or rent on time?: No  In the last 12 months, how many places have you lived?: 1  In the last 12 months, was there a time when you did not have a steady place to sleep or slept in a shelter (including now)?: No  Homeless/housing insecurity resource given?: N/A  Living Arrangements: Lives w/ Spouse/significant other    Activities of Daily Living Prior to Admission  Functional Status: Independent  Completes ADLs independently?: Yes  Ambulates independently?: Yes  Does patient use assisted devices?: Yes  Assisted Devices (DME) used: Straight Cane  Does patient currently own DME?: Yes  What DME does the patient currently own?: [employer]  Does patient have a history of Outpatient Therapy (PT/OT)?: Yes ([facility]  Does the patient have a history of Short-Term Rehab?: No  Does patient have a history of HHC?: Yes ([place])  Does patient currently have [facility]?: No     Patient Information Continued  Income Source: Pension/nursing home  Does patient have prescription coverage?: Yes  Within the past 12 months, you worried that your food would run out before you got the money to buy more.: Never true  Within the past 12 months, the food you bought just didn't last and you didn't have money to get more.: Never true  Food insecurity resource given?: N/A  Does patient receive dialysis treatments?: No     Means of Transportation  Means of Transport to Appts[de-identified] Drives Self  In the past 12 months, has lack of transportation kept you from medical appointments or from getting medications?: No  In the past 12 months, has lack of transportation kept you from meetings, work, or from getting things needed for daily living?: No  Was application for public transport provided?: N/A    DISCHARGE DETAILS:    Discharge planning discussed with[de-identified] Patient  Freedom of Choice: Yes  Comments - Freedom of Choice: HHC preference - none noted     Requested [address]         Is the patient interested in [address] [place] [address] [place] at discharge?: Yes  [facility] requested[de-identified] Occupational Therapy, Physical Therapy, Nursing  HHA External Referral Reason (only applicable if external HHA name selected): Services not provided in network or near patient location  [facility] Provider[de-identified] PCP  Home Health Services Needed[de-identified] Evaluate Functional Status and Safety, Gait/ADL Training, Strengthening/Theraputic Exercises to Improve Function  Homebound Criteria Met[de-identified] Uses an Assist Device (i.e. cane, walker, etc), Requires the Assistance of Another Person for Safe Ambulation or to Leave the Home  Supporting Clincal Findings[de-identified] Limited Endurance, Fatigues Easliy in [employer]    DME Referral Provided  Referral made for DME?: Yes  DME referral completed for the following items[de-identified] Bedside Commode  DME Supplier Name[de-identified] [employer]    Other Referral/Resources/Interventions Provided:  Interventions: DME, HHC  Referral Comments: CM made aware that patient will benefit from using a bedside commode at home. CM placed order for bedside commode and was informed patient will be responsible for copay of $ 11.24. CM met with patient at bedside, introduced self and role, complete assessment and discharge planning. Discussed therapy recs for home rehab. Patient agreeable for CM to place referrala for hhc. CM informed patient of $11.24 copay for bedside commode. Patient agreeable. Patient aware CM will reach out to patient with available hhc preferences and deliver bedside commode to room. CM sent referral for hhc to [facility], [address] [place] [address] & [facility] of [facility], awaiting response.      Treatment Team Recommendation: Home with [address]  Discharge Destination Plan[de-identified] Home with [facility] at Discharge : Family IMM Given (Date):: 08/30/23 (Initial)

## 2023-09-01 ENCOUNTER — APPOINTMENT (INPATIENT)
Dept: RADIOLOGY | Facility: HOSPITAL | Age: 73
DRG: 478 | End: 2023-09-01
Attending: STUDENT IN AN ORGANIZED HEALTH CARE EDUCATION/TRAINING PROGRAM
Payer: MEDICARE

## 2023-09-01 ENCOUNTER — APPOINTMENT (INPATIENT)
Dept: RADIOLOGY | Facility: HOSPITAL | Age: 73
DRG: 478 | End: 2023-09-01
Payer: MEDICARE

## 2023-09-01 LAB
ANION GAP SERPL CALCULATED.3IONS-SCNC: 9 MMOL/L
BUN SERPL-MCNC: 14 MG/DL (ref 5–25)
CALCIUM SERPL-MCNC: 9.9 MG/DL (ref 8.4–10.2)
CANCER AG15-3 SERPL-ACNC: 58 U/ML (ref 0–25)
CANCER AG27-29 SERPL-ACNC: 75.3 U/ML (ref 0–38.6)
CHLORIDE SERPL-SCNC: 102 MMOL/L (ref 96–108)
CO2 SERPL-SCNC: 28 MMOL/L (ref 21–32)
CREAT SERPL-MCNC: 0.56 MG/DL (ref 0.6–1.3)
DME PARACHUTE DELIVERY DATE ACTUAL: NORMAL
DME PARACHUTE DELIVERY DATE REQUESTED: NORMAL
DME PARACHUTE ITEM DESCRIPTION: NORMAL
DME PARACHUTE ORDER STATUS: NORMAL
DME PARACHUTE SUPPLIER NAME: NORMAL
DME PARACHUTE SUPPLIER PHONE: NORMAL
ERYTHROCYTE [DISTWIDTH] IN BLOOD BY AUTOMATED COUNT: 14.7 % (ref 11.6–15.1)
GFR SERPL CREATININE-BSD FRML MDRD: 94 ML/MIN/1.73SQ M
GLUCOSE SERPL-MCNC: 136 MG/DL (ref 65–140)
HCT VFR BLD AUTO: 45.5 % (ref 34.8–46.1)
HGB BLD-MCNC: 16.1 G/DL (ref 11.5–15.4)
INR PPP: 1.07 (ref 0.84–1.19)
MCH RBC QN AUTO: 30 PG (ref 26.8–34.3)
MCHC RBC AUTO-ENTMCNC: 35.4 G/DL (ref 31.4–37.4)
MCV RBC AUTO: 85 FL (ref 82–98)
PLATELET # BLD AUTO: 260 THOUSANDS/UL (ref 149–390)
PMV BLD AUTO: 13.1 FL (ref 8.9–12.7)
POTASSIUM SERPL-SCNC: 3.5 MMOL/L (ref 3.5–5.3)
PROTHROMBIN TIME: 14 SECONDS (ref 11.6–14.5)
RBC # BLD AUTO: 5.37 MILLION/UL (ref 3.81–5.12)
SODIUM SERPL-SCNC: 139 MMOL/L (ref 135–147)
WBC # BLD AUTO: 7.69 THOUSAND/UL (ref 4.31–10.16)

## 2023-09-01 PROCEDURE — 0QB33ZX EXCISION OF LEFT PELVIC BONE, PERCUTANEOUS APPROACH, DIAGNOSTIC: ICD-10-PCS | Performed by: INTERNAL MEDICINE

## 2023-09-01 PROCEDURE — 99153 MOD SED SAME PHYS/QHP EA: CPT

## 2023-09-01 PROCEDURE — 77012 CT SCAN FOR NEEDLE BIOPSY: CPT

## 2023-09-01 PROCEDURE — 97530 THERAPEUTIC ACTIVITIES: CPT

## 2023-09-01 PROCEDURE — 99232 SBSQ HOSP IP/OBS MODERATE 35: CPT | Performed by: INTERNAL MEDICINE

## 2023-09-01 PROCEDURE — 85610 PROTHROMBIN TIME: CPT | Performed by: INTERNAL MEDICINE

## 2023-09-01 PROCEDURE — A9585 GADOBUTROL INJECTION: HCPCS | Performed by: PHYSICIAN ASSISTANT

## 2023-09-01 PROCEDURE — 85027 COMPLETE CBC AUTOMATED: CPT | Performed by: INTERNAL MEDICINE

## 2023-09-01 PROCEDURE — 88341 IMHCHEM/IMCYTCHM EA ADD ANTB: CPT | Performed by: PATHOLOGY

## 2023-09-01 PROCEDURE — 88333 PATH CONSLTJ SURG CYTO XM 1: CPT | Performed by: PATHOLOGY

## 2023-09-01 PROCEDURE — 88305 TISSUE EXAM BY PATHOLOGIST: CPT | Performed by: PATHOLOGY

## 2023-09-01 PROCEDURE — 88342 IMHCHEM/IMCYTCHM 1ST ANTB: CPT | Performed by: PATHOLOGY

## 2023-09-01 PROCEDURE — 80048 BASIC METABOLIC PNL TOTAL CA: CPT | Performed by: INTERNAL MEDICINE

## 2023-09-01 PROCEDURE — 72197 MRI PELVIS W/O & W/DYE: CPT

## 2023-09-01 PROCEDURE — 88360 TUMOR IMMUNOHISTOCHEM/MANUAL: CPT | Performed by: PATHOLOGY

## 2023-09-01 PROCEDURE — 88374 M/PHMTRC ALYS ISHQUANT/SEMIQ: CPT | Performed by: PATHOLOGY

## 2023-09-01 PROCEDURE — 99152 MOD SED SAME PHYS/QHP 5/>YRS: CPT

## 2023-09-01 PROCEDURE — 20225 BONE BIOPSY TROCAR/NDL DEEP: CPT

## 2023-09-01 RX ORDER — MIDAZOLAM HYDROCHLORIDE 2 MG/2ML
INJECTION, SOLUTION INTRAMUSCULAR; INTRAVENOUS AS NEEDED
Status: COMPLETED | OUTPATIENT
Start: 2023-09-01 | End: 2023-09-01

## 2023-09-01 RX ORDER — LIDOCAINE WITH 8.4% SOD BICARB 0.9%(10ML)
SYRINGE (ML) INJECTION AS NEEDED
Status: COMPLETED | OUTPATIENT
Start: 2023-09-01 | End: 2023-09-01

## 2023-09-01 RX ORDER — GADOBUTROL 604.72 MG/ML
9 INJECTION INTRAVENOUS
Status: COMPLETED | OUTPATIENT
Start: 2023-09-01 | End: 2023-09-01

## 2023-09-01 RX ORDER — FENTANYL CITRATE 50 UG/ML
INJECTION, SOLUTION INTRAMUSCULAR; INTRAVENOUS AS NEEDED
Status: COMPLETED | OUTPATIENT
Start: 2023-09-01 | End: 2023-09-01

## 2023-09-01 RX ADMIN — LIDOCAINE 1 PATCH: 50 PATCH CUTANEOUS at 08:32

## 2023-09-01 RX ADMIN — Medication 10 ML: at 13:58

## 2023-09-01 RX ADMIN — HYDROCHLOROTHIAZIDE 12.5 MG: 12.5 TABLET ORAL at 08:31

## 2023-09-01 RX ADMIN — MIDAZOLAM 1 MG: 1 INJECTION INTRAMUSCULAR; INTRAVENOUS at 13:54

## 2023-09-01 RX ADMIN — MIDAZOLAM 1 MG: 1 INJECTION INTRAMUSCULAR; INTRAVENOUS at 14:14

## 2023-09-01 RX ADMIN — GADOBUTROL 9 ML: 604.72 INJECTION INTRAVENOUS at 10:12

## 2023-09-01 RX ADMIN — LORAZEPAM 1 MG: 2 INJECTION INTRAMUSCULAR; INTRAVENOUS at 08:59

## 2023-09-01 RX ADMIN — ACETAMINOPHEN 975 MG: 325 TABLET ORAL at 05:08

## 2023-09-01 RX ADMIN — OXYCODONE HYDROCHLORIDE 5 MG: 5 TABLET ORAL at 21:05

## 2023-09-01 RX ADMIN — OXYCODONE HYDROCHLORIDE AND ACETAMINOPHEN 500 MG: 500 TABLET ORAL at 08:31

## 2023-09-01 RX ADMIN — LOSARTAN POTASSIUM 50 MG: 50 TABLET, FILM COATED ORAL at 08:31

## 2023-09-01 RX ADMIN — FENTANYL CITRATE 50 MCG: 50 INJECTION, SOLUTION INTRAMUSCULAR; INTRAVENOUS at 14:14

## 2023-09-01 RX ADMIN — OXYCODONE HYDROCHLORIDE 10 MG: 10 TABLET ORAL at 08:31

## 2023-09-01 RX ADMIN — ACETAMINOPHEN 975 MG: 325 TABLET ORAL at 13:02

## 2023-09-01 RX ADMIN — FENTANYL CITRATE 50 MCG: 50 INJECTION, SOLUTION INTRAMUSCULAR; INTRAVENOUS at 13:54

## 2023-09-01 RX ADMIN — HYDROMORPHONE HYDROCHLORIDE 0.5 MG: 1 INJECTION, SOLUTION INTRAMUSCULAR; INTRAVENOUS; SUBCUTANEOUS at 10:58

## 2023-09-01 RX ADMIN — LIDOCAINE 5% 2 PATCH: 700 PATCH TOPICAL at 08:33

## 2023-09-01 RX ADMIN — ANASTROZOLE 1 MG: 1 TABLET, COATED ORAL at 08:32

## 2023-09-01 RX ADMIN — ACETAMINOPHEN 975 MG: 325 TABLET ORAL at 21:04

## 2023-09-01 RX ADMIN — Medication 1000 UNITS: at 08:31

## 2023-09-01 RX ADMIN — Medication 1 TABLET: at 08:31

## 2023-09-01 RX ADMIN — DOCUSATE SODIUM 100 MG: 100 CAPSULE, LIQUID FILLED ORAL at 08:31

## 2023-09-01 RX ADMIN — METOPROLOL TARTRATE 50 MG: 50 TABLET, FILM COATED ORAL at 08:31

## 2023-09-01 NOTE — ASSESSMENT & PLAN NOTE
History of hypertension and breast cancer status post radical mastectomy and chemoradiation who presents with sacroiliac pain found to have left sacral pathologic fracture  · Seen by orthopedics and oncology  · Bone scan with known pathologic sacral fracture without additional osseous metastasis. · Orthopedics ordered IR biopsy and MRI. Awaiting interpretation of MRI. Biopsy done today. · Pain better controlled with oxycodone.

## 2023-09-01 NOTE — PLAN OF CARE
Problem: PAIN - ADULT  Goal: Verbalizes/displays adequate comfort level or baseline comfort level  Description: Interventions:  - Encourage patient to monitor pain and request assistance  - Assess pain using appropriate pain scale  - Administer analgesics based on type and severity of pain and evaluate response  - Implement non-pharmacological measures as appropriate and evaluate response  - Consider cultural and social influences on pain and pain management  - Notify physician/advanced practitioner if interventions unsuccessful or patient reports new pain  Outcome: Progressing     Problem: INFECTION - ADULT  Goal: Absence or prevention of progression during hospitalization  Description: INTERVENTIONS:  - Assess and monitor for signs and symptoms of infection  - Monitor lab/diagnostic results  - Monitor all insertion sites, i.e. indwelling lines, tubes, and drains  - Monitor endotracheal if appropriate and nasal secretions for changes in amount and color  - [place] appropriate cooling/warming therapies per order  - Administer medications as ordered  - Instruct and encourage patient and family to use good hand hygiene technique  - Identify and instruct in appropriate isolation precautions for identified infection/condition  Outcome: Progressing  Goal: Absence of fever/infection during neutropenic period  Description: INTERVENTIONS:  - Monitor WBC    Outcome: Progressing

## 2023-09-01 NOTE — PROGRESS NOTES
[address] [facility]  Progress Note  Name: Alexandro Mcgraw  MRN: [id number]  Unit/Bed#: 2 [address] Date of Admission: 8/30/2023   Date of Service: 9/1/2023 I Hospital Day: 2    Assessment/Plan   * Sacro-iliac pain  Assessment & Plan  History of hypertension and breast cancer status post radical mastectomy and chemoradiation who presents with sacroiliac pain found to have left sacral pathologic fracture  · Seen by orthopedics and oncology  · Bone scan with known pathologic sacral fracture without additional osseous metastasis. · Orthopedics ordered IR biopsy and MRI. Awaiting interpretation of MRI. Biopsy done today. · Pain better controlled with oxycodone. Obesity (BMI 30-39. 9)  Assessment & Plan  · Body mass index is 38.92 kg/m². Malignant neoplasm of upper-outer quadrant of left breast in female, estrogen receptor positive ([address])  Assessment & Plan  · History of breast cancer status post chemoradiation follows with Dr. Lex Zamora  · Continue anastrozole  · Further work-up of bony abnormality as outlined above    Benign essential hypertension  Assessment & Plan  · Blood pressure was elevated secondary to pain and acute illness  · Continue metoprolol  · Losartan/HCT added with improvement in control    VTE Pharmacologic Prophylaxis:   Moderate Risk (Score 3-4) - Pharmacological DVT Prophylaxis Contraindicated. Sequential Compression Devices Ordered. Holding post biopsy    Patient Centered Rounds: I have performed bedside rounds with nursing staff today. Discussions with Specialists or Other Care Team Provider: IR Case management    Education and Discussions with Family / Patient: Updated  (daughter) at bedside. Time Spent for Care:    This time was spent on one or more of the following: performing physical exam; counseling and coordination of care; obtaining or reviewing history; documenting in the medical record; reviewing/ordering tests, medications or procedures; communicating with other healthcare professionals and discussing with patient's family/caregivers. Current Length of Stay: 2 day(s)  Current Patient Status: Inpatient   Certification Statement: The patient will continue to require additional inpatient hospital stay due to Pain control and awaiting MRI interpretation  Discharge Plan: Anticipate discharge later today or tomorrow to home. Code Status: Level 1 - Full Code      Subjective:   Patient seen and examined. Had increased pain last night but tolerating with 10 mg oxycodone    Objective:   Vitals: Blood pressure 139/70, pulse 67, temperature 97.7 °F (36.5 °C), temperature source Oral, resp. rate 18, height 5' 1" (1.549 m), weight 93.4 kg (206 lb), SpO2 93 %. Intake/Output Summary (Last 24 hours) at 9/1/2023 1533  Last data filed at 9/1/2023 0800  Gross per 24 hour   Intake 0 ml   Output 600 ml   Net -600 ml       Physical Exam  Vitals reviewed. Constitutional:       General: She is not in acute distress. Appearance: Normal appearance. Eyes:      General: No scleral icterus. Extraocular Movements: Extraocular movements intact. Pulmonary:      Effort: Pulmonary effort is normal.   Abdominal:      General: There is no distension. Musculoskeletal:         General: No swelling. Cervical back: Normal range of motion. Skin:     Coloration: Skin is not jaundiced. Neurological:      Mental Status: She is alert. Mental status is at baseline.    Psychiatric:         Mood and Affect: Mood normal.       Additional Data:   Labs:  Results from last 7 days   Lab Units 09/01/23  0509 08/31/23  0643 08/30/23  0047   WBC Thousand/uL 7.69 7.31 8.38   HEMOGLOBIN g/dL 16.1* 15.9* 15.0   PLATELETS Thousands/uL 260 219 227   MCV fL 85 89 86   INR  1.07  --   --      Results from last 7 days   Lab Units 09/01/23  0634 08/31/23  0643 08/30/23  0047   SODIUM mmol/L 139 137 136   POTASSIUM mmol/L 3.5 3.6 3.6   CHLORIDE mmol/L 102 104 102   CO2 mmol/L 28 23 24   ANION GAP mmol/L 9 10 10   BUN mg/dL 14 21 20   CREATININE mg/dL 0.56* 0.62 0.58*   CALCIUM mg/dL 9.9 9.3 9.6   ALBUMIN g/dL  --  3.9 4.2   TOTAL BILIRUBIN mg/dL  --  0.64 0.68   ALK PHOS U/L  --  109* 97   ALT U/L  --  26 20   AST U/L  --  22 17   EGFR ml/min/1.73sq m 94 91 93   GLUCOSE RANDOM mg/dL 136 124 134                                      * I Have Reviewed All Lab Data Listed Above. Cultures:         Results from last 7 days   Lab Units 08/30/23  0410   SARS-COV-2  Negative           Lines/Drains:  Invasive Devices     Peripheral Intravenous Line  Duration           Peripheral IV 08/30/23 Right Antecubital 2 days              Telemetry:      Imaging:  Imaging Reports Reviewed Today Include:   NM bone scan whole body    Result Date: 9/1/2023  Impression: 1. Known pathologic sacral fracture. Otherwise no scintigraphic evidence of additional osseous metastasis. Workstation performed: [id number]     CT pelvis wo contrast    Result Date: 8/30/2023  Impression: There is abnormal lytic destruction of the left sacrum extending across the left sacroiliac joint and involving the left medial iliac bone. Associated pathological fracture of the left sacrum and mild soft tissue density mass in the left presacral region. This could be secondary to focal osseous metastatic disease/myeloma. Severe osteomyelitis with associated bony destruction is also possible but felt to be less likely. Recommend clinical correlation. Soft tissue density extending into the left sacral neural foramina (S1-S3) with involvement of the respective left sacral nerve roots noted. No intrapelvic visceral mass/fluid collection or lymphadenopathy by size criteria noted. Workstation performed: [id number]     CT spine lumbar without contrast    Result Date: 8/30/2023  Impression: There is abnormal lytic destruction of the left sacrum extending across the left sacroiliac joint and involving the left medial iliac bone.  Associated pathological fracture of the left sacrum and mild soft tissue density mass in the left presacral region. This could be secondary to focal osseous metastatic disease/myeloma. Severe osteomyelitis with associated bony destruction is also possible but felt to be less likely. Recommend clinical correlation. Workstation performed: [id number]       Scheduled Meds:  Current Facility-Administered Medications   Medication Dose Route Frequency Provider Last Rate   • acetaminophen  975 mg Oral Q8H [address] DANILO Blue     • anastrozole  1 mg Oral Daily DANILO Blue     • ascorbic acid  500 mg Oral Daily RASHEL BlueNP     • cholecalciferol  1,000 Units Oral Daily RASHEL BlueNP     • docusate sodium  100 mg Oral BID DANILO Blue     • hydrALAZINE  10 mg Oral Q6H PRN DANILO Blue     • losartan  50 mg Oral Daily Manpreet Hernandez DO      And   • hydrochlorothiazide  12.5 mg Oral Daily Manpreet Hernandez DO     • HYDROmorphone  0.5 mg Intravenous Q4H PRN Karla Mauro DO     • lidocaine  1 patch Topical Daily Nimcomarkus Lord DO     • lidocaine  2 patch Topical Daily DANILO Blue     • metoprolol tartrate  50 mg Oral Daily DANILO Blue     • multivitamin-minerals  1 tablet Oral Daily DANILO Blue     • ondansetron  4 mg Intravenous Q6H PRN DANILO Blue     • oxyCODONE  10 mg Oral Q4H PRN Karla Mauro DO     • oxyCODONE  5 mg Oral Q4H PRN Karla Mauro DO         Today, Patient Was Seen By: Karla Mauro DO    ** Please Note: Dictation voice to text software may have been used in the creation of this document.  **

## 2023-09-01 NOTE — PLAN OF CARE
Problem: PAIN - ADULT  Goal: Verbalizes/displays adequate comfort level or baseline comfort level  Description: Interventions:  - Encourage patient to monitor pain and request assistance  - Assess pain using appropriate pain scale  - Administer analgesics based on type and severity of pain and evaluate response  - Implement non-pharmacological measures as appropriate and evaluate response  - Consider cultural and social influences on pain and pain management  - Notify physician/advanced practitioner if interventions unsuccessful or patient reports new pain  Outcome: Progressing     Problem: INFECTION - ADULT  Goal: Absence or prevention of progression during hospitalization  Description: INTERVENTIONS:  - Assess and monitor for signs and symptoms of infection  - Monitor lab/diagnostic results  - Monitor all insertion sites, i.e. indwelling lines, tubes, and drains  - Monitor endotracheal if appropriate and nasal secretions for changes in amount and color  - [place] appropriate cooling/warming therapies per order  - Administer medications as ordered  - Instruct and encourage patient and family to use good hand hygiene technique  - Identify and instruct in appropriate isolation precautions for identified infection/condition  Outcome: Progressing  Goal: Absence of fever/infection during neutropenic period  Description: INTERVENTIONS:  - Monitor WBC    Outcome: Progressing     Problem: SAFETY ADULT  Goal: Patient will remain free of falls  Description: INTERVENTIONS:  - Educate patient/family on patient safety including physical limitations  - Instruct patient to call for assistance with activity   - Consult OT/PT to assist with strengthening/mobility   - Keep Call bell within reach  - Keep bed low and locked with side rails adjusted as appropriate  - Keep care items and personal belongings within reach  - Initiate and maintain comfort rounds  - Make Fall Risk Sign visible to staff  - Offer Toileting every 2 Hours, in advance of need  - Initiate/Maintain alarm  - Obtain necessary fall risk management equipment  - Apply yellow socks and bracelet for high fall risk patients  - Consider moving patient to room near nurses station  Outcome: Progressing  Goal: Maintain or return to baseline ADL function  Description: INTERVENTIONS:  -  Assess patient's ability to carry out ADLs; assess patient's baseline for ADL function and identify physical deficits which impact ability to perform ADLs (bathing, care of mouth/teeth, toileting, grooming, dressing, etc.)  - Assess/evaluate cause of self-care deficits   - Assess range of motion  - Assess patient's mobility; develop plan if impaired  - Assess patient's need for assistive devices and provide as appropriate  - Encourage maximum independence but intervene and supervise when necessary  - Involve family in performance of ADLs  - Assess for home care needs following discharge   - Consider OT consult to assist with ADL evaluation and planning for discharge  - Provide patient education as appropriate  Outcome: Progressing  Goal: Maintains/Returns to pre admission functional level  Description: INTERVENTIONS:  - Perform BMAT or MOVE assessment daily.   - Set and communicate daily mobility goal to care team and patient/family/caregiver. - Collaborate with rehabilitation services on mobility goals if consulted  - Perform Range of Motion 3 times a day. - Reposition patient every 2 hours.   - Dangle patient 3 times a day  - Stand patient 3 times a day  - Ambulate patient 3 times a day  - Out of bed to chair 3 times a day   - Out of bed for meals 3 times a day  - Out of bed for toileting  - Record patient progress and toleration of activity level   Outcome: Progressing     Problem: DISCHARGE PLANNING  Goal: Discharge to home or other facility with appropriate resources  Description: INTERVENTIONS:  - Identify barriers to discharge w/patient and caregiver  - Arrange for needed discharge resources and transportation as appropriate  - Identify discharge learning needs (meds, wound care, etc.)  - Arrange for interpretive services to assist at discharge as needed  - Refer to Case Management Department for coordinating discharge planning if the patient needs post-hospital services based on physician/advanced practitioner order or complex needs related to functional status, cognitive ability, or social support system  Outcome: Progressing     Problem: Knowledge Deficit  Goal: Patient/family/caregiver demonstrates understanding of disease process, treatment plan, medications, and discharge instructions  Description: Complete learning assessment and assess knowledge base. Interventions:  - Provide teaching at level of understanding  - Provide teaching via preferred learning methods  Outcome: Progressing     Problem: MOBILITY - ADULT  Goal: Maintain or return to baseline ADL function  Description: INTERVENTIONS:  -  Assess patient's ability to carry out ADLs; assess patient's baseline for ADL function and identify physical deficits which impact ability to perform ADLs (bathing, care of mouth/teeth, toileting, grooming, dressing, etc.)  - Assess/evaluate cause of self-care deficits   - Assess range of motion  - Assess patient's mobility; develop plan if impaired  - Assess patient's need for assistive devices and provide as appropriate  - Encourage maximum independence but intervene and supervise when necessary  - Involve family in performance of ADLs  - Assess for home care needs following discharge   - Consider OT consult to assist with ADL evaluation and planning for discharge  - Provide patient education as appropriate  Outcome: Progressing  Goal: Maintains/Returns to pre admission functional level  Description: INTERVENTIONS:  - Perform BMAT or MOVE assessment daily.   - Set and communicate daily mobility goal to care team and patient/family/caregiver.    - Collaborate with rehabilitation services on mobility goals if consulted  - Perform Range of Motion 3 times a day. - Reposition patient every 2 hours.   - Dangle patient 3 times a day  - Stand patient 3 times a day  - Ambulate patient 3 times a day  - Out of bed to chair 3 times a day   - Out of bed for meals 3 times a day  - Out of bed for toileting  - Record patient progress and toleration of activity level   Outcome: Progressing

## 2023-09-01 NOTE — ASSESSMENT & PLAN NOTE
· History of breast cancer status post chemoradiation follows with Dr. Sharmila Fink  · Continue anastrozole  · Further work-up of bony abnormality as outlined above

## 2023-09-01 NOTE — BRIEF OP NOTE (RAD/CATH)
INTERVENTIONAL RADIOLOGY PROCEDURE NOTE    Date: 9/1/2023    Procedure:   Procedure Summary     Date:  Room / Location:     Anesthesia Start:  Anesthesia Stop:     Procedure:  Diagnosis:     Scheduled Providers:  Responsible Provider:     Anesthesia Type: Not recorded ASA Status: Not recorded          Preoperative diagnosis:   1. Right hip pain    2. Sciatica    3. Sacral fracture, closed ([address])    4. Sacral lesion    5. Bilateral buttock pain         Postoperative diagnosis: Same. Surgeon: Caio Knowles MD     Assistant: None. No qualified resident was available. Blood loss: 25 mL    Specimens: Multiple fragmented core specimens. Findings:   Similar appearance of erosive soft tissue lesion centered in left sacrum involving the sacroiliac joint and extending into the iliac bone. Access obtained with OnControl biopsy set. Lesion was very friable. Samples were obtained using several devices (OnControl set, Temno Evolution, Youjiae). Specimen adequacy was confirmed by the cytopathology team in attendance. All devices were removed and hemostasis obtained with gentle manual pressure. A sterile dressing was applied. Complications: None immediate.     Anesthesia: conscious sedation

## 2023-09-01 NOTE — ASSESSMENT & PLAN NOTE
· Blood pressure was elevated secondary to pain and acute illness  · Continue metoprolol  · Losartan/HCT added with improvement in control

## 2023-09-01 NOTE — CASE MANAGEMENT
Case Management Discharge Planning Note    Patient name Brady Benson  Location [address] [place] [address] Drive [address]/ Lima Jordan MRN [id number]  : 1950 Date 2023       Current Admission Date: 2023  Current Admission Diagnosis:Sacro-iliac pain   Patient Active Problem List    Diagnosis Date Noted   • Obesity (BMI 30-39.9) 2023   • Sacro-iliac pain 2023   • S/P mastectomy, left 2023   • Use of anastrozole 2022   • Malignant neoplasm of upper-outer quadrant of left breast in female, estrogen receptor positive ([address]) 2021   • Hypothyroidism 2017   • Chronic rhinitis 01/10/2013   • Benign essential hypertension 2012   • Hidradenitis suppurativa 2012   • Impaired fasting glucose 2012   • Morbid obesity ([address]) 2012   • Venous insufficiency (chronic) (peripheral) 2012      LOS (days): 2  Geometric Mean LOS (GMLOS) (days): 2.60  Days to GMLOS:0.2     OBJECTIVE:  Risk of Unplanned Readmission Score: 6.28         Current admission status: Inpatient   Preferred Pharmacy:   [facility] [id number] [place] [address]  Phone: [phone] Fax: [phone]    Primary Care Provider: Shabana Hernández MD    Primary Insurance: MEDICARE  Secondary Insurance: COMMERCIAL MISCELLANEOUS    DISCHARGE DETAILS:    [address] [facility] Agency Name[de-identified] [facility]     Other Referral/Resources/Interventions Provided:  Referral Comments: Bedside commode delivered to patient at bedside. A copy of receipt provided to patient and original placed in dme folder. [address] [place] [address] reserved in [facility]. Sacha Hathaway at [facility] made aware via voicemail about patient's discharge either later today or tommorrow. CM to upload AVS and [facility] orders to [facility] once available.      Treatment Team Recommendation: Home with [address] [facility]  Discharge Destination Plan[de-identified] Home with [address] ([place])  Transport at Discharge : Family      IMM Given (Date):: 09/01/23  IMM Given to[de-identified] Patient (IMM reviewed with patient. Patient verbalized understanding of the IMM.  Patient's signature obtained on IMM and a copy provided to patient and a copy placed in scan bin for chart.)

## 2023-09-01 NOTE — CONSULTS
e-Consult (IPC)  - Interventional Radiology  Kendy Baptiste 72 y.o. female MRN: [id number]  Unit/Bed#: [address] [address] Encounter: [id number]          Interventional Radiology has been consulted to evaluate Kendy Baptiste    We were consulted by Orthopedic Surgery concerning this patient with a newly identified left sacroiliac erosive lesion with pathologic fracture. Inpatient Consult to IR  Consult performed by: Caleb Cavazos MD  Consult ordered by: Deepa Man PA-C        09/01/23    Assessment/Recommendation:   29-year-old woman with history of stage IIIA left breast cancer, status post mastectomy with adjuvant chemotherapy and radiation, now with a left sacroiliac erosive mass. This lesion is amenable to percutaneous biopsy. We will add her on for CT-guided biopsy today, if schedule permits. Please make NPO and continue to hold anticoagulation. Lab values are within appropriate ranges for biopsy. Discussed with Dr. Shanell Mata of Internal Medicine. 5-10 minutes, >50% of the total time devoted to medical consultative verbal/EMR discussion between providers. Written report will be generated in the EMR. Thank you for allowing Interventional Radiology to participate in the care of Kendy Baptiste. Please don't hesitate to call or TigerText us with any questions.      Caleb Cavazos MD

## 2023-09-01 NOTE — PROGRESS NOTES
Hematology - Oncology Progress Note    Kendy Baptiste, 1950, [id number]  [address] [facility]      Impression and plan:     82-year-old female admitted with bilateral buttocks pain. Patient has a history of locally advanced breast cancer most recently on hormonal manipulation. Recent CAT scan of the pelvis demonstrated an abnormal lytic lesion of the sacrum extending across the left sacroiliac joint with associated pathologic fracture of the left sacrum and mild soft tissue density in the left presacral region. This is all concerning for recurrent/metastatic breast cancer. Breast cancer tumor markers are elevated, not terribly so. SPEP is pending. Work-up to rule out another cause for the abnormalities is in process but high on the differential diagnosis would be metastatic breast cancer. Pain control is ongoing. I spoke to Dr. Edgar Walker, radiation oncologist.  He was kind enough to review the recent scans and believes patient may be a good candidate for palliative RT after discharge. MRI/pelvis has just been completed, results are pending. Patient is also scheduled for a biopsy. Hopefully this will clarify the diagnosis. Above discussed with patient; all questions were answered. If Mrs. Gary Green is discharged over the long weekend, [facility] hematology/oncology discharge coordinator should be contacted via [employer] early Tuesday so that she can schedule a follow-up appointment with Dr. Phyllis Walker. Above discussed with patient; all questions answered. __________________________________________________________________________________________    Chief complaint: Buttocks pain    History of present illness: 82-year-old female with history of stage IIIa left-sided breast cancer most recently on adjuvant hormonal manipulation presenting with the above. Presently Mrs. Gary Green states feeling okay, better than before but still with some buttocks pain. Activities are limited. No GI or  problems. No respiratory problems. Appetite is okay.     Hospital medications list:  Current Facility-Administered Medications   Medication Dose Route Frequency Provider Last Rate   • acetaminophen  975 mg Oral Q8H [address] DANILO Blue     • anastrozole  1 mg Oral Daily DANILO Blue     • ascorbic acid  500 mg Oral Daily DANILO Blue     • cholecalciferol  1,000 Units Oral Daily DANILO Blue     • docusate sodium  100 mg Oral BID DANILO Blue     • hydrALAZINE  10 mg Oral Q6H PRN DANILO Blue     • losartan  50 mg Oral Daily Manpreet Hernandez DO      And   • hydrochlorothiazide  12.5 mg Oral Daily Manpreet Hernandez DO     • HYDROmorphone  0.5 mg Intravenous Q4H PRN Pranay Sterling, DO     • lidocaine  1 patch Topical Daily Nimco Lord, DO     • lidocaine  2 patch Topical Daily DANILO Blue     • metoprolol tartrate  50 mg Oral Daily DANILO Blue     • multivitamin-minerals  1 tablet Oral Daily DANILO Blue     • ondansetron  4 mg Intravenous Q6H PRN DANILO Blue     • oxyCODONE  10 mg Oral Q4H PRN Pranay Sterling, DO     • oxyCODONE  5 mg Oral Q4H PRN Pranya Hurting, DO       Physical exam  /71 (BP Location: Right arm)   Pulse 87   Temp 97.6 °F (36.4 °C) (Oral)   Resp 18   Ht 5' 1" (1.549 m)   Wt 93.4 kg (206 lb)   SpO2 96%   BMI 38.92 kg/m²   Constitutional: Well formed, well-nourished female, no respiratory distress  Eyes: PERRL, conjunctiva pink, anicteric    HENT: Atraumatic, external ears normal, nose normal    oropharynx moist, no pharyngeal exudates, no thrush, pink  Neck: Good range of motion, no adenopathy  Respiratory: Scattered bilateral rhonchi  Cardiovascular: Normal rate, normal rhythm, no murmurs, no gallops, no rubs    GI: Soft, nondistended, normal bowel sounds, nontender, no organomegaly, no mass, no rebound, no guarding    : No costovertebral angle tenderness, normal reproductive organs, no discharge  Musculoskeletal: No pain or tenderness with palpation of joints, muscles or bones  Integument: Warm, moist, relatively good color, no petechiae  Lymphatic: No adenopathy in the neck, supra-clavicular region, axilla and groin bilaterally  Extremities: No lower extreme edema, no cords, pulses are 1+    Laboratory test results     Latest Reference Range & Units 09/01/23 05:09   WBC 4.31 - 10.16 Thousand/uL 7.69   Red Blood Cell Count 3.81 - 5.12 Million/uL 5.37 (H)   Hemoglobin 11.5 - 15.4 g/dL 16.1 (H)   HCT 34.8 - 46.1 % 45.5   MCV 82 - 98 fL 85   MCH 26.8 - 34.3 pg 30.0   MCHC 31.4 - 37.4 g/dL 35.4   RDW 11.6 - 15.1 % 14.7   Platelet Count 590 - 390 Thousands/uL 260       8/31/2023 CA 15-3 = 58 = elevated CA 27, 29 = 75.3 = elevated    8/31/2023 SPEP in process

## 2023-09-02 VITALS
DIASTOLIC BLOOD PRESSURE: 71 MMHG | HEIGHT: 61 IN | RESPIRATION RATE: 18 BRPM | WEIGHT: 206 LBS | SYSTOLIC BLOOD PRESSURE: 157 MMHG | HEART RATE: 74 BPM | TEMPERATURE: 98.1 F | OXYGEN SATURATION: 95 % | BODY MASS INDEX: 38.89 KG/M2

## 2023-09-02 LAB
ALBUMIN SERPL ELPH-MCNC: 3.61 G/DL (ref 3.2–5.1)
ALBUMIN SERPL ELPH-MCNC: 56.4 % (ref 48–70)
ALPHA1 GLOB SERPL ELPH-MCNC: 0.3 G/DL (ref 0.15–0.47)
ALPHA1 GLOB SERPL ELPH-MCNC: 4.7 % (ref 1.8–7)
ALPHA2 GLOB SERPL ELPH-MCNC: 0.89 G/DL (ref 0.42–1.04)
ALPHA2 GLOB SERPL ELPH-MCNC: 13.9 % (ref 5.9–14.9)
B2 MICROGLOB SERPL-MCNC: 1.9 MG/L (ref 0.6–2.4)
BETA GLOB ABNORMAL SERPL ELPH-MCNC: 0.38 G/DL (ref 0.31–0.57)
BETA1 GLOB SERPL ELPH-MCNC: 6 % (ref 4.7–7.7)
BETA2 GLOB SERPL ELPH-MCNC: 6.1 % (ref 3.1–7.9)
BETA2+GAMMA GLOB SERPL ELPH-MCNC: 0.39 G/DL (ref 0.2–0.58)
GAMMA GLOB ABNORMAL SERPL ELPH-MCNC: 0.83 G/DL (ref 0.4–1.66)
GAMMA GLOB SERPL ELPH-MCNC: 12.9 % (ref 6.9–22.3)
IGG/ALB SER: 1.29 {RATIO} (ref 1.1–1.8)
PROT PATTERN SERPL ELPH-IMP: NORMAL
PROT SERPL-MCNC: 6.4 G/DL (ref 6.4–8.4)

## 2023-09-02 PROCEDURE — 84165 PROTEIN E-PHORESIS SERUM: CPT | Performed by: PATHOLOGY

## 2023-09-02 PROCEDURE — 99239 HOSP IP/OBS DSCHRG MGMT >30: CPT | Performed by: INTERNAL MEDICINE

## 2023-09-02 RX ORDER — SENNA AND DOCUSATE SODIUM 50; 8.6 MG/1; MG/1
1 TABLET, FILM COATED ORAL 2 TIMES DAILY
Refills: 0 | COMMUNITY
Start: 2023-09-02

## 2023-09-02 RX ORDER — OXYCODONE HYDROCHLORIDE 10 MG/1
10 TABLET ORAL EVERY 4 HOURS PRN
Qty: 30 TABLET | Refills: 0 | Status: SHIPPED | OUTPATIENT
Start: 2023-09-02

## 2023-09-02 RX ORDER — LOSARTAN POTASSIUM AND HYDROCHLOROTHIAZIDE 12.5; 5 MG/1; MG/1
1 TABLET ORAL DAILY
Qty: 30 TABLET | Refills: 0 | Status: SHIPPED | OUTPATIENT
Start: 2023-09-02

## 2023-09-02 RX ADMIN — OXYCODONE HYDROCHLORIDE 10 MG: 10 TABLET ORAL at 06:52

## 2023-09-02 RX ADMIN — LOSARTAN POTASSIUM 50 MG: 50 TABLET, FILM COATED ORAL at 08:23

## 2023-09-02 RX ADMIN — HYDROCHLOROTHIAZIDE 12.5 MG: 12.5 TABLET ORAL at 08:24

## 2023-09-02 RX ADMIN — ANASTROZOLE 1 MG: 1 TABLET, COATED ORAL at 08:24

## 2023-09-02 RX ADMIN — ACETAMINOPHEN 975 MG: 325 TABLET ORAL at 06:52

## 2023-09-02 RX ADMIN — LIDOCAINE 1 PATCH: 50 PATCH CUTANEOUS at 08:24

## 2023-09-02 RX ADMIN — LIDOCAINE 5% 2 PATCH: 700 PATCH TOPICAL at 08:24

## 2023-09-02 RX ADMIN — METOPROLOL TARTRATE 50 MG: 50 TABLET, FILM COATED ORAL at 08:23

## 2023-09-02 RX ADMIN — OXYCODONE HYDROCHLORIDE AND ACETAMINOPHEN 500 MG: 500 TABLET ORAL at 08:24

## 2023-09-02 RX ADMIN — Medication 1000 UNITS: at 08:24

## 2023-09-02 RX ADMIN — Medication 1 TABLET: at 08:23

## 2023-09-02 RX ADMIN — ACETAMINOPHEN 975 MG: 325 TABLET ORAL at 13:13

## 2023-09-02 NOTE — DISCHARGE SUMMARY
[address]  Discharge- Jayden Soria 1950, 72 y.o. female MRN: [id number]  Unit/Bed#: [address] [address] Encounter: [id number]  Primary Care Provider: Kuldeep Epperson MD   Date and time admitted to hospital: 8/30/2023 12:23 AM    Admitting Provider:  Haley Betancourt MD  Discharge Provider:  Jemima Luu DO  Admission Date: 8/30/2023       Discharge Date: 09/02/23   LOS: 3  Primary Care Physician at Discharge: Kuldeep Epperson [phone]    DISCHARGE DIAGNOSES  * Sacro-iliac pain  Assessment & Plan  History of hypertension and breast cancer status post radical mastectomy and chemoradiation who presents with sacroiliac pain found to have left sacral pathologic fracture  · Seen by orthopedics and oncology  · Bone scan with known pathologic sacral fracture without additional osseous metastasis. · Orthopedics ordered IR biopsy and MRI. MRI with mass. IR biopsy performed and will need close follow-up with outpatient oncologist.  · Serum protein electrophoresis without monoclonal band to be concerned for multiple myeloma. · Discharge: Oxycodone 10 mg 30 tablets until seen by oncology    Obesity (BMI 30-39. 9)  Assessment & Plan  · Body mass index is 38.92 kg/m². Malignant neoplasm of upper-outer quadrant of left breast in female, estrogen receptor positive ([address])  Assessment & Plan  · History of breast cancer status post chemoradiation follows with Dr. Bertrand Shelter  · Continue anastrozole  · Further work-up of bony abnormality as outlined above    Benign essential hypertension  Assessment & Plan  · Blood pressure was elevated secondary to pain and acute illness  · Continue metoprolol  · Losartan/HCT added with improvement in control of blood pressure. Will be discharged with this as well. REASON FOR ADMISSION  Hip Pain (Patient c/o b/l hip pain. States she was dx with Sciatica in July.  R hip pain just began; L hip pain x1 mth)    HOSPITAL COURSE:  Jayden Soria is a 72 y.o. female with a history of hypertension and breast cancer who presented to the hospital with bilateral buttock pain. Symptoms started about a month ago but worsened over the past 2 days leading up to admission. She came to the hospital where she was found to have a lytic destruction of left sacrum with pathologic fracture and mass. She was admitted for further work-up. She was seen by oncology and orthopedics. She underwent bone scan, MRI, and IR biopsy of region as above. She is being discharged home with analgesics until she follows up with her primary oncologist for further recommendations and discussion. Her blood pressure was elevated necessitating starting losartan/HCT which has controlled her blood pressure in addition to her metoprolol. The case was discussed with spouse at bedside on day of discharge. Please see problem list listed above. CONSULTING PROVIDERS   IP CONSULT TO ORTHOPEDIC SURGERY  IP CONSULT TO ONCOLOGY  INPATIENT CONSULT TO IR    PROCEDURES PERFORMED  IR biopsy bone    Result Date: 9/1/2023  Impression: Successful CT-guided biopsy of mass centered in the left sacrum, without evidence of immediate complication. Workstation performed: [id number]     RADIOLOGY RESULTS  MRI pelvis w wo contrast    Result Date: 9/2/2023  Impression: Impression: Large infiltrative upper sacral lesion with asymmetric involvement of the left sacral ala, extension to the SI joint and medial left iliac bone as well as subtle extraosseous extension as detailed above, concerning for malignancy. Correlate with bone biopsy also done on this date. The lesion narrows the left L5 and S1 neural foramina and may touch upon the exiting nerve roots in these regions. Correlate for radiculitis in these territories. Cannot exclude subtle epidural invasion posterior to S1. Additional incidental findings as above. Workstation performed: [id number]     NM bone scan whole body    Result Date: 9/1/2023  Impression: 1.  Known pathologic sacral fracture. Otherwise no scintigraphic evidence of additional osseous metastasis. Workstation performed: [id number]     CT pelvis wo contrast    Result Date: 8/30/2023  Impression: There is abnormal lytic destruction of the left sacrum extending across the left sacroiliac joint and involving the left medial iliac bone. Associated pathological fracture of the left sacrum and mild soft tissue density mass in the left presacral region. This could be secondary to focal osseous metastatic disease/myeloma. Severe osteomyelitis with associated bony destruction is also possible but felt to be less likely. Recommend clinical correlation. Soft tissue density extending into the left sacral neural foramina (S1-S3) with involvement of the respective left sacral nerve roots noted. No intrapelvic visceral mass/fluid collection or lymphadenopathy by size criteria noted. Workstation performed: [id number]     CT spine lumbar without contrast    Result Date: 8/30/2023  Impression: There is abnormal lytic destruction of the left sacrum extending across the left sacroiliac joint and involving the left medial iliac bone. Associated pathological fracture of the left sacrum and mild soft tissue density mass in the left presacral region. This could be secondary to focal osseous metastatic disease/myeloma. Severe osteomyelitis with associated bony destruction is also possible but felt to be less likely. Recommend clinical correlation.  Workstation performed: [id number]       LABS  Results from last 7 days   Lab Units 09/01/23  0509 08/31/23  0643 08/30/23  0047   WBC Thousand/uL 7.69 7.31 8.38   HEMOGLOBIN g/dL 16.1* 15.9* 15.0   HEMATOCRIT % 45.5 47.3* 42.8   MCV fL 85 89 86   PLATELETS Thousands/uL 260 219 227   INR  1.07  --   --      Results from last 7 days   Lab Units 09/01/23  0634 08/31/23  0643 08/30/23  0047   SODIUM mmol/L 139 137 136   POTASSIUM mmol/L 3.5 3.6 3.6   CHLORIDE mmol/L 102 104 102   CO2 mmol/L 28 23 24   BUN mg/dL 14 21 20   CREATININE mg/dL 0.56* 0.62 0.58*   CALCIUM mg/dL 9.9 9.3 9.6   ALBUMIN g/dL  --  3.9 4.2   TOTAL BILIRUBIN mg/dL  --  0.64 0.68   ALK PHOS U/L  --  109* 97   ALT U/L  --  26 20   AST U/L  --  22 17   EGFR ml/min/1.73sq m 94 91 93   GLUCOSE RANDOM mg/dL 136 124 134           Results from last 7 days   Lab Units 08/30/23  0410   SARS-COV-2  Negative             DISCHARGE DAY VISIT AND PHYSICAL EXAM:  Subjective: Patient seen and examined. Spouse at bedside. Ready for discharge today. Vitals:   Blood Pressure: 157/71 (09/02/23 0724)  Pulse: 74 (09/02/23 0724)  Temperature: 98.1 °F (36.7 °C) (09/02/23 0724)  Temp Source: Oral (09/02/23 0724)  Respirations: 18 (09/02/23 0724)  Height: 5' 1" (154.9 cm) (08/30/23 0600)  Weight - Scale: 93.4 kg (206 lb) (08/30/23 0600)  SpO2: 95 % (09/02/23 0724)    Physical Exam  Vitals reviewed. Constitutional:       General: She is not in acute distress. Appearance: Normal appearance. HENT:      Head: Atraumatic. Eyes:      General: No scleral icterus. Extraocular Movements: Extraocular movements intact. Cardiovascular:      Rate and Rhythm: Regular rhythm. Pulmonary:      Breath sounds: Normal breath sounds. No wheezing. Abdominal:      General: Bowel sounds are normal.      Palpations: Abdomen is soft. Tenderness: There is no guarding. Musculoskeletal:         General: No swelling. Skin:     General: Skin is warm. Neurological:      Mental Status: She is alert. Mental status is at baseline. Psychiatric:         Mood and Affect: Mood normal.       Planned Re-admission: No  Discharge Disposition: Home/Self Care    Test Results Pending at Discharge:   Pending Labs     Order Current Status    Tissue Exam In process      Incidental findings: Sacral mass. Follow-up with oncology. Medications   · Discharge Medication List: See after visit summary for reconciled discharge medications.      Diet restrictions: Regular diet  Activity restrictions: No strenuous activity  Discharge Condition: stable    Outpatient Follow-Up and Discharge Instructions  See after visit summary section titled Discharge Instructions for information provided to patient and family. Code Status: Level 1 - Full Code  Discharge Statement   I spent 35 minutes discharging the patient. This time was spent on the day of discharge. Greater than 50% of total time was spent with the patient and / or family counseling and / or coordination of care. ** Please Note: This note has been constructed using a voice recognition system.  **

## 2023-09-02 NOTE — PLAN OF CARE
Problem: PAIN - ADULT  Goal: Verbalizes/displays adequate comfort level or baseline comfort level  Description: Interventions:  - Encourage patient to monitor pain and request assistance  - Assess pain using appropriate pain scale  - Administer analgesics based on type and severity of pain and evaluate response  - Implement non-pharmacological measures as appropriate and evaluate response  - Consider cultural and social influences on pain and pain management  - Notify physician/advanced practitioner if interventions unsuccessful or patient reports new pain  Outcome: Progressing     Problem: INFECTION - ADULT  Goal: Absence or prevention of progression during hospitalization  Description: INTERVENTIONS:  - Assess and monitor for signs and symptoms of infection  - Monitor lab/diagnostic results  - Monitor all insertion sites, i.e. indwelling lines, tubes, and drains  - Monitor endotracheal if appropriate and nasal secretions for changes in amount and color  - [place] appropriate cooling/warming therapies per order  - Administer medications as ordered  - Instruct and encourage patient and family to use good hand hygiene technique  - Identify and instruct in appropriate isolation precautions for identified infection/condition  Outcome: Progressing     Problem: SAFETY ADULT  Goal: Patient will remain free of falls  Description: INTERVENTIONS:  - Educate patient/family on patient safety including physical limitations  - Instruct patient to call for assistance with activity   - Consult OT/PT to assist with strengthening/mobility   - Keep Call bell within reach  - Keep bed low and locked with side rails adjusted as appropriate  - Keep care items and personal belongings within reach  - Initiate and maintain comfort rounds  - Make Fall Risk Sign visible to staff  - Offer Toileting every 2  Hours, in advance of need  - Initiate/Maintain bed Claudia Round alarm    - Apply yellow socks and bracelet for high fall risk patients  - Consider moving patient to room near nurses station  Outcome: Progressing  Goal: Maintain or return to baseline ADL function  Description: INTERVENTIONS:  -  Assess patient's ability to carry out ADLs; assess patient's baseline for ADL function and identify physical deficits which impact ability to perform ADLs (bathing, care of mouth/teeth, toileting, grooming, dressing, etc.)  - Assess/evaluate cause of self-care deficits   - Assess range of motion  - Assess patient's mobility; develop plan if impaired  - Assess patient's need for assistive devices and provide as appropriate  - Encourage maximum independence but intervene and supervise when necessary  - Involve family in performance of ADLs  - Assess for home care needs following discharge   - Consider OT consult to assist with ADL evaluation and planning for discharge  - Provide patient education as appropriate  Outcome: Progressing  Goal: Maintains/Returns to pre admission functional level  Description: INTERVENTIONS:  - Perform BMAT or MOVE assessment daily.   - Set and communicate daily mobility goal to care team and patient/family/caregiver. - Collaborate with rehabilitation services on mobility goals if consulted  - Perform Range of Motion 3  times a day. - Reposition patient every 2 hours.       - Ambulate patient 2 times a day as tolerated  - Out of bed to chair daily    - Out of bed for toileting  - Record patient progress and toleration of activity level   Outcome: Progressing     Problem: DISCHARGE PLANNING  Goal: Discharge to home or other facility with appropriate resources  Description: INTERVENTIONS:  - Identify barriers to discharge w/patient and caregiver  - Arrange for needed discharge resources and transportation as appropriate  - Identify discharge learning needs (meds, wound care, etc.)  - Arrange for interpretive services to assist at discharge as needed  - Refer to Case Management Department for coordinating discharge planning if the patient needs post-hospital services based on physician/advanced practitioner order or complex needs related to functional status, cognitive ability, or social support system  Outcome: Progressing     Problem: Knowledge Deficit  Goal: Patient/family/caregiver demonstrates understanding of disease process, treatment plan, medications, and discharge instructions  Description: Complete learning assessment and assess knowledge base. Interventions:  - Provide teaching at level of understanding  - Provide teaching via preferred learning methods  Outcome: Progressing     Problem: MOBILITY - ADULT  Goal: Maintain or return to baseline ADL function  Description: INTERVENTIONS:  -  Assess patient's ability to carry out ADLs; assess patient's baseline for ADL function and identify physical deficits which impact ability to perform ADLs (bathing, care of mouth/teeth, toileting, grooming, dressing, etc.)  - Assess/evaluate cause of self-care deficits   - Assess range of motion  - Assess patient's mobility; develop plan if impaired  - Assess patient's need for assistive devices and provide as appropriate  - Encourage maximum independence but intervene and supervise when necessary  - Involve family in performance of ADLs  - Assess for home care needs following discharge   - Consider OT consult to assist with ADL evaluation and planning for discharge  - Provide patient education as appropriate  Outcome: Progressing  Goal: Maintains/Returns to pre admission functional level  Description: INTERVENTIONS:  - Perform BMAT or MOVE assessment daily.   - Set and communicate daily mobility goal to care team and patient/family/caregiver. - Collaborate with rehabilitation services on mobility goals if consulted  - Perform Range of Motion 3  times a day. - Reposition patient every 2 hours.       - Ambulate patient 2 times a day as tolerated  - Out of bed to chair daily    - Out of bed for toileting  - Record patient progress and toleration of activity level   Outcome: Progressing

## 2023-09-02 NOTE — ASSESSMENT & PLAN NOTE
History of hypertension and breast cancer status post radical mastectomy and chemoradiation who presents with sacroiliac pain found to have left sacral pathologic fracture  · Seen by orthopedics and oncology  · Bone scan with known pathologic sacral fracture without additional osseous metastasis. · Orthopedics ordered IR biopsy and MRI. MRI with mass. IR biopsy performed and will need close follow-up with outpatient oncologist.  · Serum protein electrophoresis without monoclonal band to be concerned for multiple myeloma.   · Discharge: Oxycodone 10 mg 30 tablets until seen by oncology

## 2023-09-02 NOTE — CASE MANAGEMENT
Case Management Progress Note    Patient name Steve Gonzalez  Location 2 [place] 211/2 [place] 211 MRN [id number]  : 1950 Date 2023       LOS (days): 3  Geometric Mean LOS (GMLOS) (days): 2.60  Days to GMLOS:-0.8        OBJECTIVE:     Current admission status: Inpatient  Preferred Pharmacy:   [address] Saige Dunbar Charlyn Ganser - [address] [place] [address] [address]  Phone: [phone] Fax: [phone]    Primary Care Provider: Isela Bonner MD    Primary Insurance: MEDICARE  Secondary Insurance: COMMERCIAL MISCELLANEOUS    PROGRESS NOTE:    AVS faxed to [facility].

## 2023-09-02 NOTE — ASSESSMENT & PLAN NOTE
· History of breast cancer status post chemoradiation follows with Dr. Jose Graham  · Continue anastrozole  · Further work-up of bony abnormality as outlined above

## 2023-09-02 NOTE — PLAN OF CARE
Problem: PAIN - ADULT  Goal: Verbalizes/displays adequate comfort level or baseline comfort level  Description: Interventions:  - Encourage patient to monitor pain and request assistance  - Assess pain using appropriate pain scale  - Administer analgesics based on type and severity of pain and evaluate response  - Implement non-pharmacological measures as appropriate and evaluate response  - Consider cultural and social influences on pain and pain management  - Notify physician/advanced practitioner if interventions unsuccessful or patient reports new pain  Outcome: Progressing     Problem: INFECTION - ADULT  Goal: Absence or prevention of progression during hospitalization  Description: INTERVENTIONS:  - Assess and monitor for signs and symptoms of infection  - Monitor lab/diagnostic results  - Monitor all insertion sites, i.e. indwelling lines, tubes, and drains  - Monitor endotracheal if appropriate and nasal secretions for changes in amount and color  - [place] appropriate cooling/warming therapies per order  - Administer medications as ordered  - Instruct and encourage patient and family to use good hand hygiene technique  - Identify and instruct in appropriate isolation precautions for identified infection/condition  Outcome: Progressing  Goal: Absence of fever/infection during neutropenic period  Description: INTERVENTIONS:  - Monitor WBC    Outcome: Progressing     Problem: SAFETY ADULT  Goal: Patient will remain free of falls  Description: INTERVENTIONS:  - Educate patient/family on patient safety including physical limitations  - Instruct patient to call for assistance with activity   - Consult OT/PT to assist with strengthening/mobility   - Keep Call bell within reach  - Keep bed low and locked with side rails adjusted as appropriate  - Keep care items and personal belongings within reach  - Initiate and maintain comfort rounds  - Make Fall Risk Sign visible to staff  - Offer Toileting every 2  Hours, in advance of need  - Initiate/Maintain bed /chair alarm    - Apply yellow socks and bracelet for high fall risk patients  - Consider moving patient to room near nurses station  Outcome: Progressing  Goal: Maintain or return to baseline ADL function  Description: INTERVENTIONS:  -  Assess patient's ability to carry out ADLs; assess patient's baseline for ADL function and identify physical deficits which impact ability to perform ADLs (bathing, care of mouth/teeth, toileting, grooming, dressing, etc.)  - Assess/evaluate cause of self-care deficits   - Assess range of motion  - Assess patient's mobility; develop plan if impaired  - Assess patient's need for assistive devices and provide as appropriate  - Encourage maximum independence but intervene and supervise when necessary  - Involve family in performance of ADLs  - Assess for home care needs following discharge   - Consider OT consult to assist with ADL evaluation and planning for discharge  - Provide patient education as appropriate  Outcome: Progressing  Goal: Maintains/Returns to pre admission functional level  Description: INTERVENTIONS:  - Perform BMAT or MOVE assessment daily.   - Set and communicate daily mobility goal to care team and patient/family/caregiver. - Collaborate with rehabilitation services on mobility goals if consulted  - Perform Range of Motion 3  times a day. - Reposition patient every 2 hours.       - Ambulate patient 2 times a day as tolerated  - Out of bed to chair daily    - Out of bed for toileting  - Record patient progress and toleration of activity level   Outcome: Progressing     Problem: DISCHARGE PLANNING  Goal: Discharge to home or other facility with appropriate resources  Description: INTERVENTIONS:  - Identify barriers to discharge w/patient and caregiver  - Arrange for needed discharge resources and transportation as appropriate  - Identify discharge learning needs (meds, wound care, etc.)  - Arrange for interpretive services to assist at discharge as needed  - Refer to Case Management Department for coordinating discharge planning if the patient needs post-hospital services based on physician/advanced practitioner order or complex needs related to functional status, cognitive ability, or social support system  Outcome: Progressing     Problem: Knowledge Deficit  Goal: Patient/family/caregiver demonstrates understanding of disease process, treatment plan, medications, and discharge instructions  Description: Complete learning assessment and assess knowledge base. Interventions:  - Provide teaching at level of understanding  - Provide teaching via preferred learning methods  Outcome: Progressing     Problem: MOBILITY - ADULT  Goal: Maintain or return to baseline ADL function  Description: INTERVENTIONS:  -  Assess patient's ability to carry out ADLs; assess patient's baseline for ADL function and identify physical deficits which impact ability to perform ADLs (bathing, care of mouth/teeth, toileting, grooming, dressing, etc.)  - Assess/evaluate cause of self-care deficits   - Assess range of motion  - Assess patient's mobility; develop plan if impaired  - Assess patient's need for assistive devices and provide as appropriate  - Encourage maximum independence but intervene and supervise when necessary  - Involve family in performance of ADLs  - Assess for home care needs following discharge   - Consider OT consult to assist with ADL evaluation and planning for discharge  - Provide patient education as appropriate  Outcome: Progressing  Goal: Maintains/Returns to pre admission functional level  Description: INTERVENTIONS:  - Perform BMAT or MOVE assessment daily.   - Set and communicate daily mobility goal to care team and patient/family/caregiver. - Collaborate with rehabilitation services on mobility goals if consulted  - Perform Range of Motion 3  times a day. - Reposition patient every 2 hours.       - Ambulate patient 2 times a day as tolerated  - Out of bed to chair daily    - Out of bed for toileting  - Record patient progress and toleration of activity level   Outcome: Progressing

## 2023-09-02 NOTE — ASSESSMENT & PLAN NOTE
· Blood pressure was elevated secondary to pain and acute illness  · Continue metoprolol  · Losartan/HCT added with improvement in control of blood pressure. Will be discharged with this as well.

## 2023-09-02 NOTE — INCIDENTAL FINDINGS
The following findings require follow up:  Radiographic finding  Findings:   · CT: There is abnormal lytic destruction of the left sacrum extending across the left sacroiliac joint and involving the left medial iliac bone. Associated pathological fracture of the left sacrum and mild soft tissue density mass in the left presacral region. This could be secondary to focal osseous metastatic disease/myeloma. · MRI: Large infiltrative upper sacral lesion with asymmetric involvement of the left sacral ala, extension to the SI joint and medial left iliac bone as well as subtle extraosseous extension as detailed above, concerning for malignancy. · NM Bone scan: Known pathologic sacral fracture. Otherwise no scintigraphic evidence of additional osseous metastasis.     Follow up required   Follow up should be done    Please notify the following clinician to assist with the follow up:   MD Dr. Arthur Obando MD

## 2023-09-05 ENCOUNTER — TELEPHONE (OUTPATIENT)
Dept: HEMATOLOGY ONCOLOGY | Facility: CLINIC | Age: 73
End: 2023-09-05

## 2023-09-05 ENCOUNTER — TRANSITIONAL CARE MANAGEMENT (OUTPATIENT)
Dept: FAMILY MEDICINE CLINIC | Facility: CLINIC | Age: 73
End: 2023-09-05

## 2023-09-05 NOTE — TELEPHONE ENCOUNTER
Called patients line and it went to . Spoke with  and changed pts appt to Dr Ching Chicas for 9/26 at [facility].  requested follow up appts to be scheduled at [facility].

## 2023-09-06 ENCOUNTER — TELEPHONE (OUTPATIENT)
Dept: FAMILY MEDICINE CLINIC | Facility: CLINIC | Age: 73
End: 2023-09-06

## 2023-09-06 PROCEDURE — 88341 IMHCHEM/IMCYTCHM EA ADD ANTB: CPT | Performed by: PATHOLOGY

## 2023-09-06 PROCEDURE — 88333 PATH CONSLTJ SURG CYTO XM 1: CPT | Performed by: PATHOLOGY

## 2023-09-06 PROCEDURE — 88305 TISSUE EXAM BY PATHOLOGIST: CPT | Performed by: PATHOLOGY

## 2023-09-06 PROCEDURE — 88342 IMHCHEM/IMCYTCHM 1ST ANTB: CPT | Performed by: PATHOLOGY

## 2023-09-06 NOTE — TELEPHONE ENCOUNTER
Patients daughter Cornelio Gricelda calling to see if Dr. Lee Fall is willing to do a virtual appointment with her mom. Patients daughter stated that patient recently broke her hip and has a mass that extends across her spine to pelvis and it is very hard for her to sit up for a period of time. It is very hard to physically move her.     Can this appointment be virtual?    Please call patients daughter Cornelio Madison is this is acceptable for virtual visit and she will provide her cell information for the appointment to happen    Cornelio Madison [phone]

## 2023-09-06 NOTE — TELEPHONE ENCOUNTER
Spoke to Carol Petit from [facility] she would like to give Vitamin D 1000mg  and Calcium 1200mg     She would also like Senna S 1 PO BID as a PRN     Please advise     [address]

## 2023-09-06 NOTE — TELEPHONE ENCOUNTER
She also wants to let you know the pt was taking  tylentol 650 3 tabs q6h       But she said I cannot call in orders the provider has to or NP    Ike Mauricio

## 2023-09-06 NOTE — TELEPHONE ENCOUNTER
Toni From [facility] Nurse   Would like to confirm medication for patient. Vitamin minerals.  Vitamin D tabs, senna-s 1 tablet,   [id number]

## 2023-09-07 ENCOUNTER — TELEMEDICINE (OUTPATIENT)
Dept: FAMILY MEDICINE CLINIC | Facility: CLINIC | Age: 73
End: 2023-09-07
Payer: MEDICARE

## 2023-09-07 ENCOUNTER — TELEPHONE (OUTPATIENT)
Dept: HEMATOLOGY ONCOLOGY | Facility: CLINIC | Age: 73
End: 2023-09-07

## 2023-09-07 DIAGNOSIS — C50.412 MALIGNANT NEOPLASM OF UPPER-OUTER QUADRANT OF LEFT BREAST IN FEMALE, ESTROGEN RECEPTOR POSITIVE (HCC): ICD-10-CM

## 2023-09-07 DIAGNOSIS — M84.58XA PATHOLOGICAL FRACTURE OF SACRAL VERTEBRA DUE TO NEOPLASTIC DISEASE: Primary | ICD-10-CM

## 2023-09-07 DIAGNOSIS — Z17.0 MALIGNANT NEOPLASM OF UPPER-OUTER QUADRANT OF LEFT BREAST IN FEMALE, ESTROGEN RECEPTOR POSITIVE (HCC): ICD-10-CM

## 2023-09-07 DIAGNOSIS — I10 BENIGN ESSENTIAL HYPERTENSION: ICD-10-CM

## 2023-09-07 PROCEDURE — 99495 TRANSJ CARE MGMT MOD F2F 14D: CPT | Performed by: INTERNAL MEDICINE

## 2023-09-07 NOTE — ASSESSMENT & PLAN NOTE
She is in need of treatment for new metastases. I called oncology to get her a sooner appointment. Per message, their nurse is trying to get patient in with XRT ASAP, and will try to move up appointment with medical oncology as well. Asked patient to call sooner than next scheduled appointment for any complaints or issues, otherwise would like to see her in office in one month.

## 2023-09-07 NOTE — TELEPHONE ENCOUNTER
Patient Call    Who are you speaking with? Deion Dunbar     If it is not the patient, are they listed on an active communication consent form? N/A   What is the reason for this call? Casey calling in stating that the patient would need to be seen sooner than scheduled as she has a new dx of metastases to the back and is in a lot of pain. Does this require a call back? Yes   If a call back is required, please list [facility] call back number [phone]   If a call back is required, advise that a message will be forwarded to their care team and someone will return their call as soon as possible. Did you relay this information to the patient?  Yes

## 2023-09-07 NOTE — TELEPHONE ENCOUNTER
D/W Dr. Maribel Carlton  Patient will need to be seen by Radiation oncology ASAP  Email sent to [facility] nurses to facilitate  Spoke to patient  Patient expecting call from [facility]   Patient instructed to call my TEAMs number if appt not made by 9/8/2023    FYI to Dr Fabrice Reese

## 2023-09-07 NOTE — PROGRESS NOTES
Pt has a virtual today with dr. González Loza left a message for pt to call back to do a quick tcm on her    [address]

## 2023-09-07 NOTE — PROGRESS NOTES
Virtual Regular Visit    Verification of patient location:    Patient is located at Home in the following state in which I hold an active license [place]      Assessment/Plan:    Problem List Items Addressed This Visit        Cardiovascular and Mediastinum    Benign essential hypertension     Control has been good and she will continue current medications as ordered. 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 I called oncology to get her a sooner appointment. Per message, their nurse is trying to get patient in with XRT ASAP, and will try to move up appointment with medical oncology as well. Asked patient to call sooner than next scheduled appointment for any complaints or issues, otherwise would like to see her in office in one month. Other    Malignant neoplasm of upper-outer quadrant of left breast in female, estrogen receptor positive ([address])            Reason for visit is   Chief Complaint   Patient presents with   • Follow-up     Lw cma   • Virtual Regular Visit        Encounter provider Mario Gamino MD    Provider located at [address] [place] [id number]      Recent Visits  Date Type Provider Dept   09/06/23 Telephone Mario Gamino MD [facility]   09/06/23 Telephone Gabbie recent visits within past 7 days and meeting all other requirements  Today's Visits  Date Type Provider Dept   09/07/23 [address], [address]  today's visits and meeting all other requirements  Future Appointments  No visits were found meeting these conditions. Showing future appointments within next 150 days and meeting all other requirements       The patient was identified by name and date of birth.  Annelise Barth was informed that this is a telemedicine visit and that the visit is being conducted through the [employer]. She agrees to proceed. .  My office door was closed. No one else was in the room. She acknowledged consent and understanding of privacy and security of the video platform. The patient has agreed to participate and understands they can discontinue the visit at any time. Patient is aware this is a billable service. Subjective  Shaylee Valentine is a 72 y.o. female with recent diagnosis of metastasis of her breast cancer, pathologic sacral fracture . Here for virtual follow up of hospital visit for back pain. She was found to have left sacral pathologic fracture. Biopsy has come back positive for metastatic breast cancer. She saw the results today on mychart. She is understandably very upset. Having a lot of pain. Taking oxycontin and tylenol with some relief of pain but is not out of bed much. Does not have appointment with oncology until later in the month. Does not think that she can wait that long. Has VNA services coming to the house. BP was up a little bit in the hospital but she was in quite a bit of pain. Denies chest pain or dyspnea. Eating and drinking okay. Past Medical History:   Diagnosis Date   • BRCA gene mutation negative 05/19/2021    [facility]; 36 gene panel   • Breast cancer ([address])     Left breast   • Colon polyp    • Dental crowns present    • Exercise involving walking     the dogs   • History of angina     per pt "years ago and related to stress"   • History of chemotherapy     breast cancer (left) 2021.    • History of radiation therapy    • [place] (hard of hearing)     wears hearing aids/will wear DOS bilat   • Hypertension    • Limb alert care status     No BP/IV Left Arm   • Lymphedema of left arm    • Prediabetes    • Wears glasses        Past Surgical History:   Procedure Laterality Date   • AXILLARY SURGERY Right     sweat glands removed -    • BREAST CYST EXCISION Right 2000 benign   • BREAST SURGERY Right    • CHOLECYSTECTOMY     • COLONOSCOPY     • DILATION AND CURETTAGE OF UTERUS     • IR BIOPSY BONE  9/1/2023   • MASTECTOMY Left 06/08/2021   • NH BREAST REDUCTION N/A 03/18/2022    Procedure: R BREAST REDUCTION; L BREAST EXCISION STANDING SKIN DEFORMITY; LOCAL FLAP;  Surgeon: Sia Trejo MD;  Location: [facility];  Service: Plastics   • NH MAST MODF RAD W/AX LYMPH NOD W/WO PECT/NERI MIN Left 06/08/2021    Procedure: BREAST MODIFIED RADICAL MASTECTOMY; ANAND  DIRECTED AXILLARY DISSECTION;  Surgeon: Narinder Herbert MD;  Location: [facility];  Service: Surgical Oncology   • REDUCTION MAMMAPLASTY Right     March 2022   • US BREAST NEEDLE LOC LEFT Left 06/02/2021   • US GUIDED BREAST BIOPSY LEFT COMPLETE Left 04/19/2021   • US GUIDED BREAST LYMPH NODE BIOPSY LEFT Left 04/19/2021   • WISDOM TOOTH EXTRACTION         Current Outpatient Medications   Medication Sig Dispense Refill   • albuterol (ProAir HFA) 90 mcg/act inhaler Inhale 2 puffs every 4 (four) hours as needed for wheezing or shortness of breath 8 g 0   • anastrozole (ARIMIDEX) 1 mg tablet TAKE ONE TABLET BY MOUTH EVERY DAY 90 tablet 1   • Ascorbic Acid (VITAMIN C PO) Take 500 mg by mouth in the morning     • fluticasone (FLONASE) 50 mcg/act nasal spray 2 puffs into each nostril daily     • losartan-hydrochlorothiazide (HYZAAR) 50-12.5 mg per tablet Take 1 tablet by mouth daily 30 tablet 0   • metoprolol tartrate (LOPRESSOR) 50 mg tablet Take 1 tablet (50 mg total) by mouth daily 90 tablet 0   • Multiple Vitamins-Minerals (MULTIVITAMIN ADULT PO) Take by mouth daily     • naproxen (NAPROSYN) 500 mg tablet Take 1 tablet (500 mg total) by mouth 2 (two) times a day with meals for 14 days 28 tablet 0   • oxyCODONE (ROXICODONE) 10 MG TABS Take 1 tablet (10 mg total) by mouth every 4 (four) hours as needed for severe pain Max Daily Amount: 60 mg 30 tablet 0   • senna-docusate sodium (SENOKOT-S) 8.6-50 mg per tablet Take 1 tablet by mouth 2 (two) times a day  0   • VITAMIN D PO Take by mouth in the morning       No current facility-administered medications for this visit. No Known Allergies    Review of Systems   Constitutional: Positive for fatigue. Respiratory: Negative. Cardiovascular: Negative. Musculoskeletal: Positive for back pain and gait problem. Neurological: Positive for weakness. Video Exam    There were no vitals filed for this visit. Physical Exam  Constitutional:       General: She is not in acute distress. Appearance: She is well-developed. She is not diaphoretic. HENT:      Head: Normocephalic and atraumatic. Pulmonary:      Effort: Pulmonary effort is normal.   Musculoskeletal:      Cervical back: Normal range of motion. Neurological:      Mental Status: She is alert and oriented to person, place, and time.           Visit Time  Total Visit Duration: 15

## 2023-09-08 ENCOUNTER — TELEPHONE (OUTPATIENT)
Dept: HEMATOLOGY ONCOLOGY | Facility: MEDICAL CENTER | Age: 73
End: 2023-09-08

## 2023-09-08 NOTE — TELEPHONE ENCOUNTER
Patient has not heard from RT regarding appt  Message sent to supervisor via email to make aware  Voicemail left for patient to inform of status

## 2023-09-11 ENCOUNTER — TELEPHONE (OUTPATIENT)
Dept: FAMILY MEDICINE CLINIC | Facility: CLINIC | Age: 73
End: 2023-09-11

## 2023-09-11 DIAGNOSIS — M84.58XA PATHOLOGICAL FRACTURE OF SACRAL VERTEBRA DUE TO NEOPLASTIC DISEASE: Primary | ICD-10-CM

## 2023-09-11 RX ORDER — GABAPENTIN 100 MG/1
100 CAPSULE ORAL
Qty: 30 CAPSULE | Refills: 1 | Status: SHIPPED | OUTPATIENT
Start: 2023-09-11

## 2023-09-11 NOTE — TELEPHONE ENCOUNTER
Patient called in to speak to dr. Marcos Fuentes she believes she is developing respiratory problems. Not sure what to do. Would like a call back ASAP.

## 2023-09-11 NOTE — TELEPHONE ENCOUNTER
naproxen (NAPROSYN) 500 mg tablet [[id number]]  ENDED didn't work. Will Dr. Anup Solano call in Neurontin? Patient had virtual with DR. Peña recently.

## 2023-09-11 NOTE — TELEPHONE ENCOUNTER
We have to start out slow, I called in 100 mg at bedtime for her. If she is not improving after about a week she should call and we can increase this.

## 2023-09-12 ENCOUNTER — TELEPHONE (OUTPATIENT)
Age: 73
End: 2023-09-12

## 2023-09-12 ENCOUNTER — CLINICAL SUPPORT (OUTPATIENT)
Dept: RADIATION ONCOLOGY | Facility: HOSPITAL | Age: 73
End: 2023-09-12
Attending: INTERNAL MEDICINE
Payer: MEDICARE

## 2023-09-12 ENCOUNTER — RADIATION ONCOLOGY FOLLOW-UP (OUTPATIENT)
Dept: RADIATION ONCOLOGY | Facility: HOSPITAL | Age: 73
End: 2023-09-12
Attending: RADIOLOGY
Payer: MEDICARE

## 2023-09-12 VITALS
DIASTOLIC BLOOD PRESSURE: 68 MMHG | RESPIRATION RATE: 18 BRPM | SYSTOLIC BLOOD PRESSURE: 132 MMHG | HEART RATE: 84 BPM | OXYGEN SATURATION: 98 % | TEMPERATURE: 97.8 F

## 2023-09-12 DIAGNOSIS — M84.58XA PATHOLOGICAL FRACTURE OF SACRAL VERTEBRA DUE TO NEOPLASTIC DISEASE: Primary | ICD-10-CM

## 2023-09-12 DIAGNOSIS — Z17.0 MALIGNANT NEOPLASM OF UPPER-OUTER QUADRANT OF LEFT BREAST IN FEMALE, ESTROGEN RECEPTOR POSITIVE: ICD-10-CM

## 2023-09-12 DIAGNOSIS — C50.412 MALIGNANT NEOPLASM OF UPPER-OUTER QUADRANT OF LEFT BREAST IN FEMALE, ESTROGEN RECEPTOR POSITIVE: ICD-10-CM

## 2023-09-12 PROCEDURE — 99211 OFF/OP EST MAY X REQ PHY/QHP: CPT | Performed by: INTERNAL MEDICINE

## 2023-09-12 PROCEDURE — 99215 OFFICE O/P EST HI 40 MIN: CPT | Performed by: INTERNAL MEDICINE

## 2023-09-12 NOTE — PROGRESS NOTES
Willam Phan 1950 is a 72 y.o. female returns to discuss palliative radiation for newly diagnosed bone metastasis. 72year old female with history of left breast invasive mammary carcinoma, grade 2, ER/AK positive, HER 2 negative, 4/10 lymph nodes micrometastases. She is s/p left breast modified radical mastectomy, ANAND  directed axillary dissection on 6/8/21. She completed adjuvant chemotherapy with Cytoxan/Taxotere on 9/17/21. This was followed by a course of radiation to the left chest wall and regional lymphatics on 11/19/2021. She was last seen in [address] for follow-up on 8/8/23 with Dr. Klaus Gordon. She had diagnostic right breast mammogram/US on 7/20/23 that was BI-RADS 2 benign. Patient presented to the ER on 8/30/23 with bilateral hip pain. She was admitted to the hospital 8/30 - 9/2/23. Imaging showed left sacral pathological fracture. IR biopsy of left iliac crest revealed metastatic breast carcinoma. She was discharged home with oxycodone 10 mg to follow-up with oncology outpatient. 8/30/23 CT lumbar spine wo contrast   There is abnormal lytic destruction of the left sacrum extending across the left sacroiliac joint and involving the left medial iliac bone. Associated pathological fracture of the left sacrum and mild soft tissue density mass in the left presacral region. This could be secondary to focal osseous metastatic disease/myeloma. Severe osteomyelitis with associated bony destruction is also possible but felt to be less likely. Recommend clinical correlation    8/30/23 CT pelvis wo contrast  There is abnormal lytic destruction of the left sacrum extending across the left sacroiliac joint and involving the left medial iliac bone. Associated pathological fracture of the left sacrum and mild soft tissue density mass in the left presacral region. This could be secondary to focal osseous metastatic disease/myeloma.  Severe osteomyelitis with associated bony destruction is also possible but felt to be less likely. Recommend clinical correlation. Soft tissue density extending into the left sacral neural foramina (S1-S3) with involvement of the respective left sacral nerve roots noted. No intrapelvic visceral mass/fluid collection or lymphadenopathy by size criteria noted. 8/31/23 Bone scan whole body  1. Known pathologic sacral fracture. Otherwise no scintigraphic evidence of additional osseous metastasis. 9/1/23 MRI pelvis w wo contrast  Large infiltrative upper sacral lesion with asymmetric involvement of the left sacral ala, extension to the SI joint and medial left iliac bone as well as subtle extraosseous extension as detailed above, concerning for malignancy. Correlate with bone biopsy also done on this date. The lesion narrows the left L5 and S1 neural foramina and may touch upon the exiting nerve roots in these regions. Correlate for radiculitis in these territories. Cannot exclude subtle epidural invasion posterior to S1.    9/1/23 IR bone biopsy -   Bone, left iliac crest, biopsy:  -Metastatic carcinoma, most compatible with metastasis from breast origin. 9/1/23 Med Onc, Inpatient note (Dr. Josue Nelson)  Recent CT scan of the pelvis demonstrated an abnormal lytic lesion of the sacrum extending across the left sacroiliac joint with associated pathologic fracture of the left sacrum and mild soft tissue density in the left presacral region, concerning for recurrent/metastatic breast cancer. Discussed with Dr. Celio Cho, radiation oncologist.  He reviewed the recent scans and believes patient may be a good candidate for palliative RT after discharge. MRI/pelvis has just been completed, results are pending. Patient is also scheduled for a biopsy.         Upcoming appts:  9/26/23 Med Onc, Dr. Josue Nelson  10/18/23 Surg Onc, [facility]                Oncology History   Malignant neoplasm of upper-outer quadrant of left breast in female, estrogen receptor positive ([address]) 4/19/2021 Biopsy    Left breast US guided biopsy:  A. 2 o'clock 8 cm from the nipple  Invasive mammary carcinoma of no special type  Grade 2  ER 90  OR 1  HER2 1+  Lymphovascular invasion: not identified    B. Left Axillary lymph node biopsy:  Invasive/ metastatic adenocarcinoma, compatible with breast primary involving fibroadipose tissues and a portion of lymphoid pranchyma. ER 90  OR 1  HER2 1+    Concordant. Malignancy appears multifocal. The 2 adjacent masses span an area of approximately 4 cm. Right breast clear. 5/3/2021 Genetic Testing    The following genes were evaluated: OLINDA, BRCA1, BRCA2, CDH1, CHEK2, PALB2, PTEN, STK11, TP53  Additional genes evaluated for a total of 36 genes analyzed  Negative result. No pathogenic sequence variants or deletions/dupllications identified  [employer]     6/8/2021 Surgery    Left breast modified radical mastectomy with ANAND  directed axillary dissection  Invasive carcinoma of no special type (ductal)  Grade 2  6 cm  Lymphovascular invasion present  Margins negative  4/10 Lymph nodes (3.5 cm)  Anatomic Stage IIIA  Prognostic Stage IB     7/16/2021 - 9/17/2021 Chemotherapy    pegfilgrastim (NEULASTA ONPRO), 6 mg, Subcutaneous, Once, 4 of 4 cycles  Administration: 6 mg (7/16/2021), 6 mg (8/6/2021), 6 mg (8/27/2021), 6 mg (9/17/2021)  cyclophosphamide (CYTOXAN) IVPB, 600 mg/m2 = 1,212 mg, Intravenous, Once, 4 of 4 cycles  Administration: 1,212 mg (7/16/2021), 1,212 mg (8/6/2021), 1,212 mg (8/27/2021), 1,212 mg (9/17/2021)  DOCEtaxel (TAXOTERE) chemo infusion, 75 mg/m2 = 151.6 mg, Intravenous, Once, 4 of 4 cycles  Administration: 151.6 mg (7/16/2021), 151.6 mg (8/6/2021), 151.6 mg (8/27/2021), 151.6 mg (9/17/2021)     10/18/2021 - 11/19/2021 Radiation    BH L CW 10X/6X 13 / 13 200 0 2,600 32   BH L CW BOLUS 10X/6X 12 / 12 200 0 2,400 30   BH L PAB 10X 25 / 25 60 0 1,500 32   BH L Sclav 10X 25 / 25 200 0 5,000 32      Treatment Dates:  10/18/2021 - 11/19/2021.     Natalie     10/2021 -  Hormone Therapy    anastrozole 1 mg once a day    Dr Amaury Sabillon     9/1/2023 Biopsy    Bone, left iliac crest, biopsy:  -Metastatic carcinoma, most compatible with metastasis from breast origin. Review of Systems:  Review of Systems   Constitutional: Positive for appetite change (decreased the last couple weeks) and fatigue. HENT: Negative. Eyes: Negative. Respiratory: Negative. Cardiovascular: Negative. Gastrointestinal: Positive for constipation (taking senokot ) and nausea (with pain). Endocrine: Negative. Genitourinary: Negative for difficulty urinating. Musculoskeletal: Positive for arthralgias (right hip), back pain and gait problem (ambulates with walker at home). Skin: Negative. Allergic/Immunologic: Negative. Neurological: Positive for dizziness (upon standing), weakness (legs) and numbness (left hip/leg numbness, b/l legs tingling at times). Hematological: Negative. Psychiatric/Behavioral: The patient is nervous/anxious.         Clinical Trial: no    Teaching: palliative RT       Health Maintenance   Topic Date Due   • Cervical Cancer Screening  01/19/2020   • Pneumococcal Vaccine: 65+ Years (3 - PPSV23 if available, else PCV20) 11/05/2020   • COVID-19 Vaccine (5 - Booster for Pfizer series) 12/28/2022   • BMI: Followup Plan  02/04/2023   • Influenza Vaccine (1) 09/01/2023   • ONC Colorectal Surgery Screening  10/18/2023   • Breast Cancer Survivorship Visit  10/18/2023   • ONC Cervical Cancer Screening  10/18/2023   • ONC DXA Scan  10/18/2023   • ONC Physical Therapy Referral  10/18/2023   • Fall Risk  05/03/2024   • Urinary Incontinence Screening  05/03/2024   • Medicare Annual Wellness Visit (AWV)  05/03/2024   • Breast Cancer Screening: Mammogram  07/20/2024   • BMI: Adult  08/30/2024   • Depression Screening  09/12/2024   • Colorectal Cancer Screening  07/05/2027   • Hepatitis C Screening  Completed   • Osteoporosis Screening  Completed   • HIB Vaccine  Aged Out   • IPV Vaccine  Aged Out   • Hepatitis A Vaccine  Aged Out   • Meningococcal ACWY Vaccine  Aged Out   • HPV Vaccine  Aged Out     Patient Active Problem List   Diagnosis   • Benign essential hypertension   • Chronic rhinitis   • Hidradenitis suppurativa   • Hypothyroidism   • Impaired fasting glucose   • Morbid obesity ([address])   • Venous insufficiency (chronic) (peripheral)   • Malignant neoplasm of upper-outer quadrant of left breast in female, estrogen receptor positive (HCC)   • Use of anastrozole   • S/P mastectomy, left   • Obesity (BMI 30-39. 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    • History of radiation therapy    • [place] (hard of hearing)     wears hearing aids/will wear DOS bilat   • Hypertension    • Limb alert care status     No BP/IV Left Arm   • Lymphedema of left arm    • Prediabetes    • Wears glasses      Past Surgical History:   Procedure Laterality Date   • AXILLARY SURGERY Right     sweat glands removed -    • BREAST CYST EXCISION Right 2000    benign   • BREAST SURGERY Right    • CHOLECYSTECTOMY     • COLONOSCOPY     • DILATION AND CURETTAGE OF UTERUS     • IR BIOPSY BONE  9/1/2023   • MASTECTOMY Left 06/08/2021   • CO BREAST REDUCTION N/A 03/18/2022    Procedure: R BREAST REDUCTION; L BREAST EXCISION STANDING SKIN DEFORMITY; LOCAL FLAP;  Surgeon: Huong Michele MD;  Location: [facility];  Service: Plastics   • CO MAST MODF RAD W/AX LYMPH NOD W/WO PECT/NERI MIN Left 06/08/2021    Procedure: BREAST MODIFIED RADICAL MASTECTOMY; ANAND  DIRECTED AXILLARY DISSECTION; Surgeon: Raffy Kohli MD;  Location: [facility];  Service: Surgical Oncology   • REDUCTION MAMMAPLASTY Right     March 2022   • US BREAST NEEDLE LOC LEFT Left 06/02/2021   • US GUIDED BREAST BIOPSY LEFT COMPLETE Left 04/19/2021   • US GUIDED BREAST LYMPH NODE BIOPSY LEFT Left 04/19/2021   • WISDOM TOOTH EXTRACTION       Family History   Problem Relation Age of Onset   • Dementia Mother    • Colon cancer Mother         49's-62's   • Lung cancer Father         49's-62's   • No Known Problems Sister    • Breast cancer Paternal Aunt    • No Known Problems Sister    • Stomach cancer Paternal Uncle         66's     Social History     Socioeconomic History   • Marital status: /Civil Union     Spouse name: Not on file   • Number of children: Not on file   • Years of education: Not on file   • Highest education level: Not on file   Occupational History   • Not on file   Tobacco Use   • Smoking status: Never     Passive exposure: Never   • Smokeless tobacco: Never   Vaping Use   • Vaping Use: Never used   Substance and Sexual Activity   • Alcohol use: Never   • Drug use: No   • Sexual activity: Yes     Partners: Male     Birth control/protection: Male Sterilization     Comment: defer   Other Topics Concern   • Not on file   Social History Narrative   • Not on file     Social Determinants of Health     Financial Resource Strain: Low Risk  (5/3/2023)    Overall Financial Resource Strain (CARDIA)    • Difficulty of Paying Living Expenses: Not hard at all   Food Insecurity: No Food Insecurity (8/31/2023)    Hunger Vital Sign    • Worried About Running Out of Food in the Last Year: Never true    • Ran Out of Food in the Last Year: Never true   Transportation Needs: No Transportation Needs (8/31/2023)    PRAPARE - Transportation    • Lack of Transportation (Medical): No    • Lack of Transportation (Non-Medical):  No   Physical Activity: Not on file   Stress: Not on file   Social Connections: Not on file   Intimate Partner Violence: Not on file   Housing Stability: Low Risk  (8/31/2023)    Housing Stability Vital Sign    • Unable to Pay for Housing in the Last Year: No    • Number of Places Lived in the Last Year: 1    • Unstable Housing in the Last Year: No       Current Outpatient Medications:   •  albuterol (ProAir HFA) 90 mcg/act inhaler, Inhale 2 puffs every 4 (four) hours as needed for wheezing or shortness of breath, Disp: 8 g, Rfl: 0  •  anastrozole (ARIMIDEX) 1 mg tablet, TAKE ONE TABLET BY MOUTH EVERY DAY, Disp: 90 tablet, Rfl: 1  •  Ascorbic Acid (VITAMIN C PO), Take 500 mg by mouth in the morning, Disp: , Rfl:   •  gabapentin (Neurontin) 100 mg capsule, Take 1 capsule (100 mg total) by mouth daily at bedtime, Disp: 30 capsule, Rfl: 1  •  losartan-hydrochlorothiazide (HYZAAR) 50-12.5 mg per tablet, Take 1 tablet by mouth daily, Disp: 30 tablet, Rfl: 0  •  metoprolol tartrate (LOPRESSOR) 50 mg tablet, Take 1 tablet (50 mg total) by mouth daily, Disp: 90 tablet, Rfl: 0  •  Multiple Vitamins-Minerals (MULTIVITAMIN ADULT PO), Take by mouth daily, Disp: , Rfl:   •  naproxen (NAPROSYN) 500 mg tablet, Take 1 tablet (500 mg total) by mouth 2 (two) times a day with meals for 14 days, Disp: 28 tablet, Rfl: 0  •  oxyCODONE (ROXICODONE) 10 MG TABS, Take 1 tablet (10 mg total) by mouth every 4 (four) hours as needed for severe pain Max Daily Amount: 60 mg, Disp: 30 tablet, Rfl: 0  •  senna-docusate sodium (SENOKOT-S) 8.6-50 mg per tablet, Take 1 tablet by mouth 2 (two) times a day, Disp: , Rfl: 0  •  VITAMIN D PO, Take by mouth in the morning, Disp: , Rfl:   •  fluticasone (FLONASE) 50 mcg/act nasal spray, 2 puffs into each nostril daily, Disp: , Rfl:   No Known Allergies  Vitals:    09/12/23 0957   BP: 132/68   BP Location: Left arm   Pulse: 84   Resp: 18   Temp: 97.8 °F (36.6 °C)   TempSrc: Temporal   SpO2: 98%

## 2023-09-12 NOTE — LETTER
2023     Jayden Chen, [facility] [address]    Patient: Alexa Damon   YOB: 1950   Date of Visit: 2023       Dear Dr. Feliciano Ahumada: Thank you for referring Alexa Damon to me for evaluation. Below are my notes for this consultation. If you have questions, please do not hesitate to call me. I look forward to following your patient along with you. Sincerely,        Emily Flores MD        CC: No Recipients    Emily Flores MD  2023  3:09 PM  Sign when Signing Visit  Complex Follow-Up/Established Patient Consultation - Radiation Oncology      Patient Name: Alexa Damon ZYK:[id number] : 1950  Encounter: [id number]  Referring Provider: Jayden Chen MD    ASSESSMENT  Cancer Staging   Malignant neoplasm of upper-outer quadrant of left breast in female, estrogen receptor positive ([address])  Staging form: Breast, AJCC 8th Edition  - Clinical: Stage IV (cM1) - Signed by Emily Flores MD on 2023    PLAN  Alexa Damon is a 72 y.o. female with previously locally advanced left breast cancer status post resection, adjuvant chemotherapy, and adjuvant radiation therapy to the chest wall and regional lymphatics completing in 2021, now presenting with newly diagnosed biopsy-proven infiltrative sacral lesion seen on MRI 23 with involvement of the left sacral ala and left L5 and S1 neural foramina. There is some linear enhancement seen along the epidural space posterior to S1 superiorly. She has significant sacral pain which correlates to the lesion. Interestingly the radiating pain is worse on the right greater than left. We discussed a palliative radiation therapy regimen (5-10 daily treatments) to the L5-sacral region. Risks, benefits and alternatives to these treatment options were also discussed. Side effects may include mild fatigue, minimal dermatitis, and gastrointestinal and genitourinary symptoms.  We discussed in detail that lower back pain and radiating pain is often multifactorial in etiology, and sometimes treating the tumor does not always results in relief of pain. She and her partner understand this. The pain may persist/worsen despite radiation therapy. The patient agreed to proceed with radiation therapy, and informed consent was signed. CT simulation to be scheduled at [facility]. Thank you for the opportunity to participate in the care of this patient. Kaylene Cruz MD  Department of [address] [facility]    Orders Placed This Encounter   Procedures   • Radiation Simulation Treatment     1. 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 Malignant neoplasm of upper-outer quadrant of left breast in female, estrogen receptor positive ([address] [facility])  Radiation Simulation Treatment          Total Time Spent  55 minutes spent reviewing EMR in preparation for visit, with the patient, coordination with other providers, review of radiology, and documentation. CHIEF COMPLAINT  Chief Complaint   Patient presents with   • Follow-up     Radiation Oncology        History of Present Illness  Angie Causey 1950 is a 72 y.o. female returns to discuss palliative radiation for newly diagnosed bone metastasis. 72year old female with history of left breast invasive mammary carcinoma, grade 2, ER/MI positive, HER 2 negative, 4/10 lymph nodes micrometastases. She is s/p left breast modified radical mastectomy, ANAND  directed axillary dissection on 6/8/21. She completed adjuvant chemotherapy with Cytoxan/Taxotere on 9/17/21. This was followed by a course of radiation to the left chest wall and regional lymphatics on 11/19/2021. She was last seen in [address] for follow-up on 8/8/23 with Dr. Lexy Galdamez. She had diagnostic right breast mammogram/US on 7/20/23 that was BI-RADS 2 benign.       Patient presented to the ER on 8/30/23 with bilateral hip pain. She was admitted to the hospital 8/30 - 9/2/23. Imaging showed left sacral pathological fracture. IR biopsy of left iliac crest revealed metastatic breast carcinoma. She was discharged home with oxycodone 10 mg to follow-up with oncology outpatient. 8/30/23 CT lumbar spine wo contrast   There is abnormal lytic destruction of the left sacrum extending across the left sacroiliac joint and involving the left medial iliac bone. Associated pathological fracture of the left sacrum and mild soft tissue density mass in the left presacral region. This could be secondary to focal osseous metastatic disease/myeloma. Severe osteomyelitis with associated bony destruction is also possible but felt to be less likely. Recommend clinical correlation     8/30/23 CT pelvis wo contrast  There is abnormal lytic destruction of the left sacrum extending across the left sacroiliac joint and involving the left medial iliac bone. Associated pathological fracture of the left sacrum and mild soft tissue density mass in the left presacral region. This could be secondary to focal osseous metastatic disease/myeloma. Severe osteomyelitis with associated bony destruction is also possible but felt to be less likely. Recommend clinical correlation. Soft tissue density extending into the left sacral neural foramina (S1-S3) with involvement of the respective left sacral nerve roots noted. No intrapelvic visceral mass/fluid collection or lymphadenopathy by size criteria noted. 8/31/23 Bone scan whole body  1. Known pathologic sacral fracture. Otherwise no scintigraphic evidence of additional osseous metastasis. 9/1/23 MRI pelvis w wo contrast  Large infiltrative upper sacral lesion with asymmetric involvement of the left sacral ala, extension to the SI joint and medial left iliac bone as well as subtle extraosseous extension as detailed above, concerning for malignancy.  Correlate with bone biopsy also done on this date. The lesion narrows the left L5 and S1 neural foramina and may touch upon the exiting nerve roots in these regions. Correlate for radiculitis in these territories. Cannot exclude subtle epidural invasion posterior to S1.     9/1/23 IR bone biopsy -   Bone, left iliac crest, biopsy:  -Metastatic carcinoma, most compatible with metastasis from breast origin. 9/1/23 Med Onc, Inpatient note (Dr. Maikel May)  Recent CT scan of the pelvis demonstrated an abnormal lytic lesion of the sacrum extending across the left sacroiliac joint with associated pathologic fracture of the left sacrum and mild soft tissue density in the left presacral region, concerning for recurrent/metastatic breast cancer. Discussed with Dr. Emani Leung, radiation oncologist.  He reviewed the recent scans and believes patient may be a good candidate for palliative RT after discharge. MRI/pelvis has just been completed, results are pending. Patient is also scheduled for a biopsy. Upcoming appts:  9/26/23 Med Onc, Dr. Maikel May  10/18/23 Surg Onc, Mary Kay CARRASCO    Today, the patient notes significant pain in her mid sacral area as well as radiating down towards the right lower extremity. She denies significant pain but does have some discomfort in the left lower extremity. Oncology History   Malignant neoplasm of upper-outer quadrant of left breast in female, estrogen receptor positive ([address])   4/19/2021 Biopsy    Left breast US guided biopsy:  A. 2 o'clock 8 cm from the nipple  Invasive mammary carcinoma of no special type  Grade 2  ER 90  CA 1  HER2 1+  Lymphovascular invasion: not identified    B. Left Axillary lymph node biopsy:  Invasive/ metastatic adenocarcinoma, compatible with breast primary involving fibroadipose tissues and a portion of lymphoid pranchyma. ER 90  CA 1  HER2 1+    Concordant. Malignancy appears multifocal. The 2 adjacent masses span an area of approximately 4 cm. Right breast clear. 5/3/2021 Genetic Testing    The following genes were evaluated: OLINDA, BRCA1, BRCA2, CDH1, CHEK2, PALB2, PTEN, STK11, TP53  Additional genes evaluated for a total of 36 genes analyzed  Negative result. No pathogenic sequence variants or deletions/dupllications identified  [employer]     6/8/2021 Surgery    Left breast modified radical mastectomy with ANAND  directed axillary dissection  Invasive carcinoma of no special type (ductal)  Grade 2  6 cm  Lymphovascular invasion present  Margins negative  4/10 Lymph nodes (3.5 cm)  Anatomic Stage IIIA  Prognostic Stage IB     7/16/2021 - 9/17/2021 Chemotherapy    pegfilgrastim (NEULASTA ONPRO), 6 mg, Subcutaneous, Once, 4 of 4 cycles  Administration: 6 mg (7/16/2021), 6 mg (8/6/2021), 6 mg (8/27/2021), 6 mg (9/17/2021)  cyclophosphamide (CYTOXAN) IVPB, 600 mg/m2 = 1,212 mg, Intravenous, Once, 4 of 4 cycles  Administration: 1,212 mg (7/16/2021), 1,212 mg (8/6/2021), 1,212 mg (8/27/2021), 1,212 mg (9/17/2021)  DOCEtaxel (TAXOTERE) chemo infusion, 75 mg/m2 = 151.6 mg, Intravenous, Once, 4 of 4 cycles  Administration: 151.6 mg (7/16/2021), 151.6 mg (8/6/2021), 151.6 mg (8/27/2021), 151.6 mg (9/17/2021)     10/18/2021 - 11/19/2021 Radiation    BH L CW 10X/6X 13 / 13 200 0 2,600 32   BH L CW BOLUS 10X/6X 12 / 12 200 0 2,400 30   BH L PAB 10X 25 / 25 60 0 1,500 32   BH L Sclav 10X 25 / 25 200 0 5,000 32      Treatment Dates:  10/18/2021 - 11/19/2021. Dr Lula Abbasi     10/2021 -  Hormone Therapy    anastrozole 1 mg once a day    Dr Denys Ha     9/1/2023 Biopsy    Bone, left iliac crest, biopsy:  -Metastatic carcinoma, most compatible with metastasis from breast origin.      9/12/2023 -  Cancer Staged    Staging form: Breast, AJCC 8th Edition  - Clinical: Stage IV (cM1) - Signed by Christin Bautista MD on 9/12/2023         Historical Information   Past Medical History:   Diagnosis Date   • BRCA gene mutation negative 05/19/2021    [employer]; 36 gene panel   • Breast cancer ([address])     Left breast   • Colon polyp    • Dental crowns present    • Exercise involving walking     the dogs   • History of angina     per pt "years ago and related to stress"   • History of chemotherapy     breast cancer (left) 2021.    • History of radiation therapy    • [place]hard of hearing[place]     wears hearing aids/will wear DOS bilat   • Hypertension    • Limb alert care status     No BP/IV Left Arm   • Lymphedema of left arm    • Prediabetes    • Wears glasses      Past Surgical History:   Procedure Laterality Date   • AXILLARY SURGERY Right     sweat glands removed -    • BREAST CYST EXCISION Right 2000    benign   • BREAST SURGERY Right    • CHOLECYSTECTOMY     • COLONOSCOPY     • DILATION AND CURETTAGE OF UTERUS     • IR BIOPSY BONE  9/1/2023   • MASTECTOMY Left 06/08/2021   • TN BREAST REDUCTION N/A 03/18/2022    Procedure: R BREAST REDUCTION; L BREAST EXCISION STANDING SKIN DEFORMITY; LOCAL FLAP;  Surgeon: Denys Drummond MD;  Location: [facility];  Service: Plastics   • TN MAST MODF RAD W/AX LYMPH NOD W/WO PECT/NERI MIN Left 06/08/2021    Procedure: BREAST MODIFIED RADICAL MASTECTOMY; ANAND  DIRECTED AXILLARY DISSECTION;  Surgeon: Ching Del Rio MD;  Location: [facility];  Service: Surgical Oncology   • REDUCTION MAMMAPLASTY Right     March 2022   • US BREAST NEEDLE LOC LEFT Left 06/02/2021   • US GUIDED BREAST BIOPSY LEFT COMPLETE Left 04/19/2021   • US GUIDED BREAST LYMPH NODE BIOPSY LEFT Left 04/19/2021   • WISDOM TOOTH EXTRACTION       Family History   Problem Relation Age of Onset   • Dementia Mother    • Colon cancer Mother         49's-62's   • Lung cancer Father         49's-62's   • No Known Problems Sister    • Breast cancer Paternal Aunt    • No Known Problems Sister    • Stomach cancer Paternal Uncle         66's     Social History   Social History     Substance and Sexual Activity   Alcohol Use Never     Social History     Substance and Sexual Activity   Drug Use No     Social History     Tobacco Use Smoking Status Never   • Passive exposure: Never   Smokeless Tobacco Never     Meds/Allergies     Current Outpatient Medications:   •  albuterol (ProAir HFA) 90 mcg/act inhaler, Inhale 2 puffs every 4 (four) hours as needed for wheezing or shortness of breath, Disp: 8 g, Rfl: 0  •  anastrozole (ARIMIDEX) 1 mg tablet, TAKE ONE TABLET BY MOUTH EVERY DAY, Disp: 90 tablet, Rfl: 1  •  Ascorbic Acid (VITAMIN C PO), Take 500 mg by mouth in the morning, Disp: , Rfl:   •  gabapentin (Neurontin) 100 mg capsule, Take 1 capsule (100 mg total) by mouth daily at bedtime, Disp: 30 capsule, Rfl: 1  •  losartan-hydrochlorothiazide (HYZAAR) 50-12.5 mg per tablet, Take 1 tablet by mouth daily, Disp: 30 tablet, Rfl: 0  •  metoprolol tartrate (LOPRESSOR) 50 mg tablet, Take 1 tablet (50 mg total) by mouth daily, Disp: 90 tablet, Rfl: 0  •  Multiple Vitamins-Minerals (MULTIVITAMIN ADULT PO), Take by mouth daily, Disp: , Rfl:   •  naproxen (NAPROSYN) 500 mg tablet, Take 1 tablet (500 mg total) by mouth 2 (two) times a day with meals for 14 days, Disp: 28 tablet, Rfl: 0  •  oxyCODONE (ROXICODONE) 10 MG TABS, Take 1 tablet (10 mg total) by mouth every 4 (four) hours as needed for severe pain Max Daily Amount: 60 mg, Disp: 30 tablet, Rfl: 0  •  senna-docusate sodium (SENOKOT-S) 8.6-50 mg per tablet, Take 1 tablet by mouth 2 (two) times a day, Disp: , Rfl: 0  •  VITAMIN D PO, Take by mouth in the morning, Disp: , Rfl:   •  fluticasone (FLONASE) 50 mcg/act nasal spray, 2 puffs into each nostril daily, Disp: , Rfl:   No Known Allergies   Pathology:  See above.     Review of Systems  Refer to nursing note    OBJECTIVE:   /68 (BP Location: Left arm)   Pulse 84   Temp 97.8 °F (36.6 °C) (Temporal)   Resp 18   SpO2 98%   Pain Assessment:  10  Performance Status: ECO - Symptomatic, <50% confined to bed    Physical Exam  General Appearance:  Alert, cooperative, no distress, appears stated age, in significant pain  Lungs: Respirations unlabored, no cyanosis, able to speak in complete sentences without dyspnea. MSK: Tenderness to palpation over mid sacral and right and left pelvic regions. Requires a wheelchair for mobility  Skin: No generalized rash or dermatitis  Neurologic: ANOx3, speech and cognition intact. Portions of the record may have been created with voice recognition software. Occasional wrong word or "sound a like" substitutions may have occurred due to the inherent limitations of voice recognition software. Read the chart carefully and recognize, using context, where substitutions have occurred.

## 2023-09-12 NOTE — PROGRESS NOTES
Complex Follow-Up/Established Patient Consultation - Radiation Oncology      Patient Name: Annelise Barth PRR:[id number] : 1950  Encounter: [id number]  Referring Provider: Elida Ayala MD    ASSESSMENT  Cancer Staging   Malignant neoplasm of upper-outer quadrant of left breast in female, estrogen receptor positive ([address])  Staging form: Breast, AJCC 8th Edition  - Clinical: Stage IV (cM1) - Signed by Baljit Jimenez MD on 2023    PLAN  Annelise Barth is a 72 y.o. female with previously locally advanced left breast cancer status post resection, adjuvant chemotherapy, and adjuvant radiation therapy to the chest wall and regional lymphatics completing in 2021, now presenting with newly diagnosed biopsy-proven infiltrative sacral lesion seen on MRI 23 with involvement of the left sacral ala and left L5 and S1 neural foramina. There is some linear enhancement seen along the epidural space posterior to S1 superiorly. She has significant sacral pain which correlates to the lesion. Interestingly the radiating pain is worse on the right greater than left. We discussed a palliative radiation therapy regimen (5-10 daily treatments) to the L5-sacral region. Risks, benefits and alternatives to these treatment options were also discussed. Side effects may include mild fatigue, minimal dermatitis, and gastrointestinal and genitourinary symptoms. We discussed in detail that lower back pain and radiating pain is often multifactorial in etiology, and sometimes treating the tumor does not always results in relief of pain. She and her partner understand this. The pain may persist/worsen despite radiation therapy. The patient agreed to proceed with radiation therapy, and informed consent was signed. CT simulation to be scheduled at [facility]. Thank you for the opportunity to participate in the care of this patient.     Ulysses Reid MD  Department of [address] [place] [address] Network    Orders Placed This Encounter   Procedures   • Radiation Simulation Treatment     1. 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 Malignant neoplasm of upper-outer quadrant of left breast in female, estrogen receptor positive ([address])  Radiation Simulation Treatment          Total Time Spent  55 minutes spent reviewing EMR in preparation for visit, with the patient, coordination with other providers, review of radiology, and documentation. CHIEF COMPLAINT  Chief Complaint   Patient presents with   • Follow-up     Radiation Oncology        History of Present Illness  Vijay Deleon 1950 is a 72 y.o. female returns to discuss palliative radiation for newly diagnosed bone metastasis.      72year old female with history of left breast invasive mammary carcinoma, grade 2, ER/ND positive, HER 2 negative, 4/10 lymph nodes micrometastases.  She is s/p left breast modified radical mastectomy, ANADN  directed axillary dissection on 6/8/21. She completed adjuvant chemotherapy with Cytoxan/Taxotere on 9/17/21. This was followed by a course of radiation to the left chest wall and regional lymphatics on 11/19/2021. She was last seen in [address] for follow-up on 8/8/23 with Dr. Jeremy Fisher. She had diagnostic right breast mammogram/US on 7/20/23 that was BI-RADS 2 benign.      Patient presented to the ER on 8/30/23 with bilateral hip pain. She was admitted to the hospital 8/30 - 9/2/23. Imaging showed left sacral pathological fracture. IR biopsy of left iliac crest revealed metastatic breast carcinoma. She was discharged home with oxycodone 10 mg to follow-up with oncology outpatient.         8/30/23 CT lumbar spine wo contrast   There is abnormal lytic destruction of the left sacrum extending across the left sacroiliac joint and involving the left medial iliac bone.  Associated pathological fracture of the left sacrum and mild soft tissue density mass in the left presacral region. This could be secondary to focal osseous metastatic disease/myeloma. Severe osteomyelitis with associated bony destruction is also possible but felt to be less likely. Recommend clinical correlation     8/30/23 CT pelvis wo contrast  There is abnormal lytic destruction of the left sacrum extending across the left sacroiliac joint and involving the left medial iliac bone. Associated pathological fracture of the left sacrum and mild soft tissue density mass in the left presacral region. This could be secondary to focal osseous metastatic disease/myeloma. Severe osteomyelitis with associated bony destruction is also possible but felt to be less likely. Recommend clinical correlation. Soft tissue density extending into the left sacral neural foramina (S1-S3) with involvement of the respective left sacral nerve roots noted. No intrapelvic visceral mass/fluid collection or lymphadenopathy by size criteria noted.     8/31/23 Bone scan whole body  1. Known pathologic sacral fracture. Otherwise no scintigraphic evidence of additional osseous metastasis.     9/1/23 MRI pelvis w wo contrast  Large infiltrative upper sacral lesion with asymmetric involvement of the left sacral ala, extension to the SI joint and medial left iliac bone as well as subtle extraosseous extension as detailed above, concerning for malignancy. Correlate with bone biopsy also done on this date. The lesion narrows the left L5 and S1 neural foramina and may touch upon the exiting nerve roots in these regions. Correlate for radiculitis in these territories.  Cannot exclude subtle epidural invasion posterior to S1.     9/1/23 IR bone biopsy -   Bone, left iliac crest, biopsy:  -Metastatic carcinoma, most compatible with metastasis from breast origin.        9/1/23 Med Onc, Inpatient note (Dr. Elijah Perea)  Recent CT scan of the pelvis demonstrated an abnormal lytic lesion of the sacrum extending across the left sacroiliac joint with associated pathologic fracture of the left sacrum and mild soft tissue density in the left presacral region, concerning for recurrent/metastatic breast cancer.     Discussed with Dr. Katia Umana, radiation oncologist. Kaylah Mustafa reviewed the recent scans and believes patient may be a good candidate for palliative RT after discharge.  MRI/pelvis has just been completed, results are pending. Gwen Sanchez is also scheduled for a biopsy.       Upcoming appts:  9/26/23 Med Onc, Dr. Stacie Mosley  10/18/23 Surg Onc, Mary Kay CARRASCO    Today, the patient notes significant pain in her mid sacral area as well as radiating down towards the right lower extremity. She denies significant pain but does have some discomfort in the left lower extremity. Oncology History   Malignant neoplasm of upper-outer quadrant of left breast in female, estrogen receptor positive ([address])   4/19/2021 Biopsy    Left breast US guided biopsy:  A. 2 o'clock 8 cm from the nipple  Invasive mammary carcinoma of no special type  Grade 2  ER 90  LA 1  HER2 1+  Lymphovascular invasion: not identified    B. Left Axillary lymph node biopsy:  Invasive/ metastatic adenocarcinoma, compatible with breast primary involving fibroadipose tissues and a portion of lymphoid pranchyma. ER 90  LA 1  HER2 1+    Concordant. Malignancy appears multifocal. The 2 adjacent masses span an area of approximately 4 cm. Right breast clear. 5/3/2021 Genetic Testing    The following genes were evaluated: OLINDA, BRCA1, BRCA2, CDH1, CHEK2, PALB2, PTEN, STK11, TP53  Additional genes evaluated for a total of 36 genes analyzed  Negative result.  No pathogenic sequence variants or deletions/dupllications identified  [employer]     6/8/2021 Surgery    Left breast modified radical mastectomy with ANAND  directed axillary dissection  Invasive carcinoma of no special type (ductal)  Grade 2  6 cm  Lymphovascular invasion present  Margins negative  4/10 Lymph nodes (3.5 cm)  Anatomic Stage IIIA  Prognostic Stage IB     7/16/2021 - 9/17/2021 Chemotherapy    pegfilgrastim (NEULASTA ONPRO), 6 mg, Subcutaneous, Once, 4 of 4 cycles  Administration: 6 mg (7/16/2021), 6 mg (8/6/2021), 6 mg (8/27/2021), 6 mg (9/17/2021)  cyclophosphamide (CYTOXAN) IVPB, 600 mg/m2 = 1,212 mg, Intravenous, Once, 4 of 4 cycles  Administration: 1,212 mg (7/16/2021), 1,212 mg (8/6/2021), 1,212 mg (8/27/2021), 1,212 mg (9/17/2021)  DOCEtaxel (TAXOTERE) chemo infusion, 75 mg/m2 = 151.6 mg, Intravenous, Once, 4 of 4 cycles  Administration: 151.6 mg (7/16/2021), 151.6 mg (8/6/2021), 151.6 mg (8/27/2021), 151.6 mg (9/17/2021)     10/18/2021 - 11/19/2021 Radiation    BH L CW 10X/6X 13 / 13 200 0 2,600 32   BH L CW BOLUS 10X/6X 12 / 12 200 0 2,400 30   BH L PAB 10X 25 / 25 60 0 1,500 32   BH L Sclav 10X 25 / 25 200 0 5,000 32      Treatment Dates:  10/18/2021 - 11/19/2021. Dr Curtis Renee     10/2021 -  Hormone Therapy    anastrozole 1 mg once a day    Dr Balbina Glasgow     9/1/2023 Biopsy    Bone, left iliac crest, biopsy:  -Metastatic carcinoma, most compatible with metastasis from breast origin. 9/12/2023 -  Cancer Staged    Staging form: Breast, AJCC 8th Edition  - Clinical: Stage IV (cM1) - Signed by Chayo Rivera MD on 9/12/2023         Historical Information   Past Medical History:   Diagnosis Date   • BRCA gene mutation negative 05/19/2021    [employer]; 36 gene panel   • Breast cancer ([address])     Left breast   • Colon polyp    • Dental crowns present    • Exercise involving walking     the dogs   • History of angina     per pt "years ago and related to stress"   • History of chemotherapy     breast cancer (left) 2021.    • History of radiation therapy    • [place] (hard of hearing)     wears hearing aids/will wear DOS bilat   • Hypertension    • Limb alert care status     No BP/IV Left Arm   • Lymphedema of left arm    • Prediabetes    • Wears glasses      Past Surgical History:   Procedure Laterality Date   • AXILLARY SURGERY Right     sweat glands removed - • BREAST CYST EXCISION Right 2000    benign   • BREAST SURGERY Right    • CHOLECYSTECTOMY     • COLONOSCOPY     • DILATION AND CURETTAGE OF UTERUS     • IR BIOPSY BONE  9/1/2023   • MASTECTOMY Left 06/08/2021   • AL BREAST REDUCTION N/A 03/18/2022    Procedure: R BREAST REDUCTION; L BREAST EXCISION STANDING SKIN DEFORMITY; LOCAL FLAP;  Surgeon: Tarik Adam MD;  Location: [facility];  Service: Plastics   • AL MAST MODF RAD W/AX LYMPH NOD W/WO PECT/NERI MIN Left 06/08/2021    Procedure: BREAST MODIFIED RADICAL MASTECTOMY; ANAND  DIRECTED AXILLARY DISSECTION;  Surgeon: Renaldo Fu MD;  Location: [facility];  Service: Surgical Oncology   • REDUCTION MAMMAPLASTY Right     March 2022   • US BREAST NEEDLE LOC LEFT Left 06/02/2021   • US GUIDED BREAST BIOPSY LEFT COMPLETE Left 04/19/2021   • US GUIDED BREAST LYMPH NODE BIOPSY LEFT Left 04/19/2021   • WISDOM TOOTH EXTRACTION       Family History   Problem Relation Age of Onset   • Dementia Mother    • Colon cancer Mother         49's-62's   • Lung cancer Father         49's-62's   • No Known Problems Sister    • Breast cancer Paternal Aunt    • No Known Problems Sister    • Stomach cancer Paternal Uncle         66's     Social History   Social History     Substance and Sexual Activity   Alcohol Use Never     Social History     Substance and Sexual Activity   Drug Use No     Social History     Tobacco Use   Smoking Status Never   • Passive exposure: Never   Smokeless Tobacco Never     Meds/Allergies     Current Outpatient Medications:   •  albuterol (ProAir HFA) 90 mcg/act inhaler, Inhale 2 puffs every 4 (four) hours as needed for wheezing or shortness of breath, Disp: 8 g, Rfl: 0  •  anastrozole (ARIMIDEX) 1 mg tablet, TAKE ONE TABLET BY MOUTH EVERY DAY, Disp: 90 tablet, Rfl: 1  •  Ascorbic Acid (VITAMIN C PO), Take 500 mg by mouth in the morning, Disp: , Rfl:   •  gabapentin (Neurontin) 100 mg capsule, Take 1 capsule (100 mg total) by mouth daily at bedtime, Disp: 30 capsule, Rfl: 1  •  losartan-hydrochlorothiazide (HYZAAR) 50-12.5 mg per tablet, Take 1 tablet by mouth daily, Disp: 30 tablet, Rfl: 0  •  metoprolol tartrate (LOPRESSOR) 50 mg tablet, Take 1 tablet (50 mg total) by mouth daily, Disp: 90 tablet, Rfl: 0  •  Multiple Vitamins-Minerals (MULTIVITAMIN ADULT PO), Take by mouth daily, Disp: , Rfl:   •  naproxen (NAPROSYN) 500 mg tablet, Take 1 tablet (500 mg total) by mouth 2 (two) times a day with meals for 14 days, Disp: 28 tablet, Rfl: 0  •  oxyCODONE (ROXICODONE) 10 MG TABS, Take 1 tablet (10 mg total) by mouth every 4 (four) hours as needed for severe pain Max Daily Amount: 60 mg, Disp: 30 tablet, Rfl: 0  •  senna-docusate sodium (SENOKOT-S) 8.6-50 mg per tablet, Take 1 tablet by mouth 2 (two) times a day, Disp: , Rfl: 0  •  VITAMIN D PO, Take by mouth in the morning, Disp: , Rfl:   •  fluticasone (FLONASE) 50 mcg/act nasal spray, 2 puffs into each nostril daily, Disp: , Rfl:   No Known Allergies    Pathology:  See above. Review of Systems  Refer to nursing note    OBJECTIVE:   /68 (BP Location: Left arm)   Pulse 84   Temp 97.8 °F (36.6 °C) (Temporal)   Resp 18   SpO2 98%   Pain Assessment:  10  Performance Status: ECO - Symptomatic, <50% confined to bed    Physical Exam  General Appearance:  Alert, cooperative, no distress, appears stated age, in significant pain  Lungs: Respirations unlabored, no cyanosis, able to speak in complete sentences without dyspnea. MSK: Tenderness to palpation over mid sacral and right and left pelvic regions. Requires a wheelchair for mobility  Skin: No generalized rash or dermatitis  Neurologic: ANOx3, speech and cognition intact. Portions of the record may have been created with voice recognition software. Occasional wrong word or "sound a like" substitutions may have occurred due to the inherent limitations of voice recognition software.   Read the chart carefully and recognize, using context, where substitutions have occurred.

## 2023-09-13 PROCEDURE — 77263 THER RADIOLOGY TX PLNG CPLX: CPT | Performed by: INTERNAL MEDICINE

## 2023-09-18 ENCOUNTER — TELEPHONE (OUTPATIENT)
Dept: PALLIATIVE MEDICINE | Facility: CLINIC | Age: 73
End: 2023-09-18

## 2023-09-18 ENCOUNTER — APPOINTMENT (OUTPATIENT)
Dept: RADIATION ONCOLOGY | Facility: HOSPITAL | Age: 73
End: 2023-09-18
Attending: INTERNAL MEDICINE
Payer: MEDICARE

## 2023-09-18 PROCEDURE — 77332 RADIATION TREATMENT AID(S): CPT | Performed by: INTERNAL MEDICINE

## 2023-09-18 PROCEDURE — 77290 THER RAD SIMULAJ FIELD CPLX: CPT | Performed by: INTERNAL MEDICINE

## 2023-09-19 ENCOUNTER — TELEPHONE (OUTPATIENT)
Dept: HEMATOLOGY ONCOLOGY | Facility: CLINIC | Age: 73
End: 2023-09-19

## 2023-09-19 NOTE — TELEPHONE ENCOUNTER
Patient Call    Who are you speaking with? Patient    If it is not the patient, are they listed on an active communication consent form? N/A   What is the reason for this call? Patient calling in wanting to make her appointment with Dr Jennifer Perales on 9/26 an earlier time due to her radiation being earlier in the day. I let the patient know that Dr Mallory Anguiano schedule was completely booked and that there was no openings. The patient became a little upset asking if there were any accommodations that could be made as she states she is unable to sit that long and wait the duration between her appointments. I let the patient know I would pass the message along, but reminded the patient again that Dr Jennifer Perales has a full schedule. Does this require a call back? Yes   If a call back is required, please list best call back number [phone]   If a call back is required, advise that a message will be forwarded to their care team and someone will return their call as soon as possible. Did you relay this information to the patient?  Yes

## 2023-09-19 NOTE — TELEPHONE ENCOUNTER
Left message on machine for patient to call office back. If patient returns call, please see if she is still having any respiratory issues.      Nicole Crews LPN

## 2023-09-21 NOTE — TELEPHONE ENCOUNTER
Left message on machine for patient to call office back. If patient returns call, please see if she is still having any respiratory issues.     Saima Rodríguez LPN

## 2023-09-22 PROCEDURE — 77334 RADIATION TREATMENT AID(S): CPT | Performed by: INTERNAL MEDICINE

## 2023-09-22 PROCEDURE — 77295 3-D RADIOTHERAPY PLAN: CPT | Performed by: INTERNAL MEDICINE

## 2023-09-22 PROCEDURE — 77300 RADIATION THERAPY DOSE PLAN: CPT | Performed by: INTERNAL MEDICINE

## 2023-09-23 NOTE — TELEPHONE ENCOUNTER
Left message for patient to call us back.  If patient returns call, please ask if she is still having respiratory issues   HK CMA

## 2023-09-25 ENCOUNTER — APPOINTMENT (OUTPATIENT)
Dept: RADIATION ONCOLOGY | Facility: HOSPITAL | Age: 73
End: 2023-09-25
Attending: INTERNAL MEDICINE
Payer: MEDICARE

## 2023-09-25 PROCEDURE — 77412 RADIATION TX DELIVERY LVL 3: CPT | Performed by: INTERNAL MEDICINE

## 2023-09-25 PROCEDURE — 77280 THER RAD SIMULAJ FIELD SMPL: CPT | Performed by: INTERNAL MEDICINE

## 2023-09-25 PROCEDURE — 77387 GUIDANCE FOR RADJ TX DLVR: CPT | Performed by: INTERNAL MEDICINE

## 2023-09-25 PROCEDURE — 77427 RADIATION TX MANAGEMENT X5: CPT | Performed by: INTERNAL MEDICINE

## 2023-09-26 ENCOUNTER — OFFICE VISIT (OUTPATIENT)
Dept: HEMATOLOGY ONCOLOGY | Facility: CLINIC | Age: 73
End: 2023-09-26
Payer: MEDICARE

## 2023-09-26 ENCOUNTER — APPOINTMENT (OUTPATIENT)
Dept: RADIATION ONCOLOGY | Facility: HOSPITAL | Age: 73
End: 2023-09-26
Attending: INTERNAL MEDICINE
Payer: MEDICARE

## 2023-09-26 ENCOUNTER — TELEPHONE (OUTPATIENT)
Dept: HEMATOLOGY ONCOLOGY | Facility: CLINIC | Age: 73
End: 2023-09-26

## 2023-09-26 VITALS
DIASTOLIC BLOOD PRESSURE: 80 MMHG | HEIGHT: 61 IN | SYSTOLIC BLOOD PRESSURE: 112 MMHG | HEART RATE: 99 BPM | TEMPERATURE: 96.9 F | BODY MASS INDEX: 38.92 KG/M2 | OXYGEN SATURATION: 99 % | RESPIRATION RATE: 16 BRPM

## 2023-09-26 DIAGNOSIS — Z17.0 MALIGNANT NEOPLASM OF UPPER-OUTER QUADRANT OF LEFT BREAST IN FEMALE, ESTROGEN RECEPTOR POSITIVE: ICD-10-CM

## 2023-09-26 DIAGNOSIS — M84.58XA PATHOLOGICAL FRACTURE OF SACRAL VERTEBRA DUE TO NEOPLASTIC DISEASE: Primary | ICD-10-CM

## 2023-09-26 DIAGNOSIS — C50.412 MALIGNANT NEOPLASM OF UPPER-OUTER QUADRANT OF LEFT BREAST IN FEMALE, ESTROGEN RECEPTOR POSITIVE: ICD-10-CM

## 2023-09-26 PROCEDURE — 77412 RADIATION TX DELIVERY LVL 3: CPT | Performed by: RADIOLOGY

## 2023-09-26 PROCEDURE — 99215 OFFICE O/P EST HI 40 MIN: CPT | Performed by: INTERNAL MEDICINE

## 2023-09-26 PROCEDURE — 77387 GUIDANCE FOR RADJ TX DLVR: CPT | Performed by: RADIOLOGY

## 2023-09-26 RX ORDER — LAMOTRIGINE 25 MG/1
500 TABLET ORAL ONCE
Status: CANCELLED | OUTPATIENT
Start: 2023-09-26

## 2023-09-26 NOTE — TELEPHONE ENCOUNTER
Please reach out to the patient and schedule her for Faslodex injections. Patient prefers [facility].

## 2023-09-26 NOTE — PROGRESS NOTES
[facility]  1950  [address] [facility]  [address] [facility] [place] [id number]    DISCUSSION/SUMMARY:    73-year-old female with history of stage IIIA left-sided breast cancer now with evidence of metastatic disease. Issues:    Pathologic sacral fracture. Patient has been seen/evaluated by radiation oncology and is on palliative RT. In the future, when patient is more stable and the treatment has begun and patient has been cleared by dental, Mrs. Ricky Bentley can begin antiresorption therapy. Pain control. Patient was not taking the oxycodone as directed. We discussed the fact that patient needs to take the oxycodone every 4-6 hours to control her pain. Mrs. Ricky Bentley has an appointment with palliative care tomorrow. Patient has enough oxycodone at least for tonight. No issues with constipation. Metastatic breast cancer. Patient is to go for PET/CT and CT/brain. The recent pathology results stated that the estrogen and progesterone receptors were negative but that it is difficult to clearly determine their status in decalcified bone. Patient's original tumor was ER and ME positive (but not very high). The plan is to begin CDK 4/6 inhibitor with Faslodex and monitor the patient closely for evidence of response versus progression. NCCN guidelines 4.2023 states that for patients with recurrent or metastatic, M1 disease, ER/ME positive, HER2/leonela negative, postmenopausal, first-line treatment includes either an AI + CDK 4/6 inhibitor or fulvestrant + CDK 4/6 inhibitor. We will wait and see if the PET/CT shows any visceral disease. A second biopsy may be necessary. Tissue will also eventually need to be sent for NexGen sequencing. CT/brain also requested (patient with pacemaker). Patient is to return in 3 to 4 weeks but this may change depending upon the above.   Mrs. Ricky Bentley knows to call the hematology/oncology office if there are any other questions or concerns. Carefully review your medication list and verify that the list is accurate and up-to-date. Please call the hematology/oncology office if there are medications missing from the list, medications on the list that you are not currently taking or if there is a dosage or instruction that is different from how you're taking that medication. Patient goals and areas of care: Complete work-up, begin treatment for metastatic breast cancer, complete RT, palliative care  Barriers to care: none  Patient is able to self-care  _____________________________________________________________________________________    Chief Complaint   Patient presents with   • Follow-up   • New diagnosis of metastatic breast cancer     Advance Care Planning/Advance Directives: Not yet discussed    Oncology History   Malignant neoplasm of upper-outer quadrant of left breast in female, estrogen receptor positive ([address])   4/19/2021 Biopsy    Left breast US guided biopsy:  A. 2 o'clock 8 cm from the nipple  Invasive mammary carcinoma of no special type  Grade 2  ER 90  CT 1  HER2 1+  Lymphovascular invasion: not identified    B. Left Axillary lymph node biopsy:  Invasive/ metastatic adenocarcinoma, compatible with breast primary involving fibroadipose tissues and a portion of lymphoid pranchyma. ER 90  CT 1  HER2 1+    Concordant. Malignancy appears multifocal. The 2 adjacent masses span an area of approximately 4 cm. Right breast clear. 5/3/2021 Genetic Testing    The following genes were evaluated: OLINDA, BRCA1, BRCA2, CDH1, CHEK2, PALB2, PTEN, STK11, TP53  Additional genes evaluated for a total of 36 genes analyzed  Negative result.  No pathogenic sequence variants or deletions/dupllications identified  [employer]     6/8/2021 Surgery    Left breast modified radical mastectomy with ANAND  directed axillary dissection  Invasive carcinoma of no special type (ductal)  Grade 2  6 cm  Lymphovascular invasion present  Margins negative  4/10 Lymph nodes (3.5 cm)  Anatomic Stage IIIA  Prognostic Stage IB     7/16/2021 - 9/17/2021 Chemotherapy    pegfilgrastim (NEULASTA ONPRO), 6 mg, Subcutaneous, Once, 4 of 4 cycles  Administration: 6 mg (7/16/2021), 6 mg (8/6/2021), 6 mg (8/27/2021), 6 mg (9/17/2021)  cyclophosphamide (CYTOXAN) IVPB, 600 mg/m2 = 1,212 mg, Intravenous, Once, 4 of 4 cycles  Administration: 1,212 mg (7/16/2021), 1,212 mg (8/6/2021), 1,212 mg (8/27/2021), 1,212 mg (9/17/2021)  DOCEtaxel (TAXOTERE) chemo infusion, 75 mg/m2 = 151.6 mg, Intravenous, Once, 4 of 4 cycles  Administration: 151.6 mg (7/16/2021), 151.6 mg (8/6/2021), 151.6 mg (8/27/2021), 151.6 mg (9/17/2021)     10/18/2021 - 11/19/2021 Radiation    BH L CW 10X/6X 13 / 13 200 0 2,600 32   BH L CW BOLUS 10X/6X 12 / 12 200 0 2,400 30   BH L PAB 10X 25 / 25 60 0 1,500 32   BH L Sclav 10X 25 / 25 200 0 5,000 32      Treatment Dates:  10/18/2021 - 11/19/2021. Dr Kianna Lama     10/2021 -  Hormone Therapy    anastrozole 1 mg once a day    Dr Corby Bills     9/1/2023 Biopsy    Bone, left iliac crest, biopsy:  -Metastatic carcinoma, most compatible with metastasis from breast origin. 9/12/2023 -  Cancer Staged    Staging form: Breast, AJCC 8th Edition  - Clinical: Stage IV (cM1) - Signed by Emily Flores MD on 9/12/2023           History of Present Illness: 77-year-old female previously followed by Dr. Corby Bills. Patient has a history of stage IIIA left breast cancer, grade 2, lobular, ER positive, HER2 negative disease status post mastectomy, lymph node sampling (June 2021). Patient was negative for the BRCA gene mutation. Pathology results (copied below) demonstrated four positive lymph nodes. Patient was treated with adjuvant Taxotere and Cytoxan (completed in September 2021) followed by radiation. Patient was subsequently placed on anastrozole and had tolerated this well; completed approximately 2+ years.     Patient was recently admitted to [facility] with severe buttocks pain. Work-up demonstrated a sacral fracture + mass. Biopsy was consistent with metastatic breast cancer. Patient is on day 2/5 of RT (Dr. Danilo Guzman). Presently Mrs. Maximo Magallon states feeling +/-. Patient has oxycodone 10 mg every 4 hours as needed but patient has not been using them. Patient with significant buttocks pain. No other significant body aches. Patient is moving her bowels okay, appetite is +/- but no nausea or vomiting. No  or GYN problems. No fevers or signs of infection. No respiratory issues. No headaches, blurred vision or dizziness.     Review of Systems   Constitutional: Positive for activity change, appetite change and fatigue. HENT: Negative. Eyes: Negative. Respiratory: Negative. Cardiovascular: Negative. Gastrointestinal: Negative. Endocrine: Negative. Genitourinary: Negative. Musculoskeletal: Positive for arthralgias and gait problem. Skin: Negative. Allergic/Immunologic: Negative. Hematological: Negative. Psychiatric/Behavioral: Negative. All other systems reviewed and are negative. Patient Active Problem List   Diagnosis   • Benign essential hypertension   • Chronic rhinitis   • Hidradenitis suppurativa   • Hypothyroidism   • Impaired fasting glucose   • Morbid obesity ([address])   • Venous insufficiency (chronic) (peripheral)   • Malignant neoplasm of upper-outer quadrant of left breast in female, estrogen receptor positive (HCC)   • Use of anastrozole   • S/P mastectomy, left   • Obesity (BMI 30-39. 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    • History of radiation therapy    • [place] (hard of hearing)     wears hearing aids/will wear DOS bilat   • Hypertension    • Limb alert care status     No BP/IV Left Arm   • Lymphedema of left arm    • Prediabetes    • Wears glasses      Past Surgical History:   Procedure Laterality Date   • AXILLARY SURGERY Right     sweat glands removed -    • BREAST CYST EXCISION Right 2000    benign   • BREAST SURGERY Right    • CHOLECYSTECTOMY     • COLONOSCOPY     • DILATION AND CURETTAGE OF UTERUS     • IR BIOPSY BONE  9/1/2023   • MASTECTOMY Left 06/08/2021   • DE BREAST REDUCTION N/A 03/18/2022    Procedure: R BREAST REDUCTION; L BREAST EXCISION STANDING SKIN DEFORMITY; LOCAL FLAP;  Surgeon: Casey Jay MD;  Location: [facility];  Service: Plastics   • DE MAST MODF RAD W/AX LYMPH NOD W/WO PECT/NERI MIN Left 06/08/2021    Procedure: BREAST MODIFIED RADICAL MASTECTOMY; ANAND  DIRECTED AXILLARY DISSECTION;  Surgeon: Ramona Nicholson MD;  Location: [facility];  Service: Surgical Oncology   • REDUCTION MAMMAPLASTY Right     March 2022   • US BREAST NEEDLE LOC LEFT Left 06/02/2021   • US GUIDED BREAST BIOPSY LEFT COMPLETE Left 04/19/2021   • US GUIDED BREAST LYMPH NODE BIOPSY LEFT Left 04/19/2021   • WISDOM TOOTH EXTRACTION       Family History   Problem Relation Age of Onset   • Dementia Mother    • Colon cancer Mother         49's-62's   • Lung cancer Father         49's-62's   • No Known Problems Sister    • Breast cancer Paternal Aunt    • No Known Problems Sister    • Stomach cancer Paternal Uncle         66's     Social History     Socioeconomic History   • Marital status: /Civil Union     Spouse name: Not on file   • Number of children: Not on file   • Years of education: Not on file   • Highest education level: Not on file   Occupational History   • Not on file   Tobacco Use   • Smoking status: Never     Passive exposure: Never   • Smokeless tobacco: Never   Vaping Use • Vaping Use: Never used   Substance and Sexual Activity   • Alcohol use: Never   • Drug use: No   • Sexual activity: Yes     Partners: Male     Birth control/protection: Male Sterilization     Comment: defer   Other Topics Concern   • Not on file   Social History Narrative   • Not on file     Social Determinants of Health     Financial Resource Strain: Low Risk  (5/3/2023)    Overall Financial Resource Strain (CARDIA)    • Difficulty of Paying Living Expenses: Not hard at all   Food Insecurity: No Food Insecurity (8/31/2023)    Hunger Vital Sign    • Worried About Running Out of Food in the Last Year: Never true    • Ran Out of Food in the Last Year: Never true   Transportation Needs: No Transportation Needs (8/31/2023)    PRAPARE - Transportation    • Lack of Transportation (Medical): No    • Lack of Transportation (Non-Medical):  No   Physical Activity: Not on file   Stress: Not on file   Social Connections: Not on file   Intimate Partner Violence: Not on file   Housing Stability: Low Risk  (8/31/2023)    Housing Stability Vital Sign    • Unable to Pay for Housing in the Last Year: No    • Number of Places Lived in the Last Year: 1    • Unstable Housing in the Last Year: No       Current Outpatient Medications:   •  albuterol (ProAir HFA) 90 mcg/act inhaler, Inhale 2 puffs every 4 (four) hours as needed for wheezing or shortness of breath, Disp: 8 g, Rfl: 0  •  anastrozole (ARIMIDEX) 1 mg tablet, TAKE ONE TABLET BY MOUTH EVERY DAY, Disp: 90 tablet, Rfl: 1  •  Ascorbic Acid (VITAMIN C PO), Take 500 mg by mouth in the morning, Disp: , Rfl:   •  fluticasone (FLONASE) 50 mcg/act nasal spray, 2 puffs into each nostril daily, Disp: , Rfl:   •  gabapentin (Neurontin) 100 mg capsule, Take 1 capsule (100 mg total) by mouth daily at bedtime, Disp: 30 capsule, Rfl: 1  •  losartan-hydrochlorothiazide (HYZAAR) 50-12.5 mg per tablet, Take 1 tablet by mouth daily, Disp: 30 tablet, Rfl: 0  •  metoprolol tartrate (LOPRESSOR) 50 mg tablet, Take 1 tablet (50 mg total) by mouth daily, Disp: 90 tablet, Rfl: 0  •  Multiple Vitamins-Minerals (MULTIVITAMIN ADULT PO), Take by mouth daily, Disp: , Rfl:   •  oxyCODONE (ROXICODONE) 10 MG TABS, Take 1 tablet (10 mg total) by mouth every 4 (four) hours as needed for severe pain Max Daily Amount: 60 mg, Disp: 30 tablet, Rfl: 0  •  senna-docusate sodium (SENOKOT-S) 8.6-50 mg per tablet, Take 1 tablet by mouth 2 (two) times a day, Disp: , Rfl: 0  •  VITAMIN D PO, Take by mouth in the morning, Disp: , Rfl:   •  naproxen (NAPROSYN) 500 mg tablet, Take 1 tablet (500 mg total) by mouth 2 (two) times a day with meals for 14 days, Disp: 28 tablet, Rfl: 0    No Known Allergies    Vitals:    09/26/23 1057   BP: 112/80   Pulse: 99   Resp: 16   Temp: (!) 96.9 °F (36.1 °C)   SpO2: 99%     Physical Exam  Constitutional:       Appearance: She is well-developed. Comments: Well-nourished female, no respiratory distress, + signs of pain, + anxious   HENT:      Head: Normocephalic and atraumatic. Right Ear: External ear normal.      Left Ear: External ear normal.   Eyes:      Conjunctiva/sclera: Conjunctivae normal.      Pupils: Pupils are equal, round, and reactive to light. Cardiovascular:      Rate and Rhythm: Normal rate and regular rhythm. Heart sounds: Normal heart sounds. Pulmonary:      Effort: Pulmonary effort is normal.      Breath sounds: Normal breath sounds. Abdominal:      General: Bowel sounds are normal.      Palpations: Abdomen is soft. Musculoskeletal:         General: Normal range of motion. Cervical back: Normal range of motion and neck supple. Skin:     General: Skin is warm. Neurological:      Mental Status: She is alert and oriented to person, place, and time. Deep Tendon Reflexes: Reflexes are normal and symmetric. Psychiatric:         Behavior: Behavior normal.         Thought Content:  Thought content normal.         Judgment: Judgment normal. Extremities: No lower extremity mid bilaterally, no cords, pulses are 1+  Lymphatics:  No adenopathy in the neck, supraclavicular region, axilla bilaterally    Performance Status: 2 - Symptomatic, <50% confined to bed    Labs    9/1/2023 WBC = 7.69 hemoglobin = 16.1 hematocrit = 45.5 platelet = 824    6/71/3215 CA 15-3 = 58 CA 27, 29 = 75 BUN = 21 creatinine = 0.62 calcium = 9.3 AST = 22 ALT = 26 alkaline phosphatase = 109 total protein = 6.9 total bilirubin = 0.64    8/31/2023 SPEP: No monoclonal bands noted    Imaging    9/1/2023 MRI pelvis    Impression:     Large infiltrative upper sacral lesion with asymmetric involvement of the left sacral ala, extension to the SI joint and medial left iliac bone as well as subtle extraosseous extension as detailed above, concerning for malignancy. Correlate with bone biopsy also done on this date. The lesion narrows the left L5 and S1 neural foramina and may touch upon the exiting nerve roots in these regions. Correlate for radiculitis in these territories. Cannot exclude subtle epidural invasion posterior to S1.    8/31/2023 bone scan. Impression stated nonpathologic sacral fracture. Otherwise no scintigraphic evidence of additional osseous metastases. 7/20/2023 mammogram diagnostic right with 3D and CAD.   Impression stated postop findings likely benign tissue asymmetry in the right outer 8:00 region, right overall category 2, recommend diagnostic mammogram 1 year right breast.    Pathology    Case Report   Surgical Pathology Report                         Case: [id number]                                    Authorizing Provider: Tavares Avila PA-C       Collected:           09/01/2023 1417               Ordering Location:     [address] [place] [address] St:            09/01/2023 1521                                      [address]                                                                       Pathologist:           Angelita Ha MD                                                           Specimen:    Bone, left illiac crest                                                                    Addendum   Test Description Result Prognostic Interpretation  Estrogen Receptor 0% negative  Internal control:Not present Staining Intensity:NA*  External control:positive Mariajose Score*: 0     Progesterone Receptor 0% negative  Internal control:Not present Staining Intensity: NA*  External control: positive Mariajose Score*: 0     HER2 by IHC 2+ Equivocal     COMMENT:  Performance characteristics may vary for Breast Prognostic Markers in decalcified specimens and may result in decreased antigen detection. Her2 by FISH cannot be run due to decalcified specimen.        Final Diagnosis   A. Bone, left iliac crest, biopsy:  -Metastatic carcinoma, most compatible with metastasis from breast origin.  -Immunohistochemical stains performed with appropriate controls show the tumor to be positive for keratin AE1/3, CK7, and GATA3 and negative for CK20, mammaglobin, ER, CDX2, TTF-1, and PAX8; the findings support the diagnosis. Electronically signed by Alvino Chan MD on 9/6/2023 at  9:09 AM          Case Report    Surgical Pathology Report                         Case: [id number]                                     Authorizing Provider: Yusuf Hernandez MD              Collected:           06/08/2021 1057                Ordering Location:     [address]        Received:            06/08/2021 [address] [place] [address] Operating Room                                                        Pathologist:           Debbi Martinez MD                                                                   Specimen:    Breast, Left, Left breast with axillary content - long suture marks lateral, medium                   suture marks medial, short suture marks superior                                              Final Diagnosis    A.  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 - Ductal carcinemia in situ.  - Skin with sebotropic keratosis. - Nipple with no pathologic abnormality.  - Lymphovascular and perineural invasion are present   - All margins are negative  - Four of ten lymph nodes are positive for metastatic carcinoma (4/10).       Comment: Immunohistochemistry was performed on block A7. The tumor cells are positive for E cadherin and p120 in a membranous stain and negative for calponin and p63, supporting the above diagnosis.   AE1/3 is performed on tissue block  A32 to help in the assessment of this case.     Intradepartmental consultation is in agreement.     Note:  1. Ancillary Studies:      - Repeat HER2 testing (2013 ASCO/CAP Recommendations): Not indicated.      - Best representative tumor block: A5       -- Sufficient tumor present for          Agendia Mammaprint/Blueprint (1 cm2 of invasive tumor in aggregate): Yes.          MI Profile/Foundation One (at least 5 x 5 mm of tumor): Yes.  2.. Pathologic Stage Classification (pTNM, AJCC 8th Edition): 8th ed, AJCC Anatomic Stage:  at least Stage  - pT3 pN2a, G2.  3. 8th ed.  AJCC Pathological Prognostic Stage: IB       Electronically signed by Naldo Carballo MD on 6/15/2021 at  9:14 AM    Note      Interpretation performed at [facility], [address] [address]       Additional Information      All reported additional testing was performed with appropriately reactive controls.  These tests were developed and their performance characteristics determined by [facility] or appropriate performing facility, though some tests may be performed on tissues which have not been validated for performance characteristics (such as staining performed on alcohol exposed cell blocks and decalcified tissues).  Results should be interpreted with caution and in the context of the patients’ clinical condition. These tests may not be cleared or approved by the U.S. Food and Drug Administration, though the FDA has determined that such clearance or approval is not necessary. These tests are used for clinical purposes and they should not be regarded as investigational or for research. This laboratory has been approved by CLIA 88, designated as a high-complexity laboratory and is qualified to perform these tests.   .    Synoptic Checklist    INVASIVE CARCINOMA OF THE BREAST: Resection  8th Edition - Protocol posted: 2/26/2020  INVASIVE CARCINOMA OF THE BREAST: COMPLETE EXCISION - All Specimens       SPECIMEN   Procedure   Total mastectomy    Specimen Laterality   Left    TUMOR   Tumor Site   Upper outer quadrant    Histologic Type   Invasive carcinoma of no special type (ductal)    Glandular (Acinar) / Tubular Differentiation   Score 3    Nuclear Pleomorphism   Score 2    Mitotic Rate   Score 1    Overall Grade   Grade 2 (scores of 6 or 7)    Tumor Size   Greatest dimension of largest invasive focus (Millimeters): 60 mm   Additional Dimension (Millimeters)   38 mm       30 mm   Tumor Focality   Single focus of invasive carcinoma    Ductal Carcinoma In Situ (DCIS)   Present        Negative for extensive intraductal component (EIC)    Size (Extent) of DCIS       Number of Blocks with DCIS   3    Number of Blocks Examined   18    Architectural Patterns   Comedo        Solid    Nuclear Grade   Grade II (intermediate)    Necrosis   Present, central (expansive "comedo" necrosis)    Lobular Carcinoma In Situ (LCIS)   Not identified    Lymphovascular Invasion   Present    Dermal Lymphovascular Invasion   Not identified    Microcalcifications   Present in invasive carcinoma    Treatment Effect in the Breast   No known presurgical therapy    MARGINS   Invasive Carcinoma Margins   Uninvolved by invasive carcinoma    Distance from Closest Margin (Millimeters)   Greater than: 20 mm   Closest Margin(s)   Posterior    DCIS Margins   Uninvolved by DCIS    Distance from Closest Margin (Millimeters)   Greater than: 20 mm   Closest Margin(s)   Posterior    LYMPH NODES   Regional Lymph Nodes   Involved by tumor cells    Number of Lymph Nodes with Macrometastases (> 2 mm)   4    Number of Lymph Nodes with Micrometastases (> 0.2 mm to 2 mm and / or > 200 cells)   0    Number of Lymph Nodes with Isolated Tumor Cells (<= 0.2 mm or <= 200 cells)   0    Size of Largest Metastatic Deposit (Millimeters)   At least: 35 mm   Extranodal Extension   Present    Extent of Extranodal Extension   Greater than 2 mm    Total Number of Lymph Nodes Examined   10    PATHOLOGIC STAGE CLASSIFICATION (pTNM, AJCC 8th Edition)       Primary Tumor (pT)   pT3    Regional Lymph Nodes (pN)   pN2a    ADDITIONAL FINDINGS   Additional Findings   intraductal papilloma    SPECIAL STUDIES   Breast Biomarker Testing Performed on Previous Biopsy       Estrogen Receptor (ER) Status   Positive (greater than 10% of cells demonstrate nuclear positivity)    Percentage of Cells with Nuclear Positivity   %    Breast Biomarker Testing Performed on Previous Biopsy       Progesterone Receptor (PgR) Status   Positive    Percentage of Cells with Nuclear Positivity   1-2 %   Breast Biomarker Testing Performed on Previous Biopsy       HER2 (by immunohistochemistry)   Negative (Score 1+)    Testing Performed on Case Number   [id number]    .

## 2023-09-27 ENCOUNTER — DOCUMENTATION (OUTPATIENT)
Dept: HEMATOLOGY ONCOLOGY | Facility: CLINIC | Age: 73
End: 2023-09-27

## 2023-09-27 ENCOUNTER — APPOINTMENT (OUTPATIENT)
Dept: RADIATION ONCOLOGY | Facility: HOSPITAL | Age: 73
End: 2023-09-27
Attending: INTERNAL MEDICINE
Payer: MEDICARE

## 2023-09-27 ENCOUNTER — TELEPHONE (OUTPATIENT)
Dept: HEMATOLOGY ONCOLOGY | Facility: MEDICAL CENTER | Age: 73
End: 2023-09-27

## 2023-09-27 ENCOUNTER — TELEPHONE (OUTPATIENT)
Dept: HEMATOLOGY ONCOLOGY | Facility: CLINIC | Age: 73
End: 2023-09-27

## 2023-09-27 ENCOUNTER — CONSULT (OUTPATIENT)
Dept: PALLIATIVE MEDICINE | Facility: CLINIC | Age: 73
End: 2023-09-27
Payer: MEDICARE

## 2023-09-27 VITALS
TEMPERATURE: 98.1 F | SYSTOLIC BLOOD PRESSURE: 110 MMHG | WEIGHT: 192.5 LBS | HEART RATE: 109 BPM | BODY MASS INDEX: 36.35 KG/M2 | OXYGEN SATURATION: 97 % | HEIGHT: 61 IN | DIASTOLIC BLOOD PRESSURE: 76 MMHG

## 2023-09-27 DIAGNOSIS — C50.412 MALIGNANT NEOPLASM OF UPPER-OUTER QUADRANT OF LEFT BREAST IN FEMALE, ESTROGEN RECEPTOR POSITIVE: ICD-10-CM

## 2023-09-27 DIAGNOSIS — R63.4 WEIGHT LOSS: ICD-10-CM

## 2023-09-27 DIAGNOSIS — R11.0 NAUSEA: ICD-10-CM

## 2023-09-27 DIAGNOSIS — M84.58XA PATHOLOGICAL FRACTURE OF SACRAL VERTEBRA DUE TO NEOPLASTIC DISEASE: ICD-10-CM

## 2023-09-27 DIAGNOSIS — R63.0 LOSS OF APPETITE: ICD-10-CM

## 2023-09-27 DIAGNOSIS — Z51.5 PALLIATIVE CARE PATIENT: Primary | ICD-10-CM

## 2023-09-27 DIAGNOSIS — Z17.0 MALIGNANT NEOPLASM OF UPPER-OUTER QUADRANT OF LEFT BREAST IN FEMALE, ESTROGEN RECEPTOR POSITIVE: ICD-10-CM

## 2023-09-27 DIAGNOSIS — G89.3 CANCER RELATED PAIN: ICD-10-CM

## 2023-09-27 DIAGNOSIS — K59.00 CONSTIPATION: ICD-10-CM

## 2023-09-27 PROCEDURE — 99204 OFFICE O/P NEW MOD 45 MIN: CPT | Performed by: STUDENT IN AN ORGANIZED HEALTH CARE EDUCATION/TRAINING PROGRAM

## 2023-09-27 PROCEDURE — 77412 RADIATION TX DELIVERY LVL 3: CPT | Performed by: RADIOLOGY

## 2023-09-27 PROCEDURE — 77387 GUIDANCE FOR RADJ TX DLVR: CPT | Performed by: RADIOLOGY

## 2023-09-27 RX ORDER — NALOXONE HYDROCHLORIDE 4 MG/.1ML
SPRAY NASAL
Qty: 1 EACH | Refills: 1 | Status: SHIPPED | OUTPATIENT
Start: 2023-09-27 | End: 2024-09-26

## 2023-09-27 RX ORDER — ONDANSETRON 4 MG/1
4 TABLET, FILM COATED ORAL EVERY 8 HOURS PRN
Qty: 30 TABLET | Refills: 0 | Status: SHIPPED | OUTPATIENT
Start: 2023-09-27

## 2023-09-27 RX ORDER — OXYCODONE HYDROCHLORIDE 10 MG/1
15 TABLET ORAL EVERY 4 HOURS PRN
Qty: 150 TABLET | Refills: 0 | Status: SHIPPED | OUTPATIENT
Start: 2023-09-27

## 2023-09-27 RX ORDER — POLYETHYLENE GLYCOL 3350 17 G/17G
17 POWDER, FOR SOLUTION ORAL DAILY PRN
Qty: 255 G | Refills: 0 | Status: SHIPPED | OUTPATIENT
Start: 2023-09-27

## 2023-09-27 NOTE — TELEPHONE ENCOUNTER
Patient Call    Who are you speaking with? Patient    If it is not the patient, are they listed on an active communication consent form? N/A   What is the reason for this call? Patient called back to confirm that the dates and times provided for procedures were good for her   Does this require a call back? No   If a call back is required, please list best call back number n/a   If a call back is required, advise that a message will be forwarded to their care team and someone will return their call as soon as possible. Did you relay this information to the patient?  N/A

## 2023-09-27 NOTE — PROGRESS NOTES
[facility][note 3] new oral chemo start-Ibrance // Gisela Princeton is required    Completed free drug pfizer application forwarded to provider for signature. Once Gisela Princeton is obtained and copay is determined call will be placed to patient. Copay [facility] [phone] Call placed to patient no response left message or return call to discuss free drug. Received call from patient, discussed free drug application, required documents and processing timeframe.  Patient stated she will gather information within the next week and forward to me    Patient & Provider forms [facility] application faxed

## 2023-09-27 NOTE — TELEPHONE ENCOUNTER
I left a detailed message for Arina Velázquez with the following information: Follow up with Dr. Tamar Dc is at [facility] on thurs. 10/26/23 at 3pm, CT scan Mon. 10/2/23 at 8am (arrival time 7;45am for registration) at [facility], [address] [place] [address] at [facility] on [address]. 10/19/23 at 9am (arrival time 8:45am for registration). I asked her to call back to confirm.

## 2023-09-27 NOTE — PROGRESS NOTES
Outpatient Consultation - Palliative and Supportive Care   Agnieszka Dee 72 y.o. 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    Relevant Medications    naloxone (NARCAN) 4 mg/0.1 mL nasal spray    oxyCODONE (ROXICODONE) 10 MG TABS    polyethylene glycol (MIRALAX) 17 g packet       Other    Malignant neoplasm of upper-outer quadrant of left breast in female, estrogen receptor positive (HCC)    Relevant Medications    naloxone (NARCAN) 4 mg/0.1 mL nasal spray    ondansetron (ZOFRAN) 4 mg tablet    oxyCODONE (ROXICODONE) 10 MG TABS    polyethylene glycol (MIRALAX) 17 g packet    Cancer related pain    Loss of appetite    Weight loss   Other Visit Diagnoses     Palliative care patient    -  Primary    Relevant Medications    naloxone (NARCAN) 4 mg/0.1 mL nasal spray    ondansetron (ZOFRAN) 4 mg tablet    oxyCODONE (ROXICODONE) 10 MG TABS    polyethylene glycol (MIRALAX) 17 g packet    Nausea        Relevant Medications    ondansetron (ZOFRAN) 4 mg tablet    Constipation        Relevant Medications    polyethylene glycol (MIRALAX) 17 g packet          Plan:  1) Symptom management  #Cancer related Pain   #Pathologic sacral fracture undergoing palliative radiation (2 treatments remaining)     - Start Oxycodone 15mg q4 hours PRN for Moderate to Severe Pain   (states OxyIR 10mg was not helping with the pain or even to take the edge off of her discomfort.  Her pain is 8-10 out of 10 especially worsened when sitting for prolonged periods of time.    -Goal pain rating of 5-6 out of 10.   -Counseled on medication safety with opioids (lockbox, out of reach of children or other visitors of home)    Multi-modal analgesia:   -Tylenol 1000mg q8 hours PRN   -Can try lidocaine patch as an adjunct, will review at next Office visit    #Gastrointestinal   #Constipation  -Bowel regimen  - Normal bowel movement is every 2 days approximately for patient.  -She does endorse some constipation in which she has to start taking the Senna-S.    -Recommended taking Senna-S 8.6mg qHS and if still constipated, to increase to 2 tablets qHS. -I have sent in Miralax 17g qday PRN for constipation as an adjust to Senna-S as well to prevent Opioid induced constipation.   -May consider Dulcolax is above is ineffective. #Nausea, loss of appetite, weight loss (pt states 20 lbs in past month)  -Admits to occasional nausea. Still able to tolerate PO intake with food and liquids. No emesis yet. -Start Zofran 4mg q8 hours PRN especially in anticipation of starting chemotherapy in the coming weeks.    -No Recent EKGs for QTc   -Patient advised to continue with supplemental protein shakes   -Will re-visit Nutritional services at next office visit   -Will introduce topic of MMJ as an option as well due to weight loss and loss of appetite in setting of metastatic malignancy    #Psychosocial    -Support provided   -Patient supported by her  Sherry Cowan) 2 daughters, and 1 son. #Hypertension  -Patient's blood pressure 110/76 today.  -Patient states she has had some lightheadedness upon standing too quickly. She would have to take a few seconds to close her eyes and gather herself. Likely a component of orthostatic hypotension.  -Discussed patient to contact her PCP in regards to whether or not her Hyzaar needs adjustment. -Recommended patient to take blood pressures daily (once in the morning and once in the evening for the next few days) so she has blood pressures to report to her PCP and so they can determine if dosing would be need adjusted. 2) Goals of Care / [place] Directive Counseling Given   -Patient states she has Living will and Medical as well as financial power of  at home.  Will bring into next [address] visit for upload.  -Patient designates her daughter, Gayathri Choudhary, as Medical POA  -Patient designates other daughter, Javad Barron, as financial POA  -Patient has a son in [place] who is aware of ongoing medical course and supports patient.  -Patient also supported by her , Vicente Mccauley  -Reviewed code Status   -Patient wishes to be DNR/DNI - Level 3 in the event she were to require cardiac/pulmonary resuscitation.   -She explicitly states she would not want life support, would not want to be intubated, and would not want artificial nutrition.   -Will review POLST form with patient at next office visit.   -Patient states she has not discussed this with her children, however,  Vicente Mccauley) is aware of this decision. 3) Referrals Placed / Medical Equipment Ordered   -None today    4) Follow-Up Recommendations  -Follow-up with specialist team and PCP   -As discussed, patient had questions about her anti-hypertensive regimen. Reviewed that she can get light headed when standing too quickly. She states her normal blood pressures are typically 037 systolic, however, when it gets to 110, she can get light headed upon standing quickly which may be related to orthostatic hypotension.    -She is in no acute distress or dizzy during my examination. She is in agreement and will reach out to her PCP for follow-up of any potential blood pressure medication changes.   -Follow-up in 1 week for pain check. Medications adjusted this encounter:  Requested Prescriptions     Signed Prescriptions Disp Refills   • naloxone (NARCAN) 4 mg/0.1 mL nasal spray 1 each 1     Sig: Administer 1 spray into a nostril. If no response after 2-3 minutes, give another dose in the other nostril using a new spray. For use in emergencies in the event of accidental ingestion, respiratory depression or oversedation.    • ondansetron (ZOFRAN) 4 mg tablet 30 tablet 0     Sig: Take 1 tablet (4 mg total) by mouth every 8 (eight) hours as needed for nausea or vomiting   • oxyCODONE (ROXICODONE) 10 MG TABS 150 tablet 0 Sig: Take 1.5 tablets (15 mg total) by mouth every 4 (four) hours as needed for moderate pain Max Daily Amount: 90 mg   • polyethylene glycol (MIRALAX) 17 g packet 255 g 0     Sig: Take 17 g by mouth daily as needed (as needed for constipation.)     No orders of the defined types were placed in this encounter. Medications Discontinued During This Encounter   Medication Reason   • oxyCODONE (Chung Chema) 10 MG TABS          Mane Osman was seen today for symptoms and planning cares related to above illnesses. Above orders were sent electronically, or provided in hardcopy in clinic. I have reviewed the patient's controlled substance dispensing history in the Prescription Drug Monitoring Program in compliance with the [facility] regulations before prescribing any controlled substances. We appreciate the referral, and wish for her to continue to follow with us. If there are questions or concerns, please contact us through our clinic/answering service 24 hours a day, seven days a week. Visit Information    Accompanied By: Spouse    Source of History: Self, Spouse    History Limitations: None    Contacts:Caregiver Information: Name: Rosemarie Ramirez and Relationship: , Contact Information:  Cell Number: [phone]    History of Present Illness    Chief Complaint  Cancer-related pain  Metastatic malignancy involving bone       Mane Osman is a 72 y.o. female who presents as a referral from Dr. Kain Hein of Radiation Oncology for primary palliative diagnosis of metastatic malignancy of the left breast (initially diagnosed April 2021 from biopsy with left axillary lymph node biopsy demonstrating invasive/metastatic adenocarcinoma compatible with breast primary) with disease to bone (pathologic fracture of sacrum - left iliac biopsy in September 2023 with metastatic carcinoma - Stage IV cM1).   Consultation is requested for: symptom management, goal of care assessment and decisional support, advance care planning, emotional support in the setting of serious illness. Patient is seen and examined with her , Marina Hunt, providing support. Reviewed Palliative care services and focus on symptom management as well as goals of care discussions. Patient states she is continuously having 8/10 low back. sacral pain with intermittent spurts of severe 10/10 pain to the low back and buttocks region. Her pain is predominantly bilateral, however, it is worse on the right side with periods of pain down the right thigh. Her goal is to get to a 5 or 6 out of 10 and ultimately to be able to sit in a chair without having pain. We discussed adjustment of dosing of the OxyIR to 15mg q4 hours PRN. I did send notification to my staff regarding potential need for prior authorization. Patient does have a supply of the 10mg remaining to hold her over for the next couple days. She denies any grogginess, feeling "loopy", or oversedated/fatigued since starting the Oxycodone for which she appears to demonstrate tolerance to the medication at this time. Discussed bowel regimen to avoid opioid induced constipation. She drinks Apple juice and takes Senna. Recommended to stay on top of this to avoid OIC as well as I sent in Miralax with instructions to use as an adjunct daily as needed should there be any concern for constipation. Patient has grandchildren that visits her home daily. Therefore, medication safety reviewed (lockbox instructions, keeping medications away from unintended individuals/children). Also discussed Narcan prescription and its use for emergencies/oversedation/respiratory depression/unintentional ingestion. Patient has had 1-2 episodes of nausea and since she will be starting chemotherapy, I have sent in Zofran 4mg tabs in anticipation of this as well as to help with her intermittent episodes of nausea recently. She is awaiting further PET CT scanning as well as a CT of the head per her Oncology team for further evaluation. Plans are for potential chemotherapy with Fulvestrant.   =============================================================  Goals of care discussion   -Patient states she has 3 children. Daughter - Amita Davenport is designated financial POA   Daughter - Apolinar Schwarz is designated Healthcare POA    -patient states she has living will and POA paperwork at home. Advised to bring copies in so we may upload this to our files with agreement with patient and . Son - lives in [place] but is aware of the ongoing issues and provides support to the patient. Reviewed Code Status -   -Patient would NOT want cardiac or pulmonary resuscitation in the event she would require it.   -She wishes to be DNR/ DNI (Level 3). -She does not wish to be placed on life support, would not want feeding tube or artificial nutrition/hydration.   -She states if the time comes, she would want a comfortable passing. Bone Biopsy 9/1/2023 at [address]. Bone, left iliac crest, biopsy:  -Metastatic carcinoma, most compatible with metastasis from breast origin. MRI Pelvis 9/1/2023  Large infiltrative upper sacral lesion with asymmetric involvement of the left sacral ala, extension to the SI joint and medial left iliac bone as well as subtle extraosseous extension as detailed above, concerning for malignancy. Correlate with bone   biopsy also done on this date. The lesion narrows the left L5 and S1 neural foramina and may touch upon the exiting nerve roots in these regions. Correlate for radiculitis in these territories. Cannot exclude subtle epidural invasion posterior to S1. NM Bone Scan 8/31/2023  Heterogeneous sacral activity predominantly on the left corresponds with known pathologic fracture with lytic metastasis. There is no scintigraphic evidence of osseous metastasis. 1. Known pathologic sacral fracture. Otherwise no scintigraphic evidence of additional osseous metastasis.       Pertinent Palliative Care Domains Physical Symptoms:ambulates with difficulty, uses walker    Psychological Symptoms:mild anxiety    Social Aspects: support system  and 3 children (2 daughters and 1 son)    Spiritual Aspects: fear of the unknown    Advance Directive and Living Will:    None on file - patient states she does have at home, recommended to bring a copy for upload to [address]:  Patient states she has this as well, recommended to bring copy for upload  POLST:   None    Historical Data  Past Medical History:   Diagnosis Date   • BRCA gene mutation negative 05/19/2021    [employer]; 36 gene panel   • Breast cancer ([address])     Left breast   • Colon polyp    • Dental crowns present    • Exercise involving walking     the dogs   • History of angina     per pt "years ago and related to stress"   • History of chemotherapy     breast cancer (left) 2021.    • History of radiation therapy    • [place] (hard of hearing)     wears hearing aids/will wear DOS bilat   • Hypertension    • Limb alert care status     No BP/IV Left Arm   • Lymphedema of left arm    • Prediabetes    • Wears glasses      Past Surgical History:   Procedure Laterality Date   • AXILLARY SURGERY Right     sweat glands removed -    • BREAST CYST EXCISION Right 2000    benign   • BREAST SURGERY Right    • CHOLECYSTECTOMY     • COLONOSCOPY     • DILATION AND CURETTAGE OF UTERUS     • IR BIOPSY BONE  9/1/2023   • MASTECTOMY Left 06/08/2021   • [place] BREAST REDUCTION N/A 03/18/2022    Procedure: R BREAST REDUCTION; L BREAST EXCISION STANDING SKIN DEFORMITY; LOCAL FLAP;  Surgeon: Yun Rivero MD;  Location: [facility];  Service: Plastics   • [place] MAST MODF RAD W/AX LYMPH NOD W/WO PECT/NERI MIN Left 06/08/2021    Procedure: BREAST MODIFIED RADICAL MASTECTOMY; ANAND  DIRECTED AXILLARY DISSECTION;  Surgeon: Elpidio Emmanuel MD;  Location: [facility];  Service: Surgical Oncology   • REDUCTION MAMMAPLASTY Right     March 2022   • US BREAST NEEDLE LOC LEFT Left 06/02/2021   • US GUIDED BREAST BIOPSY LEFT COMPLETE Left 04/19/2021   • US GUIDED BREAST LYMPH NODE BIOPSY LEFT Left 04/19/2021   • WISDOM TOOTH EXTRACTION       Social History     Socioeconomic History   • Marital status: /Civil Union     Spouse name: Not on file   • Number of children: Not on file   • Years of education: Not on file   • Highest education level: Not on file   Occupational History   • Not on file   Tobacco Use   • Smoking status: Never     Passive exposure: Never   • Smokeless tobacco: Never   Vaping Use   • Vaping Use: Never used   Substance and Sexual Activity   • Alcohol use: Never   • Drug use: No   • Sexual activity: Yes     Partners: Male     Birth control/protection: Male Sterilization     Comment: defer   Other Topics Concern   • Not on file   Social History Narrative   • Not on file     Social Determinants of Health     Financial Resource Strain: Low Risk  (5/3/2023)    Overall Financial Resource Strain (CARDIA)    • Difficulty of Paying Living Expenses: Not hard at all   Food Insecurity: No Food Insecurity (8/31/2023)    Hunger Vital Sign    • Worried About Running Out of Food in the Last Year: Never true    • Ran Out of Food in the Last Year: Never true   Transportation Needs: No Transportation Needs (8/31/2023)    PRAPARE - Transportation    • Lack of Transportation (Medical): No    • Lack of Transportation (Non-Medical):  No   Physical Activity: Not on file   Stress: Not on file   Social Connections: Not on file   Intimate Partner Violence: Not on file   Housing Stability: Low Risk  (8/31/2023)    Housing Stability Vital Sign    • Unable to Pay for Housing in the Last Year: No    • Number of Places Lived in the Last Year: 1    • Unstable Housing in the Last Year: No     Family History   Problem Relation Age of Onset   • Dementia Mother    • Colon cancer Mother         49's-62's   • Lung cancer Father         49's-62's   • No Known Problems Sister    • Breast cancer Paternal Aunt    • No Known Problems Sister    • Stomach cancer Paternal Uncle         66's     No Known Allergies  Current Outpatient Medications   Medication Sig Dispense Refill   • albuterol (ProAir HFA) 90 mcg/act inhaler Inhale 2 puffs every 4 (four) hours as needed for wheezing or shortness of breath 8 g 0   • anastrozole (ARIMIDEX) 1 mg tablet TAKE ONE TABLET BY MOUTH EVERY DAY 90 tablet 1   • Ascorbic Acid (VITAMIN C PO) Take 500 mg by mouth in the morning     • fluticasone (FLONASE) 50 mcg/act nasal spray 2 puffs into each nostril daily     • gabapentin (Neurontin) 100 mg capsule Take 1 capsule (100 mg total) by mouth daily at bedtime 30 capsule 1   • losartan-hydrochlorothiazide (HYZAAR) 50-12.5 mg per tablet Take 1 tablet by mouth daily 30 tablet 0   • metoprolol tartrate (LOPRESSOR) 50 mg tablet Take 1 tablet (50 mg total) by mouth daily 90 tablet 0   • Multiple Vitamins-Minerals (MULTIVITAMIN ADULT PO) Take by mouth daily     • naloxone (NARCAN) 4 mg/0.1 mL nasal spray Administer 1 spray into a nostril. If no response after 2-3 minutes, give another dose in the other nostril using a new spray. For use in emergencies in the event of accidental ingestion, respiratory depression or oversedation.  1 each 1   • ondansetron (ZOFRAN) 4 mg tablet Take 1 tablet (4 mg total) by mouth every 8 (eight) hours as needed for nausea or vomiting 30 tablet 0   • oxyCODONE (ROXICODONE) 10 MG TABS Take 1.5 tablets (15 mg total) by mouth every 4 (four) hours as needed for moderate pain Max Daily Amount: 90 mg 150 tablet 0   • polyethylene glycol (MIRALAX) 17 g packet Take 17 g by mouth daily as needed (as needed for constipation.) 255 g 0   • senna-docusate sodium (SENOKOT-S) 8.6-50 mg per tablet Take 1 tablet by mouth 2 (two) times a day  0   • VITAMIN D PO Take by mouth in the morning     • naproxen (NAPROSYN) 500 mg tablet Take 1 tablet (500 mg total) by mouth 2 (two) times a day with meals for 14 days 28 tablet 0     No current facility-administered medications for this visit. Review of Systems   Constitutional: Positive for decreased appetite and weight loss. Negative for chills, fever and malaise/fatigue. Eyes: Negative for visual disturbance. Cardiovascular: Negative for chest pain, irregular heartbeat and leg swelling. Respiratory: Negative for cough and shortness of breath. Musculoskeletal: Positive for back pain (low back pain, difficulty with sitting down for long periods as well as lying down. ). Negative for myalgias. Gastrointestinal: Positive for constipation and nausea (mild, occasional episodes. ). Negative for bloating, abdominal pain, diarrhea, dysphagia and vomiting. Genitourinary: Negative for dysuria. Neurological: Positive for focal weakness (bilateral lower extremities due to pain. ). Negative for excessive daytime sleepiness and headaches. Lightheaded when standing too quickly from a seated position. Vital Signs    /76 (BP Location: Right arm, Patient Position: Sitting, Cuff Size: Large)   Pulse (!) 109   Temp 98.1 °F (36.7 °C) (Temporal)   Ht 5' 1" (1.549 m)   Wt 87.3 kg (192 lb 8 oz)   SpO2 97%   BMI 36.37 kg/m²     Physical Exam and Objective Data  Physical Exam  Vitals reviewed. Constitutional:       General: She is not in acute distress. Appearance: She is ill-appearing. She is not toxic-appearing or diaphoretic. Comments: Sitting in wheelchair with  accompanying her. Patient appears uncomfortable in wheelchair and is favoring her right leg/buttock. No acute distress from a respiratory or cardiac standpoint. Pleasantly conversant with appropriate mood, behavior and thought content. HENT:      Head: Normocephalic and atraumatic. Nose: Nose normal.      Mouth/Throat:      Mouth: Mucous membranes are moist.   Eyes:      General: No scleral icterus. Right eye: No discharge. Left eye: No discharge. Cardiovascular:      Rate and Rhythm: Normal rate. Pulmonary:      Effort: Pulmonary effort is normal. No respiratory distress. Abdominal:      General: There is no distension. Comments: Round abdomen. Neurological:      Mental Status: She is alert. Radiology and Laboratory:  I personally reviewed and interpreted the following results: labs, imaging, no recent EKG, specialist notes. 50 minutes were spent in this ambulatory visit with greater than 50% of the time spent face to face with patient in counseling or coordination of care including discussions of symptom assessment and management, medication review, medication adjustment, psychosocial support, chart review, imaging review, lab review, medical marijuana, supportive listening and anticipatory guidance. All of the patient's questions were answered during this discussion. Note Shared. Violette Santana,   Palliative & Supportive Care  Clinic/Answering Service: [phone]  You can find me on [employer]. Portions of this document may have been created using dictation software and as such some "sound alike" terms may have been generated by the system. Do not hesitate to contact me with any questions or clarifications.

## 2023-09-27 NOTE — TELEPHONE ENCOUNTER
While we try to accommodate patient requests, our priority is to schedule treatment according to Doctor's orders and site availability. Does the Provider use the intake sheet or checkout note? What would be a preferred day of the week that would work best for your infusion appointment? Tuesday or Fridays   Do you prefer mornings or afternoons for your appointments? Mornings early as possible   Are there any days or dates that do not work for your schedule, including any upcoming vacations? No  We are going to try our best to schedule you at the infusion center closest to your home. In the event that we are unable to what would be your next preferred infusion site or sites? 1. [place] [facility]     Do you have transportation to take you to all of your appointments?  Yes

## 2023-09-28 ENCOUNTER — APPOINTMENT (OUTPATIENT)
Dept: RADIATION ONCOLOGY | Facility: HOSPITAL | Age: 73
End: 2023-09-28
Attending: INTERNAL MEDICINE
Payer: MEDICARE

## 2023-09-28 PROCEDURE — 77412 RADIATION TX DELIVERY LVL 3: CPT | Performed by: RADIOLOGY

## 2023-09-28 PROCEDURE — 77387 GUIDANCE FOR RADJ TX DLVR: CPT | Performed by: RADIOLOGY

## 2023-09-28 NOTE — PATIENT INSTRUCTIONS
It was a pleasure speaking with you today. As discussed:  -Continue your medications as prescribed. -Continue with a bowel regimen as discussed to avoid constipation associated with opioids.  -Trial Oxycodone 15mg every 4 hours as needed as discussed. Follow-up in: 1 week for pain check    Please do not hesitate to call me sooner should you have any questions/concerns or needs.

## 2023-09-29 ENCOUNTER — APPOINTMENT (OUTPATIENT)
Dept: RADIATION ONCOLOGY | Facility: HOSPITAL | Age: 73
End: 2023-09-29
Attending: INTERNAL MEDICINE
Payer: MEDICARE

## 2023-09-29 ENCOUNTER — TELEPHONE (OUTPATIENT)
Dept: HEMATOLOGY ONCOLOGY | Facility: CLINIC | Age: 73
End: 2023-09-29

## 2023-09-29 DIAGNOSIS — J20.9 ACUTE BRONCHITIS, UNSPECIFIED ORGANISM: ICD-10-CM

## 2023-09-29 PROCEDURE — G6002 STEREOSCOPIC X-RAY GUIDANCE: HCPCS | Performed by: STUDENT IN AN ORGANIZED HEALTH CARE EDUCATION/TRAINING PROGRAM

## 2023-09-29 PROCEDURE — 77387 GUIDANCE FOR RADJ TX DLVR: CPT | Performed by: STUDENT IN AN ORGANIZED HEALTH CARE EDUCATION/TRAINING PROGRAM

## 2023-09-29 PROCEDURE — 77336 RADIATION PHYSICS CONSULT: CPT | Performed by: INTERNAL MEDICINE

## 2023-09-29 PROCEDURE — 77412 RADIATION TX DELIVERY LVL 3: CPT | Performed by: STUDENT IN AN ORGANIZED HEALTH CARE EDUCATION/TRAINING PROGRAM

## 2023-09-29 RX ORDER — ALBUTEROL SULFATE 90 UG/1
2 AEROSOL, METERED RESPIRATORY (INHALATION) EVERY 4 HOURS PRN
Qty: 8 G | Refills: 0 | Status: SHIPPED | OUTPATIENT
Start: 2023-09-29

## 2023-09-29 NOTE — TELEPHONE ENCOUNTER
Received message that pt's  would like to know duration of Ibrance. Left voicemail confirming plan is to continue until disease progression or if there are adverse effects. Provided my direct line for any questions.

## 2023-10-02 ENCOUNTER — HOSPITAL ENCOUNTER (OUTPATIENT)
Dept: RADIOLOGY | Facility: HOSPITAL | Age: 73
Discharge: HOME | End: 2023-10-02
Attending: INTERNAL MEDICINE
Payer: MEDICARE

## 2023-10-02 DIAGNOSIS — C50.412 MALIGNANT NEOPLASM OF UPPER-OUTER QUADRANT OF LEFT BREAST IN FEMALE, ESTROGEN RECEPTOR POSITIVE: ICD-10-CM

## 2023-10-02 DIAGNOSIS — Z17.0 MALIGNANT NEOPLASM OF UPPER-OUTER QUADRANT OF LEFT BREAST IN FEMALE, ESTROGEN RECEPTOR POSITIVE: ICD-10-CM

## 2023-10-02 PROCEDURE — G1004 CDSM NDSC: HCPCS

## 2023-10-02 PROCEDURE — 70470 CT HEAD/BRAIN W/O & W/DYE: CPT

## 2023-10-02 RX ADMIN — IOHEXOL 85 ML: 350 INJECTION, SOLUTION INTRAVENOUS at 07:52

## 2023-10-03 ENCOUNTER — PATIENT MESSAGE (OUTPATIENT)
Dept: HEMATOLOGY ONCOLOGY | Facility: CLINIC | Age: 73
End: 2023-10-03

## 2023-10-04 ENCOUNTER — TELEPHONE (OUTPATIENT)
Dept: INFUSION CENTER | Facility: HOSPITAL | Age: 73
End: 2023-10-04

## 2023-10-04 ENCOUNTER — TELEPHONE (OUTPATIENT)
Age: 73
End: 2023-10-04

## 2023-10-04 DIAGNOSIS — M84.58XA PATHOLOGICAL FRACTURE OF SACRAL VERTEBRA DUE TO NEOPLASTIC DISEASE: Primary | ICD-10-CM

## 2023-10-04 DIAGNOSIS — C50.412 MALIGNANT NEOPLASM OF UPPER-OUTER QUADRANT OF LEFT BREAST IN FEMALE, ESTROGEN RECEPTOR POSITIVE: ICD-10-CM

## 2023-10-04 DIAGNOSIS — Z17.0 MALIGNANT NEOPLASM OF UPPER-OUTER QUADRANT OF LEFT BREAST IN FEMALE, ESTROGEN RECEPTOR POSITIVE: ICD-10-CM

## 2023-10-04 RX ORDER — LAMOTRIGINE 25 MG/1
500 TABLET ORAL ONCE
Status: CANCELLED | OUTPATIENT
Start: 2023-10-06

## 2023-10-04 NOTE — TELEPHONE ENCOUNTER
Patient called in stating Dr. Adriana Stanton would like to see patient in-regards to a follow-up to discuss cancer concerns. Nothing available for the patient in the next couple weeks that I could schedule. Patient requesting a call back in-regards to a sooner appt if possible.

## 2023-10-05 NOTE — TELEPHONE ENCOUNTER
lmom to call back and [facility] apt on the date Dr. Fabrice merlos is available in a TCM spot 10/12, ok per Dr. Grant Araujo

## 2023-10-06 ENCOUNTER — TELEMEDICINE (OUTPATIENT)
Dept: PALLIATIVE MEDICINE | Facility: CLINIC | Age: 73
End: 2023-10-06
Payer: MEDICARE

## 2023-10-06 ENCOUNTER — HOSPITAL ENCOUNTER (OUTPATIENT)
Dept: INFUSION CENTER | Facility: HOSPITAL | Age: 73
End: 2023-10-06
Attending: INTERNAL MEDICINE
Payer: MEDICARE

## 2023-10-06 VITALS
BODY MASS INDEX: 36.24 KG/M2 | TEMPERATURE: 97.9 F | HEART RATE: 119 BPM | WEIGHT: 191.8 LBS | SYSTOLIC BLOOD PRESSURE: 139 MMHG | DIASTOLIC BLOOD PRESSURE: 69 MMHG

## 2023-10-06 DIAGNOSIS — M84.58XA PATHOLOGICAL FRACTURE OF SACRAL VERTEBRA DUE TO NEOPLASTIC DISEASE: Primary | ICD-10-CM

## 2023-10-06 DIAGNOSIS — C50.412 MALIGNANT NEOPLASM OF UPPER-OUTER QUADRANT OF LEFT BREAST IN FEMALE, ESTROGEN RECEPTOR POSITIVE: ICD-10-CM

## 2023-10-06 DIAGNOSIS — Z17.0 MALIGNANT NEOPLASM OF UPPER-OUTER QUADRANT OF LEFT BREAST IN FEMALE, ESTROGEN RECEPTOR POSITIVE: ICD-10-CM

## 2023-10-06 DIAGNOSIS — M84.58XA PATHOLOGICAL FRACTURE OF SACRAL VERTEBRA DUE TO NEOPLASTIC DISEASE: ICD-10-CM

## 2023-10-06 DIAGNOSIS — F41.9 ANXIETY: Primary | ICD-10-CM

## 2023-10-06 DIAGNOSIS — Z51.5 PALLIATIVE CARE PATIENT: ICD-10-CM

## 2023-10-06 PROCEDURE — 96402 CHEMO HORMON ANTINEOPL SQ/IM: CPT

## 2023-10-06 PROCEDURE — 99213 OFFICE O/P EST LOW 20 MIN: CPT | Performed by: STUDENT IN AN ORGANIZED HEALTH CARE EDUCATION/TRAINING PROGRAM

## 2023-10-06 RX ORDER — LORAZEPAM 1 MG/1
1 TABLET ORAL ONCE AS NEEDED
Qty: 1 TABLET | Refills: 0 | Status: SHIPPED | OUTPATIENT
Start: 2023-10-17

## 2023-10-06 RX ORDER — LORAZEPAM 0.5 MG/1
1 TABLET ORAL ONCE AS NEEDED
Status: DISCONTINUED | OUTPATIENT
Start: 2023-10-17 | End: 2023-10-06

## 2023-10-06 RX ORDER — LAMOTRIGINE 25 MG/1
500 TABLET ORAL ONCE
OUTPATIENT
Start: 2023-10-16

## 2023-10-06 RX ORDER — LAMOTRIGINE 25 MG/1
500 TABLET ORAL ONCE
Status: DISCONTINUED | OUTPATIENT
Start: 2023-10-06 | End: 2023-10-06 | Stop reason: SDUPTHER

## 2023-10-06 RX ORDER — LAMOTRIGINE 25 MG/1
500 TABLET ORAL ONCE
Status: COMPLETED | OUTPATIENT
Start: 2023-10-06 | End: 2023-10-06

## 2023-10-06 RX ADMIN — FULVESTRANT 500 MG: 250 INJECTION, SOLUTION INTRAMUSCULAR at 08:54

## 2023-10-06 NOTE — PROGRESS NOTES
Outpatient Virtual Visit - Palliative and Supportive Care  Barbara Poster 72 y.o. female [id number]    Intake and Identification:    Reason(s) for virtual visit: televideo, follow-up and symptom management. The patient is unable to visit the clinic safely due to medical condition    Encounter provider Remigio Solano DO, located at:  [address] [facility]  [address] [facility] [address]    Recent Visits  Date Type Provider Dept   10/06/23 Telemedicine Ella Stone DO [address] [place] [address]   10/02/23 Telephone Marcos Ortiz MD [facility] recent visits within past 7 days and meeting all other requirements  Future Appointments  No visits were found meeting these conditions. Showing future appointments within next 150 days and meeting all other requirements       Patient agrees to participate in a virtual check in via telephone or video visit instead of presenting to the office to address urgent/immediate medical needs, or to respect quarantine and public health guidelines. Patient is aware this is a billable service. After connecting through televideo, the patient was identified by name and date of birth. Barbara Poster was informed that this was a telemedicine visit and that the visit is being conducted through the [address] [place] [address] [employer] platform. She agrees to proceed. My office door was closed. No one else was in the room. She acknowledged consent and understanding of privacy and security of the video platform. I informed the patient that I have reviewed her record in [facility] and presented the opportunity for her to ask any questions regarding the visit today. The patient has agreed to participate and understands they can discontinue the visit at any time. ASSESSMENT & PLAN:    Video assessment of patient: due to technical difficulties, was unable to see patient clearly during televideo meeting. 1. Anxiety    2.  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 Malignant neoplasm of upper-outer quadrant of left breast in female, estrogen receptor positive     4. Palliative care patient          Plan:  1) Symptom management  a) Pain    -Continue with current regimen of OxyIR 15mg q4 hours PRN   -Patient endorses only having to use 1 dose in the past week as her pain has much improved. She does have episodes of pain, however, only need multi-modals with Tylenol intermittently. -Medication safety issues addressed - no driving under the influence of narcotics, watch for adverse effects including AMS and respiratory depression, keep medications stored in a safe/locked environment. b) Gastrointestinal    -Bowel regimen to avoid constipation - OIC    - Sennakot 8.6mg PO - take once a daily    C) Psychosocial    -Support provided    D) Patient due for PET scan on 10/19/2023 and requesting option to help manage her anxiety during imaging   -Provided Lorazepam 1mg of one dose to be taken 30 minutes to 1 hour prior to her PET scan     2) Goals of Care / [address] Directive Counseling not given at this visit     3) Referrals Placed / Medical Equipment Ordered   -None today    4) Follow-Up Recommendations   -Follow-up in 4 weeks or sooner as needed    Requested Prescriptions     Signed Prescriptions Disp Refills   • LORazepam (ATIVAN) 1 mg tablet 1 tablet 0     Sig: Take 1 tablet (1 mg total) by mouth once as needed for anxiety for up to 1 dose Take 30 minutes to 1 hour prior to your schedule imaging/scan. Do not start before October 17, 2023. Medications Discontinued During This Encounter   Medication Reason   • LORazepam (ATIVAN) tablet 1 mg        Representatives have queried the patient's controlled substance dispensing history in the Prescription Drug Monitoring Program in compliance with regulations before I have prescribed any controlled substances.  The prescription history is consistent with prescribed therapy and our practice policies. 20 minutes were spent in this video telecommunications visit, with greater than 50% of the time spent face-to-face with patient and family in counseling or coordination of care including discussions of symptom assessment and management, medication review, medication adjustment, psychosocial support, chart review, imaging review, lab review, supportive listening and anticipatory guidance. All of the patient's questions were answered during this discussion. She is invited to continue to follow with us. If there are questions or concerns, she knows to contact us through our clinic/answering service 24 hours a day, seven days a week. SUBJECTIVE:  Cancer related pain     HPI    Starr Justin is a 72 y.o. female w/ malignant neoplasm of left breast with metastatic disease to lymph node and bone. Patient is here for follow-up of her pain management regimen. She states she has been doing much better with her pain and only having to take OxyIR once in the past week when her pain was severe. She is otherwise only taking Tylenol sparingly which has helped manage her pain. No issues with bowel movements or nausea at this time. She received her first dose of Faslodex today. Will continue to follow her for any symptoms during her treatment. She denies any other concerns or questions at this time other than requesting something to help her with her anxiety and discomfort before her upcoming PET scan on 10/19/2023. She has historically taken Ativan 1mg which helped with her anxiety during imaging. PDMP reviewed and shows no concerns. The following portions of the medical history were reviewed: past medical history, surgical history, problem list, medication list, family history, and social history.     Current Outpatient Medications:   •  albuterol (ProAir HFA) 90 mcg/act inhaler, Inhale 2 puffs every 4 (four) hours as needed for wheezing or shortness of breath, Disp: 8 g, Rfl: 0  • [START ON 10/17/2023] LORazepam (ATIVAN) 1 mg tablet, Take 1 tablet (1 mg total) by mouth once as needed for anxiety for up to 1 dose Take 30 minutes to 1 hour prior to your schedule imaging/scan. Do not start before October 17, 2023., Disp: 1 tablet, Rfl: 0  •  anastrozole (ARIMIDEX) 1 mg tablet, TAKE ONE TABLET BY MOUTH EVERY DAY, Disp: 90 tablet, Rfl: 1  •  Ascorbic Acid (VITAMIN C PO), Take 500 mg by mouth in the morning, Disp: , Rfl:   •  fluticasone (FLONASE) 50 mcg/act nasal spray, 2 puffs into each nostril daily, Disp: , Rfl:   •  gabapentin (Neurontin) 100 mg capsule, Take 1 capsule (100 mg total) by mouth daily at bedtime, Disp: 30 capsule, Rfl: 1  •  losartan-hydrochlorothiazide (HYZAAR) 50-12.5 mg per tablet, Take 1 tablet by mouth daily, Disp: 90 tablet, Rfl: 1  •  metoprolol tartrate (LOPRESSOR) 50 mg tablet, Take 1 tablet (50 mg total) by mouth daily, Disp: 90 tablet, Rfl: 0  •  Multiple Vitamins-Minerals (MULTIVITAMIN ADULT PO), Take by mouth daily, Disp: , Rfl:   •  naloxone (NARCAN) 4 mg/0.1 mL nasal spray, Administer 1 spray into a nostril. If no response after 2-3 minutes, give another dose in the other nostril using a new spray. For use in emergencies in the event of accidental ingestion, respiratory depression or oversedation. , Disp: 1 each, Rfl: 1  •  naproxen (NAPROSYN) 500 mg tablet, Take 1 tablet (500 mg total) by mouth 2 (two) times a day with meals for 14 days, Disp: 28 tablet, Rfl: 0  •  ondansetron (ZOFRAN) 4 mg tablet, Take 1 tablet (4 mg total) by mouth every 8 (eight) hours as needed for nausea or vomiting, Disp: 30 tablet, Rfl: 0  •  oxyCODONE (ROXICODONE) 10 MG TABS, Take 1.5 tablets (15 mg total) by mouth every 4 (four) hours as needed for moderate pain Max Daily Amount: 90 mg, Disp: 150 tablet, Rfl: 0  •  polyethylene glycol (MIRALAX) 17 g packet, Take 17 g by mouth daily as needed (as needed for constipation.), Disp: 255 g, Rfl: 0  •  senna-docusate sodium (SENOKOT-S) 8.6-50 mg per tablet, Take 1 tablet by mouth 2 (two) times a day, Disp: , Rfl: 0  •  VITAMIN D PO, Take by mouth in the morning, Disp: , Rfl:     Review of Systems   Constitutional: Negative for appetite change and fatigue. HENT: Negative for sore throat. Eyes: Negative for visual disturbance. Respiratory: Negative for shortness of breath. Cardiovascular: Negative for chest pain. Gastrointestinal: Negative for abdominal pain, constipation, diarrhea, nausea and vomiting. Genitourinary: Negative for dysuria. Musculoskeletal: Positive for back pain and neck pain. Neurological: Negative. Psychiatric/Behavioral: Negative for sleep disturbance. The patient is not nervous/anxious. All other systems reviewed and are negative. Papi Borja DO  [facility]iative [facility]  [phone]    Portions of this document may have been created using dictation software and as such some "sound alike" terms may have been generated by the system. Do not hesitate to contact me with any questions or clarifications. VIRTUAL VISIT DISCLAIMER    Lambert Read acknowledges that she has consented to an online visit or consultation. She understands that the online visit is based solely on information provided by her, and that, in the absence of a face-to-face physical evaluation by the physician, the diagnosis she receives is both limited and provisional in terms of accuracy and completeness. This is not intended to replace a full medical face-to-face evaluation by the physician. Lambert Read understands and accepts these terms.

## 2023-10-09 ENCOUNTER — DOCUMENTATION (OUTPATIENT)
Dept: HEMATOLOGY ONCOLOGY | Facility: CLINIC | Age: 73
End: 2023-10-09

## 2023-10-09 ENCOUNTER — TELEPHONE (OUTPATIENT)
Dept: FAMILY MEDICINE CLINIC | Facility: CLINIC | Age: 73
End: 2023-10-09

## 2023-10-09 NOTE — TELEPHONE ENCOUNTER
Regarding: FW: Handicap parking   Contact: [phone]    ----- Message -----  From: Jamia Leblanc  Sent: 10/9/2023   3:59 PM EDT  To: [facility]  Subject: Handicap parking                                 ----- Message from Jamia Leblanc sent at 10/9/2023  3:59 PM EDT -----       ----- Message sent from Gerardo Motta to Brady Benson at 10/9/2023  2:45 PM -----   Chioma Gilbert . The office left you a message to call the office to set up an appointment for your hospital follow up . Please call back and tell the  you are returning a call for an appointment . Thank you       ----- Message -----       From:Gosia Reese       Sent:10/6/2023  6:50 PM EDT         To:Dalia Peña    Subject:Handicap parking     Hi it is Brady Benson just wondering if your staff gave you the information for the parking permit and did they let you know ii wanted a follow up.  Sorry I am being a pain it’s rough being stuck in bed the mind goes crazy

## 2023-10-09 NOTE — PROGRESS NOTES
In-basket message received from Hany Heaton RN to add patient to the [facility] on 10/10/2023. Chart reviewed and prep completed.

## 2023-10-10 ENCOUNTER — TELEPHONE (OUTPATIENT)
Dept: HEMATOLOGY ONCOLOGY | Facility: CLINIC | Age: 73
End: 2023-10-10

## 2023-10-10 DIAGNOSIS — Z17.0 MALIGNANT NEOPLASM OF UPPER-OUTER QUADRANT OF LEFT BREAST IN FEMALE, ESTROGEN RECEPTOR POSITIVE: Primary | ICD-10-CM

## 2023-10-10 DIAGNOSIS — C50.412 MALIGNANT NEOPLASM OF UPPER-OUTER QUADRANT OF LEFT BREAST IN FEMALE, ESTROGEN RECEPTOR POSITIVE: Primary | ICD-10-CM

## 2023-10-11 DIAGNOSIS — C50.412 MALIGNANT NEOPLASM OF UPPER-OUTER QUADRANT OF LEFT BREAST IN FEMALE, ESTROGEN RECEPTOR POSITIVE: ICD-10-CM

## 2023-10-11 DIAGNOSIS — Z17.0 MALIGNANT NEOPLASM OF UPPER-OUTER QUADRANT OF LEFT BREAST IN FEMALE, ESTROGEN RECEPTOR POSITIVE: ICD-10-CM

## 2023-10-11 DIAGNOSIS — E66.01 MORBID OBESITY (HCC): Primary | ICD-10-CM

## 2023-10-11 NOTE — TELEPHONE ENCOUNTER
10/11 Initial Outreach - New Start Alejandra    Consent uploaded: yes    Medication provided by: [facility]    Confirmed drug, dose and schedule: yes, pt plans to track three weeks on followed by one week off with a calendar     Start date: 10/20    Adherence: no barriers identified at this time    Pt instructed not to cut/crush and take pill whole: yes    Pt instructed not to double up if a dose is missed: yes    Med storage, handling and disposal discussed: yes    Pt instructed to notify office prior to any procedures: yes    Confirmed pt reviewed med teaching sheet: yes    Side effect discussion: pt denies any questions    Pt knows how to refill med: will follow    Pt has contact info for office RN, Irvin and Oral Chemo Coordinator: yes    Other topics discussed: pt asked if okay to take oxycodone with Alejandra - advised this is fine    Pt aware of follow up appt/labs/testing: follow up appt 10/26; will check with Paticash Oreilly RN regarding labs    Plan for next call: approx 7 days after starting med
Left voicemail for pt requesting call back to discuss her Ibrance. Provided my direct line and office hours.
Patient notified
28-Feb-2019

## 2023-10-18 DIAGNOSIS — C50.412 MALIGNANT NEOPLASM OF UPPER-OUTER QUADRANT OF LEFT BREAST IN FEMALE, ESTROGEN RECEPTOR POSITIVE: ICD-10-CM

## 2023-10-18 DIAGNOSIS — Z51.5 PALLIATIVE CARE PATIENT: ICD-10-CM

## 2023-10-18 DIAGNOSIS — Z17.0 MALIGNANT NEOPLASM OF UPPER-OUTER QUADRANT OF LEFT BREAST IN FEMALE, ESTROGEN RECEPTOR POSITIVE: ICD-10-CM

## 2023-10-18 DIAGNOSIS — R11.0 NAUSEA: ICD-10-CM

## 2023-10-18 RX ORDER — ONDANSETRON 4 MG/1
4 TABLET, FILM COATED ORAL EVERY 8 HOURS PRN
Qty: 30 TABLET | Refills: 0 | Status: SHIPPED | OUTPATIENT
Start: 2023-10-18

## 2023-10-19 ENCOUNTER — HOSPITAL ENCOUNTER (OUTPATIENT)
Dept: RADIOLOGY | Age: 73
Discharge: HOME | End: 2023-10-19
Payer: MEDICARE

## 2023-10-19 DIAGNOSIS — Z17.0 MALIGNANT NEOPLASM OF UPPER-OUTER QUADRANT OF LEFT BREAST IN FEMALE, ESTROGEN RECEPTOR POSITIVE: ICD-10-CM

## 2023-10-19 DIAGNOSIS — C50.412 MALIGNANT NEOPLASM OF UPPER-OUTER QUADRANT OF LEFT BREAST IN FEMALE, ESTROGEN RECEPTOR POSITIVE: ICD-10-CM

## 2023-10-19 LAB — GLUCOSE SERPL-MCNC: 169 MG/DL (ref 65–140)

## 2023-10-19 PROCEDURE — 78815 PET IMAGE W/CT SKULL-THIGH: CPT

## 2023-10-19 PROCEDURE — A9552 F18 FDG: HCPCS

## 2023-10-19 PROCEDURE — G1004 CDSM NDSC: HCPCS

## 2023-10-19 PROCEDURE — 82948 REAGENT STRIP/BLOOD GLUCOSE: CPT

## 2023-10-19 NOTE — LETTER
[address] [place] [address]  [address] [address]      October 24, 2023    MRN: [id number]     Phone: [phone]     Dear Ms. Kathryn Cabrera recently had a(n) Nuclear Medicine performed on 10/19/2023 at  [address] [place] [address] that was requested by Azul Palomino MD. The study was reviewed by a radiologist, which is a physician who specializes in medical imaging. The radiologist issued a report describing his or her findings. In that report there was a finding that the radiologist felt warranted further discussion with your health care provider and that discussion would be beneficial to you. The results were sent to Azul Palomino MD on 10/19/2023 12:59 PM. We recommend that you contact Azul Palomino MD at [phone] or set up an appointment to discuss the results of the imaging test. If you have already heard from Azul Palomino MD regarding the results of your study, you can disregard this letter. This letter is not meant to alarm you, but intended to encourage you to follow-up on your results with the provider that sent you for the imaging study. In addition, we have enclosed answers to frequently asked questions by other patients who have also received a letter to review results with their health care provider (see page two). Thank you for choosing [address] [place] [address] for your medical imaging needs. FREQUENTLY ASKED QUESTIONS    Why am I receiving this letter? [facility] [place]note 2[place]0 [facility] requires us to notify patients who have findings on imaging exams that may require more testing or follow-up with a health professional within the next 3 months. How serious is the finding on the imaging test?  This letter is sent to all patients who may need follow-up or more testing within the next 3 months. Receiving this letter does not necessarily mean you have a life-threatening imaging finding or disease. Recommendations in the radiologist’s imaging report are general in nature and it is up to your healthcare provider to say whether those recommendations make sense for your situation. You are strongly encouraged to talk to your health care provider about the results and ask whether additional steps need to be taken. Where can I get a copy of the final report for my recent radiology exam?  To get a full copy of the report you can access your records online at [email or web address] or please contact [facility] Medical Records Department at [phone] Monday through Friday between 8 am and 6 pm.         What do I need to do now? Please contact your health care provider who requested the imaging study to discuss what further actions (if any) are needed. You may have already heard from (your ordering provider) in regard to this test in which case you can disregard this letter. NOTICE IN ACCORDANCE WITH THE [place] “PATIENT TEST RESULT INFORMATION ACT OF 2018”    You are receiving this notice as a result of a determination by your diagnostic imaging service that further discussions of your test results are warranted and would be beneficial to you. The complete results of your test or tests have been or will be sent to the health care practitioner that ordered the test or tests. It is recommended that you contact your health care practitioner to discuss your results as soon as possible.

## 2023-10-20 ENCOUNTER — HOSPITAL ENCOUNTER (OUTPATIENT)
Dept: INFUSION CENTER | Facility: HOSPITAL | Age: 73
End: 2023-10-20
Attending: INTERNAL MEDICINE
Payer: MEDICARE

## 2023-10-20 VITALS
HEART RATE: 98 BPM | SYSTOLIC BLOOD PRESSURE: 130 MMHG | RESPIRATION RATE: 20 BRPM | TEMPERATURE: 97 F | DIASTOLIC BLOOD PRESSURE: 69 MMHG | OXYGEN SATURATION: 99 %

## 2023-10-20 DIAGNOSIS — M84.58XA PATHOLOGICAL FRACTURE OF SACRAL VERTEBRA DUE TO NEOPLASTIC DISEASE: ICD-10-CM

## 2023-10-20 DIAGNOSIS — Z17.0 MALIGNANT NEOPLASM OF UPPER-OUTER QUADRANT OF LEFT BREAST IN FEMALE, ESTROGEN RECEPTOR POSITIVE: Primary | ICD-10-CM

## 2023-10-20 DIAGNOSIS — C50.412 MALIGNANT NEOPLASM OF UPPER-OUTER QUADRANT OF LEFT BREAST IN FEMALE, ESTROGEN RECEPTOR POSITIVE: Primary | ICD-10-CM

## 2023-10-20 PROCEDURE — 96401 CHEMO ANTI-NEOPL SQ/IM: CPT

## 2023-10-20 RX ORDER — LAMOTRIGINE 25 MG/1
500 TABLET ORAL ONCE
Status: CANCELLED | OUTPATIENT
Start: 2023-11-03

## 2023-10-20 RX ORDER — LAMOTRIGINE 25 MG/1
500 TABLET ORAL ONCE
Status: COMPLETED | OUTPATIENT
Start: 2023-10-20 | End: 2023-10-20

## 2023-10-20 RX ADMIN — FULVESTRANT 500 MG: 250 INJECTION, SOLUTION INTRAMUSCULAR at 08:13

## 2023-10-20 NOTE — PROGRESS NOTES
Pt reports she is to have labs in 3 weeks per Dr. Kenyatta Correa office. She said it is to coincide with new chemo regimen.

## 2023-10-24 ENCOUNTER — RA CDI HCC (OUTPATIENT)
Dept: OTHER | Facility: HOSPITAL | Age: 73
End: 2023-10-24

## 2023-10-25 ENCOUNTER — TELEPHONE (OUTPATIENT)
Dept: FAMILY MEDICINE CLINIC | Facility: CLINIC | Age: 73
End: 2023-10-25

## 2023-10-26 ENCOUNTER — OFFICE VISIT (OUTPATIENT)
Dept: HEMATOLOGY ONCOLOGY | Facility: CLINIC | Age: 73
End: 2023-10-26
Payer: MEDICARE

## 2023-10-26 ENCOUNTER — TELEPHONE (OUTPATIENT)
Dept: SURGICAL ONCOLOGY | Facility: CLINIC | Age: 73
End: 2023-10-26

## 2023-10-26 VITALS
RESPIRATION RATE: 18 BRPM | DIASTOLIC BLOOD PRESSURE: 56 MMHG | SYSTOLIC BLOOD PRESSURE: 88 MMHG | HEART RATE: 91 BPM | OXYGEN SATURATION: 96 % | TEMPERATURE: 97.4 F | HEIGHT: 61 IN | BODY MASS INDEX: 36.24 KG/M2

## 2023-10-26 DIAGNOSIS — Z17.0 MALIGNANT NEOPLASM OF UPPER-OUTER QUADRANT OF LEFT BREAST IN FEMALE, ESTROGEN RECEPTOR POSITIVE: Primary | ICD-10-CM

## 2023-10-26 DIAGNOSIS — M84.58XA PATHOLOGICAL FRACTURE OF SACRAL VERTEBRA DUE TO NEOPLASTIC DISEASE: ICD-10-CM

## 2023-10-26 DIAGNOSIS — C50.412 MALIGNANT NEOPLASM OF UPPER-OUTER QUADRANT OF LEFT BREAST IN FEMALE, ESTROGEN RECEPTOR POSITIVE: Primary | ICD-10-CM

## 2023-10-26 PROCEDURE — 99215 OFFICE O/P EST HI 40 MIN: CPT | Performed by: INTERNAL MEDICINE

## 2023-10-26 NOTE — PROGRESS NOTES
Gi Joe  1950  [address] [facility]  [address] [facility] [place] [address]    DISCUSSION/SUMMARY:    71-year-old female with history of stage IIIA left-sided breast cancer now with evidence of metastatic disease. Issues:    Pathologic sacral fracture. Patient has been seen/evaluated by radiation oncology and completed palliative RT. PET/CT results are listed below. There is a left humeral head lesion also concerning for metastatic disease. Patient denies any arm pain. This will need to be monitored. Osseous metastatic disease. Patient is a candidate for antireabsorption therapy. Mrs. Dora Gordon has not seen a dentist in many years. Patient will see a dentist as soon as possible; need clearance first.  By physical exam, teeth are in +/- condition. Pain control. Patient states that her pain is better controlled. Patient is now being followed by palliative care. Metastatic breast cancer. Mrs. Dora Gordon states feeling +/-, about the same as before. Because of the concern about waiting, patient was recently started on Ibrance and Faslodex, has received 1 dose of Faslodex. Patient subsequently underwent PET/CT. Besides the bone lesions above, patient was found to have lesions in both lungs, mediastinal adenopathy, right-sided supraclavicular adenopathy, mediastinum, left hilum, large subcarinal mass and a small left pleural effusion (not tapped). Patient's performance status is +/-, no respiratory issues. I believe there are enough areas of abnormality on the PET/CT to warrant more aggressive systemic treatment (concern for progressing to visceral crises). This was discussed with patient and  at length. Mrs. Dora Gordon understands that the idea is to give more aggressive systemic treatments upfront, stabilize the disease and then go back to the CDK 4/6 inhibitor with Faslodex.   In the future, if evidence of disease progression, a soft tissue biopsy will be needed. Patient could have peripheral blood sent for [id number]. Nursing staff spent time with patient and  today going over the potential benefits versus risk/toxicities of Doxil. Patient can continue with the Ibrance for the time being but Mrs. Pritesh Gibson should not receive any additional Faslodex. 2D echo has been requested. The dosing for the doxorubicin liposomal is 50 mg/metered squared IV every 28 days. Patient can discontinue the Ibrance the week before the first dose of Doxil is to be given. WBC/ANC needs to be monitored. NCCN guidelines 4.2023 states that for patients with recurrent or metastatic breast cancer, HR positive and HER2/leonela negative, preferred regimen includes an anthracycline. Other options include taxanes but patient was previously treated with Taxotere/Cytoxan. The tentative plan is 4-6 cycles. Afterwards, if disease stability, patient can return to the Faslodex and Ibrance. Patient is to return in 3 to 4 weeks. Mrs. Pritesh Gibson knows to call the hematology/oncology office if there are any other questions or concerns. Carefully review your medication list and verify that the list is accurate and up-to-date. Please call the hematology/oncology office if there are medications missing from the list, medications on the list that you are not currently taking or if there is a dosage or instruction that is different from how you're taking that medication.     Patient goals and areas of care: 2D echo, begin Doxil  Barriers to care: none  Patient is able to self-care  _____________________________________________________________________________________    Chief Complaint   Patient presents with    Follow-up    Metastatic breast cancer     Advance Care Planning/Advance Directives: Not yet discussed    Oncology History   Malignant neoplasm of upper-outer quadrant of left breast in female, estrogen receptor positive    4/19/2021 Biopsy Left breast US guided biopsy:  A. 2 o'clock 8 cm from the nipple  Invasive mammary carcinoma of no special type  Grade 2  ER 90  MA 1  HER2 1+  Lymphovascular invasion: not identified    B. Left Axillary lymph node biopsy:  Invasive/ metastatic adenocarcinoma, compatible with breast primary involving fibroadipose tissues and a portion of lymphoid pranchyma. ER 90  MA 1  HER2 1+    Concordant. Malignancy appears multifocal. The 2 adjacent masses span an area of approximately 4 cm. Right breast clear. 5/3/2021 Genetic Testing    The following genes were evaluated: OLINDA, BRCA1, BRCA2, CDH1, CHEK2, PALB2, PTEN, STK11, TP53  Additional genes evaluated for a total of 36 genes analyzed  Negative result. No pathogenic sequence variants or deletions/dupllications identified  [employer]     6/8/2021 Surgery    Left breast modified radical mastectomy with ANAND  directed axillary dissection  Invasive carcinoma of no special type (ductal)  Grade 2  6 cm  Lymphovascular invasion present  Margins negative  4/10 Lymph nodes (3.5 cm)  Anatomic Stage IIIA  Prognostic Stage IB     7/16/2021 - 9/17/2021 Chemotherapy    pegfilgrastim (NEULASTA ONPRO), 6 mg, Subcutaneous, Once, 4 of 4 cycles  Administration: 6 mg (7/16/2021), 6 mg (8/6/2021), 6 mg (8/27/2021), 6 mg (9/17/2021)  cyclophosphamide (CYTOXAN) IVPB, 600 mg/m2 = 1,212 mg, Intravenous, Once, 4 of 4 cycles  Administration: 1,212 mg (7/16/2021), 1,212 mg (8/6/2021), 1,212 mg (8/27/2021), 1,212 mg (9/17/2021)  DOCEtaxel (TAXOTERE) chemo infusion, 75 mg/m2 = 151.6 mg, Intravenous, Once, 4 of 4 cycles  Administration: 151.6 mg (7/16/2021), 151.6 mg (8/6/2021), 151.6 mg (8/27/2021), 151.6 mg (9/17/2021)     10/18/2021 - 11/19/2021 Radiation    BH L CW 10X/6X 13 / 13 200 0 2,600 32   BH L CW BOLUS 10X/6X 12 / 12 200 0 2,400 30   BH L PAB 10X 25 / 25 60 0 1,500 32   BH L Sclav 10X 25 / 25 200 0 5,000 32      Treatment Dates:  10/18/2021 - 11/19/2021.    Dr Harini Carlson     10/2021 - Hormone Therapy    anastrozole 1 mg once a day    Dr Ragini Thompson     9/1/2023 Biopsy    Bone, left iliac crest, biopsy:  -Metastatic carcinoma, most compatible with metastasis from breast origin. 9/12/2023 -  Cancer Staged    Staging form: Breast, AJCC 8th Edition  - Clinical: Stage IV (cM1) - Signed by Sonny Ramirez MD on 9/12/2023       10/27/2023 -  Chemotherapy    alteplase (CATHFLO), 2 mg, Intracatheter, Every 1 Minute as needed, 0 of 6 cycles  DOXOrubicin liposomal (DOXIL) chemo infusion, 50 mg/m2, Intravenous, Once, 0 of 6 cycles       History of Present Illness: 74-year-old female previously followed by Dr. Ragini Thompson. Patient has a history of stage IIIA left breast cancer, grade 2, lobular, ER positive, HER2 negative disease status post mastectomy, lymph node sampling (June 2021). Patient was negative for the BRCA gene mutation. Pathology results (copied below) demonstrated four positive lymph nodes. Patient was treated with adjuvant Taxotere and Cytoxan (completed in September 2021) followed by radiation. Patient was subsequently placed on anastrozole and had tolerated this well; completed approximately 2+ years. Patient was recently [facility] to [facility] with severe buttocks pain. Work-up demonstrated a sacral fracture + mass. Biopsy was consistent with metastatic breast cancer. Patient completed palliative RT. Presently Mrs. Kemi Rao states feeling +/-, better than before. Patient is walking around at home but needs a wheelchair outside. Patient has been seen by palliative care, pain is better controlled. No respiratory issues. Patient has occasional chest pain and cough. No fevers or signs of infection. No bleeding. Appetite is good, no GI or  problems. Activities are limited but about the same as before. Review of Systems   Constitutional:  Positive for activity change, appetite change and fatigue. HENT: Negative. Eyes: Negative. Respiratory: Negative. Cardiovascular: Negative. Gastrointestinal: Negative. Endocrine: Negative. Genitourinary: Negative. Musculoskeletal:  Positive for arthralgias and gait problem. Skin: Negative. Allergic/Immunologic: Negative. Hematological: Negative. Psychiatric/Behavioral: Negative. All other systems reviewed and are negative. Patient Active Problem List   Diagnosis    Benign essential hypertension    Chronic rhinitis    Hidradenitis suppurativa    Hypothyroidism    Impaired fasting glucose    Morbid obesity (HCC)    Venous insufficiency (chronic) (peripheral)    Malignant neoplasm of upper-outer quadrant of left breast in female, estrogen receptor positive     Use of anastrozole    S/P mastectomy, left    Obesity (BMI 30-39. 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     History of radiation therapy     [place] (hard of hearing)     wears hearing aids/will wear DOS bilat    Hypertension     Limb alert care status     No BP/IV Left Arm    Lymphedema of left arm     Prediabetes     Wears glasses      Past Surgical History:   Procedure Laterality Date    AXILLARY SURGERY Right     sweat glands removed -     BREAST CYST EXCISION Right 2000    benign    BREAST SURGERY Right     CHOLECYSTECTOMY      COLONOSCOPY      DILATION AND CURETTAGE OF UTERUS      IR BIOPSY BONE  9/1/2023    MASTECTOMY Left 06/08/2021    PA BREAST REDUCTION N/A 03/18/2022    Procedure: R BREAST REDUCTION; L BREAST EXCISION STANDING SKIN DEFORMITY; LOCAL FLAP;  Surgeon: Teddy Rios MD;  Location: [facility];  Service: Plastics    IA MAST MODF RAD W/AX LYMPH NOD W/WO PECT/NERI MIN Left 06/08/2021    Procedure: BREAST MODIFIED RADICAL MASTECTOMY; ANAND  DIRECTED AXILLARY DISSECTION;  Surgeon: Milly Alexander MD;  Location: [facility];  Service: Surgical Oncology    REDUCTION MAMMAPLASTY Right     March 2022    US BREAST NEEDLE LOC LEFT Left 06/02/2021    US GUIDED BREAST BIOPSY LEFT COMPLETE Left 04/19/2021    US GUIDED BREAST LYMPH NODE BIOPSY LEFT Left 04/19/2021    WISDOM TOOTH EXTRACTION       Family History   Problem Relation Age of Onset    Dementia Mother     Colon cancer Mother         49's-62's    Lung cancer Father         49's-62's    No Known Problems Sister     Breast cancer Paternal Aunt     No Known Problems Sister     Stomach cancer Paternal Uncle         66's     Social History     Socioeconomic History    Marital status: /Civil Union     Spouse name: Not on file    Number of children: Not on file    Years of education: Not on file    Highest education level: Not on file   Occupational History    Not on file   Tobacco Use    Smoking status: Never     Passive exposure: Never    Smokeless tobacco: Never   Vaping Use    Vaping Use: Never used   Substance and Sexual Activity    Alcohol use: Never    Drug use: No    Sexual activity: Yes     Partners: Male     Birth control/protection: Male Sterilization     Comment: defer   Other Topics Concern    Not on file   Social History Narrative    Not on file     Social Determinants of Health     Financial Resource Strain: Low Risk  (5/3/2023)    Overall Financial Resource Strain (CARDIA)     Difficulty of Paying Living Expenses: Not hard at all   Food Insecurity: No Food Insecurity (8/31/2023)    Hunger Vital Sign     Worried About Running Out of Food in the Last Year: Never true     Ran Out of Food in the Last Year: Never true   Transportation Needs: No Transportation Needs (8/31/2023)    PRAPARE - Transportation Lack of Transportation (Medical): No     Lack of Transportation (Non-Medical): No   Physical Activity: Not on file   Stress: Not on file   Social Connections: Not on file   Intimate Partner Violence: Not on file   Housing Stability: Low Risk  (8/31/2023)    Housing Stability Vital Sign     Unable to Pay for Housing in the Last Year: No     Number of Places Lived in the Last Year: 1     Unstable Housing in the Last Year: No       Current Outpatient Medications:     albuterol (ProAir HFA) 90 mcg/act inhaler, Inhale 2 puffs every 4 (four) hours as needed for wheezing or shortness of breath, Disp: 8 g, Rfl: 0    Ascorbic Acid (VITAMIN C PO), Take 500 mg by mouth in the morning, Disp: , Rfl:     fluticasone (FLONASE) 50 mcg/act nasal spray, 2 puffs into each nostril daily, Disp: , Rfl:     gabapentin (Neurontin) 100 mg capsule, Take 1 capsule (100 mg total) by mouth daily at bedtime, Disp: 30 capsule, Rfl: 1    LORazepam (ATIVAN) 1 mg tablet, Take 1 tablet (1 mg total) by mouth once as needed for anxiety for up to 1 dose Take 30 minutes to 1 hour prior to your schedule imaging/scan. Do not start before October 17, 2023., Disp: 1 tablet, Rfl: 0    losartan-hydrochlorothiazide (HYZAAR) 50-12.5 mg per tablet, Take 1 tablet by mouth daily, Disp: 90 tablet, Rfl: 1    metoprolol tartrate (LOPRESSOR) 50 mg tablet, Take 1 tablet (50 mg total) by mouth daily, Disp: 90 tablet, Rfl: 0    Multiple Vitamins-Minerals (MULTIVITAMIN ADULT PO), Take by mouth daily, Disp: , Rfl:     naloxone (NARCAN) 4 mg/0.1 mL nasal spray, Administer 1 spray into a nostril. If no response after 2-3 minutes, give another dose in the other nostril using a new spray. For use in emergencies in the event of accidental ingestion, respiratory depression or oversedation. , Disp: 1 each, Rfl: 1    naproxen (NAPROSYN) 500 mg tablet, Take 1 tablet (500 mg total) by mouth 2 (two) times a day with meals for 14 days, Disp: 28 tablet, Rfl: 0    ondansetron (ZOFRAN) 4 mg tablet, Take 1 tablet (4 mg total) by mouth every 8 (eight) hours as needed for nausea or vomiting, Disp: 30 tablet, Rfl: 0    oxyCODONE (ROXICODONE) 10 MG TABS, Take 1.5 tablets (15 mg total) by mouth every 4 (four) hours as needed for moderate pain Max Daily Amount: 90 mg, Disp: 150 tablet, Rfl: 0    Palbociclib (Ibrance) 125 MG capsule, Take 1 capsule (125 mg total) by mouth daily 21 days on followed by 7 days off, Disp: 21 capsule, Rfl: 5    polyethylene glycol (MIRALAX) 17 g packet, Take 17 g by mouth daily as needed (as needed for constipation.), Disp: 255 g, Rfl: 0    senna-docusate sodium (SENOKOT-S) 8.6-50 mg per tablet, Take 1 tablet by mouth 2 (two) times a day, Disp: , Rfl: 0    VITAMIN D PO, Take by mouth in the morning, Disp: , Rfl:     anastrozole (ARIMIDEX) 1 mg tablet, TAKE ONE TABLET BY MOUTH EVERY DAY, Disp: 90 tablet, Rfl: 1    No Known Allergies    Vitals:    10/26/23 1524   BP: (!) 88/56   Pulse:    Resp:    Temp:    SpO2:      Physical Exam  Constitutional:       Appearance: She is well-developed. Comments: Well-nourished female, no respiratory distress, + signs of pain, + anxious   HENT:      Head: Normocephalic and atraumatic. Right Ear: External ear normal.      Left Ear: External ear normal.   Eyes:      Conjunctiva/sclera: Conjunctivae normal.      Pupils: Pupils are equal, round, and reactive to light. Cardiovascular:      Rate and Rhythm: Normal rate and regular rhythm. Heart sounds: Normal heart sounds. Pulmonary:      Effort: Pulmonary effort is normal.      Breath sounds: Normal breath sounds. Abdominal:      General: Bowel sounds are normal.      Palpations: Abdomen is soft. Musculoskeletal:         General: Normal range of motion. Cervical back: Normal range of motion and neck supple. Skin:     General: Skin is warm. Neurological:      Mental Status: She is alert and oriented to person, place, and time.       Deep Tendon Reflexes: Reflexes are normal and symmetric. Psychiatric:         Behavior: Behavior normal.         Thought Content: Thought content normal.         Judgment: Judgment normal.     Extremities: No lower extremity mid bilaterally, no cords, pulses are 1+  Lymphatics:  No adenopathy in the neck, supraclavicular region, axilla bilaterally    Performance Status: 2 - Symptomatic, <50% confined to bed    Labs    9/1/2023 WBC = 7.69 hemoglobin = 16.1 hematocrit = 45.5 platelet = 270  5/17/8763 CA 15-3 = 58 CA 27, 29 = 75 BUN = 21 creatinine = 0.62 calcium = 9.3 AST = 22 ALT = 26 alkaline phosphatase = 109 total protein = 6.9 total bilirubin = 0.64  8/31/2023 SPEP: No monoclonal bands noted    Imaging    10/19/2023 PET/CT    OSSEOUS STRUCTURES:  -As noted on prior imaging examinations, destructive lytic left sacral mass extending to the SI joint and medial aspect of the left iliac bone. Lesion extends and as noted on prior MRI with subtle extraosseous extension. SUV max 8.7.  - There is also increased activity identified involving the superior right sacral ala, image 180, SUV max 3.5.  - There is a focus of increased activity in the medial aspect of the left humeral head image 62 without definite CT correlate. The SUV max is 3.9     IMPRESSION:     1. Large lytic/destructive lesion involving the sacrum, medial aspect of the left iliac bone, with subtle extraosseous extension, extension to the right of midline, and separate area of increased activity in the right superior sacral ala, consistent with   biopsy-proven metastatic breast cancer  2. There is also a focus of activity in the medial left humeral head most suspicious for osseous metastasis  3. There is multifocal right neck base/supraclavicular FDG avid adenopathy  4. Within the thorax, multifocal FDG avid disease involving the mediastinum and left hilum, including a large subcarinal mass, as above further described  5. Small left effusion  6.  Multiple pleural-based pulmonary opacities with only mild FDG activity. Differential includes pneumonia including postobstructive pneumonia, or possibly pulmonary infarctions. 7. Mildly heterogeneous liver activity although no definitive focal lesion is appreciated    9/1/2023 MRI pelvis    Impression:     Large infiltrative upper sacral lesion with asymmetric involvement of the left sacral ala, extension to the SI joint and medial left iliac bone as well as subtle extraosseous extension as detailed above, concerning for malignancy. Correlate with bone biopsy also done on this date. The lesion narrows the left L5 and S1 neural foramina and may touch upon the exiting nerve roots in these regions. Correlate for radiculitis in these territories. Cannot exclude subtle epidural invasion posterior to S1.    8/31/2023 bone scan. Impression stated nonpathologic sacral fracture. Otherwise no scintigraphic evidence of additional osseous metastases. 7/20/2023 mammogram diagnostic right with 3D and CAD.   Impression stated postop findings likely benign tissue asymmetry in the right outer 8:00 region, right overall category 2, recommend diagnostic mammogram 1 year right breast.    Pathology    Case Report   Surgical Pathology Report                         Case: [id number]                                    Authorizing Provider:  Shreyas Wright PA-C       Collected:           09/01/2023 1417               Ordering Location:     [address] Received:            09/01/2023 [facility]                                                                       Pathologist:           Jackeline Vidal MD                                                           Specimen:    Bone, left illiac crest                                                                    Addendum   Test Description Result Prognostic Interpretation  Estrogen Receptor 0% negative  Internal control:Not present Staining Intensity:NA*  External control:positive Mariajose Score*: 0     Progesterone Receptor 0% negative  Internal control:Not present Staining Intensity: NA*  External control: positive Mariajose Score*: 0     HER2 by IHC 2+ Equivocal     COMMENT:  Performance characteristics may vary for Breast Prognostic Markers in decalcified specimens and may result in decreased antigen detection. Her2 by FISH cannot be run due to decalcified specimen. Final Diagnosis   A. Bone, left iliac crest, biopsy:  -Metastatic carcinoma, most compatible with metastasis from breast origin.  -Immunohistochemical stains performed with appropriate controls show the tumor to be positive for keratin AE1/3, CK7, and GATA3 and negative for CK20, mammaglobin, ER, CDX2, TTF-1, and PAX8; the findings support the diagnosis. Electronically signed by Pooja Lakhani MD on 9/6/2023 at  9:09 AM          Case Report    Surgical Pathology Report                         Case: [id number]                                     Authorizing Provider:  Milly Alexander MD              Collected:           06/08/2021 [employer]                Ordering Location:     [facility]        Received:            06/08/2021 [facility]                                                        Pathologist:           Yun Gotti MD                                                                   Specimen:    Breast, Left, Left breast with axillary content - long suture marks lateral, medium                   suture marks medial, short suture marks superior                                              Final Diagnosis    A. 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 - Ductal carcinemia in situ.  - Skin with sebotropic keratosis.   - Nipple with no pathologic abnormality.  - Lymphovascular and perineural invasion are present   - All margins are negative  - Four of ten lymph nodes are positive for metastatic carcinoma (4/10). Comment: Immunohistochemistry was performed on block A7. The tumor cells are positive for E cadherin and p120 in a membranous stain and negative for calponin and p63, supporting the above diagnosis. AE1/3 is performed on tissue block  A32 to help in the assessment of this case. Intradepartmental consultation is in agreement. Note:  1. Ancillary Studies:      - Repeat HER2 testing (2013 ASCO/CAP Recommendations): Not indicated. - Best representative tumor block: A5       -- Sufficient tumor present for          Agendia Mammaprint/Blueprint (1 cm2 of invasive tumor in aggregate): Yes. MI Profile/Foundation One (at least 5 x 5 mm of tumor): Yes.  2.. Pathologic Stage Classification (pTNM, AJCC 8th Edition): 8th ed, AJCC Anatomic Stage:  at least Stage  - pT3 pN2a, G2.  3. 8th ed. AJCC Pathological Prognostic Stage: IB       Electronically signed by Yun Gotti MD on 6/15/2021 at  9:14 AM    Note      Interpretation performed at [facility], [facility] [address] [place] [address]       Additional Information      All reported additional testing was performed with appropriately reactive controls. These tests were developed and their performance characteristics determined by [facility] or appropriate performing facility, though some tests may be performed on tissues which have not been validated for performance characteristics (such as staining performed on alcohol exposed cell blocks and decalcified tissues). Results should be interpreted with caution and in the context of the patients’ clinical condition. These tests may not be cleared or approved by the U.S. Food and Drug Administration, though the FDA has determined that such clearance or approval is not necessary. These tests are used for clinical purposes and they should not be regarded as investigational or for research.  This laboratory has been approved by [facility], designated as a high-complexity laboratory and is qualified to perform these tests.   .    Synoptic Checklist    INVASIVE CARCINOMA OF THE BREAST: Resection  8th Edition - Protocol posted: 2/26/2020  INVASIVE CARCINOMA OF THE BREAST: COMPLETE EXCISION - All Specimens       SPECIMEN   Procedure   Total mastectomy    Specimen Laterality   Left    TUMOR   Tumor Site   Upper outer quadrant    Histologic Type   Invasive carcinoma of no special type (ductal)    Glandular (Acinar) / Tubular Differentiation   Score 3    Nuclear Pleomorphism   Score 2    Mitotic Rate   Score 1    Overall Grade   Grade 2 (scores of 6 or 7)    Tumor Size   Greatest dimension of largest invasive focus (Millimeters): 60 mm   Additional Dimension (Millimeters)   38 mm       30 mm   Tumor Focality   Single focus of invasive carcinoma    Ductal Carcinoma In Situ (DCIS)   Present        Negative for extensive intraductal component (EIC)    Size (Extent) of DCIS       Number of Blocks with DCIS   3    Number of Blocks Examined   18    Architectural Patterns   Comedo        Solid    Nuclear Grade   Grade II (intermediate)    Necrosis   Present, central (expansive "comedo" necrosis)    Lobular Carcinoma In Situ (LCIS)   Not identified    Lymphovascular Invasion   Present    Dermal Lymphovascular Invasion   Not identified    Microcalcifications   Present in invasive carcinoma    Treatment Effect in the Breast   No known presurgical therapy    MARGINS   Invasive Carcinoma Margins   Uninvolved by invasive carcinoma    Distance from Closest Margin (Millimeters)   Greater than: 20 mm   Closest Margin(s)   Posterior    DCIS Margins   Uninvolved by DCIS    Distance from Closest Margin (Millimeters)   Greater than: 20 mm   Closest Margin(s)   Posterior    LYMPH NODES   Regional Lymph Nodes   Involved by tumor cells    Number of Lymph Nodes with Macrometastases (> 2 mm)   4    Number of Lymph Nodes with Micrometastases (> 0.2 mm to 2 mm and / or > 200 cells)   0    Number of Lymph Nodes with Isolated Tumor Cells (<= 0.2 mm or <= 200 cells)   0    Size of Largest Metastatic Deposit (Millimeters)   At least: 35 mm   Extranodal Extension   Present    Extent of Extranodal Extension   Greater than 2 mm    Total Number of Lymph Nodes Examined   10    PATHOLOGIC STAGE CLASSIFICATION (pTNM, AJCC 8th Edition)       Primary Tumor (pT)   pT3    Regional Lymph Nodes (pN)   pN2a    ADDITIONAL FINDINGS   Additional Findings   intraductal papilloma    SPECIAL STUDIES   Breast Biomarker Testing Performed on Previous Biopsy       Estrogen Receptor (ER) Status   Positive (greater than 10% of cells demonstrate nuclear positivity)    Percentage of Cells with Nuclear Positivity   %    Breast Biomarker Testing Performed on Previous Biopsy       Progesterone Receptor (PgR) Status   Positive    Percentage of Cells with Nuclear Positivity   1-2 %   Breast Biomarker Testing Performed on Previous Biopsy       HER2 (by immunohistochemistry)   Negative (Score 1+)    Testing Performed on Case Number   [id number]    .

## 2023-10-27 ENCOUNTER — NURSE TRIAGE (OUTPATIENT)
Age: 73
End: 2023-10-27

## 2023-10-27 ENCOUNTER — DOCUMENTATION (OUTPATIENT)
Dept: HEMATOLOGY ONCOLOGY | Facility: MEDICAL CENTER | Age: 73
End: 2023-10-27

## 2023-10-27 ENCOUNTER — TELEPHONE (OUTPATIENT)
Dept: RADIOLOGY | Facility: HOSPITAL | Age: 73
End: 2023-10-27

## 2023-10-27 ENCOUNTER — HOSPITAL ENCOUNTER (OUTPATIENT)
Dept: NON INVASIVE DIAGNOSTICS | Facility: HOSPITAL | Age: 73
Discharge: HOME | End: 2023-10-27
Attending: INTERNAL MEDICINE
Payer: MEDICARE

## 2023-10-27 VITALS
DIASTOLIC BLOOD PRESSURE: 69 MMHG | WEIGHT: 191.8 LBS | BODY MASS INDEX: 36.21 KG/M2 | HEIGHT: 61 IN | SYSTOLIC BLOOD PRESSURE: 107 MMHG | HEART RATE: 86 BPM

## 2023-10-27 DIAGNOSIS — C50.412 MALIGNANT NEOPLASM OF UPPER-OUTER QUADRANT OF LEFT BREAST IN FEMALE, ESTROGEN RECEPTOR POSITIVE: ICD-10-CM

## 2023-10-27 DIAGNOSIS — M84.58XA PATHOLOGICAL FRACTURE OF SACRAL VERTEBRA DUE TO NEOPLASTIC DISEASE: ICD-10-CM

## 2023-10-27 DIAGNOSIS — Z17.0 MALIGNANT NEOPLASM OF UPPER-OUTER QUADRANT OF LEFT BREAST IN FEMALE, ESTROGEN RECEPTOR POSITIVE: ICD-10-CM

## 2023-10-27 DIAGNOSIS — Z17.0 MALIGNANT NEOPLASM OF UPPER-OUTER QUADRANT OF LEFT BREAST IN FEMALE, ESTROGEN RECEPTOR POSITIVE: Primary | ICD-10-CM

## 2023-10-27 DIAGNOSIS — C50.412 MALIGNANT NEOPLASM OF UPPER-OUTER QUADRANT OF LEFT BREAST IN FEMALE, ESTROGEN RECEPTOR POSITIVE: Primary | ICD-10-CM

## 2023-10-27 LAB
AORTIC ROOT: 2.8 CM
APICAL FOUR CHAMBER EJECTION FRACTION: 63 %
E WAVE DECELERATION TIME: 244 MS
E/A RATIO: 0.71
FRACTIONAL SHORTENING: 37 (ref 28–44)
GLOBAL LONGITUIDAL STRAIN: -14 %
INTERVENTRICULAR SEPTUM IN DIASTOLE (PARASTERNAL SHORT AXIS VIEW): 1.6 CM
INTERVENTRICULAR SEPTUM: 1.6 CM (ref 0.6–1.1)
LAAS-AP2: 13.3 CM2
LAAS-AP4: 11.1 CM2
LEFT ATRIUM SIZE: 3.5 CM
LEFT ATRIUM VOLUME (MOD BIPLANE): 24 ML
LEFT ATRIUM VOLUME INDEX (MOD BIPLANE): 12.9 ML/M2
LEFT INTERNAL DIMENSION IN SYSTOLE: 2.4 CM (ref 2.1–4)
LEFT VENTRICULAR INTERNAL DIMENSION IN DIASTOLE: 3.8 CM (ref 3.5–6)
LEFT VENTRICULAR POSTERIOR WALL IN END DIASTOLE: 1.4 CM
LEFT VENTRICULAR STROKE VOLUME: 44 ML
LVSV (TEICH): 44 ML
MV E'TISSUE VEL-LAT: 4 CM/S
MV E'TISSUE VEL-SEP: 8 CM/S
MV PEAK A VEL: 0.65 M/S
MV PEAK E VEL: 46 CM/S
MV STENOSIS PRESSURE HALF TIME: 71 MS
MV VALVE AREA P 1/2 METHOD: 3.1
RIGHT ATRIUM AREA SYSTOLE A4C: 11.7 CM2
RIGHT VENTRICLE ID DIMENSION: 2.1 CM
SL CV LEFT ATRIUM LENGTH A2C: 5.5 CM
SL CV LV EF: 60
SL CV PED ECHO LEFT VENTRICLE DIASTOLIC VOLUME (MOD BIPLANE) 2D: 63 ML
SL CV PED ECHO LEFT VENTRICLE SYSTOLIC VOLUME (MOD BIPLANE) 2D: 20 ML
TR MAX PG: 24 MMHG
TR PEAK VELOCITY: 2.5 M/S
TRICUSPID ANNULAR PLANE SYSTOLIC EXCURSION: 1.7 CM
TRICUSPID VALVE PEAK REGURGITATION VELOCITY: 2.45 M/S

## 2023-10-27 PROCEDURE — 93306 TTE W/DOPPLER COMPLETE: CPT | Performed by: INTERNAL MEDICINE

## 2023-10-27 PROCEDURE — 93356 MYOCRD STRAIN IMG SPCKL TRCK: CPT

## 2023-10-27 PROCEDURE — C8929 TTE W OR WO FOL WCON,DOPPLER: HCPCS

## 2023-10-27 PROCEDURE — 93356 MYOCRD STRAIN IMG SPCKL TRCK: CPT | Performed by: INTERNAL MEDICINE

## 2023-10-27 RX ADMIN — PERFLUTREN 0.8 ML/MIN: 6.52 INJECTION, SUSPENSION INTRAVENOUS at 13:57

## 2023-10-27 NOTE — TELEPHONE ENCOUNTER
Patient does not have a central line  Will place a consult for IR ASAP  Will message Infusion to postpone treatment until 11/10/2023  Patient aware

## 2023-10-27 NOTE — TELEPHONE ENCOUNTER
Left message for pt to call us back , to follow up with her blood pressure   Lorenzo Crowe, [address] [facility]

## 2023-10-27 NOTE — NURSING NOTE
Spoke to patient's  Maikel rDiscoll regarding IR port placement scheduled at [facility] on 10/30 at 11:00am with arrival time of 10:00am. Instructions given to be NPO after midnight the night before, to have a , and to take medications in the morning with a small sip of water.  Questions answered as offered

## 2023-10-27 NOTE — PROGRESS NOTES
I left Janelle Early a voicemail that her follow up with Dr. Imani Land has been scheduled for 11/22/23 at 1:20pm.

## 2023-10-27 NOTE — TELEPHONE ENCOUNTER
Regarding: Low Blood pressure  ----- Message from [address] [place] [address],[address] sent at 10/27/2023 10:14 AM EDT -----  Patient is having low blood pressure, Yesterday was 99/40

## 2023-10-28 ENCOUNTER — OFFICE VISIT (OUTPATIENT)
Dept: FAMILY MEDICINE CLINIC | Facility: CLINIC | Age: 73
End: 2023-10-28
Payer: MEDICARE

## 2023-10-28 VITALS
WEIGHT: 179 LBS | BODY MASS INDEX: 33.79 KG/M2 | HEART RATE: 100 BPM | SYSTOLIC BLOOD PRESSURE: 90 MMHG | TEMPERATURE: 98 F | DIASTOLIC BLOOD PRESSURE: 62 MMHG | HEIGHT: 61 IN | RESPIRATION RATE: 16 BRPM | OXYGEN SATURATION: 95 %

## 2023-10-28 DIAGNOSIS — I10 BENIGN ESSENTIAL HYPERTENSION: Primary | ICD-10-CM

## 2023-10-28 DIAGNOSIS — Z17.0 MALIGNANT NEOPLASM OF UPPER-OUTER QUADRANT OF LEFT BREAST IN FEMALE, ESTROGEN RECEPTOR POSITIVE: ICD-10-CM

## 2023-10-28 DIAGNOSIS — C50.412 MALIGNANT NEOPLASM OF UPPER-OUTER QUADRANT OF LEFT BREAST IN FEMALE, ESTROGEN RECEPTOR POSITIVE: ICD-10-CM

## 2023-10-28 PROCEDURE — 99214 OFFICE O/P EST MOD 30 MIN: CPT | Performed by: NURSE PRACTITIONER

## 2023-10-28 NOTE — PROGRESS NOTES
Assessment/Plan:    Hypotension multifactorial- recent changes to medications, weight loss, new chemo regimen. Advised compression stockings, may add salt to diet. Has f/u with PCP in the next coming week    1. Benign essential hypertension  Assessment & Plan: Will have her hold Hyzaar, can cont with Metoprolol for now. Will monitor BP, consider adding Losartan low dose if BP climbs      2. Malignant neoplasm of upper-outer quadrant of left breast in female, estrogen receptor positive   Assessment & Plan:  Management per hem/onc              There are no Patient Instructions on file for this visit. Return for Next scheduled follow up. Subjective:      Patient ID: Eula Ospina is a 72 y.o. female. Chief Complaint   Patient presents with   • Low bp     Natty Mariee        Here today for BP check. Yesterday, her BP was in the 80s. She does report some light headedness/dizziness. Also had a stat echo done. Hyzaar was added in September during hospitalization. She has lost 45 pounds over the past 2 months. The following portions of the patient's history were reviewed and updated as appropriate: allergies, current medications, past family history, past medical history, past social history, past surgical history and problem list.    Review of Systems   Constitutional: Negative. Respiratory:  Negative for cough, chest tightness and shortness of breath. Cardiovascular:  Negative for chest pain. Neurological:  Positive for dizziness and light-headedness.          Current Outpatient Medications   Medication Sig Dispense Refill   • albuterol (ProAir HFA) 90 mcg/act inhaler Inhale 2 puffs every 4 (four) hours as needed for wheezing or shortness of breath 8 g 0   • Ascorbic Acid (VITAMIN C PO) Take 500 mg by mouth in the morning     • fluticasone (FLONASE) 50 mcg/act nasal spray 2 puffs into each nostril daily     • gabapentin (Neurontin) 100 mg capsule Take 1 capsule (100 mg total) by mouth daily at bedtime 30 capsule 1   • LORazepam (ATIVAN) 1 mg tablet Take 1 tablet (1 mg total) by mouth once as needed for anxiety for up to 1 dose Take 30 minutes to 1 hour prior to your schedule imaging/scan. Do not start before October 17, 2023. 1 tablet 0   • metoprolol tartrate (LOPRESSOR) 50 mg tablet Take 1 tablet (50 mg total) by mouth daily 90 tablet 0   • Multiple Vitamins-Minerals (MULTIVITAMIN ADULT PO) Take by mouth daily     • naloxone (NARCAN) 4 mg/0.1 mL nasal spray Administer 1 spray into a nostril. If no response after 2-3 minutes, give another dose in the other nostril using a new spray. For use in emergencies in the event of accidental ingestion, respiratory depression or oversedation. 1 each 1   • ondansetron (ZOFRAN) 4 mg tablet Take 1 tablet (4 mg total) by mouth every 8 (eight) hours as needed for nausea or vomiting 30 tablet 0   • oxyCODONE (ROXICODONE) 10 MG TABS Take 1.5 tablets (15 mg total) by mouth every 4 (four) hours as needed for moderate pain Max Daily Amount: 90 mg 150 tablet 0   • Palbociclib (Ibrance) 125 MG capsule Take 1 capsule (125 mg total) by mouth daily 21 days on followed by 7 days off 21 capsule 5   • polyethylene glycol (MIRALAX) 17 g packet Take 17 g by mouth daily as needed (as needed for constipation.) 255 g 0   • senna-docusate sodium (SENOKOT-S) 8.6-50 mg per tablet Take 1 tablet by mouth 2 (two) times a day  0   • VITAMIN D PO Take by mouth in the morning     • naproxen (NAPROSYN) 500 mg tablet Take 1 tablet (500 mg total) by mouth 2 (two) times a day with meals for 14 days (Patient not taking: Reported on 10/28/2023) 28 tablet 0     No current facility-administered medications for this visit. Objective:    BP 90/62   Pulse 100   Temp 98 °F (36.7 °C)   Resp 16   Ht 5' 1" (1.549 m)   Wt 81.2 kg (179 lb)   SpO2 95%   BMI 33.82 kg/m²        Physical Exam  Vitals and nursing note reviewed.    Constitutional:       Appearance: Normal appearance. She is well-developed. Eyes:      Conjunctiva/sclera: Conjunctivae normal.   Cardiovascular:      Rate and Rhythm: Normal rate and regular rhythm. Pulses: Normal pulses. Heart sounds: Normal heart sounds. No murmur heard. Pulmonary:      Effort: Pulmonary effort is normal.      Breath sounds: Normal breath sounds. Skin:     General: Skin is warm and dry. Neurological:      Mental Status: She is alert.    Psychiatric:         Mood and Affect: Mood normal.         Behavior: Behavior normal.                Ettie Reveal, CRNP

## 2023-10-28 NOTE — ASSESSMENT & PLAN NOTE
Will have her hold Hyzaar, can cont with Metoprolol for now.   Will monitor BP, consider adding Losartan low dose if BP climbs

## 2023-10-30 ENCOUNTER — HOSPITAL ENCOUNTER (OUTPATIENT)
Dept: NON INVASIVE DIAGNOSTICS | Facility: HOSPITAL | Age: 73
Discharge: HOME | End: 2023-10-30
Attending: INTERNAL MEDICINE
Payer: MEDICARE

## 2023-10-30 VITALS
BODY MASS INDEX: 33.84 KG/M2 | SYSTOLIC BLOOD PRESSURE: 114 MMHG | TEMPERATURE: 97 F | OXYGEN SATURATION: 95 % | DIASTOLIC BLOOD PRESSURE: 57 MMHG | WEIGHT: 179.23 LBS | HEIGHT: 61 IN | RESPIRATION RATE: 18 BRPM | HEART RATE: 84 BPM

## 2023-10-30 DIAGNOSIS — Z17.0 MALIGNANT NEOPLASM OF UPPER-OUTER QUADRANT OF LEFT BREAST IN FEMALE, ESTROGEN RECEPTOR POSITIVE: ICD-10-CM

## 2023-10-30 DIAGNOSIS — C50.412 MALIGNANT NEOPLASM OF UPPER-OUTER QUADRANT OF LEFT BREAST IN FEMALE, ESTROGEN RECEPTOR POSITIVE: ICD-10-CM

## 2023-10-30 PROCEDURE — 76937 US GUIDE VASCULAR ACCESS: CPT | Performed by: RADIOLOGY

## 2023-10-30 PROCEDURE — 77001 FLUOROGUIDE FOR VEIN DEVICE: CPT

## 2023-10-30 PROCEDURE — C1788 PORT, INDWELLING, IMP: HCPCS

## 2023-10-30 PROCEDURE — 36561 INSERT TUNNELED CV CATH: CPT | Performed by: RADIOLOGY

## 2023-10-30 PROCEDURE — 76937 US GUIDE VASCULAR ACCESS: CPT

## 2023-10-30 PROCEDURE — 99152 MOD SED SAME PHYS/QHP 5/>YRS: CPT | Performed by: RADIOLOGY

## 2023-10-30 PROCEDURE — 77001 FLUOROGUIDE FOR VEIN DEVICE: CPT | Performed by: RADIOLOGY

## 2023-10-30 PROCEDURE — 36561 INSERT TUNNELED CV CATH: CPT

## 2023-10-30 PROCEDURE — 99153 MOD SED SAME PHYS/QHP EA: CPT

## 2023-10-30 PROCEDURE — 99152 MOD SED SAME PHYS/QHP 5/>YRS: CPT

## 2023-10-30 RX ORDER — MIDAZOLAM HYDROCHLORIDE 2 MG/2ML
INJECTION, SOLUTION INTRAMUSCULAR; INTRAVENOUS AS NEEDED
Status: COMPLETED | OUTPATIENT
Start: 2023-10-30 | End: 2023-10-30

## 2023-10-30 RX ORDER — HYDROMORPHONE HCL/PF 1 MG/ML
0.5 SYRINGE (ML) INJECTION EVERY 2 HOUR PRN
Status: DISCONTINUED | OUTPATIENT
Start: 2023-10-30 | End: 2023-10-31 | Stop reason: HOSPADM

## 2023-10-30 RX ORDER — OXYCODONE HYDROCHLORIDE 5 MG/1
5 TABLET ORAL EVERY 4 HOURS PRN
Status: DISCONTINUED | OUTPATIENT
Start: 2023-10-30 | End: 2023-10-31 | Stop reason: HOSPADM

## 2023-10-30 RX ORDER — DEXTROSE MONOHYDRATE 50 MG/ML
20 INJECTION, SOLUTION INTRAVENOUS ONCE
OUTPATIENT
Start: 2023-11-10

## 2023-10-30 RX ORDER — ACETAMINOPHEN 325 MG/1
650 TABLET ORAL EVERY 4 HOURS PRN
Status: DISCONTINUED | OUTPATIENT
Start: 2023-10-30 | End: 2023-10-31 | Stop reason: HOSPADM

## 2023-10-30 RX ORDER — OXYCODONE HYDROCHLORIDE 5 MG/1
10 TABLET ORAL EVERY 4 HOURS PRN
Status: DISCONTINUED | OUTPATIENT
Start: 2023-10-30 | End: 2023-10-31 | Stop reason: HOSPADM

## 2023-10-30 RX ORDER — FENTANYL CITRATE 50 UG/ML
INJECTION, SOLUTION INTRAMUSCULAR; INTRAVENOUS AS NEEDED
Status: COMPLETED | OUTPATIENT
Start: 2023-10-30 | End: 2023-10-30

## 2023-10-30 RX ORDER — CEFAZOLIN SODIUM 2 G/50ML
SOLUTION INTRAVENOUS
Status: COMPLETED | OUTPATIENT
Start: 2023-10-30 | End: 2023-10-30

## 2023-10-30 RX ADMIN — Medication 20 ML: at 12:09

## 2023-10-30 RX ADMIN — CEFAZOLIN SODIUM 2000 MG: 2 SOLUTION INTRAVENOUS at 11:52

## 2023-10-30 RX ADMIN — FENTANYL CITRATE 50 MCG: 50 INJECTION, SOLUTION INTRAMUSCULAR; INTRAVENOUS at 11:54

## 2023-10-30 RX ADMIN — FENTANYL CITRATE 50 MCG: 50 INJECTION, SOLUTION INTRAMUSCULAR; INTRAVENOUS at 12:08

## 2023-10-30 RX ADMIN — Medication 20 ML: at 12:13

## 2023-10-30 RX ADMIN — MIDAZOLAM 1 MG: 1 INJECTION INTRAMUSCULAR; INTRAVENOUS at 12:08

## 2023-10-30 RX ADMIN — MIDAZOLAM 1 MG: 1 INJECTION INTRAMUSCULAR; INTRAVENOUS at 11:54

## 2023-10-30 NOTE — BRIEF OP NOTE (RAD/CATH)
INTERVENTIONAL RADIOLOGY PROCEDURE NOTE    Date: 10/30/2023    Procedure: IR PORT PLACEMENT     Preoperative diagnosis:   1. Malignant neoplasm of upper-outer quadrant of left breast in female, estrogen receptor positive        Postoperative diagnosis: Same. Surgeon: Javier Moreno MD     Assistant: None. No qualified resident was available. Blood loss: minimal    Specimens: none    Findings: Successful port placement. May use immediately. Complications: None immediate.     Anesthesia: conscious sedation

## 2023-10-30 NOTE — PERIOPERATIVE NURSING NOTE
Discharge instructions reviewed verbally and written with patient. Patient receptive. Denies pain or discomfort at this time. Patient discharged to home, escorted to car via wheelchair.

## 2023-10-30 NOTE — DISCHARGE INSTRUCTIONS
Implanted Venous Access Port     WHAT YOU NEED TO KNOW:   An implanted venous access port is a device used to give treatments and take blood. It may also be called a central venous access device (CVAD). The port is a small container that is placed under your skin, usually in your upper chest. The port is attached to a catheter that enters a large vein. DISCHARGE INSTRUCTIONS:   Resume your normal diet. Small sips of flat soda will help with mild nausea. Prevent an infection:   Wash your hands often. Use soap and water. Clean your hands before and after you care for your port. Remind everyone who cares for your port to wash their hands. Check your skin for infection every day. Look for redness, swelling, or fluid oozing from the port site. Care for your port:   1. You may shower beginning 48 hours after procedure. 2.  Leave glue in place. 3. It is normal for some bruising to occur. 4. Use Tylenol for pain. 5. Limit use of arm on the side that your port was placed. Lift nothing heavier than 5 pounds for 1 week, and then gradually increase activity as tolerated. 6. DO NOT apply ointment, lotion or cream to port site until incision is healed. Allow glue to fall off. DO NOT attempt to peel glue from skin even it it begins to flake. 7. After the port incision is healed you may swim, bathe. Notify the Interventional Radiologist if you have any of the followin. Fever above 101 F    2. Increased redness or swelling after 1st day. 3. Increased pain after 1st day. 4. Any sign of infection (drainage from port site, skin separation, hot to touch). 5. Persistent nausea or vomiting. Contact Interventional Radiology at [phone] [facility] PATIENTS: Contact Interventional Radiology at [phone]) ([address] [facility] 281: Contact Interventional Radiology at [phone]).           Procedural Sedation   WHAT YOU NEED TO KNOW:   Procedural sedation is medicine used during procedures to help you feel relaxed and calm. You will remember little to none of the procedure. After sedation you may feel tired, weak, or unsteady on your feet. You may also have trouble concentrating or short-term memory loss. These symptoms should go away in 24 hours or less. DISCHARGE INSTRUCTIONS:     Call 911 or have someone else call for any of the following: You have sudden trouble breathing. You cannot be woken. Contact Interventional Radiology at [phone]   Juan M PATIENTS: Contact Interventional Radiology at [phone]) Garry Parham PATIENTS: Contact Interventional Radiology at [phone]) if any of the following occur: You have a severe headache or dizziness. Your heart is beating faster than usual.    You have a fever or chills. Your skin is itchy, swollen, or you have a rash. You have nausea or are vomiting for more than 8 hours after the procedure. You have questions or concerns about your condition or care. Self-care:   Have someone stay with you for 24 hours. This person can drive you to errands and help you do things around the house. This person can also watch for problems. Rest and do quiet activities for 24 hours. Do not exercise, ride a bike, or play sports. Stand up slowly to prevent dizziness and falls. Take short walks around the house with another person. Slowly return to your usual activities the next day. Do not drive or use dangerous machines or tools for 24 hours. You may injure yourself or others. Examples include a lawnmower, saw, or drill. Do not return to work for 24 hours if you use dangerous machines or tools for work. Do not make important decisions for 24 hours. For example, do not sign important papers or invest money. Drink liquids as directed. Liquids help flush the sedation medicine out of your body. Ask how much liquid to drink each day and which liquids are best for you.       Eat small, frequent meals to prevent nausea and vomiting. Start with clear liquids such as juice or broth. If you do not vomit after clear liquids, you can eat your usual foods. Do not drink alcohol or take medicines that make you drowsy. This includes medicines that help you sleep and anxiety medicines. Ask your healthcare provider if it is safe for you to take pain medicine. Follow up with your healthcare provider as directed: Write down your questions so you remember to ask them during your visits.

## 2023-10-30 NOTE — PERIOPERATIVE NURSING NOTE
Patient received into [facility] via stretcher from [facility]. Patient awake and alert, denies pain or discomfort. 2 sites on right upper chest with exofin, clean, dry and intact.

## 2023-10-30 NOTE — SEDATION DOCUMENTATION
Port-a-cath placed by Dr Sae Sung. Pt tolerated without issues. Education provided. Site sutured, exofin to site.

## 2023-11-01 ENCOUNTER — OFFICE VISIT (OUTPATIENT)
Dept: FAMILY MEDICINE CLINIC | Facility: CLINIC | Age: 73
End: 2023-11-01
Payer: MEDICARE

## 2023-11-01 VITALS
BODY MASS INDEX: 33.71 KG/M2 | HEART RATE: 84 BPM | SYSTOLIC BLOOD PRESSURE: 110 MMHG | RESPIRATION RATE: 18 BRPM | TEMPERATURE: 97.9 F | HEIGHT: 61 IN | DIASTOLIC BLOOD PRESSURE: 68 MMHG | WEIGHT: 178.57 LBS

## 2023-11-01 DIAGNOSIS — I10 BENIGN ESSENTIAL HYPERTENSION: Primary | ICD-10-CM

## 2023-11-01 DIAGNOSIS — Z17.0 MALIGNANT NEOPLASM OF UPPER-OUTER QUADRANT OF LEFT BREAST IN FEMALE, ESTROGEN RECEPTOR POSITIVE: ICD-10-CM

## 2023-11-01 DIAGNOSIS — Z17.0 MALIGNANT NEOPLASM OF UPPER-OUTER QUADRANT OF LEFT BREAST IN FEMALE, ESTROGEN RECEPTOR POSITIVE: Primary | ICD-10-CM

## 2023-11-01 DIAGNOSIS — M84.58XA PATHOLOGICAL FRACTURE OF SACRAL VERTEBRA DUE TO NEOPLASTIC DISEASE: ICD-10-CM

## 2023-11-01 DIAGNOSIS — R63.0 LOSS OF APPETITE: ICD-10-CM

## 2023-11-01 DIAGNOSIS — C50.412 MALIGNANT NEOPLASM OF UPPER-OUTER QUADRANT OF LEFT BREAST IN FEMALE, ESTROGEN RECEPTOR POSITIVE: Primary | ICD-10-CM

## 2023-11-01 DIAGNOSIS — C50.412 MALIGNANT NEOPLASM OF UPPER-OUTER QUADRANT OF LEFT BREAST IN FEMALE, ESTROGEN RECEPTOR POSITIVE: ICD-10-CM

## 2023-11-01 PROCEDURE — 99213 OFFICE O/P EST LOW 20 MIN: CPT | Performed by: INTERNAL MEDICINE

## 2023-11-01 RX ORDER — ONDANSETRON HYDROCHLORIDE 8 MG/1
8 TABLET, FILM COATED ORAL EVERY 8 HOURS PRN
Qty: 20 TABLET | Refills: 5 | Status: SHIPPED | OUTPATIENT
Start: 2023-11-01

## 2023-11-01 NOTE — ASSESSMENT & PLAN NOTE
She is done with xrt and has minimal pain. Discussed use of tylenol and topicals, since she doesn't want to use the oxycontin.

## 2023-11-01 NOTE — ASSESSMENT & PLAN NOTE
Discussed liberalization of diet, increased fluids with patient. She is hoping this will improve as she is going off the ibrance.

## 2023-11-01 NOTE — ASSESSMENT & PLAN NOTE
Much better now that she is off losartan hydrochlorothiazide, with no orthostatic symptoms. Will continue metoprolol as ordered. Advised patient that if orthostatic symptoms recur, she should call or return.

## 2023-11-01 NOTE — PROGRESS NOTES
Name: Scott Fletcher      : 1950      MRN: [id number]  Encounter Provider: Rosemarie Arias MD  Encounter Date: 2023   Encounter department: [address] [facility]     1. Benign essential hypertension  Assessment & Plan:  Much better now that she is off losartan hydrochlorothiazide, with no orthostatic symptoms. Will continue metoprolol as ordered. Advised patient that if orthostatic symptoms recur, she should call or return. 2. 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 Discussed use of tylenol and topicals, since she doesn't want to use the oxycontin. 3. Malignant neoplasm of upper-outer quadrant of left breast in female, estrogen receptor positive   Assessment & Plan:  Continue chemotherapy per oncology. 4. Loss of appetite  Assessment & Plan:  Discussed liberalization of diet, increased fluids with patient. She is hoping this will improve as she is going off the ibrance. Subjective      Here for follow up. She was here on 10/28 with low bp. Has not been able to eat due to her chemo drugs and has been losing weight. BP medications were adjusted at that time. Her pain is well controlled. She continues to follow with oncology.       Review of Systems    Current Outpatient Medications on File Prior to Visit   Medication Sig   • albuterol (ProAir HFA) 90 mcg/act inhaler Inhale 2 puffs every 4 (four) hours as needed for wheezing or shortness of breath   • Ascorbic Acid (VITAMIN C PO) Take 500 mg by mouth in the morning   • fluticasone (FLONASE) 50 mcg/act nasal spray 2 puffs into each nostril daily   • gabapentin (Neurontin) 100 mg capsule Take 1 capsule (100 mg total) by mouth daily at bedtime   • LORazepam (ATIVAN) 1 mg tablet Take 1 tablet (1 mg total) by mouth once as needed for anxiety for up to 1 dose Take 30 minutes to 1 hour prior to your schedule imaging/scan. Do not start before October 17, 2023. • metoprolol tartrate (LOPRESSOR) 50 mg tablet Take 1 tablet (50 mg total) by mouth daily   • Multiple Vitamins-Minerals (MULTIVITAMIN ADULT PO) Take by mouth daily   • naloxone (NARCAN) 4 mg/0.1 mL nasal spray Administer 1 spray into a nostril. If no response after 2-3 minutes, give another dose in the other nostril using a new spray. For use in emergencies in the event of accidental ingestion, respiratory depression or oversedation.    • ondansetron (ZOFRAN) 4 mg tablet Take 1 tablet (4 mg total) by mouth every 8 (eight) hours as needed for nausea or vomiting   • oxyCODONE (ROXICODONE) 10 MG TABS Take 1.5 tablets (15 mg total) by mouth every 4 (four) hours as needed for moderate pain Max Daily Amount: 90 mg   • Palbociclib (Ibrance) 125 MG capsule Take 1 capsule (125 mg total) by mouth daily 21 days on followed by 7 days off (Patient taking differently: Take 125 mg by mouth daily 21 days on followed by 7 days off  Last day is tomorrow 11/2/23)   • polyethylene glycol (MIRALAX) 17 g packet Take 17 g by mouth daily as needed (as needed for constipation.)   • senna-docusate sodium (SENOKOT-S) 8.6-50 mg per tablet Take 1 tablet by mouth 2 (two) times a day (Patient taking differently: Take 1 tablet by mouth if needed)   • VITAMIN D PO Take by mouth in the morning   • [DISCONTINUED] naproxen (NAPROSYN) 500 mg tablet Take 1 tablet (500 mg total) by mouth 2 (two) times a day with meals for 14 days (Patient not taking: Reported on 10/28/2023)       Objective     /68   Pulse 84   Temp 97.9 °F (36.6 °C)   Resp 18   Ht 5' 1" (1.549 m)   Wt 81 kg (178 lb 9.2 oz)   BMI 33.74 kg/m²     Physical Exam  Mario Gamino MD

## 2023-11-02 ENCOUNTER — TELEPHONE (OUTPATIENT)
Dept: HEMATOLOGY ONCOLOGY | Facility: CLINIC | Age: 73
End: 2023-11-02

## 2023-11-02 DIAGNOSIS — C50.412 MALIGNANT NEOPLASM OF UPPER-OUTER QUADRANT OF LEFT BREAST IN FEMALE, ESTROGEN RECEPTOR POSITIVE: Primary | ICD-10-CM

## 2023-11-02 DIAGNOSIS — Z17.0 MALIGNANT NEOPLASM OF UPPER-OUTER QUADRANT OF LEFT BREAST IN FEMALE, ESTROGEN RECEPTOR POSITIVE: Primary | ICD-10-CM

## 2023-11-02 NOTE — TELEPHONE ENCOUNTER
Spoke with patient regarding [id number] testing kit. Indicated how it works, that it was available to  at the [facility] office at her earliest convenience, and that everything needed was incorporated into the kit. Patient verbalized understanding.

## 2023-11-03 ENCOUNTER — HOSPITAL ENCOUNTER (OUTPATIENT)
Dept: INFUSION CENTER | Facility: HOSPITAL | Age: 73
Discharge: HOME | End: 2023-11-03
Attending: INTERNAL MEDICINE

## 2023-11-06 ENCOUNTER — TELEMEDICINE (OUTPATIENT)
Dept: RADIATION ONCOLOGY | Facility: HOSPITAL | Age: 73
End: 2023-11-06
Attending: INTERNAL MEDICINE

## 2023-11-06 DIAGNOSIS — C50.412 MALIGNANT NEOPLASM OF UPPER-OUTER QUADRANT OF LEFT BREAST IN FEMALE, ESTROGEN RECEPTOR POSITIVE: Primary | ICD-10-CM

## 2023-11-06 DIAGNOSIS — Z17.0 MALIGNANT NEOPLASM OF UPPER-OUTER QUADRANT OF LEFT BREAST IN FEMALE, ESTROGEN RECEPTOR POSITIVE: Primary | ICD-10-CM

## 2023-11-06 PROCEDURE — 99024 POSTOP FOLLOW-UP VISIT: CPT | Performed by: INTERNAL MEDICINE

## 2023-11-06 NOTE — PROGRESS NOTES
Virtual Brief Visit - Radiation Oncology   Umer Conway 1950 72 y.o. female [id number]      REQUIRED DOCUMENTATION:     1. This service was provided via Telemedicine. 2. Provider located at   [address] [place] [address] radiation oncology  [address] [place] [address]  3. TeleMed provider: Edmundo Hall MD.  4. Identify all parties in room with patient during tele consult:  Family member  11. Patient was then informed that this was a Telemedicine visit and that the exam was being conducted confidentially over secure lines. My office door was closed. No one else was in the room. Patient acknowledged consent and understanding of privacy and security of the Telemedicine visit, and gave us permission to have the assistant stay in the room in order to assist with the history and to conduct the exam.  I informed the patient that I have reviewed their record in Epic and presented the opportunity for them to ask any questions regarding the visit today. The patient agreed to participate. Cancer Staging   Malignant neoplasm of upper-outer quadrant of left breast in female, estrogen receptor positive   Staging form: Breast, AJCC 8th Edition  - Clinical: Stage IV (cM1) - Signed by Edmundo Hall MD on 9/12/2023    Assessment/Plan:  Umer Conway is a 72 y.o. female with previously locally advanced left breast cancer status post resection, adjuvant chemotherapy, and adjuvant radiation therapy to the chest wall and regional lymphatics completing in November 2021, now presenting with newly diagnosed biopsy-proven infiltrative sacral lesion seen on MRI 9/1/23 with involvement of the left sacral ala and left L5 and S1 neural foramina. There is some linear enhancement seen along the epidural space posterior to S1 superiorly. She had significant sacral pain which correlates to the lesion. She underwent palliative radiation therapy to this area completing on 9/29/2023.     She presents today virtually for end of treatment follow-up visit. She notes significant pain response after completion of radiation therapy, and does not require pain medication. She is trying to be more active and walk around. She notes some residual tailbone pain, but nothing like what it was prior to radiation therapy. She had interval imaging which showed progression, and is working with medical oncology regarding next line treatment. We discussed the option of routine follow-up in 6 months, versus follow-up on an as-needed basis. We reviewed that I remain available to provide care and/or discuss radiation therapy for any additional sites should they prove to be symptomatic. She feels comfortable following with medical oncology alone, with the option of returning to our office as needed. RTC in 6 months or as-needed, patient prefers latter. No orders of the defined types were placed in this encounter. History of Present Illness   Interval History:  72year old female with history of left breast invasive mammary carcinoma, grade 2, ER/RI positive, HER 2 negative, 4/10 lymph nodes micrometastases. She is s/p left breast modified radical mastectomy, ANAND  directed axillary dissection on 6/8/21. She completed adjuvant chemotherapy with Cytoxan/Taxotere on 9/17/21. This was followed by a course of radiation to the left chest wall and regional lymphatics on 11/19/2021. More recently, patient underwent palliative radiation for newly diagnosed bone metastasis to L5  sacrum. Treatment completed on 9/29/23. She presents today for EOT telephone follow up call. 10/2/23 CT head w wo contrast  IMPRESSION:  No acute intracranial abnormality. 10/6/23 Palliative Care -Telemed    10/19/23 NM PET CT skull base to mid thigh  IMPRESSION:  1.  Large lytic/destructive lesion involving the sacrum, medial aspect of the left iliac bone, with subtle extraosseous extension, extension to the right of midline, and separate area of increased activity in the right superior sacral ala, consistent with   biopsy-proven metastatic breast cancer  2. There is also a focus of activity in the medial left humeral head most suspicious for osseous metastasis  3. There is multifocal right neck base/supraclavicular FDG avid adenopathy  4. Within the thorax, multifocal FDG avid disease involving the mediastinum and left hilum, including a large subcarinal mass, as above further described  5. Small left effusion  6. Multiple pleural-based pulmonary opacities with only mild FDG activity. Differential includes pneumonia including postobstructive pneumonia, or possibly pulmonary infarctions. 7. Mildly heterogeneous liver activity although no definitive focal lesion is appreciated    10/26/23 Hematology Oncology - Dr. Jamal Carreon  PET/CT =  left humeral head lesion also concerning for metastatic disease. Patient denies any arm pain. This will need to be monitored. Candidate for antireabsorprtion therapy, will need dental clearance first  Besides the bone lesions above, patient was found to have lesions in both lungs, mediastinal adenopathy, right-sided supraclavicular adenopathy, mediastinum, left hilum, large subcarinal mass and a small left pleural effusion (not tapped). I believe there are enough areas of abnormality on the PET/CT to warrant more aggressive systemic treatment (concern for progressing to visceral crises). Dieudonne De La Torreo Peter understands that the idea is to give more aggressive systemic treatments upfront, stabilize the disease and then go back to the CDK 4/6 inhibitor with Faslodex. In the future, if evidence of disease progression, a soft tissue biopsy will be needed.   Patient could have peripheral blood sent for [id number].     10/30/23 IR port placement    Upcomin23 Hematology Oncology    Historical Information   Oncology History Overview Note   72year old female with history of left breast invasive mammary carcinoma, grade 2, ER/OH positive, HER 2 negative, 4/10 lymph nodes micrometastases. She is s/p left breast modified radical mastectomy, ANAND  directed axillary dissection on 6/8/21. She completed adjuvant chemotherapy with Cytoxan/Taxotere on 9/17/21. This was followed by a course of radiation to the left chest wall and regional lymphatics on 11/19/2021. More recently, patient underwent palliative radiation for newly diagnosed bone metastasis to L5  sacrum. Treatment completed on 9/29/23. She presents today for EOT telephone follow up call. 10/2/23 CT head w wo contrast  IMPRESSION:  No acute intracranial abnormality. 10/6/23 Palliative Care -Telemed    10/19/23 NM PET CT skull base to mid thigh  IMPRESSION:  1. Large lytic/destructive lesion involving the sacrum, medial aspect of the left iliac bone, with subtle extraosseous extension, extension to the right of midline, and separate area of increased activity in the right superior sacral ala, consistent with   biopsy-proven metastatic breast cancer  2. There is also a focus of activity in the medial left humeral head most suspicious for osseous metastasis  3. There is multifocal right neck base/supraclavicular FDG avid adenopathy  4. Within the thorax, multifocal FDG avid disease involving the mediastinum and left hilum, including a large subcarinal mass, as above further described  5. Small left effusion  6. Multiple pleural-based pulmonary opacities with only mild FDG activity. Differential includes pneumonia including postobstructive pneumonia, or possibly pulmonary infarctions. 7. Mildly heterogeneous liver activity although no definitive focal lesion is appreciated    10/26/23 Hematology Oncology - Dr. Nona Conway  PET/CT =  left humeral head lesion also concerning for metastatic disease. Patient denies any arm pain. This will need to be monitored.    Candidate for antireabsorprtion therapy, will need dental clearance first  Besides the bone lesions above, patient was found to have lesions in both lungs, mediastinal adenopathy, right-sided supraclavicular adenopathy, mediastinum, left hilum, large subcarinal mass and a small left pleural effusion (not tapped). I believe there are enough areas of abnormality on the PET/CT to warrant more aggressive systemic treatment (concern for progressing to visceral crises). Mrs. Keyon Benson understands that the idea is to give more aggressive systemic treatments upfront, stabilize the disease and then go back to the CDK 4/6 inhibitor with Faslodex. In the future, if evidence of disease progression, a soft tissue biopsy will be needed. Patient could have peripheral blood sent for [id number].     10/30/23 IR port placement    Upcomin23 Hematology Oncology     Malignant neoplasm of upper-outer quadrant of left breast in female, estrogen receptor positive    2021 Biopsy    Left breast US guided biopsy:  A. 2 o'clock 8 cm from the nipple  Invasive mammary carcinoma of no special type  Grade 2  ER 90  ME 1  HER2 1+  Lymphovascular invasion: not identified    B. Left Axillary lymph node biopsy:  Invasive/ metastatic adenocarcinoma, compatible with breast primary involving fibroadipose tissues and a portion of lymphoid pranchyma. ER 90  ME 1  HER2 1+    Concordant. Malignancy appears multifocal. The 2 adjacent masses span an area of approximately 4 cm. Right breast clear. 5/3/2021 Genetic Testing    The following genes were evaluated: OLINDA, BRCA1, BRCA2, CDH1, CHEK2, PALB2, PTEN, STK11, TP53  Additional genes evaluated for a total of 36 genes analyzed  Negative result.  No pathogenic sequence variants or deletions/dupllications identified  [employer]     2021 Surgery    Left breast modified radical mastectomy with ANAND  directed axillary dissection  Invasive carcinoma of no special type (ductal)  Grade 2  6 cm  Lymphovascular invasion present  Margins negative  4/10 Lymph nodes (3.5 cm)  Anatomic Stage IIIA  Prognostic Stage IB     7/16/2021 - 9/17/2021 Chemotherapy    pegfilgrastim (NEULASTA ONPRO), 6 mg, Subcutaneous, Once, 4 of 4 cycles  Administration: 6 mg (7/16/2021), 6 mg (8/6/2021), 6 mg (8/27/2021), 6 mg (9/17/2021)  cyclophosphamide (CYTOXAN) IVPB, 600 mg/m2 = 1,212 mg, Intravenous, Once, 4 of 4 cycles  Administration: 1,212 mg (7/16/2021), 1,212 mg (8/6/2021), 1,212 mg (8/27/2021), 1,212 mg (9/17/2021)  DOCEtaxel (TAXOTERE) chemo infusion, 75 mg/m2 = 151.6 mg, Intravenous, Once, 4 of 4 cycles  Administration: 151.6 mg (7/16/2021), 151.6 mg (8/6/2021), 151.6 mg (8/27/2021), 151.6 mg (9/17/2021)     10/18/2021 - 11/19/2021 Radiation    BH L CW 10X/6X 13 / 13 200 0 2,600 32   BH L CW BOLUS 10X/6X 12 / 12 200 0 2,400 30   BH L PAB 10X 25 / 25 60 0 1,500 32   BH L Sclav 10X 25 / 25 200 0 5,000 32      Treatment Dates:  10/18/2021 - 11/19/2021. Dr Pablo Watson     10/2021 -  Hormone Therapy    anastrozole 1 mg once a day    Dr Tylor Lopes     9/1/2023 Biopsy    Bone, left iliac crest, biopsy:  -Metastatic carcinoma, most compatible with metastasis from breast origin. 9/12/2023 -  Cancer Staged    Staging form: Breast, AJCC 8th Edition  - Clinical: Stage IV (cM1) - Signed by Merly Chaves MD on 9/12/2023 9/25/2023 - 9/29/2023 Radiation    [facility]  This is the current list of radiation treatment:  Plan ID Energy Fractions Dose per Fraction (cGy) Dose Correction (cGy) Total Dose Delivered (cGy) Elapsed Days   L5_Sacrum 10X 5 / 5 400 0 2,000 4        11/10/2023 -  Chemotherapy    alteplase (CATHFLO), 2 mg, Intracatheter, Every 1 Minute as needed, 0 of 6 cycles  DOXOrubicin liposomal (DOXIL) chemo infusion, 50 mg/m2 = 93 mg, Intravenous, Once, 0 of 6 cycles       Past Medical History:   Diagnosis Date   • BRCA gene mutation negative 05/19/2021    [employer]; 36 gene panel   • Breast cancer ([address])     Left breast   • Colon polyp    • Dental crowns present    • Exercise involving walking the dogs   • History of angina     per pt "years ago and related to stress"   • History of chemotherapy     breast cancer (left) 2021.    • History of radiation therapy    • [place] (hard of hearing)     wears hearing aids/will wear DOS bilat   • Hypertension    • Limb alert care status     No BP/IV Left Arm   • Lymphedema of left arm    • Prediabetes    • Wears glasses      Past Surgical History:   Procedure Laterality Date   • AXILLARY SURGERY Right     sweat glands removed -    • BREAST CYST EXCISION Right 2000    benign   • BREAST SURGERY Right    • CHOLECYSTECTOMY     • COLONOSCOPY     • DILATION AND CURETTAGE OF UTERUS     • IR BIOPSY BONE  9/1/2023   • IR PORT PLACEMENT  10/30/2023   • MASTECTOMY Left 06/08/2021   • ME BREAST REDUCTION N/A 03/18/2022    Procedure: R BREAST REDUCTION; L BREAST EXCISION STANDING SKIN DEFORMITY; LOCAL FLAP;  Surgeon: Chas Hay MD;  Location: [facility];  Service: Plastics   • ME MAST MODF RAD W/AX LYMPH NOD W/WO PECT/NERI MIN Left 06/08/2021    Procedure: BREAST MODIFIED RADICAL MASTECTOMY; ANAND  DIRECTED AXILLARY DISSECTION;  Surgeon: Ivy Sharp MD;  Location: [facility];  Service: Surgical Oncology   • REDUCTION MAMMAPLASTY Right     March 2022   • US BREAST NEEDLE LOC LEFT Left 06/02/2021   • US GUIDED BREAST BIOPSY LEFT COMPLETE Left 04/19/2021   • US GUIDED BREAST LYMPH NODE BIOPSY LEFT Left 04/19/2021   • WISDOM TOOTH EXTRACTION       Social History   Social History     Substance and Sexual Activity   Alcohol Use Never     Social History     Substance and Sexual Activity   Drug Use No     Social History     Tobacco Use   Smoking Status Never   • Passive exposure: Never   Smokeless Tobacco Never     Meds/Allergies     Current Outpatient Medications:   •  albuterol (ProAir HFA) 90 mcg/act inhaler, Inhale 2 puffs every 4 (four) hours as needed for wheezing or shortness of breath, Disp: 8 g, Rfl: 0  •  Ascorbic Acid (VITAMIN C PO), Take 500 mg by mouth in the morning, Disp: , Rfl:   •  fluticasone (FLONASE) 50 mcg/act nasal spray, 2 puffs into each nostril daily, Disp: , Rfl:   •  gabapentin (Neurontin) 100 mg capsule, Take 1 capsule (100 mg total) by mouth daily at bedtime, Disp: 30 capsule, Rfl: 1  •  LORazepam (ATIVAN) 1 mg tablet, Take 1 tablet (1 mg total) by mouth once as needed for anxiety for up to 1 dose Take 30 minutes to 1 hour prior to your schedule imaging/scan. Do not start before October 17, 2023., Disp: 1 tablet, Rfl: 0  •  metoprolol tartrate (LOPRESSOR) 50 mg tablet, Take 1 tablet (50 mg total) by mouth daily, Disp: 90 tablet, Rfl: 0  •  Multiple Vitamins-Minerals (MULTIVITAMIN ADULT PO), Take by mouth daily, Disp: , Rfl:   •  naloxone (NARCAN) 4 mg/0.1 mL nasal spray, Administer 1 spray into a nostril. If no response after 2-3 minutes, give another dose in the other nostril using a new spray. For use in emergencies in the event of accidental ingestion, respiratory depression or oversedation. , Disp: 1 each, Rfl: 1  •  ondansetron (ZOFRAN) 4 mg tablet, Take 1 tablet (4 mg total) by mouth every 8 (eight) hours as needed for nausea or vomiting, Disp: 30 tablet, Rfl: 0  •  ondansetron (ZOFRAN) 8 mg tablet, Take 1 tablet (8 mg total) by mouth every 8 (eight) hours as needed for nausea or vomiting, Disp: 20 tablet, Rfl: 5  •  oxyCODONE (ROXICODONE) 10 MG TABS, Take 1.5 tablets (15 mg total) by mouth every 4 (four) hours as needed for moderate pain Max Daily Amount: 90 mg, Disp: 150 tablet, Rfl: 0  •  Palbociclib (Ibrance) 125 MG capsule, Take 1 capsule (125 mg total) by mouth daily 21 days on followed by 7 days off (Patient taking differently: Take 125 mg by mouth daily 21 days on followed by 7 days off Last day is tomorrow 11/2/23), Disp: 21 capsule, Rfl: 5  •  polyethylene glycol (MIRALAX) 17 g packet, Take 17 g by mouth daily as needed (as needed for constipation.), Disp: 255 g, Rfl: 0  •  senna-docusate sodium (SENOKOT-S) 8.6-50 mg per tablet, Take 1 tablet by mouth 2 (two) times a day (Patient taking differently: Take 1 tablet by mouth if needed), Disp: , Rfl: 0  •  VITAMIN D PO, Take by mouth in the morning, Disp: , Rfl:   No Known Allergies    OBJECTIVE:   Physical exam limited over telephone encounter. William Kendrick MD  11/6/2023,2:25 PM    VIRTUAL VISIT DISCLAIMER  Patient verbally agrees to participate in [facility]. Pt is aware that [facility] could be limited without vital signs or the ability to perform a full hands-on physical exam. Patient understands that the patient or the provider may request at any time to terminate the video visit and request the patient to seek care or treatment in person. Portions of the record may have been created with voice recognition software. Occasional wrong word or "sound a like" substitutions may have occurred due to the inherent limitations of voice recognition software. Read the chart carefully and recognize, using context, where substitutions have occurred.

## 2023-11-07 ENCOUNTER — TELEPHONE (OUTPATIENT)
Dept: OTHER | Facility: HOSPITAL | Age: 73
End: 2023-11-07

## 2023-11-07 ENCOUNTER — TELEPHONE (OUTPATIENT)
Dept: OTHER | Facility: OTHER | Age: 73
End: 2023-11-07

## 2023-11-07 ENCOUNTER — APPOINTMENT (OUTPATIENT)
Dept: LAB | Facility: CLINIC | Age: 73
End: 2023-11-07
Payer: MEDICARE

## 2023-11-07 DIAGNOSIS — C50.412 MALIGNANT NEOPLASM OF UPPER-OUTER QUADRANT OF LEFT BREAST IN FEMALE, ESTROGEN RECEPTOR POSITIVE: ICD-10-CM

## 2023-11-07 DIAGNOSIS — Z17.0 MALIGNANT NEOPLASM OF UPPER-OUTER QUADRANT OF LEFT BREAST IN FEMALE, ESTROGEN RECEPTOR POSITIVE: ICD-10-CM

## 2023-11-07 DIAGNOSIS — E87.6 HYPOKALEMIA: Primary | ICD-10-CM

## 2023-11-07 LAB
ALBUMIN SERPL BCP-MCNC: 3.8 G/DL (ref 3.5–5)
ALP SERPL-CCNC: 86 U/L (ref 34–104)
ALT SERPL W P-5'-P-CCNC: 41 U/L (ref 7–52)
ANION GAP SERPL CALCULATED.3IONS-SCNC: 15 MMOL/L
AST SERPL W P-5'-P-CCNC: 39 U/L (ref 13–39)
BASOPHILS # BLD AUTO: 0.03 THOUSANDS/ÂΜL (ref 0–0.1)
BASOPHILS NFR BLD AUTO: 1 % (ref 0–1)
BILIRUB SERPL-MCNC: 1.19 MG/DL (ref 0.2–1)
BUN SERPL-MCNC: 8 MG/DL (ref 5–25)
CALCIUM SERPL-MCNC: 9 MG/DL (ref 8.4–10.2)
CHLORIDE SERPL-SCNC: 91 MMOL/L (ref 96–108)
CO2 SERPL-SCNC: 31 MMOL/L (ref 21–32)
CREAT SERPL-MCNC: 0.77 MG/DL (ref 0.6–1.3)
EOSINOPHIL # BLD AUTO: 0.08 THOUSAND/ÂΜL (ref 0–0.61)
EOSINOPHIL NFR BLD AUTO: 2 % (ref 0–6)
ERYTHROCYTE [DISTWIDTH] IN BLOOD BY AUTOMATED COUNT: 16.1 % (ref 11.6–15.1)
GFR SERPL CREATININE-BSD FRML MDRD: 77 ML/MIN/1.73SQ M
GLUCOSE P FAST SERPL-MCNC: 168 MG/DL (ref 65–99)
HCT VFR BLD AUTO: 41 % (ref 34.8–46.1)
HGB BLD-MCNC: 14.3 G/DL (ref 11.5–15.4)
IMM GRANULOCYTES # BLD AUTO: 0.02 THOUSAND/UL (ref 0–0.2)
IMM GRANULOCYTES NFR BLD AUTO: 1 % (ref 0–2)
LYMPHOCYTES # BLD AUTO: 0.37 THOUSANDS/ÂΜL (ref 0.6–4.47)
LYMPHOCYTES NFR BLD AUTO: 10 % (ref 14–44)
MCH RBC QN AUTO: 30.7 PG (ref 26.8–34.3)
MCHC RBC AUTO-ENTMCNC: 34.9 G/DL (ref 31.4–37.4)
MCV RBC AUTO: 88 FL (ref 82–98)
MONOCYTES # BLD AUTO: 0.47 THOUSAND/ÂΜL (ref 0.17–1.22)
MONOCYTES NFR BLD AUTO: 13 % (ref 4–12)
NEUTROPHILS # BLD AUTO: 2.63 THOUSANDS/ÂΜL (ref 1.85–7.62)
NEUTS SEG NFR BLD AUTO: 73 % (ref 43–75)
NRBC BLD AUTO-RTO: 0 /100 WBCS
PLATELET # BLD AUTO: 281 THOUSANDS/UL (ref 149–390)
PMV BLD AUTO: 10.1 FL (ref 8.9–12.7)
POTASSIUM SERPL-SCNC: 2.7 MMOL/L (ref 3.5–5.3)
PROT SERPL-MCNC: 6.5 G/DL (ref 6.4–8.4)
RBC # BLD AUTO: 4.66 MILLION/UL (ref 3.81–5.12)
SODIUM SERPL-SCNC: 137 MMOL/L (ref 135–147)
WBC # BLD AUTO: 3.6 THOUSAND/UL (ref 4.31–10.16)

## 2023-11-07 PROCEDURE — 80053 COMPREHEN METABOLIC PANEL: CPT

## 2023-11-07 PROCEDURE — 36415 COLL VENOUS BLD VENIPUNCTURE: CPT

## 2023-11-07 PROCEDURE — 85025 COMPLETE CBC W/AUTO DIFF WBC: CPT

## 2023-11-07 RX ORDER — POTASSIUM CHLORIDE 20 MEQ/1
TABLET, EXTENDED RELEASE ORAL
Qty: 10 TABLET | Refills: 0 | Status: SHIPPED | OUTPATIENT
Start: 2023-11-07

## 2023-11-07 NOTE — TELEPHONE ENCOUNTER
Was notified regarding lab value, K 2.7. Chart reviewed. Ms. Silvia Motta is a 72year old female with metastatic hormone receptor positive breast cancer, was recently on ibrance and faslodex, but plan to be started on chemo with doxorubicin on 11/10. I called Ms. Ashley Hartmann. She relayed that she has been having intermittent episodes of nausea and diarrhea; also with poor oral appetite. I explained that her potassium is very low, warranting some repletion, as hypokalemia can potentially cause cardiac arrhythmias. Patient denied noticing any palpitations in the recent days. Hypokalemia likely 2/23 poor oral intake and loss from vomiting/diarrheal episodes. Patient has zofran at home which has been helping with the nausea sensation. Instructed patient to take potassium supplements as follows:     2 tabs (40mEq) tonight   1 tab (20 mEq) on 11/8  1 tab (20 mEq) on 11/9. Patient would likely benefit from getting K and Mg levels checked during her infusion appt on 11/10 to see if she needs further repletions, but will defer this to primary oncology team. Gavi Boards this to primary oncology team so that they are also aware of the above.

## 2023-11-07 NOTE — TELEPHONE ENCOUNTER
Lab Result: Potassium 2.7   Date/Time Drawn: 11/07/2023 0736   Ordering Provider: Esme Sung   Lab Personnel's Name: Hiramnicole Fallon       The following critical/stat result was read back to the lab as stated above and [employer] to the on-call provider. The provider confirmed receipt of the message.

## 2023-11-09 NOTE — TELEPHONE ENCOUNTER
Spoke with patient  Patient does not have a ride until the afternoon on 11/13/2023  Will inform infusion that patient will be late to appt

## 2023-11-10 ENCOUNTER — HOSPITAL ENCOUNTER (OUTPATIENT)
Dept: INFUSION CENTER | Facility: HOSPITAL | Age: 73
End: 2023-11-10
Attending: INTERNAL MEDICINE
Payer: MEDICARE

## 2023-11-10 VITALS
SYSTOLIC BLOOD PRESSURE: 97 MMHG | OXYGEN SATURATION: 100 % | DIASTOLIC BLOOD PRESSURE: 54 MMHG | TEMPERATURE: 97 F | RESPIRATION RATE: 18 BRPM | HEIGHT: 63 IN | WEIGHT: 174.16 LBS | BODY MASS INDEX: 30.86 KG/M2 | HEART RATE: 90 BPM

## 2023-11-10 DIAGNOSIS — Z17.0 MALIGNANT NEOPLASM OF UPPER-OUTER QUADRANT OF LEFT BREAST IN FEMALE, ESTROGEN RECEPTOR POSITIVE: Primary | ICD-10-CM

## 2023-11-10 DIAGNOSIS — C50.412 MALIGNANT NEOPLASM OF UPPER-OUTER QUADRANT OF LEFT BREAST IN FEMALE, ESTROGEN RECEPTOR POSITIVE: Primary | ICD-10-CM

## 2023-11-10 DIAGNOSIS — Z17.0 MALIGNANT NEOPLASM OF UPPER-OUTER QUADRANT OF LEFT BREAST IN FEMALE, ESTROGEN RECEPTOR POSITIVE: ICD-10-CM

## 2023-11-10 DIAGNOSIS — C50.412 MALIGNANT NEOPLASM OF UPPER-OUTER QUADRANT OF LEFT BREAST IN FEMALE, ESTROGEN RECEPTOR POSITIVE: ICD-10-CM

## 2023-11-10 DIAGNOSIS — E87.6 HYPOKALEMIA: Primary | ICD-10-CM

## 2023-11-10 DIAGNOSIS — E87.6 HYPOKALEMIA: ICD-10-CM

## 2023-11-10 DIAGNOSIS — M84.58XA PATHOLOGICAL FRACTURE OF SACRAL VERTEBRA DUE TO NEOPLASTIC DISEASE: ICD-10-CM

## 2023-11-10 LAB
ALBUMIN SERPL BCP-MCNC: 3.5 G/DL (ref 3.5–5)
ALP SERPL-CCNC: 76 U/L (ref 34–104)
ALT SERPL W P-5'-P-CCNC: 45 U/L (ref 7–52)
ANION GAP SERPL CALCULATED.3IONS-SCNC: 11 MMOL/L
AST SERPL W P-5'-P-CCNC: 38 U/L (ref 13–39)
BILIRUB SERPL-MCNC: 1.13 MG/DL (ref 0.2–1)
BUN SERPL-MCNC: 8 MG/DL (ref 5–25)
CALCIUM SERPL-MCNC: 8.9 MG/DL (ref 8.4–10.2)
CHLORIDE SERPL-SCNC: 95 MMOL/L (ref 96–108)
CO2 SERPL-SCNC: 28 MMOL/L (ref 21–32)
CREAT SERPL-MCNC: 0.6 MG/DL (ref 0.6–1.3)
GFR SERPL CREATININE-BSD FRML MDRD: 91 ML/MIN/1.73SQ M
GLUCOSE SERPL-MCNC: 154 MG/DL (ref 65–140)
POTASSIUM SERPL-SCNC: 2.8 MMOL/L (ref 3.5–5.3)
PROT SERPL-MCNC: 6 G/DL (ref 6.4–8.4)
SODIUM SERPL-SCNC: 134 MMOL/L (ref 135–147)

## 2023-11-10 PROCEDURE — 96366 THER/PROPH/DIAG IV INF ADDON: CPT

## 2023-11-10 PROCEDURE — 96367 TX/PROPH/DG ADDL SEQ IV INF: CPT

## 2023-11-10 PROCEDURE — 80053 COMPREHEN METABOLIC PANEL: CPT | Performed by: INTERNAL MEDICINE

## 2023-11-10 PROCEDURE — 96413 CHEMO IV INFUSION 1 HR: CPT

## 2023-11-10 RX ORDER — SODIUM CHLORIDE 9 MG/ML
20 INJECTION, SOLUTION INTRAVENOUS ONCE
Status: CANCELLED
Start: 2023-11-10

## 2023-11-10 RX ORDER — SODIUM CHLORIDE 9 MG/ML
20 INJECTION, SOLUTION INTRAVENOUS ONCE
Status: CANCELLED
Start: 2023-11-10 | End: 2023-11-10

## 2023-11-10 RX ORDER — POTASSIUM CHLORIDE 14.9 MG/ML
20 INJECTION INTRAVENOUS ONCE
Status: COMPLETED | OUTPATIENT
Start: 2023-11-10 | End: 2023-11-10

## 2023-11-10 RX ORDER — POTASSIUM CHLORIDE 14.9 MG/ML
20 INJECTION INTRAVENOUS ONCE
Status: CANCELLED | OUTPATIENT
Start: 2023-11-10

## 2023-11-10 RX ORDER — DEXTROSE MONOHYDRATE 50 MG/ML
20 INJECTION, SOLUTION INTRAVENOUS ONCE
Status: COMPLETED | OUTPATIENT
Start: 2023-11-10 | End: 2023-11-10

## 2023-11-10 RX ORDER — SODIUM CHLORIDE 9 MG/ML
20 INJECTION, SOLUTION INTRAVENOUS ONCE
Status: COMPLETED | OUTPATIENT
Start: 2023-11-10 | End: 2023-11-10

## 2023-11-10 RX ADMIN — DOXORUBICIN HYDROCHLORIDE 90 MG: 2 INJECTION, SUSPENSION, LIPOSOMAL INTRAVENOUS at 11:09

## 2023-11-10 RX ADMIN — POTASSIUM CHLORIDE 20 MEQ: 200 INJECTION, SOLUTION INTRAVENOUS at 13:08

## 2023-11-10 RX ADMIN — SODIUM CHLORIDE 20 ML/HR: 0.9 INJECTION, SOLUTION INTRAVENOUS at 13:08

## 2023-11-10 RX ADMIN — DEXAMETHASONE SODIUM PHOSPHATE: 10 INJECTION, SOLUTION INTRAMUSCULAR; INTRAVENOUS at 09:53

## 2023-11-10 RX ADMIN — DEXTROSE 20 ML/HR: 5 SOLUTION INTRAVENOUS at 09:53

## 2023-11-10 NOTE — PLAN OF CARE
Problem: Knowledge Deficit  Goal: Patient/family/caregiver demonstrates understanding of disease process, treatment plan, medications, and discharge instructions  Description: Complete learning assessment and assess knowledge base.   Interventions:  - Provide teaching at level of understanding  - Provide teaching via preferred learning methods  11/10/2023 0922 by Juve Duron RN  Outcome: Progressing  11/10/2023 0922 by Juve Duron RN  Outcome: Progressing     Problem: INFECTION - ADULT  Goal: Absence or prevention of progression during hospitalization  Description: INTERVENTIONS:  - Assess and monitor for signs and symptoms of infection  - Monitor lab/diagnostic results  - Monitor all insertion sites, i.e. indwelling lines, tubes, and drains  - Monitor endotracheal if appropriate and nasal secretions for changes in amount and color  - [place] appropriate cooling/warming therapies per order  - Administer medications as ordered  - Instruct and encourage patient and family to use good hand hygiene technique  - Identify and instruct in appropriate isolation precautions for identified infection/condition  Outcome: Progressing  Goal: Absence of fever/infection during neutropenic period  Description: INTERVENTIONS:  - Monitor WBC    Outcome: Progressing     Problem: DISCHARGE PLANNING  Goal: Discharge to home or other facility with appropriate resources  Description: INTERVENTIONS:  - Identify barriers to discharge w/patient and caregiver  - Arrange for needed discharge resources and transportation as appropriate  - Identify discharge learning needs (meds, wound care, etc.)  - Arrange for interpretive services to assist at discharge as needed  - Refer to Case Management Department for coordinating discharge planning if the patient needs post-hospital services based on physician/advanced practitioner order or complex needs related to functional status, cognitive ability, or social support system  Outcome: Progressing

## 2023-11-13 ENCOUNTER — HOSPITAL ENCOUNTER (OUTPATIENT)
Dept: INFUSION CENTER | Facility: HOSPITAL | Age: 73
Discharge: HOME | End: 2023-11-13
Attending: INTERNAL MEDICINE
Payer: MEDICARE

## 2023-11-13 DIAGNOSIS — C50.412 MALIGNANT NEOPLASM OF UPPER-OUTER QUADRANT OF LEFT BREAST IN FEMALE, ESTROGEN RECEPTOR POSITIVE: Primary | ICD-10-CM

## 2023-11-13 DIAGNOSIS — Z17.0 MALIGNANT NEOPLASM OF UPPER-OUTER QUADRANT OF LEFT BREAST IN FEMALE, ESTROGEN RECEPTOR POSITIVE: Primary | ICD-10-CM

## 2023-11-13 PROCEDURE — 96372 THER/PROPH/DIAG INJ SC/IM: CPT

## 2023-11-13 RX ADMIN — PEGFILGRASTIM 4 MG: 6 INJECTION SUBCUTANEOUS at 12:11

## 2023-11-14 ENCOUNTER — TELEPHONE (OUTPATIENT)
Dept: NEPHROLOGY | Facility: CLINIC | Age: 73
End: 2023-11-14

## 2023-11-14 DIAGNOSIS — E87.6 HYPOKALEMIA: Primary | ICD-10-CM

## 2023-11-14 DIAGNOSIS — Z17.0 MALIGNANT NEOPLASM OF UPPER-OUTER QUADRANT OF LEFT BREAST IN FEMALE, ESTROGEN RECEPTOR POSITIVE: ICD-10-CM

## 2023-11-14 DIAGNOSIS — C50.412 MALIGNANT NEOPLASM OF UPPER-OUTER QUADRANT OF LEFT BREAST IN FEMALE, ESTROGEN RECEPTOR POSITIVE: ICD-10-CM

## 2023-11-14 NOTE — TELEPHONE ENCOUNTER
New Patient Intake Form   Patient Details   Scott Fletcher     1950     [id number]     Insurance Information   Name of [employer]    Does the patient need an insurance referral? no   If patient has [employer], please ask if they will be using their [employer]. Appointment Information   Who is calling to schedule? If not patient, what is callers name? [address] [facility]   Referring Provider  Dr. Johanne Bliss hem/onc   Reason for Appt (Diagnosis)   Hypokalemia [E87.6]; Malignant neoplasm of upper-outer quadrant of left breast in female, estrogen receptor positive  [C50.412, Z17.0]       Does Patient have labs/urine done at [facility]? If not, where do they go? List the date of last lab / urine  *Please try to get labs 2 years back if not at  yes   Has patient been hospitalized recently? If yes, list name and location of hospital they were in Yes 9/2023 [facility]   Has patient been seen by a Nephrologist before? If yes, list name, location and phone number no   Has the patient had renal imaging done? If so, list the most recent date and type of imaging yes CT 8/2023   Does patient have a history of Kidney Stones? no   Appointment Details   Is there a referral on file?  yes    Appointment Date 11/20    [facility]  [place]   [facility]   STAT REF

## 2023-11-15 ENCOUNTER — E-CONSULT (OUTPATIENT)
Dept: NEPHROLOGY | Facility: CLINIC | Age: 73
End: 2023-11-15

## 2023-11-15 DIAGNOSIS — E87.1 HYPONATREMIA: ICD-10-CM

## 2023-11-15 DIAGNOSIS — E87.6 HYPOKALEMIA: Primary | ICD-10-CM

## 2023-11-15 DIAGNOSIS — Z17.0 MALIGNANT NEOPLASM OF UPPER-OUTER QUADRANT OF LEFT BREAST IN FEMALE, ESTROGEN RECEPTOR POSITIVE: Primary | ICD-10-CM

## 2023-11-15 DIAGNOSIS — C50.412 MALIGNANT NEOPLASM OF UPPER-OUTER QUADRANT OF LEFT BREAST IN FEMALE, ESTROGEN RECEPTOR POSITIVE: Primary | ICD-10-CM

## 2023-11-15 NOTE — PROGRESS NOTES
E-Consult  Airam Barajas 72 y.o. female MRN: [id number]  Encounter Date: 11/15/23      Reason for Consult / Principal Problem: Hypokalemia and hyponatremia    Consulting Provider: Sagrario Rausch MD    Requesting Provider: Gabe Hinds MD       ASSESSMENT:    Hypokalemia  -- Low serum chloride level  -- Stable and normal renal function  -- Glucose level acceptable  -- No magnesium level, strongly recommend getting a magnesium level checked, if this is low this will need to be replaced  -- Patient on chemotherapy with cyclophosphamide/Taxotere  -- Suspect electrolyte disorders are related to chemotherapy and decreased oral intake  -- Currently on potassium chloride replacement recommend checking a repeat BMP and a magnesium level may need further increase if needed if the potassium level continues to worsen may need to be sent to the hospital for IV potassium replacement    Hyponatremia  -- Mild with concurrent hypokalemia  -- Replacement of the potassium should help with the serum sodium level  -- Underlying malignancy  -- Sodium level prior had been normal.    Breast cancer  -- Status post resection along with chemotherapy and radiation therapy  -- Following with radiation oncology and hematology    RECOMMENDATIONS:    Agree with potassium replacement check a repeat BMP and magnesium level    If the potassium level worsens consider hospitalization for IV potassium replacement    Total time spent  21-30 minutes, >50% of the total time devoted to medical consultative verbal/EMR discussion between providers. Written report will be generated in the EMR. Argelia Miguel

## 2023-11-16 ENCOUNTER — TELEPHONE (OUTPATIENT)
Dept: HEMATOLOGY ONCOLOGY | Facility: MEDICAL CENTER | Age: 73
End: 2023-11-16

## 2023-11-16 ENCOUNTER — TELEPHONE (OUTPATIENT)
Dept: OTHER | Facility: OTHER | Age: 73
End: 2023-11-16

## 2023-11-16 ENCOUNTER — HOSPITAL ENCOUNTER (EMERGENCY)
Facility: HOSPITAL | Age: 73
Discharge: HOME | End: 2023-11-16
Attending: EMERGENCY MEDICINE
Payer: MEDICARE

## 2023-11-16 ENCOUNTER — APPOINTMENT (EMERGENCY)
Dept: RADIOLOGY | Facility: HOSPITAL | Age: 73
End: 2023-11-16
Payer: MEDICARE

## 2023-11-16 ENCOUNTER — OFFICE VISIT (OUTPATIENT)
Dept: URGENT CARE | Facility: CLINIC | Age: 73
End: 2023-11-16
Payer: MEDICARE

## 2023-11-16 VITALS
RESPIRATION RATE: 22 BRPM | OXYGEN SATURATION: 99 % | DIASTOLIC BLOOD PRESSURE: 58 MMHG | WEIGHT: 174 LBS | SYSTOLIC BLOOD PRESSURE: 104 MMHG | HEART RATE: 95 BPM | TEMPERATURE: 98.2 F | BODY MASS INDEX: 30.98 KG/M2

## 2023-11-16 VITALS
DIASTOLIC BLOOD PRESSURE: 62 MMHG | OXYGEN SATURATION: 99 % | RESPIRATION RATE: 16 BRPM | HEART RATE: 87 BPM | TEMPERATURE: 98.3 F | SYSTOLIC BLOOD PRESSURE: 105 MMHG

## 2023-11-16 DIAGNOSIS — E87.6 HYPOKALEMIA: Primary | ICD-10-CM

## 2023-11-16 DIAGNOSIS — R09.81 NASAL CONGESTION: ICD-10-CM

## 2023-11-16 DIAGNOSIS — R07.89 CHEST PRESSURE: Primary | ICD-10-CM

## 2023-11-16 DIAGNOSIS — R07.9 CHEST PAIN: Primary | ICD-10-CM

## 2023-11-16 LAB
2HR DELTA HS TROPONIN: -2 NG/L
ALBUMIN SERPL BCP-MCNC: 3.5 G/DL (ref 3.5–5)
ALP SERPL-CCNC: 99 U/L (ref 34–104)
ALT SERPL W P-5'-P-CCNC: 44 U/L (ref 7–52)
ANION GAP SERPL CALCULATED.3IONS-SCNC: 13 MMOL/L
ANISOCYTOSIS BLD QL SMEAR: PRESENT
AST SERPL W P-5'-P-CCNC: 37 U/L (ref 13–39)
BASOPHILS # BLD MANUAL: 0 THOUSAND/UL (ref 0–0.1)
BASOPHILS NFR MAR MANUAL: 0 % (ref 0–1)
BILIRUB SERPL-MCNC: 1.07 MG/DL (ref 0.2–1)
BUN SERPL-MCNC: 8 MG/DL (ref 5–25)
CALCIUM SERPL-MCNC: 9.6 MG/DL (ref 8.4–10.2)
CARDIAC TROPONIN I PNL SERPL HS: 13 NG/L
CARDIAC TROPONIN I PNL SERPL HS: 15 NG/L
CHLORIDE SERPL-SCNC: 96 MMOL/L (ref 96–108)
CO2 SERPL-SCNC: 27 MMOL/L (ref 21–32)
CREAT SERPL-MCNC: 0.71 MG/DL (ref 0.6–1.3)
EOSINOPHIL # BLD MANUAL: 0 THOUSAND/UL (ref 0–0.4)
EOSINOPHIL NFR BLD MANUAL: 0 % (ref 0–6)
ERYTHROCYTE [DISTWIDTH] IN BLOOD BY AUTOMATED COUNT: 16.7 % (ref 11.6–15.1)
FLUAV RNA RESP QL NAA+PROBE: NEGATIVE
FLUBV RNA RESP QL NAA+PROBE: NEGATIVE
GFR SERPL CREATININE-BSD FRML MDRD: 85 ML/MIN/1.73SQ M
GLUCOSE SERPL-MCNC: 136 MG/DL (ref 65–140)
HCT VFR BLD AUTO: 39.3 % (ref 34.8–46.1)
HGB BLD-MCNC: 14.3 G/DL (ref 11.5–15.4)
LG PLATELETS BLD QL SMEAR: PRESENT
LYMPHOCYTES # BLD AUTO: 1.45 THOUSAND/UL (ref 0.6–4.47)
LYMPHOCYTES # BLD AUTO: 6 % (ref 14–44)
MACROCYTES BLD QL AUTO: PRESENT
MAGNESIUM SERPL-MCNC: 2.1 MG/DL (ref 1.9–2.7)
MCH RBC QN AUTO: 32.7 PG (ref 26.8–34.3)
MCHC RBC AUTO-ENTMCNC: 36.4 G/DL (ref 31.4–37.4)
MCV RBC AUTO: 90 FL (ref 82–98)
MONOCYTES # BLD AUTO: 0.13 THOUSAND/UL (ref 0–1.22)
MONOCYTES NFR BLD: 1 % (ref 4–12)
NEUTROPHILS # BLD MANUAL: 11.62 THOUSAND/UL (ref 1.85–7.62)
NEUTS BAND NFR BLD MANUAL: 7 % (ref 0–8)
NEUTS SEG NFR BLD AUTO: 81 % (ref 43–75)
PLATELET # BLD AUTO: 288 THOUSANDS/UL (ref 149–390)
PLATELET BLD QL SMEAR: ADEQUATE
PMV BLD AUTO: 9.6 FL (ref 8.9–12.7)
POLYCHROMASIA BLD QL SMEAR: PRESENT
POTASSIUM SERPL-SCNC: 3.3 MMOL/L (ref 3.5–5.3)
PROT SERPL-MCNC: 6.4 G/DL (ref 6.4–8.4)
RBC # BLD AUTO: 4.37 MILLION/UL (ref 3.81–5.12)
RBC MORPH BLD: PRESENT
RSV RNA RESP QL NAA+PROBE: NEGATIVE
S PYO DNA THROAT QL NAA+PROBE: NOT DETECTED
SARS-COV-2 AG UPPER RESP QL IA: NEGATIVE
SARS-COV-2 RNA RESP QL NAA+PROBE: NEGATIVE
SODIUM SERPL-SCNC: 136 MMOL/L (ref 135–147)
VALID CONTROL: NORMAL
VARIANT LYMPHS # BLD AUTO: 5 %
WBC # BLD AUTO: 13.2 THOUSAND/UL (ref 4.31–10.16)

## 2023-11-16 PROCEDURE — 99203 OFFICE O/P NEW LOW 30 MIN: CPT | Performed by: PHYSICIAN ASSISTANT

## 2023-11-16 PROCEDURE — 83735 ASSAY OF MAGNESIUM: CPT | Performed by: EMERGENCY MEDICINE

## 2023-11-16 PROCEDURE — 99285 EMERGENCY DEPT VISIT HI MDM: CPT

## 2023-11-16 PROCEDURE — 85027 COMPLETE CBC AUTOMATED: CPT | Performed by: EMERGENCY MEDICINE

## 2023-11-16 PROCEDURE — 85007 BL SMEAR W/DIFF WBC COUNT: CPT | Performed by: EMERGENCY MEDICINE

## 2023-11-16 PROCEDURE — 0241U HB NFCT DS VIR RESP RNA 4 TRGT: CPT | Performed by: EMERGENCY MEDICINE

## 2023-11-16 PROCEDURE — 80053 COMPREHEN METABOLIC PANEL: CPT | Performed by: EMERGENCY MEDICINE

## 2023-11-16 PROCEDURE — 99285 EMERGENCY DEPT VISIT HI MDM: CPT | Performed by: EMERGENCY MEDICINE

## 2023-11-16 PROCEDURE — 36415 COLL VENOUS BLD VENIPUNCTURE: CPT | Performed by: EMERGENCY MEDICINE

## 2023-11-16 PROCEDURE — 84484 ASSAY OF TROPONIN QUANT: CPT | Performed by: EMERGENCY MEDICINE

## 2023-11-16 PROCEDURE — 93005 ELECTROCARDIOGRAM TRACING: CPT

## 2023-11-16 PROCEDURE — 71045 X-RAY EXAM CHEST 1 VIEW: CPT

## 2023-11-16 PROCEDURE — 87651 STREP A DNA AMP PROBE: CPT | Performed by: EMERGENCY MEDICINE

## 2023-11-16 PROCEDURE — 87811 SARS-COV-2 COVID19 W/OPTIC: CPT | Performed by: PHYSICIAN ASSISTANT

## 2023-11-16 RX ORDER — POTASSIUM CHLORIDE 20 MEQ/1
40 TABLET, EXTENDED RELEASE ORAL ONCE
Status: DISCONTINUED | OUTPATIENT
Start: 2023-11-16 | End: 2023-11-16

## 2023-11-16 RX ORDER — POTASSIUM CHLORIDE 20MEQ/15ML
20 LIQUID (ML) ORAL ONCE
Status: COMPLETED | OUTPATIENT
Start: 2023-11-16 | End: 2023-11-16

## 2023-11-16 RX ADMIN — POTASSIUM CHLORIDE 20 MEQ: 1.5 SOLUTION ORAL at 19:39

## 2023-11-16 NOTE — ED PROVIDER NOTES
History  Chief Complaint   Patient presents with    Chest Pain     Patient c/o chest pain off and on since yesterday. Also states voice became hoarse yesterday. 79-year-old female presents with substernal chest pain sharp continuous currently 3 out of 10 nothing makes it better when she also has been coughing some hoarse voice for the past few weeks and some congestion. She went to an urgent care who sent her to the ED for further evaluation of her chest pain she denies any nausea vomiting or pain diarrhea leg swelling calf pain or any other symptoms      History provided by:  Patient   used: No        Prior to Admission Medications   Prescriptions Last Dose Informant Patient Reported? Taking? Ascorbic Acid (VITAMIN C PO)   Yes No   Sig: Take 500 mg by mouth in the morning   LORazepam (ATIVAN) 1 mg tablet   No No   Sig: Take 1 tablet (1 mg total) by mouth once as needed for anxiety for up to 1 dose Take 30 minutes to 1 hour prior to your schedule imaging/scan. Do not start before 2023.    Patient not taking: Reported on 2023   Multiple Vitamins-Minerals (MULTIVITAMIN ADULT PO)  Self Yes No   Sig: Take by mouth daily   Palbociclib (Ibrance) 125 MG capsule   No No   Sig: Take 1 capsule (125 mg total) by mouth daily 21 days on followed by 7 days off   Patient not taking: Reported on 2023   VITAMIN D PO   Yes No   Sig: Take by mouth in the morning   albuterol (ProAir HFA) 90 mcg/act inhaler  Self No No   Sig: Inhale 2 puffs every 4 (four) hours as needed for wheezing or shortness of breath   fluticasone (FLONASE) 50 mcg/act nasal spray  Self Yes No   Si puffs into each nostril daily   gabapentin (Neurontin) 100 mg capsule  Self No No   Sig: Take 1 capsule (100 mg total) by mouth daily at bedtime   metoprolol tartrate (LOPRESSOR) 50 mg tablet   No No   Sig: Take 1 tablet (50 mg total) by mouth daily   naloxone (NARCAN) 4 mg/0.1 mL nasal spray  Self No No   Sig: Administer 1 spray into a nostril. If no response after 2-3 minutes, give another dose in the other nostril using a new spray. For use in emergencies in the event of accidental ingestion, respiratory depression or oversedation. ondansetron (ZOFRAN) 4 mg tablet  Self No No   Sig: Take 1 tablet (4 mg total) by mouth every 8 (eight) hours as needed for nausea or vomiting   ondansetron (ZOFRAN) 8 mg tablet   No No   Sig: Take 1 tablet (8 mg total) by mouth every 8 (eight) hours as needed for nausea or vomiting   oxyCODONE (ROXICODONE) 10 MG TABS  Self No No   Sig: Take 1.5 tablets (15 mg total) by mouth every 4 (four) hours as needed for moderate pain Max Daily Amount: 90 mg   polyethylene glycol (MIRALAX) 17 g packet  Self No No   Sig: Take 17 g by mouth daily as needed (as needed for constipation.)   potassium chloride (K-DUR,KLOR-CON) 20 mEq tablet   No No   Sig: Please take 2 tabs tonight (11/7), then take 1 tab on 11/8, and 1 tab on 11/9. Save the remaining/extra tablets in case you need more potassium repletion in the future. senna-docusate sodium (SENOKOT-S) 8.6-50 mg per tablet  Self No No   Sig: Take 1 tablet by mouth 2 (two) times a day   Patient taking differently: Take 1 tablet by mouth if needed      Facility-Administered Medications: None       Past Medical History:   Diagnosis Date    BRCA gene mutation negative 05/19/2021    [facility]; 36 gene panel    Breast cancer ([address])     Left breast    Colon polyp     Dental crowns present     Exercise involving walking     the dogs    History of angina     per pt "years ago and related to stress"    History of chemotherapy     breast cancer (left) 2021.     History of radiation therapy     [place] (hard of hearing)     wears hearing aids/will wear DOS bilat    Hypertension     Limb alert care status     No BP/IV Left Arm    Lymphedema of left arm     Prediabetes     Wears glasses        Past Surgical History:   Procedure Laterality Date    AXILLARY SURGERY Right sweat glands removed -     BREAST CYST EXCISION Right 2000    benign    BREAST SURGERY Right     CHOLECYSTECTOMY      COLONOSCOPY      DILATION AND CURETTAGE OF UTERUS      IR BIOPSY BONE  9/1/2023    IR PORT PLACEMENT  10/30/2023    MASTECTOMY Left 06/08/2021    NV BREAST REDUCTION N/A 03/18/2022    Procedure: R BREAST REDUCTION; L BREAST EXCISION STANDING SKIN DEFORMITY; LOCAL FLAP;  Surgeon: Bev Vick MD;  [facility];  Service: Plastics    NV MAST MODF RAD W/AX LYMPH NOD W/WO PECT/NERI MIN Left 06/08/2021    Procedure: BREAST MODIFIED RADICAL MASTECTOMY; ANAND  DIRECTED AXILLARY DISSECTION;  Surgeon: Tsering Lindsey MD;  [facility];  Service: Surgical Oncology    REDUCTION MAMMAPLASTY Right     March 2022    US BREAST NEEDLE LOC LEFT Left 06/02/2021    US GUIDED BREAST BIOPSY LEFT COMPLETE Left 04/19/2021    US GUIDED BREAST LYMPH NODE BIOPSY LEFT Left 04/19/2021    WISDOM TOOTH EXTRACTION         Family History   Problem Relation Age of Onset    Dementia Mother     Colon cancer Mother         49's-62's    Lung cancer Father         49's-62's    No Known Problems Sister     Breast cancer Paternal Aunt     No Known Problems Sister     Stomach cancer Paternal Uncle         66's     I have reviewed and agree with the history as documented. E-Cigarette/Vaping    E-Cigarette Use Never User      E-Cigarette/Vaping Substances    Nicotine No     THC No     CBD No     Flavoring No     Other No     Unknown No      Social History     Tobacco Use    Smoking status: Never     Passive exposure: Never    Smokeless tobacco: Never   Vaping Use    Vaping Use: Never used   Substance Use Topics    Alcohol use: Never    Drug use: No       Review of Systems   Constitutional: Negative. HENT:  Positive for congestion and voice change. Eyes: Negative. Respiratory: Negative. Cardiovascular:  Positive for chest pain. Gastrointestinal: Negative. Endocrine: Negative. Genitourinary: Negative. Musculoskeletal: Negative. Skin: Negative. Allergic/Immunologic: Negative. Neurological: Negative. Hematological: Negative. Psychiatric/Behavioral: Negative. All other systems reviewed and are negative. Physical Exam  Physical Exam  Vitals and nursing note reviewed. Constitutional:       Appearance: Normal appearance. HENT:      Head: Normocephalic and atraumatic. Nose: Nose normal.      Mouth/Throat:      Mouth: Mucous membranes are moist.   Eyes:      Extraocular Movements: Extraocular movements intact. Pupils: Pupils are equal, round, and reactive to light. Cardiovascular:      Rate and Rhythm: Normal rate and regular rhythm. Pulmonary:      Effort: Pulmonary effort is normal.      Breath sounds: Normal breath sounds. Abdominal:      General: Abdomen is flat. Bowel sounds are normal.      Palpations: Abdomen is soft. Musculoskeletal:         General: Normal range of motion. Cervical back: Normal range of motion and neck supple. Skin:     General: Skin is warm. Capillary Refill: Capillary refill takes less than 2 seconds. Neurological:      General: No focal deficit present. Mental Status: She is alert and oriented to person, place, and time. Mental status is at baseline. Psychiatric:         Mood and Affect: Mood normal.         Thought Content:  Thought content normal.         Vital Signs  ED Triage Vitals   Temperature Pulse Respirations Blood Pressure SpO2   11/16/23 1817 11/16/23 1817 11/16/23 1818 11/16/23 1818 11/16/23 1817   98.3 °F (36.8 °C) 100 (!) 23 133/69 99 %      Temp Source Heart Rate Source Patient Position - Orthostatic VS BP Location FiO2 (%)   11/16/23 1817 11/16/23 1817 11/16/23 1817 11/16/23 1817 --   Tympanic Monitor Lying Right arm       Pain Score       --                  Vitals:    11/16/23 1818 11/16/23 1823 11/16/23 1845 11/16/23 1915   BP: 133/69 133/69 112/63 119/58   Pulse: 98  90 86   Patient Position - Orthostatic VS: Lying Lying Lying Lying         Visual Acuity      ED Medications  Medications   potassium chloride oral solution 20 mEq (20 mEq Oral Given 11/16/23 1939)       Diagnostic Studies  Results Reviewed       Procedure Component Value Units Date/Time    RBC Morphology Reflex Test [[id number]] Collected: 11/16/23 1829    Lab Status: Final result Specimen: Blood from Arm, Right Updated: 11/16/23 2004    FLU/RSV/COVID - if FLU/RSV clinically relevant [[id number]]  (Normal) Collected: 11/16/23 1834    Lab Status: Final result Specimen: Nares from Nose Updated: 11/16/23 1954     SARS-CoV-2 Negative     INFLUENZA A PCR Negative     INFLUENZA B PCR Negative     RSV PCR Negative    Narrative:      FOR PEDIATRIC PATIENTS - copy/paste COVID Guidelines URL to browser: [email or web address]    SARS-CoV-2 assay is a Nucleic Acid Amplification assay intended for the  qualitative detection of nucleic acid from SARS-CoV-2 in nasopharyngeal  swabs. Results are for the presumptive identification of SARS-CoV-2 RNA. Positive results are indicative of infection with SARS-CoV-2, the virus  causing COVID-19, but do not rule out bacterial infection or co-infection  with other viruses. Laboratories within the [facility] and its  territories are required to report all positive results to the appropriate  public health authorities. Negative results do not preclude SARS-CoV-2  infection and should not be used as the sole basis for treatment or other  patient management decisions. Negative results must be combined with  clinical observations, patient history, and epidemiological information. This test has not been FDA cleared or approved. This test has been authorized by FDA under an Emergency Use Authorization  (EUA).  This test is only authorized for the duration of time the  declaration that circumstances exist justifying the authorization of the  emergency use of an in vitro diagnostic tests for detection of SARS-CoV-2  virus and/or diagnosis of COVID-19 infection under section 564(b)(1) of  the Act, 21 U. S.C. 880FLH-0(M)(8), unless the authorization is terminated  or revoked sooner. The test has been validated but independent review by FDA  and CLIA is pending. Test performed using [employer] GeneXpert: This RT-PCR assay targets N2,  a region unique to SARS-CoV-2. A conserved region in the E-gene was chosen  for pan-Sarbecovirus detection which includes SARS-CoV-2. According to CMS-2020-01-R, this platform meets the definition of high-throughput technology. Strep A PCR [[id number]]  (Normal) Collected: 11/16/23 1834    Lab Status: Final result Specimen: Throat Updated: 11/16/23 1940     STREP A PCR Not Detected    CBC and differential [[id number]]  (Abnormal) Collected: 11/16/23 1829    Lab Status: Final result Specimen: Blood from Arm, Right Updated: 11/16/23 1912     WBC 13.20 Thousand/uL      RBC 4.37 Million/uL      Hemoglobin 14.3 g/dL      Hematocrit 39.3 %      MCV 90 fL      MCH 32.7 pg      MCHC 36.4 g/dL      RDW 16.7 %      MPV 9.6 fL      Platelets 787 Thousands/uL     Narrative: This is an appended report. These results have been appended to a previously verified report.     Manual Differential(PHLEBS Do Not Order) [[id number]]  (Abnormal) Collected: 11/16/23 1829    Lab Status: Final result Specimen: Blood from Arm, Right Updated: 11/16/23 1912     Segmented % 81 %      Bands % 7 %      Lymphocytes % 6 %      Monocytes % 1 %      Eosinophils, % 0 %      Basophils % 0 %      Atypical Lymphocytes % 5 %      Absolute Neutrophils 11.62 Thousand/uL      Lymphocytes Absolute 1.45 Thousand/uL      Monocytes Absolute 0.13 Thousand/uL      Eosinophils Absolute 0.00 Thousand/uL      Basophils Absolute 0.00 Thousand/uL      Total Counted --     RBC Morphology Present     Platelet Estimate Adequate     Large Platelet Present     Anisocytosis Present     Macrocytes Present Polychromasia Present    Comprehensive metabolic panel [[id number]]  (Abnormal) Collected: 11/16/23 1829    Lab Status: Final result Specimen: Blood from Arm, Right Updated: 11/16/23 1912     Sodium 136 mmol/L      Potassium 3.3 mmol/L      Chloride 96 mmol/L      CO2 27 mmol/L      ANION GAP 13 mmol/L      BUN 8 mg/dL      Creatinine 0.71 mg/dL      Glucose 136 mg/dL      Calcium 9.6 mg/dL      AST 37 U/L      ALT 44 U/L      Alkaline Phosphatase 99 U/L      Total Protein 6.4 g/dL      Albumin 3.5 g/dL      Total Bilirubin 1.07 mg/dL      eGFR 85 ml/min/1.73sq m     Narrative:      [facility] guidelines for Chronic Kidney Disease (CKD):     Stage 1 with normal or high GFR (GFR > 90 mL/min/1.73 square meters)    Stage 2 Mild CKD (GFR = 60-89 mL/min/1.73 square meters)    Stage 3A Moderate CKD (GFR = 45-59 mL/min/1.73 square meters)    Stage 3B Moderate CKD (GFR = 30-44 mL/min/1.73 square meters)    Stage 4 Severe CKD (GFR = 15-29 mL/min/1.73 square meters)    Stage 5 End Stage CKD (GFR <15 mL/min/1.73 square meters)  Note: GFR calculation is accurate only with a steady state creatinine    Magnesium [[id number]]  (Normal) Collected: 11/16/23 1829    Lab Status: Final result Specimen: Blood from Arm, Right Updated: 11/16/23 1912     Magnesium 2.1 mg/dL     HS Troponin I 2hr [[id number]]     Lab Status: No result Specimen: Blood     HS Troponin 0hr (reflex protocol) [[id number]]  (Normal) Collected: 11/16/23 1829    Lab Status: Final result Specimen: Blood from Arm, Right Updated: 11/16/23 1857     hs TnI 0hr 15 ng/L                    XR chest 1 view portable    (Results Pending)              Procedures  ECG 12 Lead Documentation Only    Date/Time: 11/16/2023 8:00 PM    Performed by: Florida Mendoza DO  Authorized by: Florida Mendoza DO    ECG reviewed by me, the ED Provider: yes    Patient location:  ED  Previous ECG:     Previous ECG:  Unavailable    Comparison to cardiac monitor: Yes Interpretation:     Interpretation: non-specific    Rate:     ECG rate assessment: normal    Rhythm:     Rhythm: sinus rhythm    Ectopy:     Ectopy: none    QRS:     QRS axis:  Normal  Conduction:     Conduction: normal    ST segments:     ST segments:  Non-specific  T waves:     T waves: non-specific             ED Course             HEART Risk Score      Flowsheet Row Most Recent Value   Heart Score Risk Calculator    History 0 Filed at: 11/16/2023 2004   ECG 1 Filed at: 11/16/2023 2004   Age 2 Filed at: 11/16/2023 2004   Risk Factors 1 Filed at: 11/16/2023 2004   Troponin 0 Filed at: 11/16/2023 2004   HEART Score 4 Filed at: 11/16/2023 2004                                        Medical Decision Making  Patient evaluated EKG labs imaging second troponin pending care transitioned to Dr. Gene Mcgregor and/or Complexity of Data Reviewed  External Data Reviewed: notes. Labs: ordered. Decision-making details documented in ED Course. Radiology: ordered and independent interpretation performed. Decision-making details documented in ED Course. Details: no acute disease  ECG/medicine tests: ordered and independent interpretation performed. Decision-making details documented in ED Course. Risk  Prescription drug management. Disposition  Final diagnoses:   Chest pain     Time reflects when diagnosis was documented in both MDM as applicable and the Disposition within this note       Time User Action Codes Description Comment    11/16/2023  8:05 PM Ramu Chong Add [R07.9] Chest pain           ED Disposition       None          Follow-up Information    None         Patient's Medications   Discharge Prescriptions    No medications on file       No discharge procedures on file.     PDMP Review         Value Time User    PDMP Reviewed  Yes 10/6/2023 12:25 PM Ramiro Laboy Sender,             ED Provider  Electronically Signed by             Adalberto Smiley DO  11/16/23 2005

## 2023-11-16 NOTE — TELEPHONE ENCOUNTER
Received TEAMs call  Hari Eller, this is Joyce Durán. I'm calling on behalf of my mom, Helen Donovan. You had sent her a message to give you a call back related to her, my chart message. So if you could give her a call back when you get a chance, you probably call my dad's phone, which is [phone] that she's having trouble speaking. She's a little horse. Any questions, please give my dad a call. Or you can always call my mom too. Thank you.       Left voicemail message for patient to clal my TEAMsn umber

## 2023-11-16 NOTE — PROGRESS NOTES
[facility]        NAME: Sydney Aranda is a 72 y.o. female  : 1950    MRN: [id number]  DATE: 2023  TIME: 5:51 PM    Assessment and Plan   Chest pressure [R07.89]  1. Chest pressure  Transfer to other facility      2. Nasal congestion  Poct Covid 19 Rapid Antigen Test        Needs ED eval for troponins. Offered EKG here but they opted to do it all at the hospital. Neg covid poct here and vitals WNL. Patient Instructions       Follow up with PCP in 3-5 days. Proceed to  ER if symptoms worsen. Chief Complaint     Chief Complaint   Patient presents with    Cold Like Symptoms     Pt presents with hoarse voice, runny nose, chest congestion,started yesterday         History of Present Illness       Pt is a 71 yo female with pmh breast cancer currently on chemo, HTN, and history of angina p/w sore throat, runny nose x 2 days. Chronic cough has worsened over last few days. C/o decreased appetite and hoarse voice which is leading to difficulty talking. No known sick contacts but is currently on chemo. No otc meds tried. Then since yesterday has been experiencing a chest "squeezing" pain or "like an elephant sitting on my chest." Only lasts for a few seconds at a time. Denies sob, wheezing. Review of Systems   Review of Systems   Constitutional:  Negative for chills, diaphoresis, fatigue and fever. HENT:  Positive for rhinorrhea and sore throat. Negative for congestion, ear pain, postnasal drip, sinus pain, sneezing and trouble swallowing. Eyes:  Negative for pain and redness. Respiratory:  Positive for cough. Negative for chest tightness, shortness of breath and wheezing. Cardiovascular:  Positive for chest pain. Negative for leg swelling. Gastrointestinal:  Negative for diarrhea, nausea and vomiting. Musculoskeletal:  Negative for myalgias. Neurological:  Negative for dizziness, weakness and headaches.          Current Medications       Current Outpatient Medications:     albuterol (ProAir HFA) 90 mcg/act inhaler, Inhale 2 puffs every 4 (four) hours as needed for wheezing or shortness of breath, Disp: 8 g, Rfl: 0    Ascorbic Acid (VITAMIN C PO), Take 500 mg by mouth in the morning, Disp: , Rfl:     fluticasone (FLONASE) 50 mcg/act nasal spray, 2 puffs into each nostril daily, Disp: , Rfl:     gabapentin (Neurontin) 100 mg capsule, Take 1 capsule (100 mg total) by mouth daily at bedtime, Disp: 30 capsule, Rfl: 1    metoprolol tartrate (LOPRESSOR) 50 mg tablet, Take 1 tablet (50 mg total) by mouth daily, Disp: 90 tablet, Rfl: 0    Multiple Vitamins-Minerals (MULTIVITAMIN ADULT PO), Take by mouth daily, Disp: , Rfl:     naloxone (NARCAN) 4 mg/0.1 mL nasal spray, Administer 1 spray into a nostril. If no response after 2-3 minutes, give another dose in the other nostril using a new spray. For use in emergencies in the event of accidental ingestion, respiratory depression or oversedation. , Disp: 1 each, Rfl: 1    ondansetron (ZOFRAN) 4 mg tablet, Take 1 tablet (4 mg total) by mouth every 8 (eight) hours as needed for nausea or vomiting, Disp: 30 tablet, Rfl: 0    ondansetron (ZOFRAN) 8 mg tablet, Take 1 tablet (8 mg total) by mouth every 8 (eight) hours as needed for nausea or vomiting, Disp: 20 tablet, Rfl: 5    oxyCODONE (ROXICODONE) 10 MG TABS, Take 1.5 tablets (15 mg total) by mouth every 4 (four) hours as needed for moderate pain Max Daily Amount: 90 mg, Disp: 150 tablet, Rfl: 0    polyethylene glycol (MIRALAX) 17 g packet, Take 17 g by mouth daily as needed (as needed for constipation.), Disp: 255 g, Rfl: 0    senna-docusate sodium (SENOKOT-S) 8.6-50 mg per tablet, Take 1 tablet by mouth 2 (two) times a day (Patient taking differently: Take 1 tablet by mouth if needed), Disp: , Rfl: 0    VITAMIN D PO, Take by mouth in the morning, Disp: , Rfl:     LORazepam (ATIVAN) 1 mg tablet, Take 1 tablet (1 mg total) by mouth once as needed for anxiety for up to 1 dose Take 30 minutes to 1 hour prior to your schedule imaging/scan. Do not start before October 17, 2023. (Patient not taking: Reported on 11/16/2023), Disp: 1 tablet, Rfl: 0    Palbociclib (Ibrance) 125 MG capsule, Take 1 capsule (125 mg total) by mouth daily 21 days on followed by 7 days off (Patient not taking: Reported on 11/16/2023), Disp: 21 capsule, Rfl: 5    potassium chloride (K-DUR,KLOR-CON) 20 mEq tablet, Please take 2 tabs tonight (11/7), then take 1 tab on 11/8, and 1 tab on 11/9. Save the remaining/extra tablets in case you need more potassium repletion in the future., Disp: 10 tablet, Rfl: 0    Current Allergies     Allergies as of 11/16/2023    (No Known Allergies)            The following portions of the patient's history were reviewed and updated as appropriate: allergies, current medications, past family history, past medical history, past social history, past surgical history and problem list.     Past Medical History:   Diagnosis Date    BRCA gene mutation negative 05/19/2021    [facility]; 36 gene panel    Breast cancer ([address])     Left breast    Colon polyp     Dental crowns present     Exercise involving walking     the dogs    History of angina     per pt "years ago and related to stress"    History of chemotherapy     breast cancer (left) 2021.     History of radiation therapy     [place] (hard of hearing)     wears hearing aids/will wear DOS bilat    Hypertension     Limb alert care status     No BP/IV Left Arm    Lymphedema of left arm     Prediabetes     Wears glasses        Past Surgical History:   Procedure Laterality Date    AXILLARY SURGERY Right     sweat glands removed -     BREAST CYST EXCISION Right 2000    benign    BREAST SURGERY Right     CHOLECYSTECTOMY      COLONOSCOPY      DILATION AND CURETTAGE OF UTERUS      IR BIOPSY BONE  9/1/2023    IR PORT PLACEMENT  10/30/2023    MASTECTOMY Left 06/08/2021    IN BREAST REDUCTION N/A 03/18/2022    Procedure: R BREAST REDUCTION; L BREAST EXCISION STANDING SKIN DEFORMITY; LOCAL FLAP;  Surgeon: Ben Restrepo MD;  Location[facility];  Service: Plastics    OK MAST MODF RAD W/AX LYMPH NOD W/WO PECT/NERI MIN Left 06/08/2021    Procedure: BREAST MODIFIED RADICAL MASTECTOMY; ANAND  DIRECTED AXILLARY DISSECTION;  Surgeon: Keri Hanna MD;  [facility];  Service: Surgical Oncology    REDUCTION MAMMAPLASTY Right     March 2022    US BREAST NEEDLE LOC LEFT Left 06/02/2021    US GUIDED BREAST BIOPSY LEFT COMPLETE Left 04/19/2021    US GUIDED BREAST LYMPH NODE BIOPSY LEFT Left 04/19/2021    WISDOM TOOTH EXTRACTION         Family History   Problem Relation Age of Onset    Dementia Mother     Colon cancer Mother         49's-62's    Lung cancer Father         49's-62's    No Known Problems Sister     Breast cancer Paternal Aunt     No Known Problems Sister     Stomach cancer Paternal Uncle         66's         Medications have been verified. Objective   /58   Pulse 95   Temp 98.2 °F (36.8 °C)   Resp 22   Wt 78.9 kg (174 lb)   SpO2 99%   BMI 30.98 kg/m²        Physical Exam     Physical Exam  Vitals and nursing note reviewed. Constitutional:       General: She is not in acute distress. Appearance: Normal appearance. She is not ill-appearing. HENT:      Head: Normocephalic and atraumatic. Cardiovascular:      Rate and Rhythm: Normal rate and regular rhythm. Heart sounds: Normal heart sounds. Pulmonary:      Effort: Pulmonary effort is normal. No respiratory distress. Breath sounds: Normal breath sounds. No wheezing, rhonchi or rales. Skin:     General: Skin is warm and dry. Capillary Refill: Capillary refill takes less than 2 seconds. Neurological:      Mental Status: She is alert and oriented to person, place, and time.    Psychiatric:         Behavior: Behavior normal.

## 2023-11-16 NOTE — TELEPHONE ENCOUNTER
Spoke with patient's   Patient has lost her voice, has pain when swallowing  Patient will go to the nearest [address] [place] [address] Now to be evaluated  BMP , Mag orders placed per E -consult by Nephrology

## 2023-11-17 LAB
ATRIAL RATE: 97 BPM
P AXIS: 41 DEGREES
PR INTERVAL: 136 MS
QRS AXIS: 2 DEGREES
QRSD INTERVAL: 90 MS
QT INTERVAL: 352 MS
QTC INTERVAL: 447 MS
T WAVE AXIS: 25 DEGREES
VENTRICULAR RATE: 97 BPM

## 2023-11-17 PROCEDURE — 93010 ELECTROCARDIOGRAM REPORT: CPT | Performed by: INTERNAL MEDICINE

## 2023-11-17 NOTE — DISCHARGE INSTRUCTIONS
Your tests are normal at this time. You likely have a viral illness which will run its course over about a week. Rest, fluids, tylenol/advil as needed for discomfort. It is fine to use over the counter cough or cold medicines or throat lozenges as needed for symptoms.

## 2023-11-17 NOTE — TELEPHONE ENCOUNTER
Pt's spouse calling to cancel appt for tomorrow. Pt is in the ED currently. Appt cancelled in [facility].

## 2023-11-20 ENCOUNTER — APPOINTMENT (OUTPATIENT)
Dept: LAB | Facility: CLINIC | Age: 73
End: 2023-11-20
Payer: MEDICARE

## 2023-11-20 ENCOUNTER — CONSULT (OUTPATIENT)
Dept: NEPHROLOGY | Facility: CLINIC | Age: 73
End: 2023-11-20
Payer: MEDICARE

## 2023-11-20 VITALS
BODY MASS INDEX: 31.18 KG/M2 | HEART RATE: 88 BPM | SYSTOLIC BLOOD PRESSURE: 90 MMHG | WEIGHT: 176 LBS | DIASTOLIC BLOOD PRESSURE: 57 MMHG | HEIGHT: 63 IN

## 2023-11-20 DIAGNOSIS — E87.6 HYPOKALEMIA: ICD-10-CM

## 2023-11-20 DIAGNOSIS — Z17.0 MALIGNANT NEOPLASM OF UPPER-OUTER QUADRANT OF LEFT BREAST IN FEMALE, ESTROGEN RECEPTOR POSITIVE: ICD-10-CM

## 2023-11-20 DIAGNOSIS — I10 ESSENTIAL HYPERTENSION: ICD-10-CM

## 2023-11-20 DIAGNOSIS — C50.412 MALIGNANT NEOPLASM OF UPPER-OUTER QUADRANT OF LEFT BREAST IN FEMALE, ESTROGEN RECEPTOR POSITIVE: ICD-10-CM

## 2023-11-20 DIAGNOSIS — E87.6 HYPOKALEMIA: Primary | ICD-10-CM

## 2023-11-20 LAB
ANION GAP SERPL CALCULATED.3IONS-SCNC: 12 MMOL/L
BUN SERPL-MCNC: 7 MG/DL (ref 5–25)
CALCIUM SERPL-MCNC: 9.5 MG/DL (ref 8.4–10.2)
CHLORIDE SERPL-SCNC: 98 MMOL/L (ref 96–108)
CO2 SERPL-SCNC: 28 MMOL/L (ref 21–32)
CREAT SERPL-MCNC: 0.57 MG/DL (ref 0.6–1.3)
GFR SERPL CREATININE-BSD FRML MDRD: 93 ML/MIN/1.73SQ M
GLUCOSE P FAST SERPL-MCNC: 154 MG/DL (ref 65–99)
MAGNESIUM SERPL-MCNC: 1.9 MG/DL (ref 1.9–2.7)
POTASSIUM SERPL-SCNC: 3.4 MMOL/L (ref 3.5–5.3)
SODIUM SERPL-SCNC: 138 MMOL/L (ref 135–147)

## 2023-11-20 PROCEDURE — 36415 COLL VENOUS BLD VENIPUNCTURE: CPT

## 2023-11-20 PROCEDURE — 83735 ASSAY OF MAGNESIUM: CPT

## 2023-11-20 PROCEDURE — 99204 OFFICE O/P NEW MOD 45 MIN: CPT | Performed by: INTERNAL MEDICINE

## 2023-11-20 PROCEDURE — 80048 BASIC METABOLIC PNL TOTAL CA: CPT

## 2023-11-20 RX ORDER — POTASSIUM CHLORIDE 20MEQ/15ML
20 LIQUID (ML) ORAL DAILY
Qty: 450 ML | Refills: 3 | Status: SHIPPED | OUTPATIENT
Start: 2023-11-20 | End: 2024-03-19

## 2023-11-20 NOTE — PATIENT INSTRUCTIONS
You were evaluated for low potassium. Please check potassium level today along with urine testing to further assess the cause of low potassium. Start potassium chloride oral solution 20 mEq once daily. Your blood pressure was low in the office and decrease your metoprolol to 1/2 tablet every day. Continue to monitor your blood pressure at home. If you have any symptoms of lightheadedness or dizziness or chest pain or shortness of breath or confusion, would recommend reporting to the emergency room. We will check blood work now, in 1 month and then in 3 months. We will bring you back to nephrology office in 4 months.

## 2023-11-20 NOTE — PROGRESS NOTES
NEPHROLOGY OUTPATIENT CONSULTATION   Umer Conway 72 y.o. female MRN: [id number]  Date: 11/20/23  Reason for consultation: Hypokalemia    ASSESSMENT and PLAN:  70-year-old female was seen in nephrology office today for evaluation and management of hypokalemia    # Hypokalemia  - First episode of hypokalemia on 11/07/2023 with serum potassium of 2.7  - She had loose stools and had very low appetitie earlier in the month when she was taking Ibrance and appearance of hypokalemia coincides with this  - Status post oral potassium supplementation and IV potassium supplementation with most recent serum potassium 3.3 on 11/16/2023  Last dose of oral potassium levels on 11/16/2023, she has not been able to tolerate oral potassium supplements and did not take it since 11/16/2023  - First dose of doxorubicin 11/10/2023  - First dose of Ibrance (palbociclib) 10/10/2023 and stopped 2 weeks ago  - Hypokalemia has not been reported with both these medications  - Previous chemotherapy with cyclophosphamide/docetaxel in 2021  - Cause of hypokalemia most likely due to decreased oral potassium intake and gastrointestinal losses  - No medications to suggest renal potassium wasting  - No evidence of metabolic acidosis to suggest renal tubular acidosis  - No evidence of hypertension or alkalosis to suggest mineralocorticoid excess  - No evidence of hypomagnesemia     >>Plan  - Check 24-hour urine potassium with creatinine to rule out renal potassium wasting  - Check spot urine potassium and creatinine to calculate fractional excretion of potassium  - No role of checking aldosterone/renin ratio and renal artery Doppler as patient is not hypertensive and actually hypotensive  - We can check genetic testing to rule out Gittleman syndrome as this can present later in life (if she has evidence of renal potassium wasting)  - Start potassium chloride oral solution 20 mEq once daily  - Repeat serum potassium and magnesium level today  - Encouraged high potassium diet    # Metastatic breast cancer  - Recent initiation of liposomal doxorubicin   - First dose of doxorubicin 11/10/2023  - First dose of Ibrance (palbociclib) 10/10/2023 which has now been discontinued after starting doxorubicin  - Notes reviewed from oncology and their plan regarding management of metastatic breast cancer  - There has been no association of hypokalemia with use of doxorubicin, no contraindication from nephrology perspective to continue doxorubicin    # Hypertension  - Target Goal: Less than 120/80 per KDIGO guidelines   - Volume status: Euvolemic  - Status: Blood pressure currently low  - Current antihypertensive regimen: Metoprolol 50 mg daily  - Changes: Patient was advised to decrease metoprolol dose to 25 mg daily (she would like to use half tablet of her current 50 mg tablet)  Patient was advised to check her blood pressure at home and present to ED if she becomes symptomatic    # Renal function  - Baseline creatinine 0.6-0.8  - GFR around 80-90  - Discussed oral hydration and she is currently avoiding NSAIDs    HISTORY OF PRESENT ILLNESS:  Requesting Physician: Marcos Ortiz MD    Barbara Banda is a 72 y.o. female who was evaluated in nephrology office for evaluation of hypokalemia. She has no history of hypokalemia and this started in 11/2023. Per patient and  she had loose stools and very low appetite beginning of this month when she was taking Ibrance. Since stopping Ibrance, appetite has significantly improved and diarrhea has slowed down. She was provided oral potassium supplements but she is having trouble swallowing potassium pills. She also received 1 dose of IV potassium when she was getting her first dose of doxorubicin. She was then seen in ED on 11/16 and was given potassium chloride 20 M EQ x1. Is currently feeling well. She denies dyspnea. She denies leg swelling. She denies any trouble with urination.     >> Medical history evaluation   - Diabetes: No  - Hypertension: Yes for 20 years   - Age ? 56 years: Yes   - Family history of kidney disease: No   - Prior kidney disease or dialysis: No  - Incidental hematuria in the past: No   - Urinary symptoms: No  - History of foamy or frothy urine: No  - History of nephrolithiasis: No  - Systemic diseases that might affect kidney: No   - History of use of medications that might affect renal function: No    PAST MEDICAL HISTORY:  Past Medical History:   Diagnosis Date    BRCA gene mutation negative 05/19/2021    [employer]; 36 gene panel    Breast cancer ([address])     Left breast    Colon polyp     Dental crowns present     Exercise involving walking     the dogs    History of angina     per pt "years ago and related to stress"    History of chemotherapy     breast cancer (left) 2021.     History of radiation therapy     [place] (hard of hearing)     wears hearing aids/will wear DOS bilat    Hypertension     Limb alert care status     No BP/IV Left Arm    Lymphedema of left arm     Prediabetes     Wears glasses        PAST SURGICAL HISTORY:  Past Surgical History:   Procedure Laterality Date    AXILLARY SURGERY Right     sweat glands removed -     BREAST CYST EXCISION Right 2000    benign    BREAST SURGERY Right     CHOLECYSTECTOMY      COLONOSCOPY      DILATION AND CURETTAGE OF UTERUS      IR BIOPSY BONE  9/1/2023    IR PORT PLACEMENT  10/30/2023    MASTECTOMY Left 06/08/2021    MI BREAST REDUCTION N/A 03/18/2022    Procedure: R BREAST REDUCTION; L BREAST EXCISION STANDING SKIN DEFORMITY; LOCAL FLAP;  Surgeon: Zaria Park MD;  Location: [facility];  Service: Plastics    MI MAST MODF RAD W/AX LYMPH NOD W/WO PECT/NERI MIN Left 06/08/2021    Procedure: BREAST MODIFIED RADICAL MASTECTOMY; ANAND  DIRECTED AXILLARY DISSECTION;  Surgeon: Fernanda Morgan MD;  Location: [facility];  Service: Surgical Oncology    REDUCTION MAMMAPLASTY Right     March 2022    US BREAST NEEDLE LOC LEFT Left 06/02/2021    US GUIDED BREAST BIOPSY LEFT COMPLETE Left 04/19/2021    US GUIDED BREAST LYMPH NODE BIOPSY LEFT Left 04/19/2021    WISDOM TOOTH EXTRACTION         ALLERGIES:  No Known Allergies    SOCIAL HISTORY:  Social History     Substance and Sexual Activity   Alcohol Use Never     Social History     Substance and Sexual Activity   Drug Use No     Social History     Tobacco Use   Smoking Status Never    Passive exposure: Never   Smokeless Tobacco Never       FAMILY HISTORY:  Family History   Problem Relation Age of Onset    Dementia Mother     Colon cancer Mother         49's-62's    Lung cancer Father         49's-62's    No Known Problems Sister     Breast cancer Paternal Aunt     No Known Problems Sister     Stomach cancer Paternal Uncle         66's       MEDICATIONS:    Current Outpatient Medications:     albuterol (ProAir HFA) 90 mcg/act inhaler, Inhale 2 puffs every 4 (four) hours as needed for wheezing or shortness of breath, Disp: 8 g, Rfl: 0    Ascorbic Acid (VITAMIN C PO), Take 500 mg by mouth in the morning, Disp: , Rfl:     fluticasone (FLONASE) 50 mcg/act nasal spray, 2 puffs into each nostril daily, Disp: , Rfl:     gabapentin (Neurontin) 100 mg capsule, Take 1 capsule (100 mg total) by mouth daily at bedtime, Disp: 30 capsule, Rfl: 1    metoprolol tartrate (LOPRESSOR) 50 mg tablet, Take 1 tablet (50 mg total) by mouth daily, Disp: 90 tablet, Rfl: 0    Multiple Vitamins-Minerals (MULTIVITAMIN ADULT PO), Take by mouth daily, Disp: , Rfl:     naloxone (NARCAN) 4 mg/0.1 mL nasal spray, Administer 1 spray into a nostril. If no response after 2-3 minutes, give another dose in the other nostril using a new spray. For use in emergencies in the event of accidental ingestion, respiratory depression or oversedation. , Disp: 1 each, Rfl: 1    ondansetron (ZOFRAN) 8 mg tablet, Take 1 tablet (8 mg total) by mouth every 8 (eight) hours as needed for nausea or vomiting, Disp: 20 tablet, Rfl: 5    oxyCODONE (ROXICODONE) 10 MG TABS, Take 1.5 tablets (15 mg total) by mouth every 4 (four) hours as needed for moderate pain Max Daily Amount: 90 mg, Disp: 150 tablet, Rfl: 0    potassium chloride 10% oral solution, Take 15 mL (20 mEq total) by mouth daily, Disp: 450 mL, Rfl: 3    senna-docusate sodium (SENOKOT-S) 8.6-50 mg per tablet, Take 1 tablet by mouth 2 (two) times a day (Patient taking differently: Take 1 tablet by mouth if needed), Disp: , Rfl: 0    VITAMIN D PO, Take by mouth in the morning, Disp: , Rfl:     LORazepam (ATIVAN) 1 mg tablet, Take 1 tablet (1 mg total) by mouth once as needed for anxiety for up to 1 dose Take 30 minutes to 1 hour prior to your schedule imaging/scan. Do not start before October 17, 2023. (Patient not taking: Reported on 11/16/2023), Disp: 1 tablet, Rfl: 0    ondansetron (ZOFRAN) 4 mg tablet, Take 1 tablet (4 mg total) by mouth every 8 (eight) hours as needed for nausea or vomiting, Disp: 30 tablet, Rfl: 0    Palbociclib (Ibrance) 125 MG capsule, Take 1 capsule (125 mg total) by mouth daily 21 days on followed by 7 days off (Patient not taking: Reported on 11/16/2023), Disp: 21 capsule, Rfl: 5    polyethylene glycol (MIRALAX) 17 g packet, Take 17 g by mouth daily as needed (as needed for constipation.) (Patient not taking: Reported on 11/20/2023), Disp: 255 g, Rfl: 0    potassium chloride (K-DUR,KLOR-CON) 20 mEq tablet, Please take 2 tabs tonight (11/7), then take 1 tab on 11/8, and 1 tab on 11/9. Save the remaining/extra tablets in case you need more potassium repletion in the future. (Patient not taking: Reported on 11/20/2023), Disp: 10 tablet, Rfl: 0    REVIEW OF SYSTEMS:  Review of Systems   Constitutional:  Negative for chills and fever. HENT:  Negative for ear pain and sore throat. Eyes:  Negative for pain and visual disturbance. Respiratory:  Negative for cough and shortness of breath. Cardiovascular:  Negative for chest pain and palpitations.    Gastrointestinal:  Negative for abdominal pain and vomiting. Genitourinary:  Negative for dysuria and hematuria. Musculoskeletal:  Negative for arthralgias and back pain. Skin:  Negative for color change and rash. Neurological:  Negative for seizures and syncope. All other systems reviewed and are negative. All the systems were reviewed and were negative except as documented on the HPI. PHYSICAL EXAMINATION:  BP 90/57   Pulse 88   Ht 5' 2.84" (1.596 m)   Wt 79.8 kg (176 lb)   BMI 31.34 kg/m²   Current Weight: Weight - Scale: 79.8 kg (176 lb) Body mass index is 31.34 kg/m². Physical Exam  Constitutional:       Appearance: Normal appearance. HENT:      Head: Normocephalic and atraumatic. Mouth/Throat:      Mouth: Mucous membranes are moist.      Pharynx: Oropharynx is clear. Cardiovascular:      Rate and Rhythm: Normal rate and regular rhythm. Pulses: Normal pulses. Heart sounds: Normal heart sounds. Pulmonary:      Effort: Pulmonary effort is normal.      Breath sounds: Normal breath sounds. Abdominal:      General: Bowel sounds are normal.      Palpations: Abdomen is soft. Musculoskeletal:         General: Normal range of motion. Right lower leg: No edema. Left lower leg: No edema. Skin:     General: Skin is warm and dry. Neurological:      General: No focal deficit present. Mental Status: She is alert and oriented to person, place, and time. Mental status is at baseline. Psychiatric:         Mood and Affect: Mood normal.         Behavior: Behavior normal.         Thought Content:  Thought content normal.         Judgment: Judgment normal.         LABORATORY RESULTS:  Lab Results   Component Value Date     01/27/2017    K 3.3 (L) 11/16/2023    CL 96 11/16/2023    CO2 27 11/16/2023    BUN 8 11/16/2023    CREATININE 0.71 11/16/2023    GLUCOSE 131 (H) 01/27/2017    GLUF 168 (H) 11/07/2023    CALCIUM 9.6 11/16/2023    CORRECTEDCA 9.8 11/12/2022    AST 37 11/16/2023    ALT 44 11/16/2023    ALKPHOS 99 11/16/2023    PROT 6.5 01/27/2017    BILITOT 0.8 01/27/2017    EGFR 85 11/16/2023     Lab Results   Component Value Date    WBC 13.20 (H) 11/16/2023    HGB 14.3 11/16/2023    HCT 39.3 11/16/2023    MCV 90 11/16/2023     11/16/2023     Lab Results   Component Value Date    CALCIUM 9.6 11/16/2023

## 2023-11-21 ENCOUNTER — TELEPHONE (OUTPATIENT)
Dept: NEPHROLOGY | Facility: CLINIC | Age: 73
End: 2023-11-21

## 2023-11-21 DIAGNOSIS — E87.6 HYPOKALEMIA: Primary | ICD-10-CM

## 2023-11-21 NOTE — TELEPHONE ENCOUNTER
Serum potassium improved to 3.4. She has not yet started potassium chloride. She was advised to start potassium chloride oral solution 20 mEq daily and repeat BMP next week. Urine studies are currently pending but overall suspicion is decreased oral potassium intake in setting of low oral intake and also component of GI losses of potassium at the start of this month. We will continue to follow.

## 2023-11-22 ENCOUNTER — OFFICE VISIT (OUTPATIENT)
Dept: HEMATOLOGY ONCOLOGY | Facility: MEDICAL CENTER | Age: 73
End: 2023-11-22
Payer: MEDICARE

## 2023-11-22 ENCOUNTER — APPOINTMENT (OUTPATIENT)
Dept: LAB | Facility: CLINIC | Age: 73
End: 2023-11-22
Payer: MEDICARE

## 2023-11-22 VITALS
TEMPERATURE: 97.7 F | SYSTOLIC BLOOD PRESSURE: 104 MMHG | HEART RATE: 68 BPM | HEIGHT: 63 IN | RESPIRATION RATE: 19 BRPM | BODY MASS INDEX: 31.33 KG/M2 | OXYGEN SATURATION: 93 % | DIASTOLIC BLOOD PRESSURE: 64 MMHG

## 2023-11-22 DIAGNOSIS — Z17.0 MALIGNANT NEOPLASM OF UPPER-OUTER QUADRANT OF LEFT BREAST IN FEMALE, ESTROGEN RECEPTOR POSITIVE: ICD-10-CM

## 2023-11-22 DIAGNOSIS — E87.6 HYPOKALEMIA: ICD-10-CM

## 2023-11-22 DIAGNOSIS — M84.58XA PATHOLOGICAL FRACTURE OF SACRAL VERTEBRA DUE TO NEOPLASTIC DISEASE: Primary | ICD-10-CM

## 2023-11-22 DIAGNOSIS — C50.412 MALIGNANT NEOPLASM OF UPPER-OUTER QUADRANT OF LEFT BREAST IN FEMALE, ESTROGEN RECEPTOR POSITIVE: ICD-10-CM

## 2023-11-22 PROBLEM — T45.1X5A CHEMOTHERAPY INDUCED NEUTROPENIA: Status: ACTIVE | Noted: 2023-11-22

## 2023-11-22 PROBLEM — D70.1 CHEMOTHERAPY INDUCED NEUTROPENIA: Status: ACTIVE | Noted: 2023-11-22

## 2023-11-22 LAB
CREAT 24H UR-MRATE: 0.4 G/24HR (ref 0.6–1.8)
POTASSIUM 24H UR-SCNC: 13.6 MMOL/24 HRS (ref 25–125)
SPECIMEN VOL UR: 850 ML
SPECIMEN VOL UR: 850 ML

## 2023-11-22 PROCEDURE — 84133 ASSAY OF URINE POTASSIUM: CPT

## 2023-11-22 PROCEDURE — 82570 ASSAY OF URINE CREATININE: CPT

## 2023-11-22 PROCEDURE — 99215 OFFICE O/P EST HI 40 MIN: CPT | Performed by: INTERNAL MEDICINE

## 2023-11-22 NOTE — PROGRESS NOTES
Reina Restrepo  1950  [address] [facility]  [address] [facility] [place] [address]    DISCUSSION/SUMMARY:    68-year-old female with history of stage IIIA left-sided breast cancer now with evidence of metastatic disease. Issues:    Pathologic sacral fracture. Patient has been seen/evaluated by radiation oncology and completed palliative RT. PET/CT results are listed below. There is a left humeral head lesion also concerning for metastatic disease. Patient denies any arm pain - surveillance continues. Osseous metastatic disease. As discussed previously, patient is a candidate for antireabsorption therapy. Mrs. Meaghan Russell states that she has a pending appointment with a dentist for January 2024. I have asked the patient to call the dental office to see if the appointment can be moved up. Patient has significant bone metastases and should start either Zometa or Xgeva soon. Pain control. Patient states that her pain is now well-controlled. Patient will follow-up with palliative care as directed. Electrolyte issues. Patient was seen/evaluated by nephrology because of persistent hypokalemia. Treatment was started, magnesium and potassium are higher/better. Patient will follow-up with nephrology as directed. Metastatic breast cancer. Patient was recently started on Ibrance and Faslodex; received 1 dose of Faslodex. Patient subsequently underwent PET/CT. Besides the bone lesions above, patient was found to have lesions in both lungs, mediastinal adenopathy, right-sided supraclavicular adenopathy, mediastinum, left hilum, large subcarinal mass and a small left pleural effusion (not tapped). Patient's performance status was +/-, no respiratory issues. Because of the extent of disease and the concern for pending visceral crisis, patient was started on Doxil. Mrs. Meaghan Russell was able to tolerate the first cycle well.   Second cycle is scheduled for December 8, 2023. The plan is to complete approximately 6 cycles of treatment, repeat the scans and if good response, go back to the CDK 4/6 inhibitor and Faslodex - see below. The specifics are not clear but Mrs. Pritesh Gibson states that the Alejandra made her very nauseous.  also states that when he touched the Alejandra, he also had nausea and blood in his stools. There is likely no connection. I have asked the  to speak to his PCP about the bloody stools. In the future, when patient is more stable, Mrs. Pritesh Gibson can go back to the Faslodex and either Rajivsramy or Susane . Eddie Patch demonstrated that patient has a PIK3CA mutation so [facility]) is an option in the future also. NCCN guidelines 4.2023 states that for patients with recurrent or metastatic breast cancer, HR positive and HER2/leonela negative, threatening visceral crisis, preferred regimen includes an anthracycline. Other options include taxanes but patient was previously treated with Taxotere/Cytoxan. Patient is to return in 4 weeks. Mrs. Pritesh Gibson knows to call the hematology/oncology office if there are any other questions or concerns. Carefully review your medication list and verify that the list is accurate and up-to-date. Please call the hematology/oncology office if there are medications missing from the list, medications on the list that you are not currently taking or if there is a dosage or instruction that is different from how you're taking that medication.     Patient goals and areas of care: Continue with Doxil  Barriers to care: none  Patient is able to self-care  _____________________________________________________________________________________    Chief Complaint   Patient presents with    Follow-up    Metastatic breast cancer on Doxil     Advance Care Planning/Advance Directives: Not yet discussed    Oncology History Overview Note   72year old female with history of left breast invasive mammary carcinoma, grade 2, ER/ND positive, HER 2 negative, 4/10 lymph nodes micrometastases. She is s/p left breast modified radical mastectomy, ANAND  directed axillary dissection on 6/8/21. She completed adjuvant chemotherapy with Cytoxan/Taxotere on 9/17/21. This was followed by a course of radiation to the left chest wall and regional lymphatics on 11/19/2021. More recently, patient underwent palliative radiation for newly diagnosed bone metastasis to L5  sacrum. Treatment completed on 9/29/23. She presents today for EOT telephone follow up call. 10/2/23 CT head w wo contrast  IMPRESSION:  No acute intracranial abnormality. 10/6/23 Palliative Care -Telemed    10/19/23 NM PET CT skull base to mid thigh  IMPRESSION:  1. Large lytic/destructive lesion involving the sacrum, medial aspect of the left iliac bone, with subtle extraosseous extension, extension to the right of midline, and separate area of increased activity in the right superior sacral ala, consistent with   biopsy-proven metastatic breast cancer  2. There is also a focus of activity in the medial left humeral head most suspicious for osseous metastasis  3. There is multifocal right neck base/supraclavicular FDG avid adenopathy  4. Within the thorax, multifocal FDG avid disease involving the mediastinum and left hilum, including a large subcarinal mass, as above further described  5. Small left effusion  6. Multiple pleural-based pulmonary opacities with only mild FDG activity. Differential includes pneumonia including postobstructive pneumonia, or possibly pulmonary infarctions. 7. Mildly heterogeneous liver activity although no definitive focal lesion is appreciated    10/26/23 Hematology Oncology - Dr. Jamal Carreon  PET/CT =  left humeral head lesion also concerning for metastatic disease. Patient denies any arm pain. This will need to be monitored.    Candidate for antireabsorprtion therapy, will need dental clearance first  Besides the bone lesions above, patient was found to have lesions in both lungs, mediastinal adenopathy, right-sided supraclavicular adenopathy, mediastinum, left hilum, large subcarinal mass and a small left pleural effusion (not tapped). I believe there are enough areas of abnormality on the PET/CT to warrant more aggressive systemic treatment (concern for progressing to visceral crises). Mrs. Kiran Reese understands that the idea is to give more aggressive systemic treatments upfront, stabilize the disease and then go back to the CDK 4/6 inhibitor with Faslodex. In the future, if evidence of disease progression, a soft tissue biopsy will be needed. Patient could have peripheral blood sent for [id number].     10/30/23 IR port placement    Upcomin23 Hematology Oncology     Malignant neoplasm of upper-outer quadrant of left breast in female, estrogen receptor positive    2021 Biopsy    Left breast US guided biopsy:  A. 2 o'clock 8 cm from the nipple  Invasive mammary carcinoma of no special type  Grade 2  ER 90  KY 1  HER2 1+  Lymphovascular invasion: not identified    B. Left Axillary lymph node biopsy:  Invasive/ metastatic adenocarcinoma, compatible with breast primary involving fibroadipose tissues and a portion of lymphoid pranchyma. ER 90  KY 1  HER2 1+    Concordant. Malignancy appears multifocal. The 2 adjacent masses span an area of approximately 4 cm. Right breast clear. 5/3/2021 Genetic Testing    The following genes were evaluated: OLINDA, BRCA1, BRCA2, CDH1, CHEK2, PALB2, PTEN, STK11, TP53  Additional genes evaluated for a total of 36 genes analyzed  Negative result.  No pathogenic sequence variants or deletions/dupllications identified  [employer]     2021 Surgery    Left breast modified radical mastectomy with ANAND  directed axillary dissection  Invasive carcinoma of no special type (ductal)  Grade 2  6 cm  Lymphovascular invasion present  Margins negative  4/10 Lymph nodes (3.5 cm)  Anatomic Stage IIIA  Prognostic Stage IB     7/16/2021 - 9/17/2021 Chemotherapy    pegfilgrastim (NEULASTA ONPRO), 6 mg, Subcutaneous, Once, 4 of 4 cycles  Administration: 6 mg (7/16/2021), 6 mg (8/6/2021), 6 mg (8/27/2021), 6 mg (9/17/2021)  cyclophosphamide (CYTOXAN) IVPB, 600 mg/m2 = 1,212 mg, Intravenous, Once, 4 of 4 cycles  Administration: 1,212 mg (7/16/2021), 1,212 mg (8/6/2021), 1,212 mg (8/27/2021), 1,212 mg (9/17/2021)  DOCEtaxel (TAXOTERE) chemo infusion, 75 mg/m2 = 151.6 mg, Intravenous, Once, 4 of 4 cycles  Administration: 151.6 mg (7/16/2021), 151.6 mg (8/6/2021), 151.6 mg (8/27/2021), 151.6 mg (9/17/2021)     10/18/2021 - 11/19/2021 Radiation    BH L CW 10X/6X 13 / 13 200 0 2,600 32   BH L CW BOLUS 10X/6X 12 / 12 200 0 2,400 30   BH L PAB 10X 25 / 25 60 0 1,500 32   BH L Sclav 10X 25 / 25 200 0 5,000 32      Treatment Dates:  10/18/2021 - 11/19/2021. Dr Phuong Hester     10/2021 -  Hormone Therapy    anastrozole 1 mg once a day    Dr Chad Morris     9/1/2023 Biopsy    Bone, left iliac crest, biopsy:  -Metastatic carcinoma, most compatible with metastasis from breast origin. 9/12/2023 -  Cancer Staged    Staging form: Breast, AJCC 8th Edition  - Clinical: Stage IV (cM1) - Signed by Henry Christian MD on 9/12/2023 9/25/2023 - 9/29/2023 Radiation    [facility].  This is the current list of radiation treatment:  Plan ID Energy Fractions Dose per Fraction (cGy) Dose Correction (cGy) Total Dose Delivered (cGy) Elapsed Days   L5_Sacrum 10X 5 / 5 400 0 2,000 4      11/10/2023 -  Chemotherapy    alteplase (CATHFLO), 2 mg, Intracatheter, Every 1 Minute as needed, 1 of 6 cycles  pegfilgrastim (NEULASTA), 4 mg (100 % of original dose 4 mg), Subcutaneous, Once, 1 of 6 cycles  Dose modification: 4 mg (original dose 4 mg, Cycle 1)  Administration: 4 mg (11/13/2023)  DOXOrubicin liposomal (DOXIL) chemo infusion, 93 mg, Intravenous, Once, 1 of 6 cycles  Administration: 90 mg (11/10/2023)       History of Present Illness: 72-year-old female previously followed by Dr. Rivera Mallory. Patient has a history of stage IIIA left breast cancer, grade 2, lobular, ER positive, HER2 negative disease status post mastectomy, lymph node sampling (June 2021). Patient was negative for the BRCA gene mutation. Pathology results (copied below) demonstrated four positive lymph nodes. Patient was treated with adjuvant Taxotere and Cytoxan (completed in September 2021) followed by radiation. Patient was subsequently placed on anastrozole and had tolerated this well; completed approximately 2+ years. Patient was recently [facility] with severe buttocks pain. Work-up demonstrated a sacral fracture + mass. Biopsy was consistent with metastatic breast cancer. Patient completed palliative RT: Then received her first cycle of Doxil. Presently Mrs. Linda Jacobs states feeling okay, better than before. As before, patient walks around at home but needs a wheelchair outside. Pain is controlled, patient follows with palliative care. Patient was able to tolerate the first cycle of Doxil relatively well. Patient has a metallic taste in her mouth but no nausea or vomiting. Appetite is +/-, weight is stable. No bruising or bleeding issues. No buttocks pain. No respiratory problems. Review of Systems   Constitutional:  Positive for activity change, appetite change and fatigue. HENT: Negative. Eyes: Negative. Respiratory: Negative. Cardiovascular: Negative. Gastrointestinal: Negative. Endocrine: Negative. Genitourinary: Negative. Musculoskeletal:  Positive for arthralgias and gait problem. Skin: Negative. Allergic/Immunologic: Negative. Hematological: Negative. Psychiatric/Behavioral: Negative. All other systems reviewed and are negative.     Patient Active Problem List   Diagnosis    Benign essential hypertension    Chronic rhinitis    Hidradenitis suppurativa    Hypothyroidism    Impaired fasting glucose    Morbid obesity (HCC)    Venous insufficiency (chronic) (peripheral)    Malignant neoplasm of upper-outer quadrant of left breast in female, estrogen receptor positive     Use of anastrozole    S/P mastectomy, left    Obesity (BMI 30-39. 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     History of radiation therapy     [place] (hard of hearing)     wears hearing aids/will wear DOS bilat    Hypertension     Limb alert care status     No BP/IV Left Arm    Lymphedema of left arm     Prediabetes     Wears glasses      Past Surgical History:   Procedure Laterality Date    AXILLARY SURGERY Right     sweat glands removed -     BREAST CYST EXCISION Right 2000    benign    BREAST SURGERY Right     CHOLECYSTECTOMY      COLONOSCOPY      DILATION AND CURETTAGE OF UTERUS      IR BIOPSY BONE  9/1/2023    IR PORT PLACEMENT  10/30/2023    MASTECTOMY Left 06/08/2021    PA BREAST REDUCTION N/A 03/18/2022    Procedure: R BREAST REDUCTION; L BREAST EXCISION STANDING SKIN DEFORMITY; LOCAL FLAP;  Surgeon: Myranda Ruffin MD;  Location: [facility];  Service: Plastics    PA MAST MODF RAD W/AX LYMPH NOD W/WO PECT/NERI MIN Left 06/08/2021    Procedure: BREAST MODIFIED RADICAL MASTECTOMY; ANAND  DIRECTED AXILLARY DISSECTION;  Surgeon: Dionne Zambrano MD;  Location: [facility];  Service: Surgical Oncology    REDUCTION MAMMAPLASTY Right     March 2022    US BREAST NEEDLE LOC LEFT Left 06/02/2021    US GUIDED BREAST BIOPSY LEFT COMPLETE Left 04/19/2021    US GUIDED BREAST LYMPH NODE BIOPSY LEFT Left 04/19/2021    WISDOM TOOTH EXTRACTION       Family History   Problem Relation Age of Onset    Dementia Mother     Colon cancer Mother         49's-62's    Lung cancer Father         49's-62's    No Known Problems Sister     Breast cancer Paternal Aunt     No Known Problems Sister     Stomach cancer Paternal Uncle         66's     Social History     Socioeconomic History    Marital status: /Civil Union     Spouse name: Not on file    Number of children: Not on file    Years of education: Not on file    Highest education level: Not on file   Occupational History    Not on file   Tobacco Use    Smoking status: Never     Passive exposure: Never    Smokeless tobacco: Never   Vaping Use    Vaping Use: Never used   Substance and Sexual Activity    Alcohol use: Never    Drug use: No    Sexual activity: Yes     Partners: Male     Birth control/protection: Male Sterilization     Comment: defer   Other Topics Concern    Not on file   Social History Narrative    Not on file     Social Determinants of Health     Financial Resource Strain: Low Risk  (5/3/2023)    Overall Financial Resource Strain (CARDIA)     Difficulty of Paying Living Expenses: Not hard at all   Food Insecurity: No Food Insecurity (8/31/2023)    Hunger Vital Sign     Worried About Running Out of Food in the Last Year: Never true     Ran Out of Food in the Last Year: Never true   Transportation Needs: No Transportation Needs (8/31/2023)    PRAPARE - Transportation     Lack of Transportation (Medical): No     Lack of Transportation (Non-Medical):  No   Physical Activity: Not on file   Stress: Not on file   Social Connections: Not on file   Intimate Partner Violence: Not on file   Housing Stability: Low Risk  (8/31/2023)    Housing Stability Vital Sign     Unable to Pay for Housing in the Last Year: No     Number of Places Lived in the Last Year: 1     Unstable Housing in the Last Year: No       Current Outpatient Medications:     albuterol (ProAir HFA) 90 mcg/act inhaler, Inhale 2 puffs every 4 (four) hours as needed for wheezing or shortness of breath, Disp: 8 g, Rfl: 0    Ascorbic Acid (VITAMIN C PO), Take 500 mg by mouth in the morning, Disp: , Rfl:     fluticasone (FLONASE) 50 mcg/act nasal spray, 2 puffs into each nostril daily, Disp: , Rfl:     gabapentin (Neurontin) 100 mg capsule, Take 1 capsule (100 mg total) by mouth daily at bedtime, Disp: 30 capsule, Rfl: 1    metoprolol tartrate (LOPRESSOR) 50 mg tablet, Take 1 tablet (50 mg total) by mouth daily, Disp: 90 tablet, Rfl: 0    Multiple Vitamins-Minerals (MULTIVITAMIN ADULT PO), Take by mouth daily, Disp: , Rfl:     naloxone (NARCAN) 4 mg/0.1 mL nasal spray, Administer 1 spray into a nostril. If no response after 2-3 minutes, give another dose in the other nostril using a new spray. For use in emergencies in the event of accidental ingestion, respiratory depression or oversedation. , Disp: 1 each, Rfl: 1    ondansetron (ZOFRAN) 8 mg tablet, Take 1 tablet (8 mg total) by mouth every 8 (eight) hours as needed for nausea or vomiting, Disp: 20 tablet, Rfl: 5    oxyCODONE (ROXICODONE) 10 MG TABS, Take 1.5 tablets (15 mg total) by mouth every 4 (four) hours as needed for moderate pain Max Daily Amount: 90 mg, Disp: 150 tablet, Rfl: 0    potassium chloride 10% oral solution, Take 15 mL (20 mEq total) by mouth daily, Disp: 450 mL, Rfl: 3    senna-docusate sodium (SENOKOT-S) 8.6-50 mg per tablet, Take 1 tablet by mouth 2 (two) times a day (Patient taking differently: Take 1 tablet by mouth if needed), Disp: , Rfl: 0    VITAMIN D PO, Take by mouth in the morning, Disp: , Rfl:     LORazepam (ATIVAN) 1 mg tablet, Take 1 tablet (1 mg total) by mouth once as needed for anxiety for up to 1 dose Take 30 minutes to 1 hour prior to your schedule imaging/scan. Do not start before October 17, 2023.  (Patient not taking: Reported on 11/16/2023), Disp: 1 tablet, Rfl: 0    ondansetron (ZOFRAN) 4 mg tablet, Take 1 tablet (4 mg total) by mouth every 8 (eight) hours as needed for nausea or vomiting, Disp: 30 tablet, Rfl: 0    Palbociclib (Ibrance) 125 MG capsule, Take 1 capsule (125 mg total) by mouth daily 21 days on followed by 7 days off (Patient not taking: Reported on 11/16/2023), Disp: 21 capsule, Rfl: 5    polyethylene glycol (MIRALAX) 17 g packet, Take 17 g by mouth daily as needed (as needed for constipation.) (Patient not taking: Reported on 11/20/2023), Disp: 255 g, Rfl: 0    potassium chloride (K-DUR,KLOR-CON) 20 mEq tablet, Please take 2 tabs tonight (11/7), then take 1 tab on 11/8, and 1 tab on 11/9. Save the remaining/extra tablets in case you need more potassium repletion in the future. (Patient not taking: Reported on 11/20/2023), Disp: 10 tablet, Rfl: 0    No Known Allergies    Vitals:    11/22/23 1308   BP: 104/64   Pulse: 68   Resp: 19   Temp: 97.7 °F (36.5 °C)   SpO2: 93%     Physical Exam  Constitutional:       Appearance: She is well-developed. Comments: Well-nourished female, no respiratory distress, + signs of pain, + anxious   HENT:      Head: Normocephalic and atraumatic. Right Ear: External ear normal.      Left Ear: External ear normal.   Eyes:      Conjunctiva/sclera: Conjunctivae normal.      Pupils: Pupils are equal, round, and reactive to light. Cardiovascular:      Rate and Rhythm: Normal rate and regular rhythm. Heart sounds: Normal heart sounds. Pulmonary:      Effort: Pulmonary effort is normal.      Breath sounds: Normal breath sounds. Abdominal:      General: Bowel sounds are normal.      Palpations: Abdomen is soft. Musculoskeletal:         General: Normal range of motion. Cervical back: Normal range of motion and neck supple. Skin:     General: Skin is warm. Neurological:      Mental Status: She is alert and oriented to person, place, and time.       Deep Tendon Reflexes: Reflexes are normal and symmetric. Psychiatric:         Behavior: Behavior normal.         Thought Content: Thought content normal.         Judgment: Judgment normal.     Extremities: No lower extremity mid bilaterally, no cords, pulses are 1+  Lymphatics:  No adenopathy in the neck, supraclavicular region, axilla bilaterally    Performance Status: 2 - Symptomatic, <50% confined to bed    Labs    11/20/2023 magnesium = 1.9 BUN = 7 creatinine = 0.57 potassium = 3.4    11/16/2023 WBC = 13.2 hemoglobin = 14.3 hematocrit = 39 platelet = 397    0/1/6274 WBC = 7.69 hemoglobin = 16.1 hematocrit = 45.5 platelet = 911  6/43/9903 CA 15-3 = 58 CA 27, 29 = 75 BUN = 21 creatinine = 0.62 calcium = 9.3 AST = 22 ALT = 26 alkaline phosphatase = 109 total protein = 6.9 total bilirubin = 0.64  8/31/2023 SPEP: No monoclonal bands noted    Imaging    10/19/2023 PET/CT    OSSEOUS STRUCTURES:  -As noted on prior imaging examinations, destructive lytic left sacral mass extending to the SI joint and medial aspect of the left iliac bone. Lesion extends and as noted on prior MRI with subtle extraosseous extension. SUV max 8.7.  - There is also increased activity identified involving the superior right sacral ala, image 180, SUV max 3.5.  - There is a focus of increased activity in the medial aspect of the left humeral head image 62 without definite CT correlate. The SUV max is 3.9     IMPRESSION:     1. Large lytic/destructive lesion involving the sacrum, medial aspect of the left iliac bone, with subtle extraosseous extension, extension to the right of midline, and separate area of increased activity in the right superior sacral ala, consistent with   biopsy-proven metastatic breast cancer  2. There is also a focus of activity in the medial left humeral head most suspicious for osseous metastasis  3. There is multifocal right neck base/supraclavicular FDG avid adenopathy  4.  Within the thorax, multifocal FDG avid disease involving the mediastinum and left hilum, including a large subcarinal mass, as above further described  5. Small left effusion  6. Multiple pleural-based pulmonary opacities with only mild FDG activity. Differential includes pneumonia including postobstructive pneumonia, or possibly pulmonary infarctions. 7. Mildly heterogeneous liver activity although no definitive focal lesion is appreciated    9/1/2023 MRI pelvis    Impression:     Large infiltrative upper sacral lesion with asymmetric involvement of the left sacral ala, extension to the SI joint and medial left iliac bone as well as subtle extraosseous extension as detailed above, concerning for malignancy. Correlate with bone biopsy also done on this date. The lesion narrows the left L5 and S1 neural foramina and may touch upon the exiting nerve roots in these regions. Correlate for radiculitis in these territories. Cannot exclude subtle epidural invasion posterior to S1.    8/31/2023 bone scan. Impression stated nonpathologic sacral fracture. Otherwise no scintigraphic evidence of additional osseous metastases. 7/20/2023 mammogram diagnostic right with 3D and CAD. Impression stated postop findings likely benign tissue asymmetry in the right outer 8:00 region, right overall category 2, recommend diagnostic mammogram 1 year right breast.    Pathology    11/8/2023 [id number] CDX. Full report is scanned into epic. Patient was found to have a PIK3CA mutation where alpelisib [facility]) can be used. Patient had a T p53 mutation as well as a PTPN11 mutation. Patient also had an EGFR variant of uncertain clinical significance.   MSI-high    Case Report   Surgical Pathology Report                         Case: [id number]                                    Authorizing Provider:  Ochoa Mendoza PA-C       Collected:           09/01/2023 1417               Ordering Location:     [address] Received:            09/01/2023 1992 [id number] [facility]                                                                       Pathologist:           Joceline Weiss MD                                                           Specimen:    Bone, left illiac crest                                                                    Addendum   Test Description Result Prognostic Interpretation  Estrogen Receptor 0% negative  Internal control:Not present Staining Intensity:NA*  External control:positive Mariajose Score*: 0     Progesterone Receptor 0% negative  Internal control:Not present Staining Intensity: NA*  External control: positive Mariajose Score*: 0     HER2 by IHC 2+ Equivocal     COMMENT:  Performance characteristics may vary for Breast Prognostic Markers in decalcified specimens and may result in decreased antigen detection. Her2 by FISH cannot be run due to decalcified specimen. Final Diagnosis   A. Bone, left iliac crest, biopsy:  -Metastatic carcinoma, most compatible with metastasis from breast origin.  -Immunohistochemical stains performed with appropriate controls show the tumor to be positive for keratin AE1/3, CK7, and GATA3 and negative for CK20, mammaglobin, ER, CDX2, TTF-1, and PAX8; the findings support the diagnosis.    Electronically signed by Jcoeline Weiss MD on 9/6/2023 at  9:09 AM          Case Report    Surgical Pathology Report                         Case: [id number]                                     Authorizing Provider:  Bipin Ramos MD              Collected:           06/08/2021 [address]                Ordering Location:     [address] [place] [address]        Received:            06/08/2021 [facility] Operating Room                                                        Pathologist:           Julian Esqueda MD                                                                   Specimen:    Breast, Left, Left breast with axillary content - long suture marks lateral, medium                   suture marks medial, short suture marks superior                                              Final Diagnosis    A. 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 - Ductal carcinemia in situ.  - Skin with sebotropic keratosis. - Nipple with no pathologic abnormality.  - Lymphovascular and perineural invasion are present   - All margins are negative  - Four of ten lymph nodes are positive for metastatic carcinoma (4/10). Comment: Immunohistochemistry was performed on block A7. The tumor cells are positive for E cadherin and p120 in a membranous stain and negative for calponin and p63, supporting the above diagnosis. AE1/3 is performed on tissue block  A32 to help in the assessment of this case. Intradepartmental consultation is in agreement. Note:  1. Ancillary Studies:      - Repeat HER2 testing (2013 ASCO/CAP Recommendations): Not indicated. - Best representative tumor block: A5       -- Sufficient tumor present for          Agendia Mammaprint/Blueprint (1 cm2 of invasive tumor in aggregate): Yes. MI Profile/Foundation One (at least 5 x 5 mm of tumor): Yes.  2.. Pathologic Stage Classification (pTNM, AJCC 8th Edition): 8th ed, AJCC Anatomic Stage:  at least Stage  - pT3 pN2a, G2.  3. 8th ed. AJCC Pathological Prognostic Stage: IB       Electronically signed by Romain Sanchez MD on 6/15/2021 at  9:14 AM    Note      Interpretation performed at [facility], [facility] [address] [address]       Additional Information      All reported additional testing was performed with appropriately reactive controls.   These tests were developed and their performance characteristics determined by [facility] or appropriate performing facility, though some tests may be performed on tissues which have not been validated for performance characteristics (such as staining performed on alcohol exposed cell blocks and decalcified tissues). Results should be interpreted with caution and in the context of the patients’ clinical condition. These tests may not be cleared or approved by the U.S. Food and Drug Administration, though the FDA has determined that such clearance or approval is not necessary. These tests are used for clinical purposes and they should not be regarded as investigational or for research. This laboratory has been approved by [facility], designated as a high-complexity laboratory and is qualified to perform these tests.   .    Synoptic Checklist    INVASIVE CARCINOMA OF THE BREAST: Resection  8th Edition - Protocol posted: 2/26/2020  INVASIVE CARCINOMA OF THE BREAST: COMPLETE EXCISION - All Specimens       SPECIMEN   Procedure   Total mastectomy    Specimen Laterality   Left    TUMOR   Tumor Site   Upper outer quadrant    Histologic Type   Invasive carcinoma of no special type (ductal)    Glandular (Acinar) / Tubular Differentiation   Score 3    Nuclear Pleomorphism   Score 2    Mitotic Rate   Score 1    Overall Grade   Grade 2 (scores of 6 or 7)    Tumor Size   Greatest dimension of largest invasive focus (Millimeters): 60 mm   Additional Dimension (Millimeters)   38 mm       30 mm   Tumor Focality   Single focus of invasive carcinoma    Ductal Carcinoma In Situ (DCIS)   Present        Negative for extensive intraductal component (EIC)    Size (Extent) of DCIS       Number of Blocks with DCIS   3    Number of Blocks Examined   18    Architectural Patterns   Comedo        Solid    Nuclear Grade   Grade II (intermediate)    Necrosis   Present, central (expansive "comedo" necrosis)    Lobular Carcinoma In Situ (LCIS)   Not identified    Lymphovascular Invasion   Present    Dermal Lymphovascular Invasion   Not identified    Microcalcifications   Present in invasive carcinoma    Treatment Effect in the Breast   No known presurgical therapy    MARGINS   Invasive Carcinoma Margins Uninvolved by invasive carcinoma    Distance from Closest Margin (Millimeters)   Greater than: 20 mm   Closest Margin(s)   Posterior    DCIS Margins   Uninvolved by DCIS    Distance from Closest Margin (Millimeters)   Greater than: 20 mm   Closest Margin(s)   Posterior    LYMPH NODES   Regional Lymph Nodes   Involved by tumor cells    Number of Lymph Nodes with Macrometastases (> 2 mm)   4    Number of Lymph Nodes with Micrometastases (> 0.2 mm to 2 mm and / or > 200 cells)   0    Number of Lymph Nodes with Isolated Tumor Cells (<= 0.2 mm or <= 200 cells)   0    Size of Largest Metastatic Deposit (Millimeters)   At least: 35 mm   Extranodal Extension   Present    Extent of Extranodal Extension   Greater than 2 mm    Total Number of Lymph Nodes Examined   10    PATHOLOGIC STAGE CLASSIFICATION (pTNM, AJCC 8th Edition)       Primary Tumor (pT)   pT3    Regional Lymph Nodes (pN)   pN2a    ADDITIONAL FINDINGS   Additional Findings   intraductal papilloma    SPECIAL STUDIES   Breast Biomarker Testing Performed on Previous Biopsy       Estrogen Receptor (ER) Status   Positive (greater than 10% of cells demonstrate nuclear positivity)    Percentage of Cells with Nuclear Positivity   %    Breast Biomarker Testing Performed on Previous Biopsy       Progesterone Receptor (PgR) Status   Positive    Percentage of Cells with Nuclear Positivity   1-2 %   Breast Biomarker Testing Performed on Previous Biopsy       HER2 (by immunohistochemistry)   Negative (Score 1+)    Testing Performed on Case Number   [id number]    .

## 2023-11-28 ENCOUNTER — APPOINTMENT (OUTPATIENT)
Dept: LAB | Facility: CLINIC | Age: 73
End: 2023-11-28
Payer: MEDICARE

## 2023-11-28 ENCOUNTER — TELEPHONE (OUTPATIENT)
Dept: HEMATOLOGY ONCOLOGY | Facility: CLINIC | Age: 73
End: 2023-11-28

## 2023-11-28 DIAGNOSIS — E87.6 HYPOKALEMIA: ICD-10-CM

## 2023-11-28 LAB
ANION GAP SERPL CALCULATED.3IONS-SCNC: 9 MMOL/L
BUN SERPL-MCNC: 6 MG/DL (ref 5–25)
CALCIUM SERPL-MCNC: 8.7 MG/DL (ref 8.4–10.2)
CHLORIDE SERPL-SCNC: 102 MMOL/L (ref 96–108)
CO2 SERPL-SCNC: 27 MMOL/L (ref 21–32)
CREAT SERPL-MCNC: 0.54 MG/DL (ref 0.6–1.3)
GFR SERPL CREATININE-BSD FRML MDRD: 95 ML/MIN/1.73SQ M
GLUCOSE P FAST SERPL-MCNC: 123 MG/DL (ref 65–99)
POTASSIUM SERPL-SCNC: 4 MMOL/L (ref 3.5–5.3)
SODIUM SERPL-SCNC: 138 MMOL/L (ref 135–147)

## 2023-11-28 PROCEDURE — 36415 COLL VENOUS BLD VENIPUNCTURE: CPT

## 2023-11-28 PROCEDURE — 80048 BASIC METABOLIC PNL TOTAL CA: CPT

## 2023-11-28 NOTE — TELEPHONE ENCOUNTER
Call from [facility] office calling to speak with Patrick Macdonald about the clearance needed for tomorrow. I  sent a message to Dayan Fonseca on teams to advise.  Dayan Fonseca stated she was working on it and will contact the, after

## 2023-11-28 NOTE — TELEPHONE ENCOUNTER
Patient Call    Who are you speaking with? Physician Office    If it is not the patient, are they listed on an active communication consent form? Yes   What is the reason for this call? Clearance for dental procedures   Does this require a call back? Yes   If a call back is required, please list best call back number [phone]    If a call back is required, advise that a message will be forwarded to their care team and someone will return their call as soon as possible. Did you relay this information to the patient?  Yes

## 2023-11-29 ENCOUNTER — TELEPHONE (OUTPATIENT)
Dept: NEPHROLOGY | Facility: CLINIC | Age: 73
End: 2023-11-29

## 2023-11-29 ENCOUNTER — TELEPHONE (OUTPATIENT)
Dept: HEMATOLOGY ONCOLOGY | Facility: MEDICAL CENTER | Age: 73
End: 2023-11-29

## 2023-11-29 ENCOUNTER — TELEPHONE (OUTPATIENT)
Dept: HEMATOLOGY ONCOLOGY | Facility: CLINIC | Age: 73
End: 2023-11-29

## 2023-11-29 ENCOUNTER — PATIENT MESSAGE (OUTPATIENT)
Dept: NEPHROLOGY | Facility: CLINIC | Age: 73
End: 2023-11-29

## 2023-11-29 DIAGNOSIS — E87.6 HYPOKALEMIA: ICD-10-CM

## 2023-11-29 RX ORDER — POTASSIUM CHLORIDE 750 MG/1
10 TABLET, EXTENDED RELEASE ORAL EVERY OTHER DAY
Qty: 15 TABLET | Refills: 2 | Status: SHIPPED | OUTPATIENT
Start: 2023-11-29

## 2023-11-29 NOTE — TELEPHONE ENCOUNTER
Doctor [facility] office was calling as the high genic doctor wanted to speak with Dayan Fonseca as the patient is on the chair.      Transferred to Dayan Fonseca.  9:29am

## 2023-11-29 NOTE — TELEPHONE ENCOUNTER
Dr Cornejo Dates office calling again as they never received clearance for the patient. Patient will be in for the appt at 9:00am today.     Transferred to Gabriela Muñoz [phone]

## 2023-11-29 NOTE — TELEPHONE ENCOUNTER
----- Message from Marguerite Muhammad MD sent at 11/29/2023  9:18 AM EST -----  Serum potassium is currently normalized. Please advise the patient to take oral potassium supplement every other day and repeat BMP in 1 week and if serum potassium remains in a good range, we can continue to taper off oral potassium supplementation.

## 2023-11-29 NOTE — PATIENT COMMUNICATION
Left voicemail for the patient requesting a call back to confirm the messages above are regarding her potassium, and that she would like to continue with the tablet form of potassium.

## 2023-11-29 NOTE — TELEPHONE ENCOUNTER
Pt called back to clarify her previous communication with Satya Ivory. In regards to her potassium, she takes 10 mEqs every other day. (She splits the 20 mEqs in half) She also stated that she would like to take the tablet form instead of the liquid form.

## 2023-11-29 NOTE — PATIENT COMMUNICATION
Karoline Hidalgo minutes ago (3:25 PM)     [facility] called back to clarify her previous communication with Lucia Nain. In regards to her potassium, she takes 10 mEqs every other day. (She splits the 20 mEqs in half) She also stated that she would like to take the tablet form instead of the liquid form.

## 2023-12-01 DIAGNOSIS — I10 BENIGN ESSENTIAL HYPERTENSION: ICD-10-CM

## 2023-12-01 RX ORDER — METOPROLOL TARTRATE 50 MG/1
50 TABLET, FILM COATED ORAL DAILY
Qty: 90 TABLET | Refills: 1 | Status: SHIPPED | OUTPATIENT
Start: 2023-12-01

## 2023-12-01 NOTE — PATIENT COMMUNICATION
[address] [place] [address] MD Stephanie  [address] [place] [address]  Phone Number: [phone]     Please let her know that new prescription is 10 mEq and she can take 1 tablet every other day (she was previously splitting 20 mEq tablet) and repeat BMP in 2 weeks. Thank you.

## 2023-12-05 ENCOUNTER — APPOINTMENT (OUTPATIENT)
Dept: LAB | Facility: CLINIC | Age: 73
End: 2023-12-05
Payer: MEDICARE

## 2023-12-05 DIAGNOSIS — Z17.0 MALIGNANT NEOPLASM OF UPPER-OUTER QUADRANT OF LEFT BREAST IN FEMALE, ESTROGEN RECEPTOR POSITIVE: ICD-10-CM

## 2023-12-05 DIAGNOSIS — E87.6 HYPOKALEMIA: ICD-10-CM

## 2023-12-05 DIAGNOSIS — C50.412 MALIGNANT NEOPLASM OF UPPER-OUTER QUADRANT OF LEFT BREAST IN FEMALE, ESTROGEN RECEPTOR POSITIVE: ICD-10-CM

## 2023-12-05 LAB
ALBUMIN SERPL BCP-MCNC: 3.2 G/DL (ref 3.5–5)
ALP SERPL-CCNC: 89 U/L (ref 34–104)
ALT SERPL W P-5'-P-CCNC: 16 U/L (ref 7–52)
ANION GAP SERPL CALCULATED.3IONS-SCNC: 12 MMOL/L
AST SERPL W P-5'-P-CCNC: 15 U/L (ref 13–39)
BASOPHILS # BLD AUTO: 0.06 THOUSANDS/ÂΜL (ref 0–0.1)
BASOPHILS NFR BLD AUTO: 1 % (ref 0–1)
BILIRUB SERPL-MCNC: 0.65 MG/DL (ref 0.2–1)
BUN SERPL-MCNC: 5 MG/DL (ref 5–25)
CALCIUM ALBUM COR SERPL-MCNC: 9.1 MG/DL (ref 8.3–10.1)
CALCIUM SERPL-MCNC: 8.5 MG/DL (ref 8.4–10.2)
CHLORIDE SERPL-SCNC: 102 MMOL/L (ref 96–108)
CO2 SERPL-SCNC: 23 MMOL/L (ref 21–32)
CREAT SERPL-MCNC: 0.55 MG/DL (ref 0.6–1.3)
EOSINOPHIL # BLD AUTO: 0.02 THOUSAND/ÂΜL (ref 0–0.61)
EOSINOPHIL NFR BLD AUTO: 0 % (ref 0–6)
ERYTHROCYTE [DISTWIDTH] IN BLOOD BY AUTOMATED COUNT: 17.4 % (ref 11.6–15.1)
GFR SERPL CREATININE-BSD FRML MDRD: 94 ML/MIN/1.73SQ M
GLUCOSE P FAST SERPL-MCNC: 151 MG/DL (ref 65–99)
HCT VFR BLD AUTO: 38.2 % (ref 34.8–46.1)
HGB BLD-MCNC: 13.2 G/DL (ref 11.5–15.4)
IMM GRANULOCYTES # BLD AUTO: 0.11 THOUSAND/UL (ref 0–0.2)
IMM GRANULOCYTES NFR BLD AUTO: 2 % (ref 0–2)
LYMPHOCYTES # BLD AUTO: 0.51 THOUSANDS/ÂΜL (ref 0.6–4.47)
LYMPHOCYTES NFR BLD AUTO: 7 % (ref 14–44)
MCH RBC QN AUTO: 32.9 PG (ref 26.8–34.3)
MCHC RBC AUTO-ENTMCNC: 34.6 G/DL (ref 31.4–37.4)
MCV RBC AUTO: 95 FL (ref 82–98)
MONOCYTES # BLD AUTO: 1.16 THOUSAND/ÂΜL (ref 0.17–1.22)
MONOCYTES NFR BLD AUTO: 17 % (ref 4–12)
NEUTROPHILS # BLD AUTO: 5.17 THOUSANDS/ÂΜL (ref 1.85–7.62)
NEUTS SEG NFR BLD AUTO: 73 % (ref 43–75)
NRBC BLD AUTO-RTO: 0 /100 WBCS
PLATELET # BLD AUTO: 535 THOUSANDS/UL (ref 149–390)
PMV BLD AUTO: 10.4 FL (ref 8.9–12.7)
POTASSIUM SERPL-SCNC: 3.6 MMOL/L (ref 3.5–5.3)
PROT SERPL-MCNC: 6 G/DL (ref 6.4–8.4)
RBC # BLD AUTO: 4.01 MILLION/UL (ref 3.81–5.12)
SODIUM SERPL-SCNC: 137 MMOL/L (ref 135–147)
WBC # BLD AUTO: 7.03 THOUSAND/UL (ref 4.31–10.16)

## 2023-12-05 PROCEDURE — 85025 COMPLETE CBC W/AUTO DIFF WBC: CPT

## 2023-12-05 PROCEDURE — 80053 COMPREHEN METABOLIC PANEL: CPT

## 2023-12-05 PROCEDURE — 36415 COLL VENOUS BLD VENIPUNCTURE: CPT

## 2023-12-06 ENCOUNTER — TELEPHONE (OUTPATIENT)
Dept: NEPHROLOGY | Facility: CLINIC | Age: 73
End: 2023-12-06

## 2023-12-06 DIAGNOSIS — E87.6 HYPOKALEMIA: Primary | ICD-10-CM

## 2023-12-06 NOTE — TELEPHONE ENCOUNTER
----- Message from Tierra Grigsby MD sent at 12/6/2023  6:32 AM EST -----  Serum potassium at lower end of normal.  Please advise the patient to take potassium chloride 10 mEq daily for now (instead of every other day) and repeat BMP in 1 week. Thank you.

## 2023-12-08 ENCOUNTER — HOSPITAL ENCOUNTER (OUTPATIENT)
Dept: INFUSION CENTER | Facility: HOSPITAL | Age: 73
End: 2023-12-08
Attending: INTERNAL MEDICINE
Payer: MEDICARE

## 2023-12-08 VITALS
RESPIRATION RATE: 18 BRPM | TEMPERATURE: 97.2 F | BODY MASS INDEX: 30.23 KG/M2 | HEIGHT: 63 IN | OXYGEN SATURATION: 98 % | SYSTOLIC BLOOD PRESSURE: 103 MMHG | WEIGHT: 170.64 LBS | DIASTOLIC BLOOD PRESSURE: 52 MMHG | HEART RATE: 72 BPM

## 2023-12-08 DIAGNOSIS — Z17.0 MALIGNANT NEOPLASM OF UPPER-OUTER QUADRANT OF LEFT BREAST IN FEMALE, ESTROGEN RECEPTOR POSITIVE: Primary | ICD-10-CM

## 2023-12-08 DIAGNOSIS — C50.412 MALIGNANT NEOPLASM OF UPPER-OUTER QUADRANT OF LEFT BREAST IN FEMALE, ESTROGEN RECEPTOR POSITIVE: Primary | ICD-10-CM

## 2023-12-08 PROCEDURE — 96367 TX/PROPH/DG ADDL SEQ IV INF: CPT

## 2023-12-08 PROCEDURE — 96413 CHEMO IV INFUSION 1 HR: CPT

## 2023-12-08 RX ORDER — DEXTROSE MONOHYDRATE 50 MG/ML
20 INJECTION, SOLUTION INTRAVENOUS ONCE
Status: COMPLETED | OUTPATIENT
Start: 2023-12-08 | End: 2023-12-08

## 2023-12-08 RX ORDER — DEXTROSE MONOHYDRATE 50 MG/ML
20 INJECTION, SOLUTION INTRAVENOUS ONCE
Status: CANCELLED | OUTPATIENT
Start: 2023-12-08

## 2023-12-08 RX ADMIN — DOXORUBICIN HYDROCHLORIDE 90 MG: 2 INJECTION, SUSPENSION, LIPOSOMAL INTRAVENOUS at 10:14

## 2023-12-08 RX ADMIN — DEXAMETHASONE SODIUM PHOSPHATE: 10 INJECTION, SOLUTION INTRAMUSCULAR; INTRAVENOUS at 09:41

## 2023-12-08 RX ADMIN — DEXTROSE 20 ML/HR: 50 INJECTION, SOLUTION INTRAVENOUS at 10:11

## 2023-12-11 ENCOUNTER — HOSPITAL ENCOUNTER (OUTPATIENT)
Dept: INFUSION CENTER | Facility: HOSPITAL | Age: 73
Discharge: HOME | End: 2023-12-11
Attending: INTERNAL MEDICINE
Payer: MEDICARE

## 2023-12-11 VITALS — TEMPERATURE: 97.2 F

## 2023-12-11 DIAGNOSIS — Z17.0 MALIGNANT NEOPLASM OF UPPER-OUTER QUADRANT OF LEFT BREAST IN FEMALE, ESTROGEN RECEPTOR POSITIVE: Primary | ICD-10-CM

## 2023-12-11 DIAGNOSIS — C50.412 MALIGNANT NEOPLASM OF UPPER-OUTER QUADRANT OF LEFT BREAST IN FEMALE, ESTROGEN RECEPTOR POSITIVE: Primary | ICD-10-CM

## 2023-12-11 PROCEDURE — 96372 THER/PROPH/DIAG INJ SC/IM: CPT

## 2023-12-11 RX ADMIN — PEGFILGRASTIM 4 MG: 6 INJECTION SUBCUTANEOUS at 10:02

## 2023-12-11 NOTE — PROGRESS NOTES
Chante Tab  tolerated injection well with no complications. Annelise Barth is aware of future appt on 1/5 at 0930.      AVS printed and given to Annelise Barth:  No (Declined by Annelise Barth)

## 2023-12-22 ENCOUNTER — OFFICE VISIT (OUTPATIENT)
Dept: HEMATOLOGY ONCOLOGY | Facility: MEDICAL CENTER | Age: 73
End: 2023-12-22
Payer: MEDICARE

## 2023-12-22 VITALS
OXYGEN SATURATION: 100 % | SYSTOLIC BLOOD PRESSURE: 98 MMHG | TEMPERATURE: 97.6 F | HEART RATE: 100 BPM | DIASTOLIC BLOOD PRESSURE: 64 MMHG | HEIGHT: 63 IN | BODY MASS INDEX: 30.37 KG/M2 | RESPIRATION RATE: 17 BRPM

## 2023-12-22 DIAGNOSIS — M84.58XA PATHOLOGICAL FRACTURE OF SACRAL VERTEBRA DUE TO NEOPLASTIC DISEASE: ICD-10-CM

## 2023-12-22 DIAGNOSIS — F41.9 ANXIETY: ICD-10-CM

## 2023-12-22 DIAGNOSIS — C50.412 MALIGNANT NEOPLASM OF UPPER-OUTER QUADRANT OF LEFT BREAST IN FEMALE, ESTROGEN RECEPTOR POSITIVE: Primary | ICD-10-CM

## 2023-12-22 DIAGNOSIS — Z17.0 MALIGNANT NEOPLASM OF UPPER-OUTER QUADRANT OF LEFT BREAST IN FEMALE, ESTROGEN RECEPTOR POSITIVE: Primary | ICD-10-CM

## 2023-12-22 DIAGNOSIS — G89.3 CANCER RELATED PAIN: ICD-10-CM

## 2023-12-22 PROCEDURE — 99215 OFFICE O/P EST HI 40 MIN: CPT | Performed by: INTERNAL MEDICINE

## 2023-12-22 NOTE — PROGRESS NOTES
Gosia Bragg  1950  [address] [facility] [place]  [address]  [place] [id number]    DISCUSSION/SUMMARY:    73-year-old female with history of stage IIIA left-sided breast cancer now with evidence of metastatic disease.  Issues:    Pathologic sacral fracture.  Patient has been seen/evaluated by radiation oncology and completed palliative RT.  PET/CT results are listed below.  There is a left humeral head lesion also concerning for metastatic disease.  Patient denies any arm pain - surveillance continues.  Since beginning systemic treatment, patient states that her pain is much better controlled, using the wheelchair less, walking more.  No falls.    Osseous metastatic disease.  As discussed previously, patient is a candidate for antireabsorption therapy.  Mrs. Bragg has been seen by dental but is in need of a number of procedures.  Workup is in process.  As discussed previously, patient has significant bone metastases and should begin either Zometa or Xgeva as soon as possible.    Pain control.  Patient states that her pain is now well-controlled.  Patient will follow-up with palliative care as directed.    Metastatic breast cancer.  Patient was recently started on Ibrance and Faslodex; received 1 dose of Faslodex.  Patient subsequently underwent PET/CT.  Besides the bone lesions above, patient was found to have lesions in both lungs, mediastinal adenopathy, right-sided supraclavicular adenopathy, mediastinum, left hilum, large subcarinal mass and a small left pleural effusion (not tapped).  Patient's performance status was +/-, no respiratory issues.  Because of the extent of disease and the concern for pending visceral crisis, patient was started on Doxil.  Mrs. Bragg received 2 cycles, tolerated the treatments well.  Clinically there are no concerning findings, blood work is okay.  The plan is to continue with the Doxil for 6 cycles and then repeat  scans.  If patient demonstrates a good response, the plan would then be to go back to the CDK 4/6 inhibitor and Faslodex.    The specifics are not clear but Mrs. Bragg states that the Ibrance made her very nauseous.   also states that when he touched the Ibrance, he also had nausea and blood in his stools.  There is likely no connection.  I have asked the  to speak to his PCP about the bloody stools.    In the future, when patient is more stable, Mrs. Bragg can go back to the Faslodex and either Kisqali or Verzenio.  [id number] demonstrated that patient has a PIK3CA mutation so alpelisib (Piqray) is an option in the future also.    NCCN guidelines 4.2023 states that for patients with recurrent or metastatic breast cancer, HR positive and HER2/lenoela negative, threatening visceral crisis, preferred regimen includes an anthracycline.  Other options include taxanes but patient was previously treated with Taxotere/Cytoxan.      Patient is to return in 4 weeks.  Mrs. Bragg knows to call the hematology/oncology office if there are any other questions or concerns.    Carefully review your medication list and verify that the list is accurate and up-to-date. Please call the hematology/oncology office if there are medications missing from the list, medications on the list that you are not currently taking or if there is a dosage or instruction that is different from how you're taking that medication.    Patient goals and areas of care: Continue with Doxil  Barriers to care: none  Patient is able to self-care  _____________________________________________________________________________________    Chief Complaint   Patient presents with    Follow-up    Metastatic breast cancer     Advance Care Planning/Advance Directives: Not yet discussed    Oncology History Overview Note   73 year old female with history of left breast invasive mammary carcinoma, grade 2, ER/KY positive, HER 2 negative, 4/10 lymph nodes  micrometastases.  She is s/p left breast modified radical mastectomy, ANAND  directed axillary dissection on 6/8/21. She completed adjuvant chemotherapy with Cytoxan/Taxotere on 9/17/21. This was followed by a course of radiation to the left chest wall and regional lymphatics on 11/19/2021.  More recently, patient underwent palliative radiation for newly diagnosed bone metastasis to L5  sacrum. Treatment completed on 9/29/23. She presents today for EOT telephone follow up call.     10/2/23 CT head w wo contrast  IMPRESSION:  No acute intracranial abnormality.    10/6/23 Palliative Care -Telemed    10/19/23 NM PET CT skull base to mid thigh  IMPRESSION:  1. Large lytic/destructive lesion involving the sacrum, medial aspect of the left iliac bone, with subtle extraosseous extension, extension to the right of midline, and separate area of increased activity in the right superior sacral ala, consistent with   biopsy-proven metastatic breast cancer  2. There is also a focus of activity in the medial left humeral head most suspicious for osseous metastasis  3. There is multifocal right neck base/supraclavicular FDG avid adenopathy  4. Within the thorax, multifocal FDG avid disease involving the mediastinum and left hilum, including a large subcarinal mass, as above further described  5. Small left effusion  6. Multiple pleural-based pulmonary opacities with only mild FDG activity. Differential includes pneumonia including postobstructive pneumonia, or possibly pulmonary infarctions.  7. Mildly heterogeneous liver activity although no definitive focal lesion is appreciated    10/26/23 Hematology Oncology - Dr. Ledesma  PET/CT =  left humeral head lesion also concerning for metastatic disease.    Patient denies any arm pain.  This will need to be monitored.   Candidate for antireabsorprtion therapy, will need dental clearance first  Besides the bone lesions above, patient was found to have lesions in both lungs,  mediastinal adenopathy, right-sided supraclavicular adenopathy, mediastinum, left hilum, large subcarinal mass and a small left pleural effusion (not tapped).   I believe there are enough areas of abnormality on the PET/CT to warrant more aggressive systemic treatment (concern for progressing to visceral crises).   Mrs. Bragg understands that the idea is to give more aggressive systemic treatments upfront, stabilize the disease and then go back to the CDK 4/6 inhibitor with Faslodex.  In the future, if evidence of disease progression, a soft tissue biopsy will be needed.  Patient could have peripheral blood sent for [id number].     10/30/23 IR port placement    Upcomin23 Hematology Oncology     Malignant neoplasm of upper-outer quadrant of left breast in female, estrogen receptor positive    2021 Biopsy    Left breast US guided biopsy:  A. 2 o'clock 8 cm from the nipple  Invasive mammary carcinoma of no special type  Grade 2  ER 90  VA 1  HER2 1+  Lymphovascular invasion: not identified    B. Left Axillary lymph node biopsy:  Invasive/ metastatic adenocarcinoma, compatible with breast primary involving fibroadipose tissues and a portion of lymphoid pranchyma.   ER 90  VA 1  HER2 1+    Concordant. Malignancy appears multifocal. The 2 adjacent masses span an area of approximately 4 cm. Right breast clear.      5/3/2021 Genetic Testing    The following genes were evaluated: OLINDA, BRCA1, BRCA2, CDH1, CHEK2, PALB2, PTEN, STK11, TP53  Additional genes evaluated for a total of 36 genes analyzed  Negative result. No pathogenic sequence variants or deletions/dupllications identified  [employer]     2021 Surgery    Left breast modified radical mastectomy with ANAND  directed axillary dissection  Invasive carcinoma of no special type (ductal)  Grade 2  6 cm  Lymphovascular invasion present  Margins negative  4/10 Lymph nodes (3.5 cm)  Anatomic Stage IIIA  Prognostic Stage IB     2021 - 2021  Chemotherapy    pegfilgrastim (NEULASTA ONPRO), 6 mg, Subcutaneous, Once, 4 of 4 cycles  Administration: 6 mg (7/16/2021), 6 mg (8/6/2021), 6 mg (8/27/2021), 6 mg (9/17/2021)  cyclophosphamide (CYTOXAN) IVPB, 600 mg/m2 = 1,212 mg, Intravenous, Once, 4 of 4 cycles  Administration: 1,212 mg (7/16/2021), 1,212 mg (8/6/2021), 1,212 mg (8/27/2021), 1,212 mg (9/17/2021)  DOCEtaxel (TAXOTERE) chemo infusion, 75 mg/m2 = 151.6 mg, Intravenous, Once, 4 of 4 cycles  Administration: 151.6 mg (7/16/2021), 151.6 mg (8/6/2021), 151.6 mg (8/27/2021), 151.6 mg (9/17/2021)     10/18/2021 - 11/19/2021 Radiation    BH L CW 10X/6X 13 / 13 200 0 2,600 32   BH L CW BOLUS 10X/6X 12 / 12 200 0 2,400 30   BH L PAB 10X 25 / 25 60 0 1,500 32   BH L Sclav 10X 25 / 25 200 0 5,000 32      Treatment Dates:  10/18/2021 - 11/19/2021.   Dr Estrada     10/2021 -  Hormone Therapy    anastrozole 1 mg once a day    Dr Angela     9/1/2023 Biopsy    Bone, left iliac crest, biopsy:  -Metastatic carcinoma, most compatible with metastasis from breast origin.     9/12/2023 -  Cancer Staged    Staging form: Breast, AJCC 8th Edition  - Clinical: Stage IV (cM1) - Signed by Tiffany Babb MD on 9/12/2023 9/25/2023 - 9/29/2023 Radiation    The patient saw @[facility] for radiation treatment. This is the current list of radiation treatment:  Plan ID Energy Fractions Dose per Fraction (cGy) Dose Correction (cGy) Total Dose Delivered (cGy) Elapsed Days   L5_Sacrum 10X 5 / 5 400 0 2,000 4        11/10/2023 -  Chemotherapy    alteplase (CATHFLO), 2 mg, Intracatheter, Every 1 Minute as needed, 2 of 6 cycles  pegfilgrastim (NEULASTA), 4 mg (100 % of original dose 4 mg), Subcutaneous, Once, 2 of 6 cycles  Dose modification: 4 mg (original dose 4 mg, Cycle 1)  Administration: 4 mg (11/13/2023), 4 mg (12/11/2023)  DOXOrubicin liposomal (DOXIL) chemo infusion, 93 mg, Intravenous, Once, 2 of 6 cycles  Administration: 90 mg (11/10/2023), 90 mg (12/8/2023)       History of  Present Illness: 73-year-old female previously followed by Dr. Angela.  Patient has a history of stage IIIA left breast cancer, grade 2, lobular, ER positive, HER2 negative disease status post mastectomy, lymph node sampling (June 2021).  Patient was negative for the BRCA gene mutation.  Pathology results (copied below) demonstrated four positive lymph nodes.  Patient was treated with adjuvant Taxotere and Cytoxan (completed in September 2021) followed by radiation.  Patient was subsequently placed on anastrozole and had tolerated this well; completed approximately 2+ years.    Patient was recently admitted to [facility] with severe buttocks pain.  Work-up demonstrated a sacral fracture + mass.  Biopsy was consistent with metastatic breast cancer.  Patient completed palliative RT; has also received 2 cycles of Doxil.  Patient returns for follow-up.    Presently Mrs. Bragg states feeling okay, better than before.  As before, patient walks around at home but needs a wheelchair outside.  Pain is controlled.  No respiratory issues.  No GI or  problems.  Appetites okay, no nausea or vomiting.  No fevers or signs of infection.    Review of Systems   Constitutional:  Positive for activity change, appetite change and fatigue.   HENT: Negative.     Eyes: Negative.    Respiratory: Negative.     Cardiovascular: Negative.    Gastrointestinal: Negative.    Endocrine: Negative.    Genitourinary: Negative.    Musculoskeletal:  Positive for arthralgias and gait problem.   Skin: Negative.    Allergic/Immunologic: Negative.    Hematological: Negative.    Psychiatric/Behavioral: Negative.     All other systems reviewed and are negative.    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    History of radiation therapy     [place] (hard of hearing)     wears hearing aids/will wear DOS bilat    Hypertension     Limb alert care status     No BP/IV Left Arm    Lymphedema of left arm     Prediabetes     Wears glasses      Past Surgical History:   Procedure Laterality Date    AXILLARY SURGERY Right     sweat glands removed -     BREAST CYST EXCISION Right 2000    benign    BREAST SURGERY Right     CHOLECYSTECTOMY      COLONOSCOPY      DILATION AND CURETTAGE OF UTERUS      IR BIOPSY BONE  9/1/2023    IR PORT PLACEMENT  10/30/2023    MASTECTOMY Left 06/08/2021    CO BREAST REDUCTION N/A 03/18/2022    Procedure: R BREAST REDUCTION; L BREAST EXCISION STANDING SKIN DEFORMITY; LOCAL FLAP;  Surgeon: Lisa Pineda MD;  Location: [facility];  Service: Plastics    CO MAST MODF RAD W/AX LYMPH NOD W/WO PECT/NERI MIN Left 06/08/2021    Procedure: BREAST MODIFIED RADICAL MASTECTOMY; ANAND  DIRECTED AXILLARY DISSECTION;  Surgeon: Lisandro Tijerina MD;  Location: [facility];  Service: Surgical Oncology    REDUCTION MAMMAPLASTY Right     March 2022    US BREAST NEEDLE LOC LEFT Left 06/02/2021    US GUIDED BREAST BIOPSY LEFT COMPLETE Left 04/19/2021    US GUIDED BREAST LYMPH NODE BIOPSY LEFT Left 04/19/2021    WISDOM TOOTH EXTRACTION       Family " History   Problem Relation Age of Onset    Dementia Mother     Colon cancer Mother         50's-60's    Lung cancer Father         50's-60's    No Known Problems Sister     Breast cancer Paternal Aunt     No Known Problems Sister     Stomach cancer Paternal Uncle         70's     Social History     Socioeconomic History    Marital status: /Civil Union     Spouse name: Not on file    Number of children: Not on file    Years of education: Not on file    Highest education level: Not on file   Occupational History    Not on file   Tobacco Use    Smoking status: Never     Passive exposure: Never    Smokeless tobacco: Never   Vaping Use    Vaping status: Never Used   Substance and Sexual Activity    Alcohol use: Never    Drug use: No    Sexual activity: Yes     Partners: Male     Birth control/protection: Male Sterilization     Comment: defer   Other Topics Concern    Not on file   Social History Narrative    Not on file     Social Determinants of Health     Financial Resource Strain: Low Risk  (5/3/2023)    Overall Financial Resource Strain (CARDIA)     Difficulty of Paying Living Expenses: Not hard at all   Food Insecurity: No Food Insecurity (8/31/2023)    Hunger Vital Sign     Worried About Running Out of Food in the Last Year: Never true     Ran Out of Food in the Last Year: Never true   Transportation Needs: No Transportation Needs (8/31/2023)    PRAPARE - Transportation     Lack of Transportation (Medical): No     Lack of Transportation (Non-Medical): No   Physical Activity: Not on file   Stress: Not on file   Social Connections: Not on file   Intimate Partner Violence: Not on file   Housing Stability: Low Risk  (8/31/2023)    Housing Stability Vital Sign     Unable to Pay for Housing in the Last Year: No     Number of Places Lived in the Last Year: 1     Unstable Housing in the Last Year: No       Current Outpatient Medications:     albuterol (ProAir HFA) 90 mcg/act inhaler, Inhale 2 puffs every 4 (four)  hours as needed for wheezing or shortness of breath, Disp: 8 g, Rfl: 0    Ascorbic Acid (VITAMIN C PO), Take 500 mg by mouth in the morning, Disp: , Rfl:     fluticasone (FLONASE) 50 mcg/act nasal spray, 2 puffs into each nostril daily, Disp: , Rfl:     gabapentin (Neurontin) 100 mg capsule, Take 1 capsule (100 mg total) by mouth daily at bedtime, Disp: 30 capsule, Rfl: 1    metoprolol tartrate (LOPRESSOR) 50 mg tablet, TAKE ONE TABLET BY MOUTH EVERY DAY, Disp: 90 tablet, Rfl: 1    Multiple Vitamins-Minerals (MULTIVITAMIN ADULT PO), Take by mouth daily, Disp: , Rfl:     naloxone (NARCAN) 4 mg/0.1 mL nasal spray, Administer 1 spray into a nostril. If no response after 2-3 minutes, give another dose in the other nostril using a new spray. For use in emergencies in the event of accidental ingestion, respiratory depression or oversedation., Disp: 1 each, Rfl: 1    ondansetron (ZOFRAN) 8 mg tablet, Take 1 tablet (8 mg total) by mouth every 8 (eight) hours as needed for nausea or vomiting, Disp: 20 tablet, Rfl: 5    oxyCODONE (ROXICODONE) 10 MG TABS, Take 1.5 tablets (15 mg total) by mouth every 4 (four) hours as needed for moderate pain Max Daily Amount: 90 mg, Disp: 150 tablet, Rfl: 0    potassium chloride (K-DUR,KLOR-CON) 10 mEq tablet, Take 1 tablet (10 mEq total) by mouth every other day, Disp: 15 tablet, Rfl: 2    potassium chloride 10% oral solution, Take 15 mL (20 mEq total) by mouth daily, Disp: 450 mL, Rfl: 3    senna-docusate sodium (SENOKOT-S) 8.6-50 mg per tablet, Take 1 tablet by mouth 2 (two) times a day (Patient taking differently: Take 1 tablet by mouth if needed), Disp: , Rfl: 0    VITAMIN D PO, Take by mouth in the morning, Disp: , Rfl:     LORazepam (ATIVAN) 1 mg tablet, Take 1 tablet (1 mg total) by mouth once as needed for anxiety for up to 1 dose Take 30 minutes to 1 hour prior to your schedule imaging/scan. Do not start before October 17, 2023. (Patient not taking: Reported on 11/16/2023), Disp: 1  tablet, Rfl: 0    ondansetron (ZOFRAN) 4 mg tablet, Take 1 tablet (4 mg total) by mouth every 8 (eight) hours as needed for nausea or vomiting, Disp: 30 tablet, Rfl: 0    Palbociclib (Ibrance) 125 MG capsule, Take 1 capsule (125 mg total) by mouth daily 21 days on followed by 7 days off (Patient not taking: Reported on 11/16/2023), Disp: 21 capsule, Rfl: 5    polyethylene glycol (MIRALAX) 17 g packet, Take 17 g by mouth daily as needed (as needed for constipation.) (Patient not taking: Reported on 11/20/2023), Disp: 255 g, Rfl: 0    No Known Allergies    Vitals:    12/22/23 0916   BP: 98/64   Pulse: 100   Resp: 17   Temp: 97.6 °F (36.4 °C)   SpO2: 100%     Physical Exam  Constitutional:       Appearance: She is well-developed.      Comments: Well-nourished female, no respiratory distress, + signs of pain, + anxious   HENT:      Head: Normocephalic and atraumatic.      Right Ear: External ear normal.      Left Ear: External ear normal.   Eyes:      Conjunctiva/sclera: Conjunctivae normal.      Pupils: Pupils are equal, round, and reactive to light.   Cardiovascular:      Rate and Rhythm: Normal rate and regular rhythm.      Heart sounds: Normal heart sounds.   Pulmonary:      Effort: Pulmonary effort is normal.      Breath sounds: Normal breath sounds.   Abdominal:      General: Bowel sounds are normal.      Palpations: Abdomen is soft.   Musculoskeletal:         General: Normal range of motion.      Cervical back: Normal range of motion and neck supple.   Skin:     General: Skin is warm.   Neurological:      Mental Status: She is alert and oriented to person, place, and time.      Deep Tendon Reflexes: Reflexes are normal and symmetric.   Psychiatric:         Behavior: Behavior normal.         Thought Content: Thought content normal.         Judgment: Judgment normal.     Extremities: No lower extremity mid bilaterally, no cords, pulses are 1+  Lymphatics:  No adenopathy in the neck, supraclavicular region, axilla  bilaterally    Performance Status: 2 - Symptomatic, <50% confined to bed    Labs    12/5/2023 WBC = 7.03 hemoglobin = 13.2 hematocrit = 38 platelet = 535 neutrophil = 73% BUN = 5 creatinine = 0.55 calcium = 8.5 LFTs WNL    11/20/2023 magnesium = 1.9 BUN = 7 creatinine = 0.57 potassium = 3.4  11/16/2023 WBC = 13.2 hemoglobin = 14.3 hematocrit = 39 platelet = 288  9/1/2023 WBC = 7.69 hemoglobin = 16.1 hematocrit = 45.5 platelet = 260  8/31/2023 CA 15-3 = 58 CA 27, 29 = 75 BUN = 21 creatinine = 0.62 calcium = 9.3 AST = 22 ALT = 26 alkaline phosphatase = 109 total protein = 6.9 total bilirubin = 0.64  8/31/2023 SPEP: No monoclonal bands noted    Imaging    10/19/2023 PET/CT    OSSEOUS STRUCTURES:  -As noted on prior imaging examinations, destructive lytic left sacral mass extending to the SI joint and medial aspect of the left iliac bone. Lesion extends and as noted on prior MRI with subtle extraosseous extension. SUV max 8.7.  - There is also increased activity identified involving the superior right sacral ala, image 180, SUV max 3.5.  - There is a focus of increased activity in the medial aspect of the left humeral head image 62 without definite CT correlate. The SUV max is 3.9     IMPRESSION:     1. Large lytic/destructive lesion involving the sacrum, medial aspect of the left iliac bone, with subtle extraosseous extension, extension to the right of midline, and separate area of increased activity in the right superior sacral ala, consistent with   biopsy-proven metastatic breast cancer  2. There is also a focus of activity in the medial left humeral head most suspicious for osseous metastasis  3. There is multifocal right neck base/supraclavicular FDG avid adenopathy  4. Within the thorax, multifocal FDG avid disease involving the mediastinum and left hilum, including a large subcarinal mass, as above further described  5. Small left effusion  6. Multiple pleural-based pulmonary opacities with only mild FDG  activity. Differential includes pneumonia including postobstructive pneumonia, or possibly pulmonary infarctions.  7. Mildly heterogeneous liver activity although no definitive focal lesion is appreciated    9/1/2023 MRI pelvis    Impression:     Large infiltrative upper sacral lesion with asymmetric involvement of the left sacral ala, extension to the SI joint and medial left iliac bone as well as subtle extraosseous extension as detailed above, concerning for malignancy. Correlate with bone biopsy also done on this date. The lesion narrows the left L5 and S1 neural foramina and may touch upon the exiting nerve roots in these regions. Correlate for radiculitis in these territories. Cannot exclude subtle epidural invasion posterior to S1.    8/31/2023 bone scan.  Impression stated nonpathologic sacral fracture.  Otherwise no scintigraphic evidence of additional osseous metastases.    7/20/2023 mammogram diagnostic right with 3D and CAD.  Impression stated postop findings likely benign tissue asymmetry in the right outer 8:00 region, right overall category 2, recommend diagnostic mammogram 1 year right breast.    Pathology    11/8/2023 [id number] CDX.  Full report is scanned into epic.  Patient was found to have a PIK3CA mutation where alpelisib (Piqray) can be used.  Patient had a T p53 mutation as well as a PTPN11 mutation.  Patient also had an EGFR variant of uncertain clinical significance.  MSI-high    Case Report   Surgical Pathology Report                         Case: [id number]                                    Authorizing Provider:  Matt Ferguson PA-C       Collected:           09/01/2023 1417               Ordering Location:     [facility] Received:            09/01/2023 [address] [place] [address]                                                                       Pathologist:           Scooter Herr MD                                                            Specimen:    Bone, left illiac crest                                                                    Addendum   Test Description Result Prognostic Interpretation  Estrogen Receptor 0% negative  Internal control:Not present Staining Intensity:NA*  External control:positive Mariajose Score*: 0     Progesterone Receptor 0% negative  Internal control:Not present Staining Intensity: NA*  External control: positive Mariajose Score*: 0     HER2 by IHC 2+ Equivocal     COMMENT:  Performance characteristics may vary for Breast Prognostic Markers in decalcified specimens and may result in decreased antigen detection.  Her2 by FISH cannot be run due to decalcified specimen.        Final Diagnosis   A.  Bone, left iliac crest, biopsy:  -Metastatic carcinoma, most compatible with metastasis from breast origin.  -Immunohistochemical stains performed with appropriate controls show the tumor to be positive for keratin AE1/3, CK7, and GATA3 and negative for CK20, mammaglobin, ER, CDX2, TTF-1, and PAX8; the findings support the diagnosis.   Electronically signed by Scooter Herr MD on 9/6/2023 at  9:09 AM          Case Report    Surgical Pathology Report                         Case: [id number]                                     Authorizing Provider:  Lisandro Tijerina MD              Collected:           06/08/2021 1057                Ordering Location:     [facility]        Received:            06/08/2021 [address] [place] [address] Operating Room                                                        Pathologist:           Eliza Caballero MD                                                                   Specimen:    Breast, Left, Left breast with axillary content - long suture marks lateral, medium                   suture marks medial, short suture marks superior                                              Final Diagnosis    A. Left breast with axillary content, modified radical mastectomy:  - Invasive  breast carcinoma of no special type (ductal NST/invasive ductal carcinoma).  - Ductal carcinemia in situ.  - Skin with sebotropic keratosis.  - Nipple with no pathologic abnormality.  - Lymphovascular and perineural invasion are present   - All margins are negative  - Four of ten lymph nodes are positive for metastatic carcinoma (4/10).        Comment: Immunohistochemistry was performed on block A7. The tumor cells are positive for E cadherin and p120 in a membranous stain and negative for calponin and p63, supporting the above diagnosis.   AE1/3 is performed on tissue block  A32 to help in the assessment of this case.     Intradepartmental consultation is in agreement.     Note:  1. Ancillary Studies:      - Repeat HER2 testing (2013 ASCO/CAP Recommendations): Not indicated.      - Best representative tumor block: A5       -- Sufficient tumor present for          Agendia Mammaprint/Blueprint (1 cm2 of invasive tumor in aggregate): Yes.          MI Profile/Foundation One (at least 5 x 5 mm of tumor): Yes.  2.. Pathologic Stage Classification (pTNM, AJCC 8th Edition): 8th ed, AJCC Anatomic Stage:  at least Stage  - pT3 pN2a, G2.  3. 8th ed. AJCC Pathological Prognostic Stage: IB       Electronically signed by Eliza Caballero MD on 6/15/2021 at  9:14 AM    Note      Interpretation performed at [facility], [address] [facility] [address]       Additional Information      All reported additional testing was performed with appropriately reactive controls.  These tests were developed and their performance characteristics determined by [facility] or appropriate performing facility, though some tests may be performed on tissues which have not been validated for performance characteristics (such as staining performed on alcohol exposed cell blocks and decalcified tissues).  Results should be interpreted with caution and in the context of the patients’ clinical condition. These tests may not be cleared  "or approved by the U.S. Food and Drug Administration, though the FDA has determined that such clearance or approval is not necessary. These tests are used for clinical purposes and they should not be regarded as investigational or for research. This laboratory has been approved by CLIA 88, designated as a high-complexity laboratory and is qualified to perform these tests.  .    Synoptic Checklist    INVASIVE CARCINOMA OF THE BREAST: Resection  8th Edition - Protocol posted: 2/26/2020  INVASIVE CARCINOMA OF THE BREAST: COMPLETE EXCISION - All Specimens       SPECIMEN   Procedure   Total mastectomy    Specimen Laterality   Left    TUMOR   Tumor Site   Upper outer quadrant    Histologic Type   Invasive carcinoma of no special type (ductal)    Glandular (Acinar) / Tubular Differentiation   Score 3    Nuclear Pleomorphism   Score 2    Mitotic Rate   Score 1    Overall Grade   Grade 2 (scores of 6 or 7)    Tumor Size   Greatest dimension of largest invasive focus (Millimeters): 60 mm   Additional Dimension (Millimeters)   38 mm       30 mm   Tumor Focality   Single focus of invasive carcinoma    Ductal Carcinoma In Situ (DCIS)   Present        Negative for extensive intraductal component (EIC)    Size (Extent) of DCIS       Number of Blocks with DCIS   3    Number of Blocks Examined   18    Architectural Patterns   Comedo        Solid    Nuclear Grade   Grade II (intermediate)    Necrosis   Present, central (expansive \"comedo\" necrosis)    Lobular Carcinoma In Situ (LCIS)   Not identified    Lymphovascular Invasion   Present    Dermal Lymphovascular Invasion   Not identified    Microcalcifications   Present in invasive carcinoma    Treatment Effect in the Breast   No known presurgical therapy    MARGINS   Invasive Carcinoma Margins   Uninvolved by invasive carcinoma    Distance from Closest Margin (Millimeters)   Greater than: 20 mm   Closest Margin(s)   Posterior    DCIS Margins   Uninvolved by DCIS    Distance from " Closest Margin (Millimeters)   Greater than: 20 mm   Closest Margin(s)   Posterior    LYMPH NODES   Regional Lymph Nodes   Involved by tumor cells    Number of Lymph Nodes with Macrometastases (> 2 mm)   4    Number of Lymph Nodes with Micrometastases (> 0.2 mm to 2 mm and / or > 200 cells)   0    Number of Lymph Nodes with Isolated Tumor Cells (<= 0.2 mm or <= 200 cells)   0    Size of Largest Metastatic Deposit (Millimeters)   At least: 35 mm   Extranodal Extension   Present    Extent of Extranodal Extension   Greater than 2 mm    Total Number of Lymph Nodes Examined   10    PATHOLOGIC STAGE CLASSIFICATION (pTNM, AJCC 8th Edition)       Primary Tumor (pT)   pT3    Regional Lymph Nodes (pN)   pN2a    ADDITIONAL FINDINGS   Additional Findings   intraductal papilloma    SPECIAL STUDIES   Breast Biomarker Testing Performed on Previous Biopsy       Estrogen Receptor (ER) Status   Positive (greater than 10% of cells demonstrate nuclear positivity)    Percentage of Cells with Nuclear Positivity   %    Breast Biomarker Testing Performed on Previous Biopsy       Progesterone Receptor (PgR) Status   Positive    Percentage of Cells with Nuclear Positivity   1-2 %   Breast Biomarker Testing Performed on Previous Biopsy       HER2 (by immunohistochemistry)   Negative (Score 1+)    Testing Performed on Case Number   [id number]    .

## 2023-12-29 DIAGNOSIS — C50.412 MALIGNANT NEOPLASM OF UPPER-OUTER QUADRANT OF LEFT BREAST IN FEMALE, ESTROGEN RECEPTOR POSITIVE: Primary | ICD-10-CM

## 2023-12-29 DIAGNOSIS — Z17.0 MALIGNANT NEOPLASM OF UPPER-OUTER QUADRANT OF LEFT BREAST IN FEMALE, ESTROGEN RECEPTOR POSITIVE: Primary | ICD-10-CM

## 2023-12-29 RX ORDER — DEXTROSE MONOHYDRATE 50 MG/ML
20 INJECTION, SOLUTION INTRAVENOUS ONCE
Status: CANCELLED | OUTPATIENT
Start: 2024-01-05

## 2024-01-02 ENCOUNTER — APPOINTMENT (OUTPATIENT)
Dept: LAB | Facility: CLINIC | Age: 74
End: 2024-01-02
Payer: MEDICARE

## 2024-01-02 DIAGNOSIS — C50.412 MALIGNANT NEOPLASM OF UPPER-OUTER QUADRANT OF LEFT BREAST IN FEMALE, ESTROGEN RECEPTOR POSITIVE: ICD-10-CM

## 2024-01-02 DIAGNOSIS — Z17.0 MALIGNANT NEOPLASM OF UPPER-OUTER QUADRANT OF LEFT BREAST IN FEMALE, ESTROGEN RECEPTOR POSITIVE: ICD-10-CM

## 2024-01-03 DIAGNOSIS — Z17.0 MALIGNANT NEOPLASM OF UPPER-OUTER QUADRANT OF LEFT BREAST IN FEMALE, ESTROGEN RECEPTOR POSITIVE: Primary | ICD-10-CM

## 2024-01-03 DIAGNOSIS — C50.412 MALIGNANT NEOPLASM OF UPPER-OUTER QUADRANT OF LEFT BREAST IN FEMALE, ESTROGEN RECEPTOR POSITIVE: Primary | ICD-10-CM

## 2024-01-05 ENCOUNTER — HOSPITAL ENCOUNTER (OUTPATIENT)
Dept: INFUSION CENTER | Facility: HOSPITAL | Age: 74
End: 2024-01-05
Attending: INTERNAL MEDICINE
Payer: MEDICARE

## 2024-01-05 VITALS
OXYGEN SATURATION: 100 % | RESPIRATION RATE: 18 BRPM | HEART RATE: 89 BPM | DIASTOLIC BLOOD PRESSURE: 62 MMHG | SYSTOLIC BLOOD PRESSURE: 119 MMHG | WEIGHT: 175.04 LBS | HEIGHT: 63 IN | BODY MASS INDEX: 31.02 KG/M2 | TEMPERATURE: 96.8 F

## 2024-01-05 DIAGNOSIS — Z17.0 MALIGNANT NEOPLASM OF UPPER-OUTER QUADRANT OF LEFT BREAST IN FEMALE, ESTROGEN RECEPTOR POSITIVE: Primary | ICD-10-CM

## 2024-01-05 DIAGNOSIS — C50.412 MALIGNANT NEOPLASM OF UPPER-OUTER QUADRANT OF LEFT BREAST IN FEMALE, ESTROGEN RECEPTOR POSITIVE: Primary | ICD-10-CM

## 2024-01-05 PROCEDURE — 96367 TX/PROPH/DG ADDL SEQ IV INF: CPT

## 2024-01-05 PROCEDURE — 96413 CHEMO IV INFUSION 1 HR: CPT

## 2024-01-05 RX ORDER — DEXTROSE MONOHYDRATE 50 MG/ML
20 INJECTION, SOLUTION INTRAVENOUS ONCE
Status: COMPLETED | OUTPATIENT
Start: 2024-01-05 | End: 2024-01-05

## 2024-01-05 RX ADMIN — DOXORUBICIN HYDROCHLORIDE 93 MG: 2 INJECTION, SUSPENSION, LIPOSOMAL INTRAVENOUS at 10:42

## 2024-01-05 RX ADMIN — DEXTROSE 20 ML/HR: 50 INJECTION, SOLUTION INTRAVENOUS at 10:41

## 2024-01-05 RX ADMIN — DEXAMETHASONE SODIUM PHOSPHATE: 10 INJECTION, SOLUTION INTRAMUSCULAR; INTRAVENOUS at 09:48

## 2024-01-08 ENCOUNTER — HOSPITAL ENCOUNTER (OUTPATIENT)
Dept: INFUSION CENTER | Facility: HOSPITAL | Age: 74
Discharge: HOME | End: 2024-01-08
Payer: MEDICARE

## 2024-01-08 DIAGNOSIS — Z17.0 MALIGNANT NEOPLASM OF UPPER-OUTER QUADRANT OF LEFT BREAST IN FEMALE, ESTROGEN RECEPTOR POSITIVE: Primary | ICD-10-CM

## 2024-01-08 DIAGNOSIS — C50.412 MALIGNANT NEOPLASM OF UPPER-OUTER QUADRANT OF LEFT BREAST IN FEMALE, ESTROGEN RECEPTOR POSITIVE: Primary | ICD-10-CM

## 2024-01-08 RX ADMIN — PEGFILGRASTIM 4 MG: 6 INJECTION SUBCUTANEOUS at 10:11

## 2024-01-08 NOTE — PROGRESS NOTES
oGsia Bragg  tolerated treatment well with no complications.      Gosia Bragg is aware of future appt on 2/2/24 at 0930.     AVS printed and given to Gosia Bragg:    No (Declined by Gosia Bragg)

## 2024-01-08 NOTE — PLAN OF CARE
Problem: Potential for Falls  Goal: Patient will remain free of falls  Description: INTERVENTIONS:  - Educate patient/family on patient safety including physical limitations  - Instruct patient to call for assistance with activity   - Consult OT/PT to assist with strengthening/mobility   - Keep Call bell within reach  - Outcome: Progressing     Problem: Knowledge Deficit  Goal: Patient/family/caregiver demonstrates understanding of disease process, treatment plan, medications, and discharge instructions  Description: Complete learning assessment and assess knowledge base.  Interventions:  - Provide teaching at level of understanding  - Provide teaching via preferred learning methods  Outcome: Progressing

## 2024-01-12 ENCOUNTER — TELEPHONE (OUTPATIENT)
Dept: NEPHROLOGY | Facility: CLINIC | Age: 74
End: 2024-01-12

## 2024-01-19 ENCOUNTER — TELEMEDICINE (OUTPATIENT)
Dept: HEMATOLOGY ONCOLOGY | Facility: MEDICAL CENTER | Age: 74
End: 2024-01-19
Payer: MEDICARE

## 2024-01-19 ENCOUNTER — TELEPHONE (OUTPATIENT)
Dept: HEMATOLOGY ONCOLOGY | Facility: MEDICAL CENTER | Age: 74
End: 2024-01-19

## 2024-01-19 DIAGNOSIS — C50.919 MALIGNANT NEOPLASM OF BREAST IN FEMALE, ESTROGEN RECEPTOR POSITIVE, UNSPECIFIED LATERALITY, UNSPECIFIED SITE OF BREAST: ICD-10-CM

## 2024-01-19 DIAGNOSIS — C50.412 MALIGNANT NEOPLASM OF UPPER-OUTER QUADRANT OF LEFT BREAST IN FEMALE, ESTROGEN RECEPTOR POSITIVE: ICD-10-CM

## 2024-01-19 DIAGNOSIS — Z17.0 MALIGNANT NEOPLASM OF BREAST IN FEMALE, ESTROGEN RECEPTOR POSITIVE, UNSPECIFIED LATERALITY, UNSPECIFIED SITE OF BREAST: ICD-10-CM

## 2024-01-19 DIAGNOSIS — Z17.0 MALIGNANT NEOPLASM OF UPPER-OUTER QUADRANT OF LEFT BREAST IN FEMALE, ESTROGEN RECEPTOR POSITIVE: ICD-10-CM

## 2024-01-19 DIAGNOSIS — L27.1 HAND FOOT SYNDROME: Primary | ICD-10-CM

## 2024-01-19 PROCEDURE — 99442 PR PHYS/QHP TELEPHONE EVALUATION 11-20 MIN: CPT | Performed by: INTERNAL MEDICINE

## 2024-01-19 RX ORDER — LANOLIN ALCOHOL/MO/W.PET/CERES
50 CREAM (GRAM) TOPICAL DAILY
Qty: 30 TABLET | Refills: 4 | Status: SHIPPED | OUTPATIENT
Start: 2024-01-19

## 2024-01-19 NOTE — TELEPHONE ENCOUNTER
Called patient with new f/u appt scheduled . Left message with all info and with hope line tel number

## 2024-01-19 NOTE — PROGRESS NOTES
Virtual Brief Visit    This Visit is being completed by telephone. The Patient is located at Home and in the following state in which I hold an active license [place]    The patient was identified by name and date of birth. Gosia Bragg was informed that this is a telemedicine visit and that the visit is being conducted through Telephone.  My office door was closed. No one else was in the room.  She acknowledged consent and understanding of privacy and security of the video platform. The patient has agreed to participate and understands they can discontinue the visit at any time.    Patient is aware this is a billable service.     Assessment/Plan: 73-year-old female with breast cancer.  Issues:     Breast cancer, Doxil reaction.  Patient has a history of stage IIIA status post surgery, adjuvant chemotherapy and hormonal manipulation.  Unfortunately patient was found to have metastatic disease, heavy tumor burden, placed on Doxil.  Mrs. Bragg has completed 3 cycles.    Unfortunately patient has developed hand-foot syndrome.  The fourth cycle is due on February 2, 2024.  At this moment, it is not clear if patient will be able to receive additional Doxil.  From the pictures that patient sent and from the symptoms, patient has grade 2 plantar palmar erythrodysesthesia.  Patient can begin vitamin B6 50 mg a day.  Patient will continue with the hand lotions.  Patient was given a prescription for hydrocortisone cream, 2.5% to be used 3 times a day.  Patient should also keep her hands cool, same with the feet.  Depending upon how Mrs. Bragg does over the next 10 days will determine whether or not patient can continue with the Doxil.    Another possible option would be to discontinue the chemotherapy, repeat scans and demonstrate a good/significant response.  Patient could then go back to CDK 4/6 inhibitor and Faslodex.  Patient has the PIK3CA mutation so Piqray is an option in the future also.    Pain control.  Patient  states that her pain is otherwise well-controlled.  Patient is being followed by palliative care.    Osseous metastatic disease.  Patient was seen by dental, still completing procedures.  Xgeva/Zometa still on hold for now.    Patient should be seen in the office before February 2, 2024, before the fourth cycle of Doxil is due.    Carefully review your medication list and verify that the list is accurate and up-to-date. Please call the hematology/oncology office if there are medications missing from the list, medications on the list that you are not currently taking or if there is a dosage or instruction that is different from how you're taking that medication.     Patient goals and areas of care: Continue with Doxil  Barriers to care: none  Patient is able to self-care    Problem List Items Addressed This Visit          Other    Malignant neoplasm of upper-outer quadrant of left breast in female, estrogen receptor positive      Other Visit Diagnoses       Hand foot syndrome    -  Primary    Relevant Medications    pyridoxine (VITAMIN B6) 50 mg tablet    hydrocortisone 2.5 % cream    Malignant neoplasm of breast in female, estrogen receptor positive, unspecified laterality, unspecified site of breast         Relevant Medications    pyridoxine (VITAMIN B6) 50 mg tablet    hydrocortisone 2.5 % cream          Recent Visits  No visits were found meeting these conditions.  Showing recent visits within past 7 days and meeting all other requirements  Today's Visits  Date Type Provider Dept   01/19/24 Telemedicine Jimmie Ledesma MD [facility]   Showing today's visits and meeting all other requirements  Future Appointments  No visits were found meeting these conditions.  Showing future appointments within next 150 days and meeting all other requirements     HPI: 73-year-old female previously followed by Dr. Angela.  Patient has a history of stage IIIA left breast cancer, grade 2, lobular, ER positive, HER2  negative disease status post mastectomy, lymph node sampling (June 2021).  Patient was negative for the BRCA gene mutation.  Pathology results demonstrated four positive lymph nodes.  Patient was treated with adjuvant Taxotere and Cytoxan (completed in September 2021) followed by radiation.  Patient was subsequently placed on anastrozole and had tolerated this well; completed approximately 2+ years.     Patient was recently admitted to [facility] with severe buttocks pain.  Work-up demonstrated a sacral fracture + mass.  Biopsy was consistent with metastatic breast cancer.  Patient completed palliative RT; has also received 3 cycles of Doxil.  Patient returns for follow-up.     Presently Mrs. Bragg states feeling +/-.  Patient received her third cycle on January 5, developed redness, swelling, pain, decreased use of both hands.  Feet are also red and inflamed but less so.  Activities are limited because of this.  No fevers or signs of infection.  No other pain control issues, buttocks pain is controlled.  Appetites okay, no other GI,  or GYN issues.    Laboratory    1/2/2024 WBC = 4.45 hemoglobin = 11.6 hematocrit = 35.6 platelet = 297 neutrophil = 71% BUN = 6 creatinine = 0.50 calcium = 9.0 LFTs WNL      Visit Time  Total Visit Duration: 15 minutes

## 2024-01-22 ENCOUNTER — TELEPHONE (OUTPATIENT)
Dept: HEMATOLOGY ONCOLOGY | Facility: MEDICAL CENTER | Age: 74
End: 2024-01-22

## 2024-01-22 DIAGNOSIS — E87.6 HYPOKALEMIA: ICD-10-CM

## 2024-01-22 RX ORDER — POTASSIUM CHLORIDE 750 MG/1
10 TABLET, EXTENDED RELEASE ORAL EVERY OTHER DAY
Qty: 45 TABLET | Refills: 3 | Status: SHIPPED | OUTPATIENT
Start: 2024-01-22

## 2024-01-22 NOTE — TELEPHONE ENCOUNTER
Patient was seen by Dr Ledesma:    Unfortunately patient has developed hand-foot syndrome.  The fourth cycle is due on February 2, 2024.  At this moment, it is not clear if patient will be able to receive additional Doxil.  From the pictures that patient sent and from the symptoms, patient has grade 2 plantar palmar erythrodysesthesia.  Patient can begin vitamin B6 50 mg a day.  Patient will continue with the hand lotions.  Patient was given a prescription for hydrocortisone cream, 2.5% to be used 3 times a day.  Patient should also keep her hands cool, same with the feet.  Depending upon how Mrs. Bragg does over the next 10 days will determine whether or not patient can continue with the Doxil.     Patient verbalizes understanding of plan and has f/u 1/29/2024

## 2024-01-25 ENCOUNTER — RA CDI HCC (OUTPATIENT)
Dept: OTHER | Facility: HOSPITAL | Age: 74
End: 2024-01-25

## 2024-01-26 ENCOUNTER — APPOINTMENT (OUTPATIENT)
Dept: LAB | Facility: CLINIC | Age: 74
End: 2024-01-26
Payer: MEDICARE

## 2024-01-26 DIAGNOSIS — C50.412 MALIGNANT NEOPLASM OF UPPER-OUTER QUADRANT OF LEFT BREAST IN FEMALE, ESTROGEN RECEPTOR POSITIVE: ICD-10-CM

## 2024-01-26 DIAGNOSIS — Z17.0 MALIGNANT NEOPLASM OF UPPER-OUTER QUADRANT OF LEFT BREAST IN FEMALE, ESTROGEN RECEPTOR POSITIVE: ICD-10-CM

## 2024-01-26 LAB
ALBUMIN SERPL BCP-MCNC: 3.5 G/DL (ref 3.5–5)
ALP SERPL-CCNC: 67 U/L (ref 34–104)
ALT SERPL W P-5'-P-CCNC: 13 U/L (ref 7–52)
ANION GAP SERPL CALCULATED.3IONS-SCNC: 9 MMOL/L
AST SERPL W P-5'-P-CCNC: 15 U/L (ref 13–39)
BASOPHILS # BLD AUTO: 0.03 THOUSANDS/ÂΜL (ref 0–0.1)
BASOPHILS NFR BLD AUTO: 1 % (ref 0–1)
BILIRUB SERPL-MCNC: 0.54 MG/DL (ref 0.2–1)
BUN SERPL-MCNC: 8 MG/DL (ref 5–25)
CALCIUM SERPL-MCNC: 9.3 MG/DL (ref 8.4–10.2)
CHLORIDE SERPL-SCNC: 102 MMOL/L (ref 96–108)
CO2 SERPL-SCNC: 28 MMOL/L (ref 21–32)
CREAT SERPL-MCNC: 0.54 MG/DL (ref 0.6–1.3)
EOSINOPHIL # BLD AUTO: 0.1 THOUSAND/ÂΜL (ref 0–0.61)
EOSINOPHIL NFR BLD AUTO: 2 % (ref 0–6)
ERYTHROCYTE [DISTWIDTH] IN BLOOD BY AUTOMATED COUNT: 15.8 % (ref 11.6–15.1)
GFR SERPL CREATININE-BSD FRML MDRD: 95 ML/MIN/1.73SQ M
GLUCOSE P FAST SERPL-MCNC: 130 MG/DL (ref 65–99)
HCT VFR BLD AUTO: 36.1 % (ref 34.8–46.1)
HGB BLD-MCNC: 12.3 G/DL (ref 11.5–15.4)
IMM GRANULOCYTES # BLD AUTO: 0.03 THOUSAND/UL (ref 0–0.2)
IMM GRANULOCYTES NFR BLD AUTO: 1 % (ref 0–2)
LYMPHOCYTES # BLD AUTO: 0.36 THOUSANDS/ÂΜL (ref 0.6–4.47)
LYMPHOCYTES NFR BLD AUTO: 8 % (ref 14–44)
MCH RBC QN AUTO: 33.3 PG (ref 26.8–34.3)
MCHC RBC AUTO-ENTMCNC: 34.1 G/DL (ref 31.4–37.4)
MCV RBC AUTO: 98 FL (ref 82–98)
MONOCYTES # BLD AUTO: 0.75 THOUSAND/ÂΜL (ref 0.17–1.22)
MONOCYTES NFR BLD AUTO: 17 % (ref 4–12)
NEUTROPHILS # BLD AUTO: 3.09 THOUSANDS/ÂΜL (ref 1.85–7.62)
NEUTS SEG NFR BLD AUTO: 71 % (ref 43–75)
NRBC BLD AUTO-RTO: 0 /100 WBCS
PLATELET # BLD AUTO: 312 THOUSANDS/UL (ref 149–390)
PMV BLD AUTO: 10.8 FL (ref 8.9–12.7)
POTASSIUM SERPL-SCNC: 3.4 MMOL/L (ref 3.5–5.3)
PROT SERPL-MCNC: 5.8 G/DL (ref 6.4–8.4)
RBC # BLD AUTO: 3.69 MILLION/UL (ref 3.81–5.12)
SODIUM SERPL-SCNC: 139 MMOL/L (ref 135–147)
WBC # BLD AUTO: 4.36 THOUSAND/UL (ref 4.31–10.16)

## 2024-01-26 PROCEDURE — 85025 COMPLETE CBC W/AUTO DIFF WBC: CPT

## 2024-01-26 PROCEDURE — 80053 COMPREHEN METABOLIC PANEL: CPT

## 2024-01-26 PROCEDURE — 36415 COLL VENOUS BLD VENIPUNCTURE: CPT

## 2024-01-26 RX ORDER — DEXTROSE MONOHYDRATE 50 MG/ML
20 INJECTION, SOLUTION INTRAVENOUS ONCE
Status: CANCELLED | OUTPATIENT
Start: 2024-02-02

## 2024-01-29 ENCOUNTER — OFFICE VISIT (OUTPATIENT)
Dept: HEMATOLOGY ONCOLOGY | Facility: MEDICAL CENTER | Age: 74
End: 2024-01-29
Payer: MEDICARE

## 2024-01-29 VITALS
OXYGEN SATURATION: 99 % | BODY MASS INDEX: 30.48 KG/M2 | SYSTOLIC BLOOD PRESSURE: 116 MMHG | WEIGHT: 172 LBS | TEMPERATURE: 97.6 F | HEIGHT: 63 IN | RESPIRATION RATE: 16 BRPM | HEART RATE: 78 BPM | DIASTOLIC BLOOD PRESSURE: 76 MMHG

## 2024-01-29 DIAGNOSIS — M84.58XA PATHOLOGICAL FRACTURE OF SACRAL VERTEBRA DUE TO NEOPLASTIC DISEASE: ICD-10-CM

## 2024-01-29 DIAGNOSIS — Z17.0 MALIGNANT NEOPLASM OF UPPER-OUTER QUADRANT OF LEFT BREAST IN FEMALE, ESTROGEN RECEPTOR POSITIVE: Primary | ICD-10-CM

## 2024-01-29 DIAGNOSIS — C50.412 MALIGNANT NEOPLASM OF UPPER-OUTER QUADRANT OF LEFT BREAST IN FEMALE, ESTROGEN RECEPTOR POSITIVE: Primary | ICD-10-CM

## 2024-01-29 PROCEDURE — 99214 OFFICE O/P EST MOD 30 MIN: CPT | Performed by: INTERNAL MEDICINE

## 2024-01-29 NOTE — PROGRESS NOTES
Gosia Bragg  1950  [facility] [address] [id number]    DISCUSSION/SUMMARY:    73-year-old female with history of stage IIIA left-sided breast cancer now with evidence of metastatic disease.  Issues:    Pathologic sacral fracture.  Patient has been seen/evaluated by radiation oncology and completed palliative RT.  PET/CT results are listed below.  There is a left humeral head lesion also concerning for metastatic disease.  Patient denies any arm pain - surveillance continues.    Osseous metastatic disease.  As discussed previously, patient is a candidate for antireabsorption therapy.  Mrs. Bragg still waiting for dental clearance, has an appointment for April.  Patient/ is trying to get this moved up.  Discussed previously, patient has significant bone metastases and should start either Zometa or Xgeva as soon as possible.    Pain control.  Patient states that her pain is now well-controlled.  Patient will follow-up with palliative care as directed.    Metastatic breast cancer.  Patient was recently started on Ibrance and Faslodex; received 1 dose of Faslodex.  Patient subsequently underwent PET/CT.  Besides the bone lesions above, patient was found to have lesions in both lungs, mediastinal adenopathy, right-sided supraclavicular adenopathy, mediastinum, left hilum, large subcarinal mass and a small left pleural effusion (not tapped).  Patient's performance status was +/-, no respiratory issues.  Because of the extent of disease and the concern for pending visceral crisis, patient was started on Doxil.  Mrs. Bragg completed 2 cycles without any problems.  On the third cycle patient had evidence of hand-foot syndrome.  With lotions, 2.5% hydrocortisone cream and nonrestrictive clothing, this has resolved.    Patient/ understand that it is impossible to predict the future and Mrs. Bragg will need to  monitor closely for recurrence after the fourth cycle (on February 2, 2024).  If the hand-foot syndrome returns, other options will need to be discussed.  At this time, the plan is to complete 6 cycles of Doxil, repeat the scans and if good response, go back to the CDK 4/6 inhibitor and Faslodex.  Doxil has been decreased to 90%.    In the future, when patient is more stable, Mrs. Bragg can go back to the Faslodex and either Kisqali or Verzenio.  [id number] demonstrated that patient has a PIK3CA mutation so alpelisib (Piqray) is an option in the future also.    NCCN guidelines 4.2023 states that for patients with recurrent or metastatic breast cancer, HR positive and HER2/leonela negative, threatening visceral crisis, preferred regimen includes an anthracycline.  Other options include taxanes but patient was previously treated with Taxotere/Cytoxan.      Patient is to return in 4 weeks.  Mrs. Bragg knows to call the hematology/oncology office if there are any other questions or concerns.    Carefully review your medication list and verify that the list is accurate and up-to-date. Please call the hematology/oncology office if there are medications missing from the list, medications on the list that you are not currently taking or if there is a dosage or instruction that is different from how you're taking that medication.    Patient goals and areas of care: Continue with Doxil with dose reduction  Barriers to care: none  Patient is able to self-care  _____________________________________________________________________________________    Chief Complaint   Patient presents with    Follow-up    Metastatic breast cancer     Advance Care Planning/Advance Directives: Not yet discussed    Oncology History Overview Note   73 year old female with history of left breast invasive mammary carcinoma, grade 2, ER/DC positive, HER 2 negative, 4/10 lymph nodes micrometastases.  She is s/p left breast modified radical mastectomy,  ANAND  directed axillary dissection on 6/8/21. She completed adjuvant chemotherapy with Cytoxan/Taxotere on 9/17/21. This was followed by a course of radiation to the left chest wall and regional lymphatics on 11/19/2021.  More recently, patient underwent palliative radiation for newly diagnosed bone metastasis to L5  sacrum. Treatment completed on 9/29/23. She presents today for EOT telephone follow up call.     10/2/23 CT head w wo contrast  IMPRESSION:  No acute intracranial abnormality.    10/6/23 Palliative Care -Telemed    10/19/23 NM PET CT skull base to mid thigh  IMPRESSION:  1. Large lytic/destructive lesion involving the sacrum, medial aspect of the left iliac bone, with subtle extraosseous extension, extension to the right of midline, and separate area of increased activity in the right superior sacral ala, consistent with   biopsy-proven metastatic breast cancer  2. There is also a focus of activity in the medial left humeral head most suspicious for osseous metastasis  3. There is multifocal right neck base/supraclavicular FDG avid adenopathy  4. Within the thorax, multifocal FDG avid disease involving the mediastinum and left hilum, including a large subcarinal mass, as above further described  5. Small left effusion  6. Multiple pleural-based pulmonary opacities with only mild FDG activity. Differential includes pneumonia including postobstructive pneumonia, or possibly pulmonary infarctions.  7. Mildly heterogeneous liver activity although no definitive focal lesion is appreciated    10/26/23 Hematology Oncology - Dr. Ledesma  PET/CT =  left humeral head lesion also concerning for metastatic disease.    Patient denies any arm pain.  This will need to be monitored.   Candidate for antireabsorprtion therapy, will need dental clearance first  Besides the bone lesions above, patient was found to have lesions in both lungs, mediastinal adenopathy, right-sided supraclavicular adenopathy, mediastinum,  left hilum, large subcarinal mass and a small left pleural effusion (not tapped).   I believe there are enough areas of abnormality on the PET/CT to warrant more aggressive systemic treatment (concern for progressing to visceral crises).   Mrs. Bragg understands that the idea is to give more aggressive systemic treatments upfront, stabilize the disease and then go back to the CDK 4/6 inhibitor with Faslodex.  In the future, if evidence of disease progression, a soft tissue biopsy will be needed.  Patient could have peripheral blood sent for [id number].     10/30/23 IR port placement    Upcomin23 Hematology Oncology     Malignant neoplasm of upper-outer quadrant of left breast in female, estrogen receptor positive    2021 Biopsy    Left breast US guided biopsy:  A. 2 o'clock 8 cm from the nipple  Invasive mammary carcinoma of no special type  Grade 2  ER 90  DE 1  HER2 1+  Lymphovascular invasion: not identified    B. Left Axillary lymph node biopsy:  Invasive/ metastatic adenocarcinoma, compatible with breast primary involving fibroadipose tissues and a portion of lymphoid pranchyma.   ER 90  DE 1  HER2 1+    Concordant. Malignancy appears multifocal. The 2 adjacent masses span an area of approximately 4 cm. Right breast clear.      5/3/2021 Genetic Testing    The following genes were evaluated: OLINDA, BRCA1, BRCA2, CDH1, CHEK2, PALB2, PTEN, STK11, TP53  Additional genes evaluated for a total of 36 genes analyzed  Negative result. No pathogenic sequence variants or deletions/dupllications identified  [employer]     2021 Surgery    Left breast modified radical mastectomy with ANAND  directed axillary dissection  Invasive carcinoma of no special type (ductal)  Grade 2  6 cm  Lymphovascular invasion present  Margins negative  4/10 Lymph nodes (3.5 cm)  Anatomic Stage IIIA  Prognostic Stage IB     2021 - 2021 Chemotherapy    pegfilgrastim (NEULASTA ONPRO), 6 mg, Subcutaneous, Once, 4 of 4  cycles  Administration: 6 mg (7/16/2021), 6 mg (8/6/2021), 6 mg (8/27/2021), 6 mg (9/17/2021)  cyclophosphamide (CYTOXAN) IVPB, 600 mg/m2 = 1,212 mg, Intravenous, Once, 4 of 4 cycles  Administration: 1,212 mg (7/16/2021), 1,212 mg (8/6/2021), 1,212 mg (8/27/2021), 1,212 mg (9/17/2021)  DOCEtaxel (TAXOTERE) chemo infusion, 75 mg/m2 = 151.6 mg, Intravenous, Once, 4 of 4 cycles  Administration: 151.6 mg (7/16/2021), 151.6 mg (8/6/2021), 151.6 mg (8/27/2021), 151.6 mg (9/17/2021)     10/18/2021 - 11/19/2021 Radiation    BH L CW 10X/6X 13 / 13 200 0 2,600 32   BH L CW BOLUS 10X/6X 12 / 12 200 0 2,400 30   BH L PAB 10X 25 / 25 60 0 1,500 32   BH L Sclav 10X 25 / 25 200 0 5,000 32      Treatment Dates:  10/18/2021 - 11/19/2021.   Dr Estrada     10/2021 -  Hormone Therapy    anastrozole 1 mg once a day    Dr Angela     9/1/2023 Biopsy    Bone, left iliac crest, biopsy:  -Metastatic carcinoma, most compatible with metastasis from breast origin.     9/12/2023 -  Cancer Staged    Staging form: Breast, AJCC 8th Edition  - Clinical: Stage IV (cM1) - Signed by Tiffany Babb MD on 9/12/2023 9/25/2023 - 9/29/2023 Radiation    The patient saw @[facility] for radiation treatment. This is the current list of radiation treatment:  Plan ID Energy Fractions Dose per Fraction (cGy) Dose Correction (cGy) Total Dose Delivered (cGy) Elapsed Days   L5_Sacrum 10X 5 / 5 400 0 2,000 4        11/10/2023 -  Chemotherapy    alteplase (CATHFLO), 2 mg, Intracatheter, Every 1 Minute as needed, 3 of 6 cycles  pegfilgrastim (NEULASTA), 4 mg (100 % of original dose 4 mg), Subcutaneous, Once, 3 of 6 cycles  Dose modification: 4 mg (original dose 4 mg, Cycle 1)  Administration: 4 mg (11/13/2023), 4 mg (12/11/2023), 4 mg (1/8/2024)  DOXOrubicin liposomal (DOXIL) chemo infusion, 93 mg, Intravenous, Once, 3 of 6 cycles  Administration: 90 mg (11/10/2023), 90 mg (12/8/2023), 93 mg (1/5/2024)       History of Present Illness: 73-year-old female previously  followed by Dr. Angela.  Patient has a history of stage IIIA left breast cancer, grade 2, lobular, ER positive, HER2 negative disease status post mastectomy, lymph node sampling (June 2021).  Patient was negative for the BRCA gene mutation.  Pathology results (copied below) demonstrated four positive lymph nodes.  Patient was treated with adjuvant Taxotere and Cytoxan (completed in September 2021) followed by radiation.  Patient was subsequently placed on anastrozole and had tolerated this well; completed approximately 2+ years.    Patient was [facility] with severe buttocks pain.  Work-up demonstrated a sacral fracture + mass.  Biopsy was consistent with metastatic breast cancer.  Patient completed palliative RT: Patient has received 3 cycles of Doxil.    Presently Mrs. Bragg states feeling okay, better than before.  Patient developed hand-foot syndrome after the third cycle, now better.  No fevers or signs of infection.  Appetite is okay, weight is relatively stable.  No bruising or bleeding issues.  No pain control issues.  Activities are slightly better than before. No buttocks pain.  No respiratory problems.    Review of Systems   Constitutional:  Positive for activity change, appetite change and fatigue.   HENT: Negative.     Eyes: Negative.    Respiratory: Negative.     Cardiovascular: Negative.    Gastrointestinal: Negative.    Endocrine: Negative.    Genitourinary: Negative.    Musculoskeletal:  Positive for arthralgias and gait problem.   Skin: Negative.    Allergic/Immunologic: Negative.    Hematological: Negative.    Psychiatric/Behavioral: Negative.     All other systems reviewed and are negative.    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    History of radiation therapy     [place] (hard of hearing)     wears hearing aids/will wear DOS bilat    Hypertension     Limb alert care status     No BP/IV Left Arm    Lymphedema of left arm     Prediabetes     Wears glasses      Past Surgical History:   Procedure Laterality Date    AXILLARY SURGERY Right     sweat glands removed -     BREAST CYST EXCISION Right 2000    benign    BREAST SURGERY Right     CHOLECYSTECTOMY      COLONOSCOPY      DILATION AND CURETTAGE OF UTERUS      IR BIOPSY BONE  9/1/2023    IR PORT PLACEMENT  10/30/2023    MASTECTOMY Left 06/08/2021    TN BREAST REDUCTION N/A 03/18/2022    Procedure: R BREAST REDUCTION; L BREAST EXCISION STANDING SKIN DEFORMITY; LOCAL FLAP;  Surgeon: Lisa Pineda MD;  Location: [facility];  Service: Plastics    TN MAST MODF RAD W/AX LYMPH NOD W/WO PECT/NERI MIN Left 06/08/2021    Procedure: BREAST MODIFIED RADICAL MASTECTOMY; ANAND  DIRECTED AXILLARY DISSECTION;  Surgeon: Lisandro Tijerina MD;  Location: [facility];  Service: Surgical Oncology    REDUCTION MAMMAPLASTY Right     March 2022    US BREAST NEEDLE LOC LEFT Left 06/02/2021    US GUIDED BREAST BIOPSY LEFT COMPLETE Left 04/19/2021    US GUIDED BREAST LYMPH NODE BIOPSY LEFT Left 04/19/2021    WISDOM TOOTH EXTRACTION   "     Family History   Problem Relation Age of Onset    Dementia Mother     Colon cancer Mother         50's-60's    Lung cancer Father         50's-60's    No Known Problems Sister     Breast cancer Paternal Aunt     No Known Problems Sister     Stomach cancer Paternal Uncle         70's     Social History     Socioeconomic History    Marital status: /Civil Union     Spouse name: Not on file    Number of children: Not on file    Years of education: Not on file    Highest education level: Not on file   Occupational History    Not on file   Tobacco Use    Smoking status: Never     Passive exposure: Never    Smokeless tobacco: Never   Vaping Use    Vaping status: Never Used   Substance and Sexual Activity    Alcohol use: Never    Drug use: No    Sexual activity: Yes     Partners: Male     Birth control/protection: Male Sterilization     Comment: defer   Other Topics Concern    Not on file   Social History Narrative    Not on file     Social Determinants of Health     Financial Resource Strain: Low Risk  (5/3/2023)    Overall Financial Resource Strain (CARDIA)     Difficulty of Paying Living Expenses: Not hard at all   Food Insecurity: No Food Insecurity (8/31/2023)    Hunger Vital Sign     Worried About Running Out of Food in the Last Year: Never true     Ran Out of Food in the Last Year: Never true   Transportation Needs: No Transportation Needs (8/31/2023)    PRAPARE - Transportation     Lack of Transportation (Medical): No     Lack of Transportation (Non-Medical): No   Physical Activity: Not on file   Stress: Not on file   Social Connections: Not on file   Intimate Partner Violence: Not on file   Housing Stability: Low Risk  (8/31/2023)    Housing Stability Vital Sign     Unable to Pay for Housing in the Last Year: No     Number of Places Lived in the Last Year: 1     Unstable Housing in the Last Year: No       Current Outpatient Medications:     albuterol (ProAir HFA) 90 mcg/act inhaler, Inhale 2 puffs every  4 (four) hours as needed for wheezing or shortness of breath, Disp: 8 g, Rfl: 0    Ascorbic Acid (VITAMIN C PO), Take 500 mg by mouth in the morning, Disp: , Rfl:     fluticasone (FLONASE) 50 mcg/act nasal spray, 2 puffs into each nostril daily, Disp: , Rfl:     gabapentin (Neurontin) 100 mg capsule, Take 1 capsule (100 mg total) by mouth daily at bedtime, Disp: 30 capsule, Rfl: 1    hydrocortisone 2.5 % cream, Apply topically 3 (three) times a day Indication: Hand-foot syndrome from Doxil chemotherapy, Disp: 1 g, Rfl: 2    metoprolol tartrate (LOPRESSOR) 50 mg tablet, TAKE ONE TABLET BY MOUTH EVERY DAY, Disp: 90 tablet, Rfl: 1    Multiple Vitamins-Minerals (MULTIVITAMIN ADULT PO), Take by mouth daily, Disp: , Rfl:     naloxone (NARCAN) 4 mg/0.1 mL nasal spray, Administer 1 spray into a nostril. If no response after 2-3 minutes, give another dose in the other nostril using a new spray. For use in emergencies in the event of accidental ingestion, respiratory depression or oversedation., Disp: 1 each, Rfl: 1    ondansetron (ZOFRAN) 8 mg tablet, Take 1 tablet (8 mg total) by mouth every 8 (eight) hours as needed for nausea or vomiting, Disp: 20 tablet, Rfl: 5    oxyCODONE (ROXICODONE) 10 MG TABS, Take 1.5 tablets (15 mg total) by mouth every 4 (four) hours as needed for moderate pain Max Daily Amount: 90 mg, Disp: 150 tablet, Rfl: 0    potassium chloride (K-DUR,KLOR-CON) 10 mEq tablet, Take 1 tablet (10 mEq total) by mouth every other day, Disp: 45 tablet, Rfl: 3    pyridoxine (VITAMIN B6) 50 mg tablet, Take 1 tablet (50 mg total) by mouth daily, Disp: 30 tablet, Rfl: 4    senna-docusate sodium (SENOKOT-S) 8.6-50 mg per tablet, Take 1 tablet by mouth 2 (two) times a day (Patient taking differently: Take 1 tablet by mouth if needed), Disp: , Rfl: 0    VITAMIN D PO, Take by mouth in the morning, Disp: , Rfl:     LORazepam (ATIVAN) 1 mg tablet, Take 1 tablet (1 mg total) by mouth once as needed for anxiety for up to 1  dose Take 30 minutes to 1 hour prior to your schedule imaging/scan. Do not start before October 17, 2023. (Patient not taking: Reported on 11/16/2023), Disp: 1 tablet, Rfl: 0    ondansetron (ZOFRAN) 4 mg tablet, Take 1 tablet (4 mg total) by mouth every 8 (eight) hours as needed for nausea or vomiting, Disp: 30 tablet, Rfl: 0    Palbociclib (Ibrance) 125 MG capsule, Take 1 capsule (125 mg total) by mouth daily 21 days on followed by 7 days off (Patient not taking: Reported on 11/16/2023), Disp: 21 capsule, Rfl: 5    polyethylene glycol (MIRALAX) 17 g packet, Take 17 g by mouth daily as needed (as needed for constipation.) (Patient not taking: Reported on 11/20/2023), Disp: 255 g, Rfl: 0    potassium chloride 10% oral solution, Take 15 mL (20 mEq total) by mouth daily, Disp: 450 mL, Rfl: 3    No Known Allergies    Vitals:    01/29/24 1248   BP: 116/76   Pulse: 78   Resp: 16   Temp: 97.6 °F (36.4 °C)   SpO2: 99%     Physical Exam  Constitutional:       Appearance: She is well-developed.      Comments: Well-nourished female, no respiratory distress, no signs of george   HENT:      Head: Normocephalic and atraumatic.      Right Ear: External ear normal.      Left Ear: External ear normal.   Eyes:      Conjunctiva/sclera: Conjunctivae normal.      Pupils: Pupils are equal, round, and reactive to light.   Cardiovascular:      Rate and Rhythm: Normal rate and regular rhythm.      Heart sounds: Normal heart sounds.   Pulmonary:      Effort: Pulmonary effort is normal.      Breath sounds: Normal breath sounds.   Abdominal:      General: Bowel sounds are normal.      Palpations: Abdomen is soft.   Musculoskeletal:         General: Normal range of motion.      Cervical back: Normal range of motion and neck supple.   Skin:     General: Skin is warm.      Comments: Warm, moist, good color, hands very minimally red but no ulceration or rash, same with feet, no petechiae or ecchymoses, no purpura   Neurological:      Mental Status:  She is alert and oriented to person, place, and time.      Deep Tendon Reflexes: Reflexes are normal and symmetric.   Psychiatric:         Behavior: Behavior normal.         Thought Content: Thought content normal.         Judgment: Judgment normal.      Comments: Prior anxiety is much less/better than before     Extremities: No lower extremity mid bilaterally, no cords, pulses are 1+  Lymphatics:  No adenopathy in the neck, supraclavicular region, axilla bilaterally    Performance Status: 2 - Symptomatic, <50% confined to bed    Labs    1/26/2024 WBC = 4.36 hemoglobin = 12.3 hematocrit = 36.1 platelet = 312 neutrophil = 71% BUN = 8 creatinine = 0.54 calcium = 9.3 LFTs WNL    8/31/2023 CA 15-3 = 58 CA 27, 29 = 75 BUN = 21 creatinine = 0.62 calcium = 9.3 AST = 22 ALT = 26 alkaline phosphatase = 109 total protein = 6.9 total bilirubin = 0.64    Imaging    10/19/2023 PET/CT    OSSEOUS STRUCTURES:  -As noted on prior imaging examinations, destructive lytic left sacral mass extending to the SI joint and medial aspect of the left iliac bone. Lesion extends and as noted on prior MRI with subtle extraosseous extension. SUV max 8.7.  - There is also increased activity identified involving the superior right sacral ala, image 180, SUV max 3.5.  - There is a focus of increased activity in the medial aspect of the left humeral head image 62 without definite CT correlate. The SUV max is 3.9     IMPRESSION:     1. Large lytic/destructive lesion involving the sacrum, medial aspect of the left iliac bone, with subtle extraosseous extension, extension to the right of midline, and separate area of increased activity in the right superior sacral ala, consistent with   biopsy-proven metastatic breast cancer  2. There is also a focus of activity in the medial left humeral head most suspicious for osseous metastasis  3. There is multifocal right neck base/supraclavicular FDG avid adenopathy  4. Within the thorax, multifocal FDG avid  disease involving the mediastinum and left hilum, including a large subcarinal mass, as above further described  5. Small left effusion  6. Multiple pleural-based pulmonary opacities with only mild FDG activity. Differential includes pneumonia including postobstructive pneumonia, or possibly pulmonary infarctions.  7. Mildly heterogeneous liver activity although no definitive focal lesion is appreciated    9/1/2023 MRI pelvis    Impression:     Large infiltrative upper sacral lesion with asymmetric involvement of the left sacral ala, extension to the SI joint and medial left iliac bone as well as subtle extraosseous extension as detailed above, concerning for malignancy. Correlate with bone biopsy also done on this date. The lesion narrows the left L5 and S1 neural foramina and may touch upon the exiting nerve roots in these regions. Correlate for radiculitis in these territories. Cannot exclude subtle epidural invasion posterior to S1.    8/31/2023 bone scan.  Impression stated nonpathologic sacral fracture.  Otherwise no scintigraphic evidence of additional osseous metastases.    7/20/2023 mammogram diagnostic right with 3D and CAD.  Impression stated postop findings likely benign tissue asymmetry in the right outer 8:00 region, right overall category 2, recommend diagnostic mammogram 1 year right breast.    Pathology    11/8/2023 [id number] CDX.  Full report is scanned into epic.  Patient was found to have a PIK3CA mutation where alpelisib (Piqray) can be used.  Patient had a T p53 mutation as well as a PTPN11 mutation.  Patient also had an EGFR variant of uncertain clinical significance.  MSI-high    Case Report   Surgical Pathology Report                         Case: [id number]                                    Authorizing Provider:  Matt Ferguson PA-C       Collected:           09/01/2023 1417               Ordering Location:     [facility] Received:            09/01/2023 1522                                       [address]                                                                       Pathologist:           Scooter Herr MD                                                           Specimen:    Bone, left illiac crest                                                                    Addendum   Test Description Result Prognostic Interpretation  Estrogen Receptor 0% negative  Internal control:Not present Staining Intensity:NA*  External control:positive Mariajose Score*: 0     Progesterone Receptor 0% negative  Internal control:Not present Staining Intensity: NA*  External control: positive Mariajose Score*: 0     HER2 by IHC 2+ Equivocal     COMMENT:  Performance characteristics may vary for Breast Prognostic Markers in decalcified specimens and may result in decreased antigen detection.  Her2 by FISH cannot be run due to decalcified specimen.        Final Diagnosis   A.  Bone, left iliac crest, biopsy:  -Metastatic carcinoma, most compatible with metastasis from breast origin.  -Immunohistochemical stains performed with appropriate controls show the tumor to be positive for keratin AE1/3, CK7, and GATA3 and negative for CK20, mammaglobin, ER, CDX2, TTF-1, and PAX8; the findings support the diagnosis.   Electronically signed by Scooter Herr MD on 9/6/2023 at  9:09 AM          Case Report    Surgical Pathology Report                         Case: [id number]                                     Authorizing Provider:  Lisandro Tijerina MD              Collected:           06/08/2021 1057                Ordering Location:     [facility]        Received:            06/08/2021 [address] [place] [address] Operating Room                                                        Pathologist:           Eliza Caballero MD                                                                   Specimen:    Breast, Left, Left breast with axillary content - long suture marks lateral, medium                    suture marks medial, short suture marks superior                                              Final Diagnosis    A. 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  - Ductal carcinemia in situ.  - Skin with sebotropic keratosis.  - Nipple with no pathologic abnormality.  - Lymphovascular and perineural invasion are present   - All margins are negative  - Four of ten lymph nodes are positive for metastatic carcinoma (4/10).        Comment: Immunohistochemistry was performed on block A7. The tumor cells are positive for E cadherin and p120 in a membranous stain and negative for calponin and p63, supporting the above diagnosis.   AE1/3 is performed on tissue block  A32 to help in the assessment of this case.     Intradepartmental consultation is in agreement.     Note:  1. Ancillary Studies:      - Repeat HER2 testing (2013 ASCO/CAP Recommendations): Not indicated.      - Best representative tumor block: A5       -- Sufficient tumor present for          Agendia Mammaprint/Blueprint (1 cm2 of invasive tumor in aggregate): Yes.          MI Profile/Foundation One (at least 5 x 5 mm of tumor): Yes.  2.. Pathologic Stage Classification (pTNM, AJCC 8th Edition): 8th ed, AJCC Anatomic Stage:  at least Stage  - pT3 pN2a, G2.  3. 8th ed. AJCC Pathological Prognostic Stage: IB       Electronically signed by Eliza Caballero MD on 6/15/2021 at  9:14 AM    Note      Interpretation performed at [facility], [address] [facility] [address]       Additional Information      All reported additional testing was performed with appropriately reactive controls.  These tests were developed and their performance characteristics determined by [facility] or appropriate performing facility, though some tests may be performed on tissues which have not been validated for performance characteristics (such as  "staining performed on alcohol exposed cell blocks and decalcified tissues).  Results should be interpreted with caution and in the context of the patients’ clinical condition. These tests may not be cleared or approved by the U.S. Food and Drug Administration, though the FDA has determined that such clearance or approval is not necessary. These tests are used for clinical purposes and they should not be regarded as investigational or for research. This laboratory has been approved by [facility], designated as a high-complexity laboratory and is qualified to perform these tests.  .    Synoptic Checklist    INVASIVE CARCINOMA OF THE BREAST: Resection  8th Edition - Protocol posted: 2/26/2020  INVASIVE CARCINOMA OF THE BREAST: COMPLETE EXCISION - All Specimens       SPECIMEN   Procedure   Total mastectomy    Specimen Laterality   Left    TUMOR   Tumor Site   Upper outer quadrant    Histologic Type   Invasive carcinoma of no special type (ductal)    Glandular (Acinar) / Tubular Differentiation   Score 3    Nuclear Pleomorphism   Score 2    Mitotic Rate   Score 1    Overall Grade   Grade 2 (scores of 6 or 7)    Tumor Size   Greatest dimension of largest invasive focus (Millimeters): 60 mm   Additional Dimension (Millimeters)   38 mm       30 mm   Tumor Focality   Single focus of invasive carcinoma    Ductal Carcinoma In Situ (DCIS)   Present        Negative for extensive intraductal component (EIC)    Size (Extent) of DCIS       Number of Blocks with DCIS   3    Number of Blocks Examined   18    Architectural Patterns   Comedo        Solid    Nuclear Grade   Grade II (intermediate)    Necrosis   Present, central (expansive \"comedo\" necrosis)    Lobular Carcinoma In Situ (LCIS)   Not identified    Lymphovascular Invasion   Present    Dermal Lymphovascular Invasion   Not identified    Microcalcifications   Present in invasive carcinoma    Treatment Effect in the Breast   No known presurgical therapy    MARGINS   Invasive " Carcinoma Margins   Uninvolved by invasive carcinoma    Distance from Closest Margin (Millimeters)   Greater than: 20 mm   Closest Margin(s)   Posterior    DCIS Margins   Uninvolved by DCIS    Distance from Closest Margin (Millimeters)   Greater than: 20 mm   Closest Margin(s)   Posterior    LYMPH NODES   Regional Lymph Nodes   Involved by tumor cells    Number of Lymph Nodes with Macrometastases (> 2 mm)   4    Number of Lymph Nodes with Micrometastases (> 0.2 mm to 2 mm and / or > 200 cells)   0    Number of Lymph Nodes with Isolated Tumor Cells (<= 0.2 mm or <= 200 cells)   0    Size of Largest Metastatic Deposit (Millimeters)   At least: 35 mm   Extranodal Extension   Present    Extent of Extranodal Extension   Greater than 2 mm    Total Number of Lymph Nodes Examined   10    PATHOLOGIC STAGE CLASSIFICATION (pTNM, AJCC 8th Edition)       Primary Tumor (pT)   pT3    Regional Lymph Nodes (pN)   pN2a    ADDITIONAL FINDINGS   Additional Findings   intraductal papilloma    SPECIAL STUDIES   Breast Biomarker Testing Performed on Previous Biopsy       Estrogen Receptor (ER) Status   Positive (greater than 10% of cells demonstrate nuclear positivity)    Percentage of Cells with Nuclear Positivity   %    Breast Biomarker Testing Performed on Previous Biopsy       Progesterone Receptor (PgR) Status   Positive    Percentage of Cells with Nuclear Positivity   1-2 %   Breast Biomarker Testing Performed on Previous Biopsy       HER2 (by immunohistochemistry)   Negative (Score 1+)    Testing Performed on Case Number   [id number]    .

## 2024-01-31 ENCOUNTER — TELEPHONE (OUTPATIENT)
Dept: HEMATOLOGY ONCOLOGY | Facility: MEDICAL CENTER | Age: 74
End: 2024-01-31

## 2024-02-01 ENCOUNTER — OFFICE VISIT (OUTPATIENT)
Dept: FAMILY MEDICINE CLINIC | Facility: CLINIC | Age: 74
End: 2024-02-01
Payer: MEDICARE

## 2024-02-01 VITALS
RESPIRATION RATE: 16 BRPM | DIASTOLIC BLOOD PRESSURE: 74 MMHG | TEMPERATURE: 96.9 F | SYSTOLIC BLOOD PRESSURE: 122 MMHG | HEIGHT: 62 IN | WEIGHT: 176 LBS | BODY MASS INDEX: 32.39 KG/M2 | HEART RATE: 72 BPM

## 2024-02-01 DIAGNOSIS — C79.9 SECONDARY MALIGNANT NEOPLASM OF UNSPECIFIED SITE (CODE) (HCC): ICD-10-CM

## 2024-02-01 DIAGNOSIS — I10 BENIGN ESSENTIAL HYPERTENSION: Primary | ICD-10-CM

## 2024-02-01 DIAGNOSIS — R53.81 PHYSICAL DECONDITIONING: ICD-10-CM

## 2024-02-01 DIAGNOSIS — L29.9 ITCHING: ICD-10-CM

## 2024-02-01 DIAGNOSIS — Z17.0 MALIGNANT NEOPLASM OF UPPER-OUTER QUADRANT OF LEFT BREAST IN FEMALE, ESTROGEN RECEPTOR POSITIVE: ICD-10-CM

## 2024-02-01 DIAGNOSIS — C50.412 MALIGNANT NEOPLASM OF UPPER-OUTER QUADRANT OF LEFT BREAST IN FEMALE, ESTROGEN RECEPTOR POSITIVE: ICD-10-CM

## 2024-02-01 PROBLEM — E66.9 OBESITY (BMI 30-39.9): Status: RESOLVED | Noted: 2023-08-30 | Resolved: 2024-02-01

## 2024-02-01 PROBLEM — R63.0 LOSS OF APPETITE: Status: RESOLVED | Noted: 2023-09-27 | Resolved: 2024-02-01

## 2024-02-01 PROBLEM — R63.4 WEIGHT LOSS: Status: RESOLVED | Noted: 2023-09-27 | Resolved: 2024-02-01

## 2024-02-01 PROCEDURE — 99213 OFFICE O/P EST LOW 20 MIN: CPT | Performed by: INTERNAL MEDICINE

## 2024-02-01 RX ORDER — HYDROXYZINE PAMOATE 25 MG/1
25 CAPSULE ORAL 3 TIMES DAILY PRN
Qty: 60 CAPSULE | Refills: 3 | Status: SHIPPED | OUTPATIENT
Start: 2024-02-01

## 2024-02-01 NOTE — PROGRESS NOTES
Name: Gosia Bragg      : 1950      MRN: [id number]  Encounter Provider: Dalia Peña MD  Encounter Date: 2024   Encounter department: [facility]    Assessment & Plan     1. Benign essential hypertension  Assessment & Plan:  Well controlled on metoprolol and will continue as ordered.       2. Malignant neoplasm of upper-outer quadrant of left breast in female, estrogen receptor positive   Assessment & Plan:  Managed by oncology.      3. Secondary malignant neoplasm of unspecified site (CODE) (HCC)  Assessment & Plan:  Managed by oncology.  Currently doing treatments once per month.       4. Itching  Comments:  will treat symptomatically with antihistamines.  Orders:  -     hydrOXYzine pamoate (VISTARIL) 25 mg capsule; Take 1 capsule (25 mg total) by mouth 3 (three) times a day as needed for itching    5. Physical deconditioning  -     Ambulatory Referral to Physical Therapy; Future           Subjective      Here for follow up visit.  Since last visit, she has been getting monthly treatments for her metastatic breast cancer, and also finished palliative xrt for her bone lesions.  She denies pain and has not needed her oxycontin, has not been going to see palliative care.  Her only complaint is itching, she has this diffusely and it is worse at night.  Per patient her oncologist suggested PT and she would like to do this for deconditioning and gait retraining.      Review of Systems   Constitutional: Negative.    Respiratory: Negative.     Cardiovascular: Negative.    Musculoskeletal:  Negative for arthralgias.   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 If no response after 2-3 minutes, give another dose in the other nostril using a new spray. For use in emergencies in the event of accidental ingestion, respiratory depression or oversedation.   • oxyCODONE (ROXICODONE) 10 MG TABS Take 1.5 tablets (15 mg total) by mouth every 4 (four) hours as needed for moderate pain Max Daily Amount: 90 mg   • polyethylene glycol (MIRALAX) 17 g packet Take 17 g by mouth daily as needed (as needed for constipation.)   • potassium chloride (K-DUR,KLOR-CON) 10 mEq tablet Take 1 tablet (10 mEq total) by mouth every other day   • pyridoxine (VITAMIN B6) 50 mg tablet Take 1 tablet (50 mg total) by mouth daily   • senna-docusate sodium (SENOKOT-S) 8.6-50 mg per tablet Take 1 tablet by mouth 2 (two) times a day (Patient taking differently: Take 1 tablet by mouth if needed)   • VITAMIN D PO Take by mouth in the morning   • [DISCONTINUED] LORazepam (ATIVAN) 1 mg tablet Take 1 tablet (1 mg total) by mouth once as needed for anxiety for up to 1 dose Take 30 minutes to 1 hour prior to your schedule imaging/scan. Do not start before October 17, 2023. (Patient not taking: Reported on 11/16/2023)   • [DISCONTINUED] ondansetron (ZOFRAN) 4 mg tablet Take 1 tablet (4 mg total) by mouth every 8 (eight) hours as needed for nausea or vomiting   • [DISCONTINUED] ondansetron (ZOFRAN) 8 mg tablet Take 1 tablet (8 mg total) by mouth every 8 (eight) hours as needed for nausea or vomiting (Patient not taking: Reported on 2/1/2024)   • [DISCONTINUED]  "Palbociclib (Ibrance) 125 MG capsule Take 1 capsule (125 mg total) by mouth daily 21 days on followed by 7 days off (Patient not taking: Reported on 11/16/2023)   • [DISCONTINUED] potassium chloride 10% oral solution Take 15 mL (20 mEq total) by mouth daily       Objective     /74   Pulse 72   Temp (!) 96.9 °F (36.1 °C) (Temporal)   Resp 16   Ht 5' 2\" (1.575 m)   Wt 79.8 kg (176 lb)   BMI 32.19 kg/m²     Physical Exam  Constitutional:       Appearance: She is well-developed.   Eyes:      Conjunctiva/sclera: Conjunctivae normal.   Neck:      Thyroid: No thyromegaly.      Vascular: No JVD.   Cardiovascular:      Rate and Rhythm: Normal rate and regular rhythm.      Heart sounds: Normal heart sounds. No murmur heard.     No friction rub. No gallop.   Pulmonary:      Effort: Pulmonary effort is normal.      Breath sounds: Normal breath sounds. No wheezing or rales.   Abdominal:      General: Bowel sounds are normal. There is no distension.      Palpations: Abdomen is soft.      Tenderness: There is no abdominal tenderness.   Musculoskeletal:      Cervical back: Neck supple.   Skin:     General: Skin is warm and dry.      Findings: No rash.       Dalia Peña MD    "

## 2024-02-02 ENCOUNTER — HOSPITAL ENCOUNTER (OUTPATIENT)
Dept: INFUSION CENTER | Facility: HOSPITAL | Age: 74
End: 2024-02-02
Attending: INTERNAL MEDICINE
Payer: MEDICARE

## 2024-02-02 VITALS
OXYGEN SATURATION: 98 % | RESPIRATION RATE: 18 BRPM | SYSTOLIC BLOOD PRESSURE: 136 MMHG | WEIGHT: 170.64 LBS | DIASTOLIC BLOOD PRESSURE: 78 MMHG | HEART RATE: 88 BPM | TEMPERATURE: 97.2 F | BODY MASS INDEX: 30.23 KG/M2 | HEIGHT: 63 IN

## 2024-02-02 DIAGNOSIS — Z17.0 MALIGNANT NEOPLASM OF UPPER-OUTER QUADRANT OF LEFT BREAST IN FEMALE, ESTROGEN RECEPTOR POSITIVE: Primary | ICD-10-CM

## 2024-02-02 DIAGNOSIS — C50.412 MALIGNANT NEOPLASM OF UPPER-OUTER QUADRANT OF LEFT BREAST IN FEMALE, ESTROGEN RECEPTOR POSITIVE: Primary | ICD-10-CM

## 2024-02-02 PROCEDURE — 96367 TX/PROPH/DG ADDL SEQ IV INF: CPT

## 2024-02-02 PROCEDURE — 96413 CHEMO IV INFUSION 1 HR: CPT

## 2024-02-02 RX ORDER — DEXTROSE MONOHYDRATE 50 MG/ML
20 INJECTION, SOLUTION INTRAVENOUS ONCE
Status: COMPLETED | OUTPATIENT
Start: 2024-02-02 | End: 2024-02-02

## 2024-02-02 RX ADMIN — DEXAMETHASONE SODIUM PHOSPHATE: 10 INJECTION, SOLUTION INTRAMUSCULAR; INTRAVENOUS at 09:43

## 2024-02-02 RX ADMIN — DOXORUBICIN HYDROCHLORIDE 83.7 MG: 2 INJECTION, SUSPENSION, LIPOSOMAL INTRAVENOUS at 10:22

## 2024-02-02 RX ADMIN — DEXTROSE 20 ML/HR: 50 INJECTION, SOLUTION INTRAVENOUS at 09:43

## 2024-02-02 NOTE — PLAN OF CARE
Problem: Potential for Falls  Goal: Patient will remain free of falls  Description: INTERVENTIONS:  - Educate patient/family on patient safety including physical limitations  - Instruct patient to call for assistance with activity   - Keep Call bell within reach  - Keep bed low and locked with side rails adjusted as appropriate  -Outcome: Progressing     Problem: DISCHARGE PLANNING  Goal: Discharge to home or other facility with appropriate resources  Description: INTERVENTIONS:  - Identify barriers to discharge w/patient and caregiver  - Arrange for needed discharge resources and transportation as appropriate  - Identify discharge learning needs (meds, wound care, etc.)  - Arrange for interpretive services to assist at discharge as needed  - Refer to Case Management Department for coordinating discharge planning if the patient needs post-hospital services based on physician/advanced practitioner order or complex needs related to functional status, cognitive ability, or social support system  Outcome: Progressing

## 2024-02-02 NOTE — PROGRESS NOTES
Pt states her hands feel better, slightly pink, will  Rx today for itching today. States itching is worse at night. Using hydrocortisone cream on hands.

## 2024-02-02 NOTE — PROGRESS NOTES
Gosia Bragg  tolerated treatment well with no complications.      Gosia Bragg is aware of future appt on 2/5/24 at 1100.     AVS printed and given to Gosia Bragg:    No (Declined by Gosia Bragg)

## 2024-02-05 ENCOUNTER — HOSPITAL ENCOUNTER (OUTPATIENT)
Dept: INFUSION CENTER | Facility: HOSPITAL | Age: 74
Discharge: HOME | End: 2024-02-05
Attending: INTERNAL MEDICINE
Payer: MEDICARE

## 2024-02-05 VITALS
DIASTOLIC BLOOD PRESSURE: 77 MMHG | RESPIRATION RATE: 18 BRPM | HEART RATE: 84 BPM | SYSTOLIC BLOOD PRESSURE: 160 MMHG | TEMPERATURE: 97.5 F | OXYGEN SATURATION: 99 %

## 2024-02-05 DIAGNOSIS — C50.412 MALIGNANT NEOPLASM OF UPPER-OUTER QUADRANT OF LEFT BREAST IN FEMALE, ESTROGEN RECEPTOR POSITIVE: Primary | ICD-10-CM

## 2024-02-05 DIAGNOSIS — Z17.0 MALIGNANT NEOPLASM OF UPPER-OUTER QUADRANT OF LEFT BREAST IN FEMALE, ESTROGEN RECEPTOR POSITIVE: Primary | ICD-10-CM

## 2024-02-05 PROCEDURE — 96372 THER/PROPH/DIAG INJ SC/IM: CPT

## 2024-02-05 RX ADMIN — PEGFILGRASTIM 4 MG: 6 INJECTION SUBCUTANEOUS at 11:00

## 2024-02-05 NOTE — PLAN OF CARE
Problem: Potential for Falls  Goal: Patient will remain free of falls  Description: INTERVENTIONS:  - Educate patient/family on patient safety including physical limitations  - Instruct patient to call for assistance with activity   - Keep Call bell within reach  -Outcome: Progressing     Problem: Knowledge Deficit  Goal: Patient/family/caregiver demonstrates understanding of disease process, treatment plan, medications, and discharge instructions  Description: Complete learning assessment and assess knowledge base.  Interventions:  - Provide teaching at level of understanding  - Provide teaching via preferred learning methods  Outcome: Progressing

## 2024-02-05 NOTE — PROGRESS NOTES
Gosia Bragg  tolerated treatment well with no complications.      Gosia Bragg is aware of future appt on 3/1/24 at 1000.     AVS printed and given to Gosia Bragg:    No (Declined by Gosia Bragg)

## 2024-02-23 RX ORDER — DEXTROSE MONOHYDRATE 50 MG/ML
20 INJECTION, SOLUTION INTRAVENOUS ONCE
Status: CANCELLED | OUTPATIENT
Start: 2024-03-01

## 2024-02-27 ENCOUNTER — APPOINTMENT (OUTPATIENT)
Dept: LAB | Facility: CLINIC | Age: 74
End: 2024-02-27
Payer: MEDICARE

## 2024-02-27 DIAGNOSIS — Z17.0 MALIGNANT NEOPLASM OF UPPER-OUTER QUADRANT OF LEFT BREAST IN FEMALE, ESTROGEN RECEPTOR POSITIVE: ICD-10-CM

## 2024-02-27 DIAGNOSIS — C50.412 MALIGNANT NEOPLASM OF UPPER-OUTER QUADRANT OF LEFT BREAST IN FEMALE, ESTROGEN RECEPTOR POSITIVE: ICD-10-CM

## 2024-03-01 ENCOUNTER — HOSPITAL ENCOUNTER (OUTPATIENT)
Dept: INFUSION CENTER | Facility: HOSPITAL | Age: 74
End: 2024-03-01
Attending: INTERNAL MEDICINE
Payer: MEDICARE

## 2024-03-01 VITALS
DIASTOLIC BLOOD PRESSURE: 74 MMHG | OXYGEN SATURATION: 99 % | HEIGHT: 63 IN | BODY MASS INDEX: 30.12 KG/M2 | SYSTOLIC BLOOD PRESSURE: 162 MMHG | WEIGHT: 169.97 LBS | RESPIRATION RATE: 18 BRPM | HEART RATE: 99 BPM | TEMPERATURE: 98 F

## 2024-03-01 DIAGNOSIS — Z17.0 MALIGNANT NEOPLASM OF UPPER-OUTER QUADRANT OF LEFT BREAST IN FEMALE, ESTROGEN RECEPTOR POSITIVE: Primary | ICD-10-CM

## 2024-03-01 DIAGNOSIS — C50.412 MALIGNANT NEOPLASM OF UPPER-OUTER QUADRANT OF LEFT BREAST IN FEMALE, ESTROGEN RECEPTOR POSITIVE: Primary | ICD-10-CM

## 2024-03-01 PROCEDURE — 96413 CHEMO IV INFUSION 1 HR: CPT

## 2024-03-01 PROCEDURE — 96367 TX/PROPH/DG ADDL SEQ IV INF: CPT

## 2024-03-01 RX ORDER — DEXTROSE MONOHYDRATE 50 MG/ML
20 INJECTION, SOLUTION INTRAVENOUS ONCE
Status: COMPLETED | OUTPATIENT
Start: 2024-03-01 | End: 2024-03-01

## 2024-03-01 RX ADMIN — DEXTROSE 20 ML/HR: 50 INJECTION, SOLUTION INTRAVENOUS at 10:16

## 2024-03-01 RX ADMIN — DOXORUBICIN HYDROCHLORIDE 83.7 MG: 2 INJECTION, SUSPENSION, LIPOSOMAL INTRAVENOUS at 11:24

## 2024-03-01 RX ADMIN — DEXAMETHASONE SODIUM PHOSPHATE: 10 INJECTION, SOLUTION INTRAMUSCULAR; INTRAVENOUS at 10:16

## 2024-03-01 NOTE — PROGRESS NOTES
..Gosia Bragg  tolerated treatment well with no complications.      Gosia Bragg is aware of future appt on 3/4 at 1100.     AVS printed and given to Gosia Bragg:    No (Declined by Gosia Bragg)

## 2024-03-04 ENCOUNTER — HOSPITAL ENCOUNTER (OUTPATIENT)
Dept: INFUSION CENTER | Facility: HOSPITAL | Age: 74
Discharge: HOME | End: 2024-03-04
Attending: INTERNAL MEDICINE
Payer: MEDICARE

## 2024-03-04 ENCOUNTER — OFFICE VISIT (OUTPATIENT)
Dept: HEMATOLOGY ONCOLOGY | Facility: MEDICAL CENTER | Age: 74
End: 2024-03-04
Payer: MEDICARE

## 2024-03-04 VITALS
WEIGHT: 171 LBS | OXYGEN SATURATION: 100 % | RESPIRATION RATE: 17 BRPM | HEIGHT: 63 IN | HEART RATE: 103 BPM | DIASTOLIC BLOOD PRESSURE: 82 MMHG | BODY MASS INDEX: 30.3 KG/M2 | TEMPERATURE: 98 F | SYSTOLIC BLOOD PRESSURE: 138 MMHG

## 2024-03-04 DIAGNOSIS — C50.412 MALIGNANT NEOPLASM OF UPPER-OUTER QUADRANT OF LEFT BREAST IN FEMALE, ESTROGEN RECEPTOR POSITIVE: ICD-10-CM

## 2024-03-04 DIAGNOSIS — M84.58XA PATHOLOGICAL FRACTURE OF SACRAL VERTEBRA DUE TO NEOPLASTIC DISEASE: Primary | ICD-10-CM

## 2024-03-04 DIAGNOSIS — C50.412 MALIGNANT NEOPLASM OF UPPER-OUTER QUADRANT OF LEFT BREAST IN FEMALE, ESTROGEN RECEPTOR POSITIVE: Primary | ICD-10-CM

## 2024-03-04 DIAGNOSIS — Z17.0 MALIGNANT NEOPLASM OF UPPER-OUTER QUADRANT OF LEFT BREAST IN FEMALE, ESTROGEN RECEPTOR POSITIVE: ICD-10-CM

## 2024-03-04 DIAGNOSIS — Z17.0 MALIGNANT NEOPLASM OF UPPER-OUTER QUADRANT OF LEFT BREAST IN FEMALE, ESTROGEN RECEPTOR POSITIVE: Primary | ICD-10-CM

## 2024-03-04 PROCEDURE — 99215 OFFICE O/P EST HI 40 MIN: CPT | Performed by: INTERNAL MEDICINE

## 2024-03-04 PROCEDURE — 96372 THER/PROPH/DIAG INJ SC/IM: CPT

## 2024-03-04 RX ADMIN — PEGFILGRASTIM 4 MG: 6 INJECTION SUBCUTANEOUS at 11:12

## 2024-03-04 NOTE — PROGRESS NOTES
Gosia Bragg  tolerated neulasta injection in R arm well with no complications. Aware of future appt on 3/29 at 7:30 am. AVS declined. Patient left clinic ambulatory with walker.

## 2024-03-04 NOTE — PROGRESS NOTES
Gosia Bragg  1950  [facility] [address] [address]    DISCUSSION/SUMMARY:    73-year-old female with history of stage IIIA left-sided breast cancer now with evidence of metastatic disease.  Issues:    Pathologic sacral fracture.  Patient has been seen/evaluated by radiation oncology and completed palliative RT. The prior PET/CT results are listed below.  There is a left humeral head lesion also concerning for metastatic disease.  Patient denies any arm pain - surveillance continues.    Osseous metastatic disease.  As discussed previously, patient is a candidate for antireabsorption therapy.  Mrs. Bragg is still working with her dentist to get clearance.  As discussed previously, patient has significant bone metastases and should start either Zometa or Xgeva as soon as possible.    Pain control.  Patient states that her pain is now well-controlled.  Patient will follow-up with palliative care as directed.    Metastatic breast cancer.  Patient was previously started on Ibrance and Faslodex (Dr. Angela); received 1 dose of Faslodex only.  Patient subsequently underwent PET/CT.  Besides the bone lesions above, patient was found to have lesions in both lungs, mediastinal adenopathy, right-sided supraclavicular adenopathy, mediastinum, left hilum, large subcarinal mass and a small left pleural effusion (not tapped).  Patient's performance status was +/-, no respiratory issues.  Because of the extent of disease and the concern for pending visceral crisis, patient was started on Doxil.  Mrs. Bragg has now completed 5 cycles, done relatively well (hand-foot syndrome after the third cycle, none after the fourth and fifth with manipulations).  The plan is to complete 6 cycles and then repeat PET/CT.  Next cycle is in approximately 3.5 weeks.  Patient will go for a PET/CT in 7 weeks and return here in 8 weeks.  As long as  there has been a significant/good response to treatment, the plan will be to go back to the Faslodex and CDK 4/6 inhibitor (prior to the diagnosis of heavy tumor burden, patient had not been on the medications for any length of time).    [id number] demonstrated that patient has a PIK3CA mutation so alpelisib (Piqray) is an option in the future also.    NCCN guidelines 4.2023 states that for patients with recurrent or metastatic breast cancer, HR positive and HER2/leonela negative, threatening visceral crisis, preferred regimen includes an anthracycline.  Other options include taxanes but patient was previously treated with Taxotere/Cytoxan.      Patient is to return in 8 weeks.  Mrs. Bragg knows to call the hematology/oncology office if there are any other questions or concerns.    Carefully review your medication list and verify that the list is accurate and up-to-date. Please call the hematology/oncology office if there are medications missing from the list, medications on the list that you are not currently taking or if there is a dosage or instruction that is different from how you're taking that medication.    Patient goals and areas of care: Continue with Doxil with dose reduction  Barriers to care: none  Patient is able to self-care  _____________________________________________________________________________________    Chief Complaint   Patient presents with    Follow-up    Metastatic breast cancer     Advance Care Planning/Advance Directives: Not yet discussed    Oncology History Overview Note   73 year old female with history of left breast invasive mammary carcinoma, grade 2, ER/DC positive, HER 2 negative, 4/10 lymph nodes micrometastases.  She is s/p left breast modified radical mastectomy, ANAND  directed axillary dissection on 6/8/21. She completed adjuvant chemotherapy with Cytoxan/Taxotere on 9/17/21. This was followed by a course of radiation to the left chest wall and regional lymphatics on  11/19/2021.  More recently, patient underwent palliative radiation for newly diagnosed bone metastasis to L5  sacrum. Treatment completed on 9/29/23. She presents today for EOT telephone follow up call.     10/2/23 CT head w wo contrast  IMPRESSION:  No acute intracranial abnormality.    10/6/23 Palliative Care -Telemed    10/19/23 NM PET CT skull base to mid thigh  IMPRESSION:  1. Large lytic/destructive lesion involving the sacrum, medial aspect of the left iliac bone, with subtle extraosseous extension, extension to the right of midline, and separate area of increased activity in the right superior sacral ala, consistent with   biopsy-proven metastatic breast cancer  2. There is also a focus of activity in the medial left humeral head most suspicious for osseous metastasis  3. There is multifocal right neck base/supraclavicular FDG avid adenopathy  4. Within the thorax, multifocal FDG avid disease involving the mediastinum and left hilum, including a large subcarinal mass, as above further described  5. Small left effusion  6. Multiple pleural-based pulmonary opacities with only mild FDG activity. Differential includes pneumonia including postobstructive pneumonia, or possibly pulmonary infarctions.  7. Mildly heterogeneous liver activity although no definitive focal lesion is appreciated    10/26/23 Hematology Oncology - Dr. Ledesma  PET/CT =  left humeral head lesion also concerning for metastatic disease.    Patient denies any arm pain.  This will need to be monitored.   Candidate for antireabsorprtion therapy, will need dental clearance first  Besides the bone lesions above, patient was found to have lesions in both lungs, mediastinal adenopathy, right-sided supraclavicular adenopathy, mediastinum, left hilum, large subcarinal mass and a small left pleural effusion (not tapped).   I believe there are enough areas of abnormality on the PET/CT to warrant more aggressive systemic treatment (concern for progressing  to visceral crises).   Mrs. Bragg understands that the idea is to give more aggressive systemic treatments upfront, stabilize the disease and then go back to the CDK 4/6 inhibitor with Faslodex.  In the future, if evidence of disease progression, a soft tissue biopsy will be needed.  Patient could have peripheral blood sent for [id number].     10/30/23 IR port placement    Upcomin23 Hematology Oncology     Malignant neoplasm of upper-outer quadrant of left breast in female, estrogen receptor positive    2021 Biopsy    Left breast US guided biopsy:  A. 2 o'clock 8 cm from the nipple  Invasive mammary carcinoma of no special type  Grade 2  ER 90  CA 1  HER2 1+  Lymphovascular invasion: not identified    B. Left Axillary lymph node biopsy:  Invasive/ metastatic adenocarcinoma, compatible with breast primary involving fibroadipose tissues and a portion of lymphoid pranchyma.   ER 90  CA 1  HER2 1+    Concordant. Malignancy appears multifocal. The 2 adjacent masses span an area of approximately 4 cm. Right breast clear.      5/3/2021 Genetic Testing    The following genes were evaluated: OLINDA, BRCA1, BRCA2, CDH1, CHEK2, PALB2, PTEN, STK11, TP53  Additional genes evaluated for a total of 36 genes analyzed  Negative result. No pathogenic sequence variants or deletions/dupllications identified  [employer]     2021 Surgery    Left breast modified radical mastectomy with ANAND  directed axillary dissection  Invasive carcinoma of no special type (ductal)  Grade 2  6 cm  Lymphovascular invasion present  Margins negative  4/10 Lymph nodes (3.5 cm)  Anatomic Stage IIIA  Prognostic Stage IB     2021 - 2021 Chemotherapy    pegfilgrastim (NEULASTA ONPRO), 6 mg, Subcutaneous, Once, 4 of 4 cycles  Administration: 6 mg (2021), 6 mg (2021), 6 mg (2021), 6 mg (2021)  cyclophosphamide (CYTOXAN) IVPB, 600 mg/m2 = 1,212 mg, Intravenous, Once, 4 of 4 cycles  Administration: 1,212 mg  (7/16/2021), 1,212 mg (8/6/2021), 1,212 mg (8/27/2021), 1,212 mg (9/17/2021)  DOCEtaxel (TAXOTERE) chemo infusion, 75 mg/m2 = 151.6 mg, Intravenous, Once, 4 of 4 cycles  Administration: 151.6 mg (7/16/2021), 151.6 mg (8/6/2021), 151.6 mg (8/27/2021), 151.6 mg (9/17/2021)     10/18/2021 - 11/19/2021 Radiation    BH L CW 10X/6X 13 / 13 200 0 2,600 32   BH L CW BOLUS 10X/6X 12 / 12 200 0 2,400 30   BH L PAB 10X 25 / 25 60 0 1,500 32   BH L Sclav 10X 25 / 25 200 0 5,000 32      Treatment Dates:  10/18/2021 - 11/19/2021.   Dr Estrada     10/2021 -  Hormone Therapy    anastrozole 1 mg once a day    Dr Angela     9/1/2023 Biopsy    Bone, left iliac crest, biopsy:  -Metastatic carcinoma, most compatible with metastasis from breast origin.     9/12/2023 -  Cancer Staged    Staging form: Breast, AJCC 8th Edition  - Clinical: Stage IV (cM1) - Signed by Tiffany Babb MD on 9/12/2023 9/25/2023 - 9/29/2023 Radiation    The patient saw @[facility]@ for radiation treatment. This is the current list of radiation treatment:  Plan ID Energy Fractions Dose per Fraction (cGy) Dose Correction (cGy) Total Dose Delivered (cGy) Elapsed Days   L5_Sacrum 10X 5 / 5 400 0 2,000 4        11/10/2023 -  Chemotherapy    alteplase (CATHFLO), 2 mg, Intracatheter, Every 1 Minute as needed, 5 of 6 cycles  pegfilgrastim (NEULASTA), 4 mg (100 % of original dose 4 mg), Subcutaneous, Once, 5 of 6 cycles  Dose modification: 4 mg (original dose 4 mg, Cycle 1)  Administration: 4 mg (11/13/2023), 4 mg (12/11/2023), 4 mg (1/8/2024), 4 mg (2/5/2024)  DOXOrubicin liposomal (DOXIL) chemo infusion, 93 mg, Intravenous, Once, 5 of 6 cycles  Dose modification: 45 mg/m2 (90 % of original dose 50 mg/m2, Cycle 5, Reason: Dose Not Tolerated)  Administration: 90 mg (11/10/2023), 90 mg (12/8/2023), 93 mg (1/5/2024), 83.7 mg (3/1/2024), 83.7 mg (2/2/2024)       History of Present Illness: 73-year-old female previously followed by Dr. Angela.  Patient has a history of  stage IIIA left breast cancer, grade 2, lobular, ER positive, HER2 negative disease status post mastectomy, lymph node sampling (June 2021).  Patient was negative for the BRCA gene mutation.  Pathology results (copied below) demonstrated four positive lymph nodes.  Patient was treated with adjuvant Taxotere and Cytoxan (completed in September 2021) followed by radiation.  Patient was subsequently placed on anastrozole and had tolerated this well; completed approximately 2+ years.    Patient was admitted to [facility] in the fall 2023 with severe buttocks pain.  Work-up demonstrated a sacral fracture + mass.  Biopsy was consistent with metastatic breast cancer.  Patient completed palliative RT: Patient has received 5 cycles of Doxil.    Presently Mrs. Bragg states feeling okay, better than before.  Patient developed hand-foot syndrome after the third cycle, now better.  No issues with the fourth and fifth cycle.  No fevers or signs of infection.  Appetite is okay, weight is relatively stable.  No bruising or bleeding issues.  No pain control issues.  Activities are slightly better than before, patient uses a walker. No buttocks pain.  No respiratory problems.    Patient states that occasionally while eating, food seems to get stuck midway and comes back up.  Also happens with larger pills.  Maybe a couple times a week but clearly not every day.    Review of Systems   Constitutional:  Positive for activity change, appetite change and fatigue.   HENT: Negative.     Eyes: Negative.    Respiratory: Negative.     Cardiovascular: Negative.    Gastrointestinal: Negative.    Endocrine: Negative.    Genitourinary: Negative.    Musculoskeletal:  Positive for arthralgias and gait problem.   Skin: Negative.    Allergic/Immunologic: Negative.    Hematological: Negative.    Psychiatric/Behavioral: Negative.     All other systems reviewed and are negative.    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    History of radiation therapy     [place] (hard of hearing)     wears hearing aids/will wear DOS bilat    Hypertension     Limb alert care status     No BP/IV Left Arm    Lymphedema of left arm     Prediabetes     Wears glasses      Past Surgical History:   Procedure Laterality Date    AXILLARY SURGERY Right     sweat glands removed -     BREAST CYST EXCISION Right 2000    benign    BREAST SURGERY Right     CHOLECYSTECTOMY      COLONOSCOPY      DILATION AND CURETTAGE OF UTERUS      IR BIOPSY BONE  9/1/2023    IR PORT PLACEMENT  10/30/2023    MASTECTOMY Left 06/08/2021    [facility] BREAST REDUCTION N/A 03/18/2022    Procedure: R BREAST REDUCTION; L BREAST EXCISION STANDING SKIN DEFORMITY; LOCAL FLAP;  Surgeon: Lisa Pineda MD;  Location: [facility];  Service: Plastics    [facility] MAST MODF RAD W/AX LYMPH NOD W/WO PECT/NERI MIN Left 06/08/2021    Procedure: BREAST MODIFIED RADICAL MASTECTOMY; ANAND  DIRECTED AXILLARY DISSECTION;  Surgeon: Lisandro Tijerina MD;  Location: [facility];  Service: Surgical Oncology    REDUCTION MAMMAPLASTY Right     " March 2022    US BREAST NEEDLE LOC LEFT Left 06/02/2021    US GUIDED BREAST BIOPSY LEFT COMPLETE Left 04/19/2021    US GUIDED BREAST LYMPH NODE BIOPSY LEFT Left 04/19/2021    WISDOM TOOTH EXTRACTION       Family History   Problem Relation Age of Onset    Dementia Mother     Colon cancer Mother         50's-60's    Lung cancer Father         50's-60's    No Known Problems Sister     Breast cancer Paternal Aunt     No Known Problems Sister     Stomach cancer Paternal Uncle         70's     Social History     Socioeconomic History    Marital status: /Civil Union     Spouse name: Not on file    Number of children: Not on file    Years of education: Not on file    Highest education level: Not on file   Occupational History    Not on file   Tobacco Use    Smoking status: Never     Passive exposure: Never    Smokeless tobacco: Never   Vaping Use    Vaping status: Never Used   Substance and Sexual Activity    Alcohol use: Never    Drug use: No    Sexual activity: Yes     Partners: Male     Birth control/protection: Male Sterilization     Comment: defer   Other Topics Concern    Not on file   Social History Narrative    Not on file     Social Determinants of Health     Financial Resource Strain: Low Risk  (5/3/2023)    Overall Financial Resource Strain (CARDIA)     Difficulty of Paying Living Expenses: Not hard at all   Food Insecurity: No Food Insecurity (8/31/2023)    Hunger Vital Sign     Worried About Running Out of Food in the Last Year: Never true     Ran Out of Food in the Last Year: Never true   Transportation Needs: No Transportation Needs (8/31/2023)    PRAPARE - Transportation     Lack of Transportation (Medical): No     Lack of Transportation (Non-Medical): No   Physical Activity: Not on file   Stress: Not on file   Social Connections: Not on file   Intimate Partner Violence: Not on file   Housing Stability: Low Risk  (8/31/2023)    Housing Stability Vital Sign     Unable to Pay for Housing in the Last  Year: No     Number of Places Lived in the Last Year: 1     Unstable Housing in the Last Year: No       Current Outpatient Medications:     albuterol (ProAir HFA) 90 mcg/act inhaler, Inhale 2 puffs every 4 (four) hours as needed for wheezing or shortness of breath, Disp: 8 g, Rfl: 0    Ascorbic Acid (VITAMIN C PO), Take 500 mg by mouth in the morning, Disp: , Rfl:     fluticasone (FLONASE) 50 mcg/act nasal spray, 2 puffs into each nostril daily, Disp: , Rfl:     gabapentin (Neurontin) 100 mg capsule, Take 1 capsule (100 mg total) by mouth daily at bedtime, Disp: 30 capsule, Rfl: 1    hydrocortisone 2.5 % cream, Apply topically 3 (three) times a day Indication: 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 If no response after 2-3 minutes, give another dose in the other nostril using a new spray. For use in emergencies in the event of accidental ingestion, respiratory depression or oversedation., Disp: 1 each, Rfl: 1    oxyCODONE (ROXICODONE) 10 MG TABS, Take 1.5 tablets (15 mg total) by mouth every 4 (four) hours as needed for moderate pain Max Daily Amount: 90 mg, Disp: 150 tablet, Rfl: 0    polyethylene glycol (MIRALAX) 17 g packet, Take 17 g by mouth daily as needed (as needed for constipation.), Disp: 255 g, Rfl: 0    potassium chloride (K-DUR,KLOR-CON) 10 mEq tablet, Take 1 tablet (10 mEq total) by mouth every other day, Disp: 45 tablet, Rfl: 3    pyridoxine (VITAMIN B6) 50 mg tablet, Take 1 tablet (50 mg total) by mouth daily, Disp: 30 tablet, Rfl: 4     senna-docusate sodium (SENOKOT-S) 8.6-50 mg per tablet, Take 1 tablet by mouth 2 (two) times a day (Patient taking differently: Take 1 tablet by mouth if needed), Disp: , Rfl: 0    VITAMIN D PO, Take by mouth in the morning, Disp: , Rfl:   No current facility-administered medications for this visit.    No Known Allergies    Vitals:    03/04/24 0901   BP: 138/82   Pulse: 103   Resp: 17   Temp: 98 °F (36.7 °C)   SpO2: 100%     Physical Exam  Constitutional:       Appearance: She is well-developed.      Comments: Well-nourished female, no respiratory distress, no signs of george   HENT:      Head: Normocephalic and atraumatic.      Right Ear: External ear normal.      Left Ear: External ear normal.   Eyes:      Conjunctiva/sclera: Conjunctivae normal.      Pupils: Pupils are equal, round, and reactive to light.   Cardiovascular:      Rate and Rhythm: Normal rate and regular rhythm.      Heart sounds: Normal heart sounds.   Pulmonary:      Effort: Pulmonary effort is normal.      Breath sounds: Normal breath sounds.   Abdominal:      General: Bowel sounds are normal.      Palpations: Abdomen is soft.   Musculoskeletal:         General: Normal range of motion.      Cervical back: Normal range of motion and neck supple.   Skin:     General: Skin is warm.      Comments: Warm, moist, good color, hands very minimally red but no ulceration or rash, same with feet, no petechiae or ecchymoses, no purpura   Neurological:      Mental Status: She is alert and oriented to person, place, and time.      Deep Tendon Reflexes: Reflexes are normal and symmetric.   Psychiatric:         Behavior: Behavior normal.         Thought Content: Thought content normal.         Judgment: Judgment normal.      Comments: Prior anxiety is much less/better than before     Extremities: No lower extremity mid bilaterally, no cords, pulses are 1+  Lymphatics:  No adenopathy in the neck, supraclavicular region, axilla bilaterally    Performance Status: 2 -  Symptomatic, <50% confined to bed    Labs    2/27/2024 WBC = 5.26 hemoglobin = 12.5 hematocrit = 30.7 platelet = 290 neutrophil = 72% BUN = 10 creatinine = 0.56 LFTs WNL    1/26/2024 WBC = 4.36 hemoglobin = 12.3 hematocrit = 36.1 platelet = 312 neutrophil = 71% BUN = 8 creatinine = 0.54 calcium = 9.3 LFTs WNL    Imaging    10/19/2023 PET/CT    OSSEOUS STRUCTURES:  -As noted on prior imaging examinations, destructive lytic left sacral mass extending to the SI joint and medial aspect of the left iliac bone. Lesion extends and as noted on prior MRI with subtle extraosseous extension. SUV max 8.7.  - There is also increased activity identified involving the superior right sacral ala, image 180, SUV max 3.5.  - There is a focus of increased activity in the medial aspect of the left humeral head image 62 without definite CT correlate. The SUV max is 3.9     IMPRESSION:     1. Large lytic/destructive lesion involving the sacrum, medial aspect of the left iliac bone, with subtle extraosseous extension, extension to the right of midline, and separate area of increased activity in the right superior sacral ala, consistent with   biopsy-proven metastatic breast cancer  2. There is also a focus of activity in the medial left humeral head most suspicious for osseous metastasis  3. There is multifocal right neck base/supraclavicular FDG avid adenopathy  4. Within the thorax, multifocal FDG avid disease involving the mediastinum and left hilum, including a large subcarinal mass, as above further described  5. Small left effusion  6. Multiple pleural-based pulmonary opacities with only mild FDG activity. Differential includes pneumonia including postobstructive pneumonia, or possibly pulmonary infarctions.  7. Mildly heterogeneous liver activity although no definitive focal lesion is appreciated    9/1/2023 MRI pelvis    Impression:     Large infiltrative upper sacral lesion with asymmetric involvement of the left sacral ala,  extension to the SI joint and medial left iliac bone as well as subtle extraosseous extension as detailed above, concerning for malignancy. Correlate with bone biopsy also done on this date. The lesion narrows the left L5 and S1 neural foramina and may touch upon the exiting nerve roots in these regions. Correlate for radiculitis in these territories. Cannot exclude subtle epidural invasion posterior to S1.    8/31/2023 bone scan.  Impression stated nonpathologic sacral fracture.  Otherwise no scintigraphic evidence of additional osseous metastases.    7/20/2023 mammogram diagnostic right with 3D and CAD.  Impression stated postop findings likely benign tissue asymmetry in the right outer 8:00 region, right overall category 2, recommend diagnostic mammogram 1 year right breast.    Pathology    11/8/2023 [id number] CDX.  Full report is scanned into epic.  Patient was found to have a PIK3CA mutation where alpelisib (Piqray) can be used.  Patient had a T p53 mutation as well as a PTPN11 mutation.  Patient also had an EGFR variant of uncertain clinical significance.  MSI-high    Case Report   Surgical Pathology Report                         Case: [id number]                                    Authorizing Provider:  Matt Ferguson PA-C       Collected:           09/01/2023 1417               Ordering Location:     [facility] Received:            09/01/2023 1521                                      [address]                                                                       Pathologist:           Scooter Herr MD                                                           Specimen:    Bone, left illiac crest                                                                    Addendum   Test Description Result Prognostic Interpretation  Estrogen Receptor 0% negative  Internal control:Not present Staining Intensity:NA*  External control:positive Mariajose Score*: 0     Progesterone Receptor 0%  negative  Internal control:Not present Staining Intensity: NA*  External control: positive Mariajose Score*: 0     HER2 by IHC 2+ Equivocal     COMMENT:  Performance characteristics may vary for Breast Prognostic Markers in decalcified specimens and may result in decreased antigen detection.  Her2 by FISH cannot be run due to decalcified specimen.        Final Diagnosis   A.  Bone, left iliac crest, biopsy:  -Metastatic carcinoma, most compatible with metastasis from breast origin.  -Immunohistochemical stains performed with appropriate controls show the tumor to be positive for keratin AE1/3, CK7, and GATA3 and negative for CK20, mammaglobin, ER, CDX2, TTF-1, and PAX8; the findings support the diagnosis.   Electronically signed by Scooter Herr MD on 9/6/2023 at  9:09 AM          Case Report    Surgical Pathology Report                         Case: [id number]                                     Authorizing Provider:  Lisandro Tijerina MD              Collected:           06/08/2021 1057                Ordering Location:     [facility]        Received:            06/08/2021 [address] [place] [address] Operating Room                                                        Pathologist:           Eliza Caballero MD                                                                   Specimen:    Breast, Left, Left breast with axillary content - long suture marks lateral, medium                   suture marks medial, short suture marks superior                                              Final Diagnosis    A. 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  - Ductal carcinemia in situ.  - Skin with sebotropic keratosis.  - Nipple with no pathologic abnormality.  - Lymphovascular and perineural invasion are present   - All margins are negative  - Four of ten lymph nodes are positive for metastatic  carcinoma (4/10).        Comment: Immunohistochemistry was performed on block A7. The tumor cells are positive for E cadherin and p120 in a membranous stain and negative for calponin and p63, supporting the above diagnosis.   AE1/3 is performed on tissue block  A32 to help in the assessment of this case.     Intradepartmental consultation is in agreement.     Note:  1. Ancillary Studies:      - Repeat HER2 testing (2013 ASCO/CAP Recommendations): Not indicated.      - Best representative tumor block: A5       -- Sufficient tumor present for          Agendia Mammaprint/Blueprint (1 cm2 of invasive tumor in aggregate): Yes.          MI Profile/Foundation One (at least 5 x 5 mm of tumor): Yes.  2.. Pathologic Stage Classification (pTNM, AJCC 8th Edition): 8th ed, AJCC Anatomic Stage:  at least Stage  - pT3 pN2a, G2.  3. 8th ed. AJCC Pathological Prognostic Stage: IB       Electronically signed by Eliza Caballero MD on 6/15/2021 at  9:14 AM    Note      Interpretation performed at [facility], [address] [id number]       Additional Information      All reported additional testing was performed with appropriately reactive controls.  These tests were developed and their performance characteristics determined by [facility] or appropriate performing facility, though some tests may be performed on tissues which have not been validated for performance characteristics (such as staining performed on alcohol exposed cell blocks and decalcified tissues).  Results should be interpreted with caution and in the context of the patients’ clinical condition. These tests may not be cleared or approved by the U.S. Food and Drug Administration, though the FDA has determined that such clearance or approval is not necessary. These tests are used for clinical purposes and they should not be regarded as investigational or for research. This laboratory has been approved by CLIA 88, designated as a  "high-complexity laboratory and is qualified to perform these tests.  .    Synoptic Checklist    INVASIVE CARCINOMA OF THE BREAST: Resection  8th Edition - Protocol posted: 2/26/2020  INVASIVE CARCINOMA OF THE BREAST: COMPLETE EXCISION - All Specimens       SPECIMEN   Procedure   Total mastectomy    Specimen Laterality   Left    TUMOR   Tumor Site   Upper outer quadrant    Histologic Type   Invasive carcinoma of no special type (ductal)    Glandular (Acinar) / Tubular Differentiation   Score 3    Nuclear Pleomorphism   Score 2    Mitotic Rate   Score 1    Overall Grade   Grade 2 (scores of 6 or 7)    Tumor Size   Greatest dimension of largest invasive focus (Millimeters): 60 mm   Additional Dimension (Millimeters)   38 mm       30 mm   Tumor Focality   Single focus of invasive carcinoma    Ductal Carcinoma In Situ (DCIS)   Present        Negative for extensive intraductal component (EIC)    Size (Extent) of DCIS       Number of Blocks with DCIS   3    Number of Blocks Examined   18    Architectural Patterns   Comedo        Solid    Nuclear Grade   Grade II (intermediate)    Necrosis   Present, central (expansive \"comedo\" necrosis)    Lobular Carcinoma In Situ (LCIS)   Not identified    Lymphovascular Invasion   Present    Dermal Lymphovascular Invasion   Not identified    Microcalcifications   Present in invasive carcinoma    Treatment Effect in the Breast   No known presurgical therapy    MARGINS   Invasive Carcinoma Margins   Uninvolved by invasive carcinoma    Distance from Closest Margin (Millimeters)   Greater than: 20 mm   Closest Margin(s)   Posterior    DCIS Margins   Uninvolved by DCIS    Distance from Closest Margin (Millimeters)   Greater than: 20 mm   Closest Margin(s)   Posterior    LYMPH NODES   Regional Lymph Nodes   Involved by tumor cells    Number of Lymph Nodes with Macrometastases (> 2 mm)   4    Number of Lymph Nodes with Micrometastases (> 0.2 mm to 2 mm and / or > 200 cells)   0    Number of " Lymph Nodes with Isolated Tumor Cells (<= 0.2 mm or <= 200 cells)   0    Size of Largest Metastatic Deposit (Millimeters)   At least: 35 mm   Extranodal Extension   Present    Extent of Extranodal Extension   Greater than 2 mm    Total Number of Lymph Nodes Examined   10    PATHOLOGIC STAGE CLASSIFICATION (pTNM, AJCC 8th Edition)       Primary Tumor (pT)   pT3    Regional Lymph Nodes (pN)   pN2a    ADDITIONAL FINDINGS   Additional Findings   intraductal papilloma    SPECIAL STUDIES   Breast Biomarker Testing Performed on Previous Biopsy       Estrogen Receptor (ER) Status   Positive (greater than 10% of cells demonstrate nuclear positivity)    Percentage of Cells with Nuclear Positivity   %    Breast Biomarker Testing Performed on Previous Biopsy       Progesterone Receptor (PgR) Status   Positive    Percentage of Cells with Nuclear Positivity   1-2 %   Breast Biomarker Testing Performed on Previous Biopsy       HER2 (by immunohistochemistry)   Negative (Score 1+)    Testing Performed on Case Number   [id number]    .

## 2024-03-21 ENCOUNTER — TELEPHONE (OUTPATIENT)
Dept: HEMATOLOGY ONCOLOGY | Facility: CLINIC | Age: 74
End: 2024-03-21

## 2024-03-21 NOTE — TELEPHONE ENCOUNTER
Spoke to patient in regards to MyChart message  Patient experiencing intermittent diarrhea stoold   No more than 3 per day at it's peak  Patient is staying hydrated   Patient will call if diarrhea episodes worsen or if IV hydration is needed

## 2024-03-22 RX ORDER — DEXTROSE MONOHYDRATE 50 MG/ML
20 INJECTION, SOLUTION INTRAVENOUS ONCE
Status: CANCELLED | OUTPATIENT
Start: 2024-03-29

## 2024-03-26 ENCOUNTER — APPOINTMENT (OUTPATIENT)
Dept: LAB | Facility: CLINIC | Age: 74
End: 2024-03-26
Payer: MEDICARE

## 2024-03-29 ENCOUNTER — HOSPITAL ENCOUNTER (OUTPATIENT)
Dept: INFUSION CENTER | Facility: HOSPITAL | Age: 74
End: 2024-03-29
Attending: INTERNAL MEDICINE
Payer: MEDICARE

## 2024-03-29 VITALS
WEIGHT: 161.38 LBS | HEART RATE: 103 BPM | BODY MASS INDEX: 28.59 KG/M2 | DIASTOLIC BLOOD PRESSURE: 63 MMHG | HEIGHT: 63 IN | SYSTOLIC BLOOD PRESSURE: 130 MMHG | OXYGEN SATURATION: 99 % | TEMPERATURE: 96.7 F | RESPIRATION RATE: 18 BRPM

## 2024-03-29 DIAGNOSIS — C50.412 MALIGNANT NEOPLASM OF UPPER-OUTER QUADRANT OF LEFT BREAST IN FEMALE, ESTROGEN RECEPTOR POSITIVE (HCC): Primary | ICD-10-CM

## 2024-03-29 DIAGNOSIS — Z17.0 MALIGNANT NEOPLASM OF UPPER-OUTER QUADRANT OF LEFT BREAST IN FEMALE, ESTROGEN RECEPTOR POSITIVE (HCC): Primary | ICD-10-CM

## 2024-03-29 PROCEDURE — 96367 TX/PROPH/DG ADDL SEQ IV INF: CPT

## 2024-03-29 PROCEDURE — 96413 CHEMO IV INFUSION 1 HR: CPT

## 2024-03-29 RX ORDER — DEXTROSE MONOHYDRATE 50 MG/ML
20 INJECTION, SOLUTION INTRAVENOUS ONCE
Status: COMPLETED | OUTPATIENT
Start: 2024-03-29 | End: 2024-03-29

## 2024-03-29 RX ADMIN — DEXTROSE 20 ML/HR: 50 INJECTION, SOLUTION INTRAVENOUS at 08:55

## 2024-03-29 RX ADMIN — DEXAMETHASONE SODIUM PHOSPHATE: 10 INJECTION, SOLUTION INTRAMUSCULAR; INTRAVENOUS at 08:06

## 2024-03-29 RX ADMIN — DOXORUBICIN HYDROCHLORIDE 83.7 MG: 2 INJECTION, SUSPENSION, LIPOSOMAL INTRAVENOUS at 08:55

## 2024-03-29 NOTE — PROGRESS NOTES
Pt's potassium 2.9. States she had diarrhea earlier in week and spoke to Page Cadena RN about it. Pt states she has not had any further diarrhea since that day. Page Cadena RN made aware of labs and pt is to take her KCL 10meq daily x 1 week instead of every other day. Pt made aware.

## 2024-03-29 NOTE — PLAN OF CARE
Problem: Potential for Falls  Goal: Patient will remain free of falls  Description: INTERVENTIONS:  - Educate patient/family on patient safety including physical limitations  - Instruct patient to call for assistance with activity   Outcome: Progressing

## 2024-03-29 NOTE — PROGRESS NOTES
Pt set up for port flush in 6 weeks    Gosia Bragg  tolerated treatment well with no complications.      Gosia Bragg is aware of future appt on 4/1/24 at 1130.     AVS printed and given to Gosia Bragg:    No (Declined by Gosia Bragg)

## 2024-04-01 ENCOUNTER — HOSPITAL ENCOUNTER (OUTPATIENT)
Dept: INFUSION CENTER | Facility: HOSPITAL | Age: 74
Discharge: HOME | End: 2024-04-01
Attending: INTERNAL MEDICINE
Payer: MEDICARE

## 2024-04-01 DIAGNOSIS — Z17.0 MALIGNANT NEOPLASM OF UPPER-OUTER QUADRANT OF LEFT BREAST IN FEMALE, ESTROGEN RECEPTOR POSITIVE (HCC): Primary | ICD-10-CM

## 2024-04-01 DIAGNOSIS — C50.412 MALIGNANT NEOPLASM OF UPPER-OUTER QUADRANT OF LEFT BREAST IN FEMALE, ESTROGEN RECEPTOR POSITIVE (HCC): Primary | ICD-10-CM

## 2024-04-01 PROCEDURE — 96372 THER/PROPH/DIAG INJ SC/IM: CPT

## 2024-04-01 RX ADMIN — PEGFILGRASTIM 4 MG: 6 INJECTION SUBCUTANEOUS at 11:38

## 2024-04-22 ENCOUNTER — HOSPITAL ENCOUNTER (OUTPATIENT)
Dept: RADIOLOGY | Age: 74
Discharge: HOME | End: 2024-04-22
Payer: MEDICARE

## 2024-04-22 DIAGNOSIS — Z17.0 MALIGNANT NEOPLASM OF UPPER-OUTER QUADRANT OF LEFT BREAST IN FEMALE, ESTROGEN RECEPTOR POSITIVE (HCC): ICD-10-CM

## 2024-04-22 DIAGNOSIS — C50.412 MALIGNANT NEOPLASM OF UPPER-OUTER QUADRANT OF LEFT BREAST IN FEMALE, ESTROGEN RECEPTOR POSITIVE (HCC): ICD-10-CM

## 2024-04-22 LAB — GLUCOSE SERPL-MCNC: 109 MG/DL (ref 65–140)

## 2024-04-22 PROCEDURE — 82948 REAGENT STRIP/BLOOD GLUCOSE: CPT

## 2024-04-22 PROCEDURE — A9552 F18 FDG: HCPCS

## 2024-04-22 PROCEDURE — 78815 PET IMAGE W/CT SKULL-THIGH: CPT

## 2024-04-29 ENCOUNTER — OFFICE VISIT (OUTPATIENT)
Dept: HEMATOLOGY ONCOLOGY | Facility: MEDICAL CENTER | Age: 74
End: 2024-04-29
Payer: MEDICARE

## 2024-04-29 ENCOUNTER — TELEPHONE (OUTPATIENT)
Dept: FAMILY MEDICINE CLINIC | Facility: CLINIC | Age: 74
End: 2024-04-29

## 2024-04-29 VITALS
DIASTOLIC BLOOD PRESSURE: 74 MMHG | BODY MASS INDEX: 27.46 KG/M2 | HEIGHT: 63 IN | TEMPERATURE: 98 F | OXYGEN SATURATION: 98 % | SYSTOLIC BLOOD PRESSURE: 114 MMHG | RESPIRATION RATE: 17 BRPM | WEIGHT: 155 LBS | HEART RATE: 105 BPM

## 2024-04-29 DIAGNOSIS — C50.412 MALIGNANT NEOPLASM OF UPPER-OUTER QUADRANT OF LEFT BREAST IN FEMALE, ESTROGEN RECEPTOR POSITIVE (HCC): Primary | ICD-10-CM

## 2024-04-29 DIAGNOSIS — M84.58XA PATHOLOGICAL FRACTURE OF SACRAL VERTEBRA DUE TO NEOPLASTIC DISEASE: ICD-10-CM

## 2024-04-29 DIAGNOSIS — C50.412 MALIGNANT NEOPLASM OF UPPER-OUTER QUADRANT OF LEFT BREAST IN FEMALE, ESTROGEN RECEPTOR POSITIVE (HCC): ICD-10-CM

## 2024-04-29 DIAGNOSIS — Z17.0 MALIGNANT NEOPLASM OF UPPER-OUTER QUADRANT OF LEFT BREAST IN FEMALE, ESTROGEN RECEPTOR POSITIVE (HCC): Primary | ICD-10-CM

## 2024-04-29 DIAGNOSIS — Z17.0 MALIGNANT NEOPLASM OF UPPER-OUTER QUADRANT OF LEFT BREAST IN FEMALE, ESTROGEN RECEPTOR POSITIVE (HCC): ICD-10-CM

## 2024-04-29 DIAGNOSIS — E87.6 HYPOKALEMIA: Primary | ICD-10-CM

## 2024-04-29 PROCEDURE — 99215 OFFICE O/P EST HI 40 MIN: CPT | Performed by: INTERNAL MEDICINE

## 2024-04-29 RX ORDER — LAMOTRIGINE 25 MG/1
500 TABLET ORAL ONCE
Status: CANCELLED | OUTPATIENT
Start: 2024-04-29

## 2024-04-29 RX ORDER — LAMOTRIGINE 25 MG/1
500 TABLET ORAL ONCE
OUTPATIENT
Start: 2024-04-29

## 2024-04-29 NOTE — PROGRESS NOTES
Gosia Bragg  1950  [facility] [address] [id number]    DISCUSSION/SUMMARY:    73-year-old female with history of stage IIIA left-sided breast cancer now with evidence of metastatic disease.  Issues:    Pathologic sacral fracture.  Patient has been seen/evaluated by radiation oncology and completed palliative RT. The prior PET/CT results are listed below.  There is a left humeral head lesion also concerning for metastatic disease.  Patient denies any arm pain - surveillance continues.    Osseous metastatic disease.  As discussed previously, patient is a candidate for antireabsorption therapy.  Mrs. Bragg states that she has 1 more procedure left, this week.  Patient will still need approximately 2 months from today to begin either Zometa or Xgeva.    Pain control.  Patient states that her pain is now well-controlled.  Patient will follow-up with palliative care as directed.    Metastatic breast cancer.  Patient was previously started on Ibrance and Faslodex (Dr. Angela); received 1 dose of Faslodex only.  Patient subsequently underwent PET/CT.  Besides the bone lesions above, patient was found to have lesions in both lungs, mediastinal adenopathy, right-sided supraclavicular adenopathy, mediastinum, left hilum, large subcarinal mass and a small left pleural effusion (not tapped).  Patient's performance status was +/-, no respiratory issues.  Because of the extent of disease and the concern for pending visceral crisis, patient was started on Doxil.  Mrs. Bragg completed 6 cycles; tolerated the treatments relatively well.  Clinically patient looks better, recent labs were good.  Recent PET/CT demonstrates significant response to treatment.  The plan is to go back to the Faslodex and CDK 4 6 inhibitor.  The specific CD4/6 inhibitor is not presently clear, patient had trouble swallowing the Ibrance.    [id number]  demonstrated that patient has a PIK3CA mutation so alpelisib (Piqray) is an option in the future also.    NCCN guidelines 4.2023 states that for patients with recurrent or metastatic breast cancer, HR positive and HER2/leonela negative, threatening visceral crisis, preferred regimen includes an anthracycline.  Other options include taxanes but patient was previously treated with Taxotere/Cytoxan.      Left pleural effusion.  Etiology unclear.  Patient is not symptomatic, continues to be active.  At this moment, I do not believe that thoracentesis is needed.  We discussed what to monitor for as far as worsening respiratory issues.    Trouble swallowing, diarrhea.  The trouble swallowing predates the breast cancer diagnosis.  The etiology for the diarrhea is not clear but this has continued off of chemotherapy (so it is difficult to blame the chemotherapy on this).  Patient has been seen by Dr. Blancas in the past, he has been reconsulted.    Patient is to return in 6 weeks.  Mrs. Bragg knows to call the hematology/oncology office if there are any other questions or concerns.    Carefully review your medication list and verify that the list is accurate and up-to-date. Please call the hematology/oncology office if there are medications missing from the list, medications on the list that you are not currently taking or if there is a dosage or instruction that is different from how you're taking that medication.    Patient goals and areas of care: Restart Faslodex and Ibrance  Barriers to care: none  Patient is able to self-care  _____________________________________________________________________________________    Chief Complaint   Patient presents with    Follow-up    Metastatic breast cancer     Advance Care Planning/Advance Directives: Not yet discussed    Oncology History Overview Note   73 year old female with history of left breast invasive mammary carcinoma, grade 2, ER/MT positive, HER 2 negative, 4/10 lymph nodes  micrometastases.  She is s/p left breast modified radical mastectomy, ANAND  directed axillary dissection on 6/8/21. She completed adjuvant chemotherapy with Cytoxan/Taxotere on 9/17/21. This was followed by a course of radiation to the left chest wall and regional lymphatics on 11/19/2021.  More recently, patient underwent palliative radiation for newly diagnosed bone metastasis to L5  sacrum. Treatment completed on 9/29/23. She presents today for EOT telephone follow up call.     10/2/23 CT head w wo contrast  IMPRESSION:  No acute intracranial abnormality.    10/6/23 Palliative Care -Telemed    10/19/23 NM PET CT skull base to mid thigh  IMPRESSION:  1. Large lytic/destructive lesion involving the sacrum, medial aspect of the left iliac bone, with subtle extraosseous extension, extension to the right of midline, and separate area of increased activity in the right superior sacral ala, consistent with   biopsy-proven metastatic breast cancer  2. There is also a focus of activity in the medial left humeral head most suspicious for osseous metastasis  3. There is multifocal right neck base/supraclavicular FDG avid adenopathy  4. Within the thorax, multifocal FDG avid disease involving the mediastinum and left hilum, including a large subcarinal mass, as above further described  5. Small left effusion  6. Multiple pleural-based pulmonary opacities with only mild FDG activity. Differential includes pneumonia including postobstructive pneumonia, or possibly pulmonary infarctions.  7. Mildly heterogeneous liver activity although no definitive focal lesion is appreciated    10/26/23 Hematology Oncology - Dr. Ledesma  PET/CT =  left humeral head lesion also concerning for metastatic disease.    Patient denies any arm pain.  This will need to be monitored.   Candidate for antireabsorprtion therapy, will need dental clearance first  Besides the bone lesions above, patient was found to have lesions in both lungs,  mediastinal adenopathy, right-sided supraclavicular adenopathy, mediastinum, left hilum, large subcarinal mass and a small left pleural effusion (not tapped).   I believe there are enough areas of abnormality on the PET/CT to warrant more aggressive systemic treatment (concern for progressing to visceral crises).   Mrs. Bragg understands that the idea is to give more aggressive systemic treatments upfront, stabilize the disease and then go back to the CDK 4/6 inhibitor with Faslodex.  In the future, if evidence of disease progression, a soft tissue biopsy will be needed.  Patient could have peripheral blood sent for [id number].     10/30/23 IR port placement    Upcomin23 Hematology Oncology     Malignant neoplasm of upper-outer quadrant of left breast in female, estrogen receptor positive (HCC)   2021 Biopsy    Left breast US guided biopsy:  A. 2 o'clock 8 cm from the nipple  Invasive mammary carcinoma of no special type  Grade 2  ER 90  IL 1  HER2 1+  Lymphovascular invasion: not identified    B. Left Axillary lymph node biopsy:  Invasive/ metastatic adenocarcinoma, compatible with breast primary involving fibroadipose tissues and a portion of lymphoid pranchyma.   ER 90  IL 1  HER2 1+    Concordant. Malignancy appears multifocal. The 2 adjacent masses span an area of approximately 4 cm. Right breast clear.      5/3/2021 Genetic Testing    The following genes were evaluated: OLINDA, BRCA1, BRCA2, CDH1, CHEK2, PALB2, PTEN, STK11, TP53  Additional genes evaluated for a total of 36 genes analyzed  Negative result. No pathogenic sequence variants or deletions/dupllications identified  [employer]     2021 Surgery    Left breast modified radical mastectomy with ANAND  directed axillary dissection  Invasive carcinoma of no special type (ductal)  Grade 2  6 cm  Lymphovascular invasion present  Margins negative  4/10 Lymph nodes (3.5 cm)  Anatomic Stage IIIA  Prognostic Stage IB     2021 - 2021  Chemotherapy    pegfilgrastim (NEULASTA ONPRO), 6 mg, Subcutaneous, Once, 4 of 4 cycles  Administration: 6 mg (7/16/2021), 6 mg (8/6/2021), 6 mg (8/27/2021), 6 mg (9/17/2021)  cyclophosphamide (CYTOXAN) IVPB, 600 mg/m2 = 1,212 mg, Intravenous, Once, 4 of 4 cycles  Administration: 1,212 mg (7/16/2021), 1,212 mg (8/6/2021), 1,212 mg (8/27/2021), 1,212 mg (9/17/2021)  DOCEtaxel (TAXOTERE) chemo infusion, 75 mg/m2 = 151.6 mg, Intravenous, Once, 4 of 4 cycles  Administration: 151.6 mg (7/16/2021), 151.6 mg (8/6/2021), 151.6 mg (8/27/2021), 151.6 mg (9/17/2021)     10/18/2021 - 11/19/2021 Radiation    BH L CW 10X/6X 13 / 13 200 0 2,600 32   BH L CW BOLUS 10X/6X 12 / 12 200 0 2,400 30   BH L PAB 10X 25 / 25 60 0 1,500 32   BH L Sclav 10X 25 / 25 200 0 5,000 32      Treatment Dates:  10/18/2021 - 11/19/2021.   Dr Estrada     10/2021 -  Hormone Therapy    anastrozole 1 mg once a day    Dr Angela     9/1/2023 Biopsy    Bone, left iliac crest, biopsy:  -Metastatic carcinoma, most compatible with metastasis from breast origin.     9/12/2023 -  Cancer Staged    Staging form: Breast, AJCC 8th Edition  - Clinical: Stage IV (cM1) - Signed by Tiffany Babb MD on 9/12/2023 9/25/2023 - 9/29/2023 Radiation    The [facility] treatment. This is the current list of radiation treatment:  Plan ID Energy Fractions Dose per Fraction (cGy) Dose Correction (cGy) Total Dose Delivered (cGy) Elapsed Days   L5_Sacrum 10X 5 / 5 400 0 2,000 4        11/10/2023 -  Chemotherapy    alteplase (CATHFLO), 2 mg, Intracatheter, Every 1 Minute as needed, 6 of 6 cycles  pegfilgrastim (NEULASTA), 4 mg (100 % of original dose 4 mg), Subcutaneous, Once, 6 of 6 cycles  Dose modification: 4 mg (original dose 4 mg, Cycle 1)  Administration: 4 mg (11/13/2023), 4 mg (12/11/2023), 4 mg (1/8/2024), 4 mg (3/4/2024), 4 mg (4/1/2024), 4 mg (2/5/2024)  DOXOrubicin liposomal (DOXIL) chemo infusion, 93 mg, Intravenous, Once, 6 of 6 cycles  Dose  modification: 45 mg/m2 (90 % of original dose 50 mg/m2, Cycle 5, Reason: Dose Not Tolerated)  Administration: 90 mg (11/10/2023), 90 mg (12/8/2023), 93 mg (1/5/2024), 83.7 mg (3/1/2024), 83.7 mg (3/29/2024), 83.7 mg (2/2/2024)       History of Present Illness: 73-year-old female previously followed by Dr. Angela.  Patient has a history of stage IIIA left breast cancer, grade 2, lobular, ER positive, HER2 negative disease status post mastectomy, lymph node sampling (June 2021).  Patient was negative for the BRCA gene mutation.  Pathology results (copied below) demonstrated four positive lymph nodes.  Patient was treated with adjuvant Taxotere and Cytoxan (completed in September 2021) followed by radiation.  Patient was subsequently placed on anastrozole and had tolerated this well; completed approximately 2+ years.    Patient was [facility] to [facility] in the fall 2023 with severe buttocks pain.  Work-up demonstrated a sacral fracture + mass.  Biopsy was consistent with metastatic breast cancer.  Patient completed palliative RT: Patient has received 5 cycles of Doxil.    Presently Mrs. Bragg states feeling okay, about the same as before.  No problems with rashes, no redness or swelling.  No respiratory issues.  Appetite is okay, diarrhea is less/better than before.  No GI bleeding.  No  or GYN problems.  No fevers or signs of infection.  Pain is controlled, activities have improved.  Patient is followed by palliative care.    Patient states that occasionally while eating, food seems to get stuck midway and comes back up.  This predates the breast cancer diagnosis.  Also happens with larger pills.  Maybe a couple times a week but clearly not every day.    Review of Systems   Constitutional:  Positive for activity change, appetite change and fatigue.   HENT: Negative.     Eyes: Negative.    Respiratory: Negative.     Cardiovascular: Negative.    Gastrointestinal: Negative.    Endocrine: Negative.   "  Genitourinary: Negative.    Musculoskeletal:  Positive for arthralgias and gait problem.   Skin: Negative.    Allergic/Immunologic: Negative.    Hematological: Negative.    Psychiatric/Behavioral: Negative.     All other systems reviewed and are negative.    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    History of radiation therapy     [place] (hard of hearing)     wears hearing aids/will wear DOS bilat    Hypertension     Limb alert care status     No BP/IV Left Arm    Lymphedema of left arm     Prediabetes     Wears glasses      Past Surgical History:   Procedure Laterality Date    AXILLARY SURGERY Right     sweat glands removed -     BREAST CYST EXCISION Right 2000    benign    BREAST SURGERY Right     CHOLECYSTECTOMY      COLONOSCOPY      DILATION AND CURETTAGE OF UTERUS      IR BIOPSY BONE  9/1/2023    IR PORT PLACEMENT  10/30/2023    MASTECTOMY Left 06/08/2021    PA BREAST REDUCTION N/A 03/18/2022    Procedure: R BREAST REDUCTION; L BREAST EXCISION STANDING SKIN DEFORMITY; LOCAL FLAP;  Surgeon: Lisa" Maribel Pineda MD;  Location: [facility];  Service: Plastics    NE MAST MODF RAD W/AX LYMPH NOD W/WO PECT/NERI MIN Left 06/08/2021    Procedure: BREAST MODIFIED RADICAL MASTECTOMY; ANAND  DIRECTED AXILLARY DISSECTION;  Surgeon: Lisandro Tijerina MD;  Location: [facility];  Service: Surgical Oncology    REDUCTION MAMMAPLASTY Right     March 2022    US BREAST NEEDLE LOC LEFT Left 06/02/2021    US GUIDED BREAST BIOPSY LEFT COMPLETE Left 04/19/2021    US GUIDED BREAST LYMPH NODE BIOPSY LEFT Left 04/19/2021    WISDOM TOOTH EXTRACTION       Family History   Problem Relation Age of Onset    Dementia Mother     Colon cancer Mother         50's-60's    Lung cancer Father         50's-60's    No Known Problems Sister     Breast cancer Paternal Aunt     No Known Problems Sister     Stomach cancer Paternal Uncle         70's     Social History     Socioeconomic History    Marital status: /Civil Union     Spouse name: Not on file    Number of children: Not on file    Years of education: Not on file    Highest education level: Not on file   Occupational History    Not on file   Tobacco Use    Smoking status: Never     Passive exposure: Never    Smokeless tobacco: Never   Vaping Use    Vaping status: Never Used   Substance and Sexual Activity    Alcohol use: Never    Drug use: No    Sexual activity: Yes     Partners: Male     Birth control/protection: Male Sterilization     Comment: defer   Other Topics Concern    Not on file   Social History Narrative    Not on file     Social Determinants of Health     Financial Resource Strain: Low Risk  (5/3/2023)    Overall Financial Resource Strain (CARDIA)     Difficulty of Paying Living Expenses: Not hard at all   Food Insecurity: No Food Insecurity (8/31/2023)    Hunger Vital Sign     Worried About Running Out of Food in the Last Year: Never true     Ran Out of Food in the Last Year: Never true   Transportation Needs: No Transportation Needs (8/31/2023)    PRAPARE - Transportation      Lack of Transportation (Medical): No     Lack of Transportation (Non-Medical): No   Physical Activity: Not on file   Stress: Not on file   Social Connections: Not on file   Intimate Partner Violence: Not on file   Housing Stability: Low Risk  (8/31/2023)    Housing Stability Vital Sign     Unable to Pay for Housing in the Last Year: No     Number of Places Lived in the Last Year: 1     Unstable Housing in the Last Year: No       Current Outpatient Medications:     albuterol (ProAir HFA) 90 mcg/act inhaler, Inhale 2 puffs every 4 (four) hours as needed for wheezing or shortness of breath, Disp: 8 g, Rfl: 0    Ascorbic Acid (VITAMIN C PO), Take 500 mg by mouth in the morning, Disp: , Rfl:     fluticasone (FLONASE) 50 mcg/act nasal spray, 2 puffs into each nostril daily, Disp: , Rfl:     gabapentin (Neurontin) 100 mg capsule, Take 1 capsule (100 mg total) by mouth daily at bedtime, Disp: 30 capsule, Rfl: 1    hydrocortisone 2.5 % cream, Apply topically 3 (three) times a day Indication: 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 If no response after 2-3 minutes, give another dose in the other nostril using a new spray. For use in emergencies in the event of accidental ingestion, respiratory depression or oversedation., Disp: 1 each, Rfl: 1    oxyCODONE (ROXICODONE) 10 MG TABS, Take 1.5 tablets (15 mg total) by mouth every 4 (four) hours as needed for moderate pain Max Daily Amount: 90 mg, Disp: 150 tablet, Rfl: 0    polyethylene glycol (MIRALAX) 17 g packet, Take 17 g by  mouth daily as needed (as needed for constipation.), Disp: 255 g, Rfl: 0    potassium chloride (K-DUR,KLOR-CON) 10 mEq tablet, Take 1 tablet (10 mEq total) by mouth every other day, Disp: 45 tablet, Rfl: 3    pyridoxine (VITAMIN B6) 50 mg tablet, Take 1 tablet (50 mg total) by mouth daily, Disp: 30 tablet, Rfl: 4    senna-docusate sodium (SENOKOT-S) 8.6-50 mg per tablet, Take 1 tablet by mouth 2 (two) times a day (Patient taking differently: Take 1 tablet by mouth if needed), Disp: , Rfl: 0    VITAMIN D PO, Take by mouth in the morning, Disp: , Rfl:     No Known Allergies    Vitals:    04/29/24 0843   BP: 114/74   Pulse: 105   Resp: 17   Temp: 98 °F (36.7 °C)   SpO2: 98%     Physical Exam  Constitutional:       Appearance: She is well-developed.      Comments: Well-nourished female, no respiratory distress, no signs of george   HENT:      Head: Normocephalic and atraumatic.      Right Ear: External ear normal.      Left Ear: External ear normal.   Eyes:      Conjunctiva/sclera: Conjunctivae normal.      Pupils: Pupils are equal, round, and reactive to light.   Cardiovascular:      Rate and Rhythm: Normal rate and regular rhythm.      Heart sounds: Normal heart sounds.   Pulmonary:      Effort: Pulmonary effort is normal.      Breath sounds: Normal breath sounds.   Abdominal:      General: Bowel sounds are normal.      Palpations: Abdomen is soft.   Musculoskeletal:         General: Normal range of motion.      Cervical back: Normal range of motion and neck supple.   Skin:     General: Skin is warm.      Comments: Warm, moist, good color, hands very minimally red but no ulceration or rash, same with feet, no petechiae or ecchymoses, no purpura   Neurological:      Mental Status: She is alert and oriented to person, place, and time.      Deep Tendon Reflexes: Reflexes are normal and symmetric.   Psychiatric:         Behavior: Behavior normal.         Thought Content: Thought content normal.         Judgment: Judgment  normal.      Comments: Prior anxiety is much less/better than before     Extremities: No lower extremity mid bilaterally, no cords, pulses are 1+  Lymphatics:  No adenopathy in the neck, supraclavicular region, axilla bilaterally    Performance Status: 2 - Symptomatic, <50% confined to bed    Labs    2/27/2024 WBC = 5.26 hemoglobin = 12.5 hematocrit = 30.7 platelet = 290 neutrophil = 72% BUN = 10 creatinine = 0.56 LFTs WNL    1/26/2024 WBC = 4.36 hemoglobin = 12.3 hematocrit = 36.1 platelet = 312 neutrophil = 71% BUN = 8 creatinine = 0.54 calcium = 9.3 LFTs WNL    Imaging    4/22/2024 PET/CT    IMPRESSION:     1. Findings most consistent with significant interval favorable metabolic response to therapy with interval metabolic resolution of deacon metastatic disease involving the neck, significant interval decrease in hypermetabolic thoracic deacon metastatic disease, and interval decrease in overall extent of hypermetabolic osseous metastatic disease.     Please note, however, that although hypermetabolic osseous metastatic disease involving the sacrum/left iliac bone has decreased in extent there is increased in avidity nodular FDG activity about the central/right paramedian anterior upper sacrum at site of original disease of uncertain clinical significance. Correlation and continued short interval follow-up is recommended to exclude developing localized progression of hypermetabolic osseous metastatic disease.     2. Increased moderate to large left pleural effusion.    10/19/2023 PET/CT    OSSEOUS STRUCTURES:  -As noted on prior imaging examinations, destructive lytic left sacral mass extending to the SI joint and medial aspect of the left iliac bone. Lesion extends and as noted on prior MRI with subtle extraosseous extension. SUV max 8.7.  - There is also increased activity identified involving the superior right sacral ala, image 180, SUV max 3.5.  - There is a focus of increased activity in the medial aspect of  the left humeral head image 62 without definite CT correlate. The SUV max is 3.9     IMPRESSION:     1. Large lytic/destructive lesion involving the sacrum, medial aspect of the left iliac bone, with subtle extraosseous extension, extension to the right of midline, and separate area of increased activity in the right superior sacral ala, consistent with   biopsy-proven metastatic breast cancer  2. There is also a focus of activity in the medial left humeral head most suspicious for osseous metastasis  3. There is multifocal right neck base/supraclavicular FDG avid adenopathy  4. Within the thorax, multifocal FDG avid disease involving the mediastinum and left hilum, including a large subcarinal mass, as above further described  5. Small left effusion  6. Multiple pleural-based pulmonary opacities with only mild FDG activity. Differential includes pneumonia including postobstructive pneumonia, or possibly pulmonary infarctions.  7. Mildly heterogeneous liver activity although no definitive focal lesion is appreciated    9/1/2023 MRI pelvis    Impression:     Large infiltrative upper sacral lesion with asymmetric involvement of the left sacral ala, extension to the SI joint and medial left iliac bone as well as subtle extraosseous extension as detailed above, concerning for malignancy. Correlate with bone biopsy also done on this date. The lesion narrows the left L5 and S1 neural foramina and may touch upon the exiting nerve roots in these regions. Correlate for radiculitis in these territories. Cannot exclude subtle epidural invasion posterior to S1.    8/31/2023 bone scan.  Impression stated nonpathologic sacral fracture.  Otherwise no scintigraphic evidence of additional osseous metastases.    7/20/2023 mammogram diagnostic right with 3D and CAD.  Impression stated postop findings likely benign tissue asymmetry in the right outer 8:00 region, right overall category 2, recommend diagnostic mammogram 1 year right  breast.    Pathology    11/8/2023 [id number] CDX.  Full report is scanned into epic.  Patient was found to have a PIK3CA mutation where alpelisib (Piqray) can be used.  Patient had a T p53 mutation as well as a PTPN11 mutation.  Patient also had an EGFR variant of uncertain clinical significance.  MSI-high    Case Report   Surgical Pathology Report                         Case: [id number]                                    Authorizing Provider:  Matt Ferguson PA-C       Collected:           09/01/2023 1417               Ordering Location:     [facility] Received:            09/01/2023 152                                      [address]                                                                       Pathologist:           Scooter Herr MD                                                           Specimen:    Bone, left illiac crest                                                                    Addendum   Test Description Result Prognostic Interpretation  Estrogen Receptor 0% negative  Internal control:Not present Staining Intensity:NA*  External control:positive Mariajose Score*: 0     Progesterone Receptor 0% negative  Internal control:Not present Staining Intensity: NA*  External control: positive Mariajose Score*: 0     HER2 by IHC 2+ Equivocal     COMMENT:  Performance characteristics may vary for Breast Prognostic Markers in decalcified specimens and may result in decreased antigen detection.  Her2 by FISH cannot be run due to decalcified specimen.        Final Diagnosis   A.  Bone, left iliac crest, biopsy:  -Metastatic carcinoma, most compatible with metastasis from breast origin.  -Immunohistochemical stains performed with appropriate controls show the tumor to be positive for keratin AE1/3, CK7, and GATA3 and negative for CK20, mammaglobin, ER, CDX2, TTF-1, and PAX8; the findings support the diagnosis.   Electronically signed by Scooter Herr MD on 9/6/2023 at  9:09 AM          Case  Report    Surgical Pathology Report                         Case: [id number]                                     Authorizing Provider:  Lisandro Tijerina MD              Collected:           06/08/2021 1057                Ordering Location:     [facility]        Received:            06/08/2021 [address] [place] [address] Operating Room                                                        Pathologist:           Eliza Caballero MD                                                                   Specimen:    Breast, Left, Left breast with axillary content - long suture marks lateral, medium                   suture marks medial, short suture marks superior                                              Final Diagnosis    A. 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  - Ductal carcinemia in situ.  - Skin with sebotropic keratosis.  - Nipple with no pathologic abnormality.  - Lymphovascular and perineural invasion are present   - All margins are negative  - Four of ten lymph nodes are positive for metastatic carcinoma (4/10).        Comment: Immunohistochemistry was performed on block A7. The tumor cells are positive for E cadherin and p120 in a membranous stain and negative for calponin and p63, supporting the above diagnosis.   AE1/3 is performed on tissue block  A32 to help in the assessment of this case.     Intradepartmental consultation is in agreement.     Note:  1. Ancillary Studies:      - Repeat HER2 testing (2013 ASCO/CAP Recommendations): Not indicated.      - Best representative tumor block: A5       -- Sufficient tumor present for          Agendia Mammaprint/Blueprint (1 cm2 of invasive tumor in aggregate): Yes.          MI Profile/Foundation One (at least 5 x 5 mm of tumor): Yes.  2.. Pathologic Stage Classification (pTNM, AJCC 8th Edition): 8th ed, AJCC Anatomic Stage:  at least  Stage  - pT3 pN2a, G2.  3. 8th ed. AJCC Pathological Prognostic Stage: IB       Electronically signed by Eliza Caballero MD on 6/15/2021 at  9:14 AM    Note      Interpretation performed at [facility], [address] [facility] [address] [facility] [address]       Additional Information      All reported additional testing was performed with appropriately reactive controls.  These tests were developed and their performance characteristics determined by [facility] or appropriate performing facility, though some tests may be performed on tissues which have not been validated for performance characteristics (such as staining performed on alcohol exposed cell blocks and decalcified tissues).  Results should be interpreted with caution and in the context of the patients’ clinical condition. These tests may not be cleared or approved by the U.S. Food and Drug Administration, though the FDA has determined that such clearance or approval is not necessary. These tests are used for clinical purposes and they should not be regarded as investigational or for research. This laboratory has been approved by [facility], designated as a high-complexity laboratory and is qualified to perform these tests.  .    Synoptic Checklist    INVASIVE CARCINOMA OF THE BREAST: Resection  8th Edition - Protocol posted: 2/26/2020  INVASIVE CARCINOMA OF THE BREAST: COMPLETE EXCISION - All Specimens       SPECIMEN   Procedure   Total mastectomy    Specimen Laterality   Left    TUMOR   Tumor Site   Upper outer quadrant    Histologic Type   Invasive carcinoma of no special type (ductal)    Glandular (Acinar) / Tubular Differentiation   Score 3    Nuclear Pleomorphism   Score 2    Mitotic Rate   Score 1    Overall Grade   Grade 2 (scores of 6 or 7)    Tumor Size   Greatest dimension of largest invasive focus (Millimeters): 60 mm   Additional Dimension (Millimeters)   38 mm       30 mm   Tumor Focality   Single focus of invasive carcinoma    Ductal  "Carcinoma In Situ (DCIS)   Present        Negative for extensive intraductal component (EIC)    Size (Extent) of DCIS       Number of Blocks with DCIS   3    Number of Blocks Examined   18    Architectural Patterns   Comedo        Solid    Nuclear Grade   Grade II (intermediate)    Necrosis   Present, central (expansive \"comedo\" necrosis)    Lobular Carcinoma In Situ (LCIS)   Not identified    Lymphovascular Invasion   Present    Dermal Lymphovascular Invasion   Not identified    Microcalcifications   Present in invasive carcinoma    Treatment Effect in the Breast   No known presurgical therapy    MARGINS   Invasive Carcinoma Margins   Uninvolved by invasive carcinoma    Distance from Closest Margin (Millimeters)   Greater than: 20 mm   Closest Margin(s)   Posterior    DCIS Margins   Uninvolved by DCIS    Distance from Closest Margin (Millimeters)   Greater than: 20 mm   Closest Margin(s)   Posterior    LYMPH NODES   Regional Lymph Nodes   Involved by tumor cells    Number of Lymph Nodes with Macrometastases (> 2 mm)   4    Number of Lymph Nodes with Micrometastases (> 0.2 mm to 2 mm and / or > 200 cells)   0    Number of Lymph Nodes with Isolated Tumor Cells (<= 0.2 mm or <= 200 cells)   0    Size of Largest Metastatic Deposit (Millimeters)   At least: 35 mm   Extranodal Extension   Present    Extent of Extranodal Extension   Greater than 2 mm    Total Number of Lymph Nodes Examined   10    PATHOLOGIC STAGE CLASSIFICATION (pTNM, AJCC 8th Edition)       Primary Tumor (pT)   pT3    Regional Lymph Nodes (pN)   pN2a    ADDITIONAL FINDINGS   Additional Findings   intraductal papilloma    SPECIAL STUDIES   Breast Biomarker Testing Performed on Previous Biopsy       Estrogen Receptor (ER) Status   Positive (greater than 10% of cells demonstrate nuclear positivity)    Percentage of Cells with Nuclear Positivity   %    Breast Biomarker Testing Performed on Previous Biopsy       Progesterone Receptor (PgR) Status   " Positive    Percentage of Cells with Nuclear Positivity   1-2 %   Breast Biomarker Testing Performed on Previous Biopsy       HER2 (by immunohistochemistry)   Negative (Score 1+)    Testing Performed on Case Number   [id number]    .

## 2024-04-30 ENCOUNTER — DOCUMENTATION (OUTPATIENT)
Dept: HEMATOLOGY ONCOLOGY | Facility: CLINIC | Age: 74
End: 2024-04-30

## 2024-04-30 ENCOUNTER — TELEPHONE (OUTPATIENT)
Dept: HEMATOLOGY ONCOLOGY | Facility: CLINIC | Age: 74
End: 2024-04-30

## 2024-04-30 DIAGNOSIS — Z17.0 MALIGNANT NEOPLASM OF UPPER-OUTER QUADRANT OF LEFT BREAST IN FEMALE, ESTROGEN RECEPTOR POSITIVE (HCC): ICD-10-CM

## 2024-04-30 DIAGNOSIS — C50.412 MALIGNANT NEOPLASM OF UPPER-OUTER QUADRANT OF LEFT BREAST IN FEMALE, ESTROGEN RECEPTOR POSITIVE (HCC): ICD-10-CM

## 2024-04-30 DIAGNOSIS — M84.58XA PATHOLOGICAL FRACTURE OF SACRAL VERTEBRA DUE TO NEOPLASTIC DISEASE: ICD-10-CM

## 2024-04-30 DIAGNOSIS — E87.6 HYPOKALEMIA: Primary | ICD-10-CM

## 2024-04-30 RX ORDER — LAMOTRIGINE 25 MG/1
500 TABLET ORAL ONCE
OUTPATIENT
Start: 2024-05-10

## 2024-04-30 RX ORDER — LAMOTRIGINE 25 MG/1
500 TABLET ORAL ONCE
Status: CANCELLED | OUTPATIENT
Start: 2024-04-30

## 2024-04-30 NOTE — TELEPHONE ENCOUNTER
Left message for Chantale to call my TEAMs number to discuss dental clearance  Per Dr Ledesma's note:  As discussed previously, patient is a candidate for antireabsorption therapy.  Mrs. Bragg states that she has 1 more procedure left, this week.  Patient will still need approximately 2 months from today to begin either Zometa or Xgeva.    [facility]  Dental clearance form faxed to [phone]

## 2024-04-30 NOTE — PROGRESS NOTES
Oncology Finance Advocacy Intake and Intervention  Oncology Finance Counselor/Advocate placed call to patient. This writer informed patient that this writer is here to assist patient with billing questions, financial assistance, payment/payment plans, quotes, copayment assistance, insurance optimization, and insurance navigation.    This writer conducted a thorough benefit review of copayment, deductible, and out of pocket cost. This information is documented below and has been reviewed with patient.     Copayment: n/a  Deductible: n/a  Out of Pocket Cost: n/a  Insurance optimization (Limited benefit vs self-pay): n/a  Patient assistance status: n/a  Free Drug Applications: Pfizer   Oral Chemo Application:   BIN#:  PCN#:  GRP#:  Copay:$  Interventions:  Rcvd oral chemo request for assistance due to high OOP copay $ $3259.73. [employer] not offering assistance at this time. Completed pfizer free drug application. Forwarded to providers office for signature. Call placed to patient to discuss free drug process, which she is familiar with as we initially enrolled her last year.   Patient will submit all required information. Once all documents are received application will be faxed.         Information above was review thoroughly with patient and patient was advise of possible assistance programs/interventions. If any question arise patient can contact this writer at below information. This information was given to patient at time of contact.      Magda Augustin  Phone:[phone]  Email: [email or web address]

## 2024-04-30 NOTE — TELEPHONE ENCOUNTER
Patient Call    Who are you speaking with? Physician Office    If it is not the patient, are they listed on an active communication consent form? Yes   What is the reason for this call? Clearance for bone medication   Does this require a call back? Yes   If a call back is required, please list best call back number [phone]    If a call back is required, advise that a message will be forwarded to their care team and someone will return their call as soon as possible.   Did you relay this information to the patient? Yes

## 2024-05-01 ENCOUNTER — OFFICE VISIT (OUTPATIENT)
Dept: FAMILY MEDICINE CLINIC | Facility: CLINIC | Age: 74
End: 2024-05-01
Payer: MEDICARE

## 2024-05-01 ENCOUNTER — TELEPHONE (OUTPATIENT)
Age: 74
End: 2024-05-01

## 2024-05-01 ENCOUNTER — NURSE TRIAGE (OUTPATIENT)
Age: 74
End: 2024-05-01

## 2024-05-01 VITALS
DIASTOLIC BLOOD PRESSURE: 82 MMHG | WEIGHT: 154.6 LBS | TEMPERATURE: 97 F | HEART RATE: 97 BPM | RESPIRATION RATE: 18 BRPM | HEIGHT: 63 IN | BODY MASS INDEX: 27.39 KG/M2 | SYSTOLIC BLOOD PRESSURE: 124 MMHG

## 2024-05-01 DIAGNOSIS — Z00.00 MEDICARE ANNUAL WELLNESS VISIT, SUBSEQUENT: Primary | ICD-10-CM

## 2024-05-01 DIAGNOSIS — I10 BENIGN ESSENTIAL HYPERTENSION: ICD-10-CM

## 2024-05-01 PROBLEM — T45.1X5A CHEMOTHERAPY INDUCED NEUTROPENIA (HCC): Status: RESOLVED | Noted: 2023-11-22 | Resolved: 2024-05-01

## 2024-05-01 PROBLEM — E03.9 HYPOTHYROIDISM: Status: RESOLVED | Noted: 2017-01-30 | Resolved: 2024-05-01

## 2024-05-01 PROBLEM — D70.1 CHEMOTHERAPY INDUCED NEUTROPENIA (HCC): Status: RESOLVED | Noted: 2023-11-22 | Resolved: 2024-05-01

## 2024-05-01 PROCEDURE — 99213 OFFICE O/P EST LOW 20 MIN: CPT | Performed by: INTERNAL MEDICINE

## 2024-05-01 PROCEDURE — G0439 PPPS, SUBSEQ VISIT: HCPCS | Performed by: INTERNAL MEDICINE

## 2024-05-01 NOTE — TELEPHONE ENCOUNTER
"Last colonoscopy was 7/22    Started having dysphagia symptoms several months ago that have slowly progressed along with bought's of diarrhea.  Patient does have pelvic cancer but Dr. Ledesma was unable to find a reason for this and recommenced she be seen and evaluated.         Reason for Disposition  • Swallowing difficulty and cause unknown (Exception: difficulty swallowing is a chronic symptom)    Answer Assessment - Initial Assessment Questions  1. SYMPTOM: \"Are you having difficulty swallowing liquids, solids, or both?\"     Patient states that she is having a  hard time swallowing states that sometimes it doesn't go down it all come right back up.   States that it has been going on for awhile.   Initially thought is was cancer related but DR. Ledesma said nothing is showing up and she needs to be evaluated by GI.   2. ONSET: \"When did the swallowing problems begin?\"   \" Several months ago but I think it's getting worse\"     4. CHRONIC/RECURRENT: \"Is this a new problem for you?\"  If no, ask: \"How long have you had this problem?\" (e.g., days, weeks, months)       new  5. OTHER SYMPTOMS: \"Do you have any other symptoms?\" (e.g., difficulty breathing, sore throat, swollen tongue, chest pain)  On and off diarrhea.    Protocols used: Swallowing Difficulty-ADULT-[place]    "

## 2024-05-01 NOTE — PROGRESS NOTES
Assessment and Plan:     Problem List Items Addressed This Visit        Cardiovascular and Mediastinum    Benign essential hypertension     Well controlled on current dose of metoprolol and will continue.        Other Visit Diagnoses     Medicare annual wellness visit, subsequent    -  Primary             Preventive health issues were discussed with patient, and age appropriate screening tests were ordered as noted in patient's After Visit Summary.  Personalized health advice and appropriate referrals for health education or preventive services given if needed, as noted in patient's After Visit Summary.     History of Present Illness:     Patient presents for a Medicare Wellness Visit    Here for follow up and AWV.  She is having some difficulty swallowing and Dr. Ledesma is sending her to GI for possible EGD.  Otherwise she is doing well.  PET scan showed regression of disease.  Denies chest pain or dyspnea from known pleural effusion.       Patient Care Team:  Dalia Peña MD as PCP - General (Internal Medicine)  Lisandro Tijerina MD as Surgeon (Surgical Oncology)  Keren Monahan MD (Family Medicine)  Marilynn Hernandez RN as Registered Nurse (Oncology)  Iza Angela MD as Medical Oncologist (Hematology)  Tiffany Babb MD (Radiation Oncology)  Mauro Cui MD (Nephrology)     Review of Systems:     Review of Systems   Constitutional:  Negative for chills and fever.   HENT:  Negative for ear pain and sore throat.    Eyes:  Negative for pain and visual disturbance.   Respiratory:  Negative for cough and shortness of breath.    Cardiovascular:  Negative for chest pain and palpitations.   Gastrointestinal:  Negative for abdominal pain and vomiting.   Genitourinary:  Negative for dysuria and hematuria.   Musculoskeletal:  Negative for arthralgias and back pain.   Skin:  Negative for color change and rash.   Neurological:  Negative for seizures and syncope.   All other systems reviewed and are negative.       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   • History of radiation therapy    • [place] (hard of hearing)     wears hearing aids/will wear DOS bilat   • Hypertension    • Limb alert care status     No BP/IV Left Arm   • Lymphedema of left arm    • Prediabetes    • Wears glasses      Past Surgical History:   Procedure Laterality Date   • AXILLARY SURGERY Right     sweat glands removed -    • BREAST CYST EXCISION Right 2000    benign   • BREAST SURGERY Right    • CHOLECYSTECTOMY     • COLONOSCOPY     • DILATION AND CURETTAGE OF UTERUS     • IR BIOPSY BONE  9/1/2023   • IR PORT PLACEMENT  10/30/2023   • MASTECTOMY Left 06/08/2021   • UT BREAST REDUCTION N/A 03/18/2022    Procedure: R BREAST REDUCTION; L BREAST EXCISION STANDING SKIN DEFORMITY; LOCAL FLAP;  Surgeon: Lisa Pineda MD;  Location: [facility];  Service: Plastics   • UT MAST MODF RAD W/AX LYMPH NOD W/WO PECT/NERI MIN Left 06/08/2021    Procedure: BREAST MODIFIED RADICAL MASTECTOMY; ANAND  DIRECTED AXILLARY DISSECTION;  Surgeon: Lisandro Tijerina MD;  Location: [facility]" OR;  Service: Surgical Oncology   • REDUCTION MAMMAPLASTY Right     March 2022   • US BREAST NEEDLE LOC LEFT Left 06/02/2021   • US GUIDED BREAST BIOPSY LEFT COMPLETE Left 04/19/2021   • US GUIDED BREAST LYMPH NODE BIOPSY LEFT Left 04/19/2021   • WISDOM TOOTH EXTRACTION        Family History:     Family History   Problem Relation Age of Onset   • Dementia Mother    • Colon cancer Mother         50's-60's   • Lung cancer Father         50's-60's   • No Known Problems Sister    • Breast cancer Paternal Aunt    • No Known Problems Sister    • Stomach cancer Paternal Uncle         70's      Social History:     Social History     Socioeconomic History   • Marital status: /Civil Union     Spouse name: None   • Number of children: None   • Years of education: None   • Highest education level: None   Occupational History   • None   Tobacco Use   • Smoking status: Never     Passive exposure: Never   • Smokeless tobacco: Never   Vaping Use   • Vaping status: Never Used   Substance and Sexual Activity   • Alcohol use: Never   • Drug use: No   • Sexual activity: Yes     Partners: Male     Birth control/protection: Male Sterilization     Comment: defer   Other Topics Concern   • None   Social History Narrative   • None     Social Determinants of Health     Financial Resource Strain: Low Risk  (5/3/2023)    Overall Financial Resource Strain (CARDIA)    • Difficulty of Paying Living Expenses: Not hard at all   Food Insecurity: No Food Insecurity (5/1/2024)    Hunger Vital Sign    • Worried About Running Out of Food in the Last Year: Never true    • Ran Out of Food in the Last Year: Never true   Transportation Needs: No Transportation Needs (5/1/2024)    PRAPARE - Transportation    • Lack of Transportation (Medical): No    • Lack of Transportation (Non-Medical): No   Physical Activity: Not on file   Stress: Not on file   Social Connections: Not on file   Intimate Partner Violence: Not on file   Housing Stability: Low Risk   (5/1/2024)    Housing Stability Vital Sign    • Unable to Pay for Housing in the Last Year: No    • Number of Places Lived in the Last Year: 1    • Unstable Housing in the Last Year: No      Medications and Allergies:     Current Outpatient Medications   Medication Sig Dispense Refill   • albuterol (ProAir HFA) 90 mcg/act inhaler Inhale 2 puffs every 4 (four) hours as needed for wheezing or shortness of breath 8 g 0   • Ascorbic Acid (VITAMIN C PO) Take 500 mg by mouth in the morning     • fluticasone (FLONASE) 50 mcg/act nasal spray 2 puffs into each nostril daily     • gabapentin (Neurontin) 100 mg capsule Take 1 capsule (100 mg total) by mouth daily at bedtime (Patient taking differently: Take 100 mg by mouth if needed) 30 capsule 1   • hydrocortisone 2.5 % cream Apply topically 3 (three) times a day Indication: Hand-foot syndrome from Doxil chemotherapy 1 g 2   • hydrOXYzine pamoate (VISTARIL) 25 mg capsule Take 1 capsule (25 mg total) by mouth 3 (three) times a day as needed for itching 60 capsule 3   • metoprolol tartrate (LOPRESSOR) 50 mg tablet TAKE ONE TABLET BY MOUTH EVERY DAY 90 tablet 1   • Multiple Vitamins-Minerals (MULTIVITAMIN ADULT PO) Take by mouth daily     • naloxone (NARCAN) 4 mg/0.1 mL nasal spray Administer 1 spray into a nostril. If no response after 2-3 minutes, give another dose in the other nostril using a new spray. For use in emergencies in the event of accidental ingestion, respiratory depression or oversedation. 1 each 1   • oxyCODONE (ROXICODONE) 10 MG TABS Take 1.5 tablets (15 mg total) by mouth every 4 (four) hours as needed for moderate pain Max Daily Amount: 90 mg 150 tablet 0   • polyethylene glycol (MIRALAX) 17 g packet Take 17 g by mouth daily as needed (as needed for constipation.) 255 g 0   • potassium chloride (K-DUR,KLOR-CON) 10 mEq tablet Take 1 tablet (10 mEq total) by mouth every other day 45 tablet 3   • pyridoxine (VITAMIN B6) 50 mg tablet Take 1 tablet (50 mg total)  by mouth daily 30 tablet 4   • senna-docusate sodium (SENOKOT-S) 8.6-50 mg per tablet Take 1 tablet by mouth 2 (two) times a day (Patient taking differently: Take 1 tablet by mouth if needed)  0   • VITAMIN D PO Take by mouth in the morning       No current facility-administered medications for this visit.     No Known Allergies   Immunizations:     Immunization History   Administered Date(s) Administered   • COVID-19 PFIZER VACCINE 0.3 ML IM 03/07/2021, 03/26/2021, 11/06/2021, 11/02/2022   • INFLUENZA 10/02/2020, 10/02/2021, 11/02/2022, 10/03/2023   • Influenza Split High Dose Preservative Free IM 10/28/2017   • Pneumococcal Conjugate 13-Valent 10/28/2017   • Pneumococcal Polysaccharide PPV23 11/05/2015   • Tdap 10/12/2009, 01/10/2013      Health Maintenance:         Topic Date Due   • Cervical Cancer Screening  01/19/2020   • Breast Cancer Screening: Mammogram  07/20/2024   • Colorectal Cancer Screening  07/05/2027   • Hepatitis C Screening  Completed         Topic Date Due   • Pneumococcal Vaccine: 65+ Years (3 of 3 - PPSV23 or PCV20) 11/05/2020   • COVID-19 Vaccine (5 - 2023-24 season) 09/01/2023      Medicare Screening Tests and Risk Assessments:     Gosia is here for her Subsequent Wellness visit.     Health Risk Assessment:   Patient rates overall health as fair. Patient feels that their physical health rating is much worse. Patient is dissatisfied with their life. Eyesight was rated as slightly worse. Hearing was rated as slightly worse. Patient feels that their emotional and mental health rating is slightly worse. Patients states they are never, rarely angry. Patient states they are often unusually tired/fatigued. Pain experienced in the last 7 days has been none. Patient states that she has experienced weight loss or gain in last 6 months.     Depression Screening:   PHQ-2 Score: 2      Fall Risk Screening:   In the past year, patient has experienced: no history of falling in past year      Urinary  Incontinence Screening:   Patient has not leaked urine accidently in the last six months.     Home Safety:  Patient has trouble with stairs inside or outside of their home. Patient has working smoke alarms and has working carbon monoxide detector. Home safety hazards include: none.     Nutrition:   Current diet is Regular.     Medications:   Patient is currently taking over-the-counter supplements. OTC medications include: see medication list. Patient is able to manage medications.     Activities of Daily Living (ADLs)/Instrumental Activities of Daily Living (IADLs):   Walk and transfer into and out of bed and chair?: Yes  Dress and groom yourself?: Yes    Bathe or shower yourself?: Yes    Feed yourself? Yes  Do your laundry/housekeeping?: Yes  Manage your money, pay your bills and track your expenses?: Yes  Make your own meals?: Yes    Do your own shopping?: Yes    Previous Hospitalizations:   Any hospitalizations or ED visits within the last 12 months?: Yes    How many hospitalizations have you had in the last year?: 1-2    Advance Care Planning:   Living will: Yes    Durable POA for healthcare: Yes    Advanced directive: Yes    End of Life Decisions reviewed with patient: Yes      Cognitive Screening:   Provider or family/friend/caregiver concerned regarding cognition?: No    PREVENTIVE SCREENINGS      Cardiovascular Screening:    General: Screening Current      Diabetes Screening:     General: Screening Current      Colorectal Cancer Screening:     General: Screening Current      Breast Cancer Screening:     General: History Breast Cancer      Cervical Cancer Screening:    General: Screening Not Indicated      Osteoporosis Screening:    General: Patient Declines      Abdominal Aortic Aneurysm (AAA) Screening:        General: Screening Not Indicated      Lung Cancer Screening:     General: Screening Not Indicated      Hepatitis C Screening:    General: Screening Current    Screening, Brief Intervention, and  "Referral to Treatment (SBIRT)    Screening  Typical number of drinks in a day: 0  Typical number of drinks in a week: 0  Interpretation: Low risk drinking behavior.    AUDIT-C Screenin) How often did you have a drink containing alcohol in the past year? never  2) How many drinks did you have on a typical day when you were drinking in the past year? 0  3) How often did you have 6 or more drinks on one occasion in the past year? never    AUDIT-C Score: 0  Interpretation: Score 0-2 (female): Negative screen for alcohol misuse    Single Item Drug Screening:  How often have you used an illegal drug (including marijuana) or a prescription medication for non-medical reasons in the past year? never    Single Item Drug Screen Score: 0  Interpretation: Negative screen for possible drug use disorder    Brief Intervention  Alcohol & drug use screenings were reviewed. No concerns regarding substance use disorder identified.     Other Counseling Topics:   Car/seat belt/driving safety, skin self-exam, sunscreen and calcium and vitamin D intake and regular weightbearing exercise.     No results found.     Physical Exam:     /82   Pulse 97   Temp (!) 97 °F (36.1 °C)   Resp 18   Ht 5' 2.75\" (1.594 m)   Wt 70.1 kg (154 lb 9.6 oz)   BMI 27.60 kg/m²     Physical Exam  Constitutional:       General: She is not in acute distress.     Appearance: She is well-developed. She is not diaphoretic.   HENT:      Head: Normocephalic and atraumatic.      Right Ear: External ear normal.      Left Ear: External ear normal.      Nose: Nose normal.   Eyes:      Pupils: Pupils are equal, round, and reactive to light.   Neck:      Thyroid: No thyromegaly.      Vascular: No JVD.   Cardiovascular:      Rate and Rhythm: Regular rhythm.      Heart sounds: No murmur heard.     No friction rub. No gallop.   Pulmonary:      Effort: Pulmonary effort is normal.      Breath sounds: Normal breath sounds. No wheezing or rales.   Abdominal:      " General: Bowel sounds are normal. There is no distension.      Palpations: Abdomen is soft.      Tenderness: There is no abdominal tenderness.   Musculoskeletal:         General: Normal range of motion.      Cervical back: Normal range of motion and neck supple.   Skin:     General: Skin is warm and dry.      Findings: No rash.   Neurological:      Mental Status: She is alert and oriented to person, place, and time.      Cranial Nerves: No cranial nerve deficit.      Sensory: No sensory deficit.      Motor: No abnormal muscle tone.      Coordination: Coordination normal.      Deep Tendon Reflexes: Reflexes normal.   Psychiatric:         Behavior: Behavior normal.         Thought Content: Thought content normal.         Judgment: Judgment normal.          Dalia Peña MD

## 2024-05-01 NOTE — PATIENT INSTRUCTIONS
Medicare Preventive Visit Patient Instructions  Thank you for completing your Welcome to Medicare Visit or Medicare Annual Wellness Visit today. Your next wellness visit will be due in one year (5/2/2025).  The screening/preventive services that you may require over the next 5-10 years are detailed below. Some tests may not apply to you based off risk factors and/or age. Screening tests ordered at today's visit but not completed yet may show as past due. Also, please note that scanned in results may not display below.  Preventive Screenings:  Service Recommendations Previous Testing/Comments   Colorectal Cancer Screening  * Colonoscopy    * Fecal Occult Blood Test (FOBT)/Fecal Immunochemical Test (FIT)  * Fecal DNA/Cologuard Test  * Flexible Sigmoidoscopy Age: 45-75 years old   Colonoscopy: every 10 years (may be performed more frequently if at higher risk)  OR  FOBT/FIT: every 1 year  OR  Cologuard: every 3 years  OR  Sigmoidoscopy: every 5 years  Screening may be recommended earlier than age 45 if at higher risk for colorectal cancer. Also, an individualized decision between you and your healthcare provider will decide whether screening between the ages of 76-85 would be appropriate. Colonoscopy: 07/06/2022  FOBT/FIT: Not on file  Cologuard: Not on file  Sigmoidoscopy: Not on file          Breast Cancer Screening Age: 40+ years old  Frequency: every 1-2 years  Not required if history of left and right mastectomy Mammogram: 07/20/2023        Cervical Cancer Screening Between the ages of 21-29, pap smear recommended once every 3 years.   Between the ages of 30-65, can perform pap smear with HPV co-testing every 5 years.   Recommendations may differ for women with a history of total hysterectomy, cervical cancer, or abnormal pap smears in past. Pap Smear: 01/19/2017        Hepatitis C Screening Once for adults born between 1945 and 1965  More frequently in patients at high risk for Hepatitis C Hep C Antibody:  08/26/2020        Diabetes Screening 1-2 times per year if you're at risk for diabetes or have pre-diabetes Fasting glucose: 94 mg/dL (3/26/2024)  A1C: 6.1 % (11/12/2022)      Cholesterol Screening Once every 5 years if you don't have a lipid disorder. May order more often based on risk factors. Lipid panel: 11/12/2022          Other Preventive Screenings Covered by Medicare:  Abdominal Aortic Aneurysm (AAA) Screening: covered once if your at risk. You're considered to be at risk if you have a family history of AAA.  Lung Cancer Screening: covers low dose CT scan once per year if you meet all of the following conditions: (1) Age 55-77; (2) No signs or symptoms of lung cancer; (3) Current smoker or have quit smoking within the last 15 years; (4) You have a tobacco smoking history of at least 20 pack years (packs per day multiplied by number of years you smoked); (5) You get a written order from a healthcare provider.  Glaucoma Screening: covered annually if you're considered high risk: (1) You have diabetes OR (2) Family history of glaucoma OR (3)  aged 50 and older OR (4)  American aged 65 and older  Osteoporosis Screening: covered every 2 years if you meet one of the following conditions: (1) You're estrogen deficient and at risk for osteoporosis based off medical history and other findings; (2) Have a vertebral abnormality; (3) On glucocorticoid therapy for more than 3 months; (4) Have primary hyperparathyroidism; (5) On osteoporosis medications and need to assess response to drug therapy.   Last bone density test (DXA Scan): 02/21/2022.  HIV Screening: covered annually if you're between the age of 15-65. Also covered annually if you are younger than 15 and older than 65 with risk factors for HIV infection. For pregnant patients, it is covered up to 3 times per pregnancy.    Immunizations:  Immunization Recommendations   Influenza Vaccine Annual influenza vaccination during flu season is  recommended for all persons aged >= 6 months who do not have contraindications   Pneumococcal Vaccine   * Pneumococcal conjugate vaccine = PCV13 (Prevnar 13), PCV15 (Vaxneuvance), PCV20 (Prevnar 20)  * Pneumococcal polysaccharide vaccine = PPSV23 (Pneumovax) Adults 19-63 yo with certain risk factors or if 65+ yo  If never received any pneumonia vaccine: recommend Prevnar 20 (PCV20)  Give PCV20 if previously received 1 dose of PCV13 or PPSV23   Hepatitis B Vaccine 3 dose series if at intermediate or high risk (ex: diabetes, end stage renal disease, liver disease)   Respiratory syncytial virus (RSV) Vaccine - COVERED BY MEDICARE PART D  * RSVPreF3 (Arexvy) CDC recommends that adults 60 years of age and older may receive a single dose of RSV vaccine using shared clinical decision-making (SCDM)   Tetanus (Td) Vaccine - COST NOT COVERED BY MEDICARE PART B Following completion of primary series, a booster dose should be given every 10 years to maintain immunity against tetanus. Td may also be given as tetanus wound prophylaxis.   Tdap Vaccine - COST NOT COVERED BY MEDICARE PART B Recommended at least once for all adults. For pregnant patients, recommended with each pregnancy.   Shingles Vaccine (Shingrix) - COST NOT COVERED BY MEDICARE PART B  2 shot series recommended in those 19 years and older who have or will have weakened immune systems or those 50 years and older     Health Maintenance Due:      Topic Date Due   • Cervical Cancer Screening  01/19/2020   • Breast Cancer Screening: Mammogram  07/20/2024   • Colorectal Cancer Screening  07/05/2027   • Hepatitis C Screening  Completed     Immunizations Due:      Topic Date Due   • Pneumococcal Vaccine: 65+ Years (3 of 3 - PPSV23 or PCV20) 11/05/2020   • COVID-19 Vaccine (5 - 2023-24 season) 09/01/2023     Advance Directives   What are advance directives?  Advance directives are legal documents that state your wishes and plans for medical care. These plans are made ahead  of time in case you lose your ability to make decisions for yourself. Advance directives can apply to any medical decision, such as the treatments you want, and if you want to donate organs.   What are the types of advance directives?  There are many types of advance directives, and each state has rules about how to use them. You may choose a combination of any of the following:  Living will:  This is a written record of the treatment you want. You can also choose which treatments you do not want, which to limit, and which to stop at a certain time. This includes surgery, medicine, IV fluid, and tube feedings.   Durable power of  for healthcare (DPAHC):  This is a written record that states who you want to make healthcare choices for you when you are unable to make them for yourself. This person, called a proxy, is usually a family member or a friend. You may choose more than 1 proxy.  Do not resuscitate (DNR) order:  A DNR order is used in case your heart stops beating or you stop breathing. It is a request not to have certain forms of treatment, such as CPR. A DNR order may be included in other types of advance directives.  Medical directive:  This covers the care that you want if you are in a coma, near death, or unable to make decisions for yourself. You can list the treatments you want for each condition. Treatment may include pain medicine, surgery, blood transfusions, dialysis, IV or tube feedings, and a ventilator (breathing machine).  Values history:  This document has questions about your views, beliefs, and how you feel and think about life. This information can help others choose the care that you would choose.  Why are advance directives important?  An advance directive helps you control your care. Although spoken wishes may be used, it is better to have your wishes written down. Spoken wishes can be misunderstood, or not followed. Treatments may be given even if you do not want them. An advance  directive may make it easier for your family to make difficult choices about your care.   Weight Management   Why it is important to manage your weight:  Being overweight increases your risk of health conditions such as heart disease, high blood pressure, type 2 diabetes, and certain types of cancer. It can also increase your risk for osteoarthritis, sleep apnea, and other respiratory problems. Aim for a slow, steady weight loss. Even a small amount of weight loss can lower your risk of health problems.  How to lose weight safely:  A safe and healthy way to lose weight is to eat fewer calories and get regular exercise. You can lose up about 1 pound a week by decreasing the number of calories you eat by 500 calories each day.   Healthy meal plan for weight management:  A healthy meal plan includes a variety of foods, contains fewer calories, and helps you stay healthy. A healthy meal plan includes the following:  Eat whole-grain foods more often.  A healthy meal plan should contain fiber. Fiber is the part of grains, fruits, and vegetables that is not broken down by your body. Whole-grain foods are healthy and provide extra fiber in your diet. Some examples of whole-grain foods are whole-wheat breads and pastas, oatmeal, brown rice, and bulgur.  Eat a variety of vegetables every day.  Include dark, leafy greens such as spinach, kale, angel greens, and mustard greens. Eat yellow and orange vegetables such as carrots, sweet potatoes, and winter squash.   Eat a variety of fruits every day.  Choose fresh or canned fruit (canned in its own juice or light syrup) instead of juice. Fruit juice has very little or no fiber.  Eat low-fat dairy foods.  Drink fat-free (skim) milk or 1% milk. Eat fat-free yogurt and low-fat cottage cheese. Try low-fat cheeses such as mozzarella and other reduced-fat cheeses.  Choose meat and other protein foods that are low in fat.  Choose beans or other legumes such as split peas or lentils.  Choose fish, skinless poultry (chicken or turkey), or lean cuts of red meat (beef or pork). Before you cook meat or poultry, cut off any visible fat.   Use less fat and oil.  Try baking foods instead of frying them. Add less fat, such as margarine, sour cream, regular salad dressing and mayonnaise to foods. Eat fewer high-fat foods. Some examples of high-fat foods include french fries, doughnuts, ice cream, and cakes.  Eat fewer sweets.  Limit foods and drinks that are high in sugar. This includes candy, cookies, regular soda, and sweetened drinks.  Exercise:  Exercise at least 30 minutes per day on most days of the week. Some examples of exercise include walking, biking, dancing, and swimming. You can also fit in more physical activity by taking the stairs instead of the elevator or parking farther away from stores. Ask your healthcare provider about the best exercise plan for you.      © Copyright [employer] 2018 Information is for End User's use only and may not be sold, redistributed or otherwise used for commercial purposes. All illustrations and images included in CareNotes® are the copyrighted property of [employer].A.M., Inc. or [employer]

## 2024-05-01 NOTE — TELEPHONE ENCOUNTER
Patient contacted office with complaints of trouble swallowing, diarrhea. Transferred to nurse triage to further assist.

## 2024-05-02 ENCOUNTER — DOCUMENTATION (OUTPATIENT)
Dept: HEMATOLOGY ONCOLOGY | Facility: CLINIC | Age: 74
End: 2024-05-02

## 2024-05-02 NOTE — PROGRESS NOTES
Follow up call to patient regarding updated documents for application to free drug. No response left message   2/week(s)

## 2024-05-07 ENCOUNTER — APPOINTMENT (OUTPATIENT)
Dept: LAB | Facility: CLINIC | Age: 74
End: 2024-05-07
Payer: MEDICARE

## 2024-05-07 DIAGNOSIS — C50.412 MALIGNANT NEOPLASM OF UPPER-OUTER QUADRANT OF LEFT BREAST IN FEMALE, ESTROGEN RECEPTOR POSITIVE (HCC): ICD-10-CM

## 2024-05-07 DIAGNOSIS — Z17.0 MALIGNANT NEOPLASM OF UPPER-OUTER QUADRANT OF LEFT BREAST IN FEMALE, ESTROGEN RECEPTOR POSITIVE (HCC): ICD-10-CM

## 2024-05-07 DIAGNOSIS — E87.6 HYPOKALEMIA: ICD-10-CM

## 2024-05-07 LAB — MAGNESIUM SERPL-MCNC: 1.7 MG/DL (ref 1.9–2.7)

## 2024-05-07 PROCEDURE — 83735 ASSAY OF MAGNESIUM: CPT

## 2024-05-08 ENCOUNTER — DOCUMENTATION (OUTPATIENT)
Dept: HEMATOLOGY ONCOLOGY | Facility: CLINIC | Age: 74
End: 2024-05-08

## 2024-05-08 NOTE — PROGRESS NOTES
Follow up call to [employer], spoke with April, application is in benefits investigation. Final determination should be forwarded to providers office within 24hrs.

## 2024-05-10 ENCOUNTER — DOCUMENTATION (OUTPATIENT)
Dept: HEMATOLOGY ONCOLOGY | Facility: CLINIC | Age: 74
End: 2024-05-10

## 2024-05-10 ENCOUNTER — OFFICE VISIT (OUTPATIENT)
Dept: GASTROENTEROLOGY | Facility: CLINIC | Age: 74
End: 2024-05-10
Payer: MEDICARE

## 2024-05-10 ENCOUNTER — TELEPHONE (OUTPATIENT)
Dept: HEMATOLOGY ONCOLOGY | Facility: MEDICAL CENTER | Age: 74
End: 2024-05-10

## 2024-05-10 ENCOUNTER — HOSPITAL ENCOUNTER (OUTPATIENT)
Dept: INFUSION CENTER | Facility: HOSPITAL | Age: 74
End: 2024-05-10
Attending: INTERNAL MEDICINE
Payer: MEDICARE

## 2024-05-10 VITALS
HEART RATE: 86 BPM | HEIGHT: 63 IN | BODY MASS INDEX: 26.75 KG/M2 | OXYGEN SATURATION: 100 % | SYSTOLIC BLOOD PRESSURE: 130 MMHG | DIASTOLIC BLOOD PRESSURE: 82 MMHG | WEIGHT: 151 LBS

## 2024-05-10 VITALS
TEMPERATURE: 97.3 F | DIASTOLIC BLOOD PRESSURE: 62 MMHG | SYSTOLIC BLOOD PRESSURE: 140 MMHG | HEART RATE: 81 BPM | RESPIRATION RATE: 18 BRPM | OXYGEN SATURATION: 98 %

## 2024-05-10 DIAGNOSIS — E87.6 HYPOKALEMIA: Primary | ICD-10-CM

## 2024-05-10 DIAGNOSIS — Z17.0 MALIGNANT NEOPLASM OF UPPER-OUTER QUADRANT OF LEFT BREAST IN FEMALE, ESTROGEN RECEPTOR POSITIVE (HCC): ICD-10-CM

## 2024-05-10 DIAGNOSIS — C50.412 MALIGNANT NEOPLASM OF UPPER-OUTER QUADRANT OF LEFT BREAST IN FEMALE, ESTROGEN RECEPTOR POSITIVE (HCC): ICD-10-CM

## 2024-05-10 DIAGNOSIS — M84.58XA PATHOLOGICAL FRACTURE OF SACRAL VERTEBRA DUE TO NEOPLASTIC DISEASE: ICD-10-CM

## 2024-05-10 DIAGNOSIS — R13.10 DYSPHAGIA, UNSPECIFIED TYPE: Primary | ICD-10-CM

## 2024-05-10 PROCEDURE — 96402 CHEMO HORMON ANTINEOPL SQ/IM: CPT

## 2024-05-10 PROCEDURE — 99214 OFFICE O/P EST MOD 30 MIN: CPT | Performed by: INTERNAL MEDICINE

## 2024-05-10 RX ORDER — LAMOTRIGINE 25 MG/1
500 TABLET ORAL ONCE
Status: CANCELLED | OUTPATIENT
Start: 2024-05-24

## 2024-05-10 RX ORDER — LAMOTRIGINE 25 MG/1
500 TABLET ORAL ONCE
Status: COMPLETED | OUTPATIENT
Start: 2024-05-10 | End: 2024-05-10

## 2024-05-10 RX ORDER — PANTOPRAZOLE SODIUM 40 MG/1
40 TABLET, DELAYED RELEASE ORAL DAILY
Qty: 30 TABLET | Refills: 3 | Status: SHIPPED | OUTPATIENT
Start: 2024-05-10

## 2024-05-10 RX ADMIN — FULVESTRANT 500 MG: 50 INJECTION INTRAMUSCULAR at 13:26

## 2024-05-10 NOTE — PROGRESS NOTES
Follow up call to [employer], spoke with Juliet SANTANA Who stated application is currently pending RX insurance verification. They have been unable to verify plan, information was confirmed during call.   Copy of card faxed to [employer] and Request for urgent processing to be considered, Follow up Monday

## 2024-05-10 NOTE — PROGRESS NOTES
Pt to clinic for port flush and faslodex injections. Pt aware of next apt on 5/24/24 0930, declined AVS.

## 2024-05-10 NOTE — PATIENT INSTRUCTIONS
Scheduled date of EGD(as of today): 5/13/24  Physician performing EGD: Magalis  Location of EGD: [facility]  Instructions reviewed with patient by: Genevieve SANTANA  Clearances: n/a

## 2024-05-10 NOTE — PROGRESS NOTES
[facility]  Gosia Bragg 73 y.o. female MRN: [id number]            Assessment & Plan:  73-year-old female with a history of metastatic breast cancer, pleural effusion, worsening dysphagia over the past 6 months, now to the point where liquids are increasingly difficult.    1.  Progressive dysphagia: Differential including reflux esophagitis, peptic stricture, candidal esophagitis, dysmotility from achalasia or pseudo achalasia.  -PPI therapy  -Continue full liquid diet  -Plan for urgent upper endoscopy  -Discussed with the risks of procedure including bleeding, surgery, perforation, increased risk of dilation      Gosia was seen today for dysphagia, diarrhea and constipation.    Diagnoses and all orders for this visit:    Dysphagia, unspecified type  -     EGD; Future  -     pantoprazole (PROTONIX) 40 mg tablet; Take 1 tablet (40 mg total) by mouth daily    Malignant neoplasm of upper-outer quadrant of left breast in female, estrogen receptor positive (HCC)  -     Ambulatory Referral to Gastroenterology    Other orders  -     Diet NPO; Sips with meds; Standing  -     Void on call to OR; Standing            _____________________________________________________________        CC: Dysphagia    HPI:  Gosia Bragg is a 73 y.o.female who is here for dysphagia.  This is a pleasant 73-year-old female, with a history of metastatic breast cancer, on chemotherapy, who presents with past 6 months she has had increasing dysphagia, initially to solid food but has become increasingly more difficult with even liquids.  Has a globus sensation constantly.  Denies any typical acid reflux symptoms, nausea, vomiting, abdominal pain.  She is lost significant weight.  She is able to tolerate liquids small amounts of solid food.  Bowel movements have changed somewhat.    She had a colonoscopy about 2 years ago which was relatively unremarkable.    Recent PET scan shows improvement in overall tumor  burden          ROS:  The remainder of the ROS was negative except for the pertinent positives mentioned in HPI.      Allergies: Patient has no known allergies.    Medications:   Current Outpatient Medications:     albuterol (ProAir HFA) 90 mcg/act inhaler, Inhale 2 puffs every 4 (four) hours as needed for wheezing or shortness of breath, Disp: 8 g, Rfl: 0    Ascorbic Acid (VITAMIN C PO), Take 500 mg by mouth in the morning, Disp: , Rfl:     fluticasone (FLONASE) 50 mcg/act nasal spray, 2 puffs into each nostril daily, Disp: , Rfl:     gabapentin (Neurontin) 100 mg capsule, Take 1 capsule (100 mg total) by mouth daily at bedtime (Patient taking differently: Take 100 mg by mouth if needed), Disp: 30 capsule, Rfl: 1    hydrocortisone 2.5 % cream, Apply topically 3 (three) times a day Indication: 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 If no response after 2-3 minutes, give another dose in the other nostril using a new spray. For use in emergencies in the event of accidental ingestion, respiratory depression or oversedation., Disp: 1 each, Rfl: 1    oxyCODONE (ROXICODONE) 10 MG TABS, Take 1.5 tablets (15 mg total) by mouth every 4 (four) hours as needed for moderate pain Max Daily Amount: 90 mg, Disp: 150 tablet, Rfl: 0    pantoprazole (PROTONIX) 40 mg tablet, Take 1 tablet (40 mg total) by mouth daily, Disp: 30 tablet, Rfl: 3    polyethylene glycol (MIRALAX) 17 g packet, Take 17 g by mouth daily as needed (as needed for constipation.), Disp: 255 g, Rfl: 0    potassium  "chloride (K-DUR,KLOR-CON) 10 mEq tablet, Take 1 tablet (10 mEq total) by mouth every other day, Disp: 45 tablet, Rfl: 3    pyridoxine (VITAMIN B6) 50 mg tablet, Take 1 tablet (50 mg total) by mouth daily, Disp: 30 tablet, Rfl: 4    senna-docusate sodium (SENOKOT-S) 8.6-50 mg per tablet, Take 1 tablet by mouth 2 (two) times a day (Patient taking differently: Take 1 tablet by mouth if needed), Disp: , Rfl: 0    VITAMIN D PO, Take by mouth in the morning, Disp: , Rfl:     Past Medical History:   Diagnosis Date    BRCA gene mutation negative 05/19/2021    [employer]; 36 gene panel    Breast cancer (HCC)     Left breast    Colon polyp     Dental crowns present     Exercise involving walking     the dogs    History of angina     per pt \"years ago and related to stress\"    History of chemotherapy     breast cancer (left) 2021.    History of radiation therapy     [place] (hard of hearing)     wears hearing aids/will wear DOS bilat    Hypertension     Limb alert care status     No BP/IV Left Arm    Lymphedema of left arm     Prediabetes     Wears glasses        Past Surgical History:   Procedure Laterality Date    AXILLARY SURGERY Right     sweat glands removed -     BREAST CYST EXCISION Right 2000    benign    BREAST SURGERY Right     CHOLECYSTECTOMY      COLONOSCOPY  07/06/2022    DILATION AND CURETTAGE OF UTERUS      IR BIOPSY BONE  09/01/2023    IR PORT PLACEMENT  10/30/2023    MASTECTOMY Left 06/08/2021    [place] BREAST REDUCTION N/A 03/18/2022    Procedure: R BREAST REDUCTION; L BREAST EXCISION STANDING SKIN DEFORMITY; LOCAL FLAP;  Surgeon: Lisa Pineda MD;  Location: [facility];  Service: Plastics    [place] MAST MODF RAD W/AX LYMPH NOD W/WO PECT/NERI MIN Left 06/08/2021    Procedure: BREAST MODIFIED RADICAL MASTECTOMY; ANAND  DIRECTED AXILLARY DISSECTION;  Surgeon: Lisandro Tijerina MD;  [facility];  Service: Surgical Oncology    REDUCTION MAMMAPLASTY Right     March 2022    US BREAST NEEDLE LOC LEFT Left 06/02/2021    US " "GUIDED BREAST BIOPSY LEFT COMPLETE Left 04/19/2021    US GUIDED BREAST LYMPH NODE BIOPSY LEFT Left 04/19/2021    WISDOM TOOTH EXTRACTION         Family History   Problem Relation Age of Onset    Dementia Mother     Colon cancer Mother         50's-60's    Lung cancer Father         50's-60's    No Known Problems Sister     Breast cancer Paternal Aunt     No Known Problems Sister     Stomach cancer Paternal Uncle         70's        reports that she has never smoked. She has never been exposed to tobacco smoke. She has never used smokeless tobacco. She reports that she does not drink alcohol and does not use drugs.      Physical Exam:    /82 (BP Location: Right arm, Patient Position: Sitting, Cuff Size: Standard)   Pulse 86   Ht 5' 2.75\" (1.594 m)   Wt 68.5 kg (151 lb)   SpO2 100%   BMI 26.96 kg/m²     Gen: wn/wd, NAD  HEENT: anicteric, MMM, no cervical LAD  CVS: RRR, no m/r/g  CHEST: Diminished breath sounds at left base   ABD: +BS, soft, NT,ND, no hepatosplenomegaly  EXT: Trace edema  NEURO: aaox3  SKIN: NO rashes    "

## 2024-05-13 ENCOUNTER — HOSPITAL ENCOUNTER (OUTPATIENT)
Dept: GASTROENTEROLOGY | Facility: AMBULARY SURGERY CENTER | Age: 74
Setting detail: OUTPATIENT SURGERY
Discharge: HOME | End: 2024-05-13
Attending: INTERNAL MEDICINE | Admitting: INTERNAL MEDICINE
Payer: MEDICARE

## 2024-05-13 ENCOUNTER — ANESTHESIA (OUTPATIENT)
Dept: GASTROENTEROLOGY | Facility: AMBULARY SURGERY CENTER | Age: 74
End: 2024-05-13

## 2024-05-13 ENCOUNTER — ANESTHESIA EVENT (OUTPATIENT)
Dept: GASTROENTEROLOGY | Facility: AMBULARY SURGERY CENTER | Age: 74
End: 2024-05-13

## 2024-05-13 VITALS
RESPIRATION RATE: 16 BRPM | HEART RATE: 76 BPM | DIASTOLIC BLOOD PRESSURE: 71 MMHG | OXYGEN SATURATION: 100 % | TEMPERATURE: 98 F | SYSTOLIC BLOOD PRESSURE: 123 MMHG

## 2024-05-13 DIAGNOSIS — R13.10 DYSPHAGIA, UNSPECIFIED TYPE: ICD-10-CM

## 2024-05-13 PROCEDURE — 88374 M/PHMTRC ALYS ISHQUANT/SEMIQ: CPT | Performed by: PATHOLOGY

## 2024-05-13 PROCEDURE — 88342 IMHCHEM/IMCYTCHM 1ST ANTB: CPT | Performed by: PATHOLOGY

## 2024-05-13 PROCEDURE — 88305 TISSUE EXAM BY PATHOLOGIST: CPT | Performed by: PATHOLOGY

## 2024-05-13 PROCEDURE — 88341 IMHCHEM/IMCYTCHM EA ADD ANTB: CPT | Performed by: PATHOLOGY

## 2024-05-13 PROCEDURE — 43239 EGD BIOPSY SINGLE/MULTIPLE: CPT | Performed by: INTERNAL MEDICINE

## 2024-05-13 PROCEDURE — 88360 TUMOR IMMUNOHISTOCHEM/MANUAL: CPT | Performed by: PATHOLOGY

## 2024-05-13 PROCEDURE — 88312 SPECIAL STAINS GROUP 1: CPT | Performed by: PATHOLOGY

## 2024-05-13 PROCEDURE — 88313 SPECIAL STAINS GROUP 2: CPT | Performed by: PATHOLOGY

## 2024-05-13 RX ORDER — SODIUM CHLORIDE, SODIUM LACTATE, POTASSIUM CHLORIDE, CALCIUM CHLORIDE 600; 310; 30; 20 MG/100ML; MG/100ML; MG/100ML; MG/100ML
INJECTION, SOLUTION INTRAVENOUS CONTINUOUS PRN
Status: DISCONTINUED | OUTPATIENT
Start: 2024-05-13 | End: 2024-05-13

## 2024-05-13 RX ORDER — SODIUM CHLORIDE, SODIUM LACTATE, POTASSIUM CHLORIDE, CALCIUM CHLORIDE 600; 310; 30; 20 MG/100ML; MG/100ML; MG/100ML; MG/100ML
125 INJECTION, SOLUTION INTRAVENOUS CONTINUOUS
Status: CANCELLED | OUTPATIENT
Start: 2024-05-13

## 2024-05-13 RX ORDER — PROPOFOL 10 MG/ML
INJECTION, EMULSION INTRAVENOUS AS NEEDED
Status: DISCONTINUED | OUTPATIENT
Start: 2024-05-13 | End: 2024-05-13

## 2024-05-13 RX ORDER — LIDOCAINE HYDROCHLORIDE 10 MG/ML
INJECTION, SOLUTION EPIDURAL; INFILTRATION; INTRACAUDAL; PERINEURAL AS NEEDED
Status: DISCONTINUED | OUTPATIENT
Start: 2024-05-13 | End: 2024-05-13

## 2024-05-13 RX ADMIN — PROPOFOL 40 MG: 10 INJECTION, EMULSION INTRAVENOUS at 13:52

## 2024-05-13 RX ADMIN — PROPOFOL 80 MG: 10 INJECTION, EMULSION INTRAVENOUS at 13:49

## 2024-05-13 RX ADMIN — PROPOFOL 20 MG: 10 INJECTION, EMULSION INTRAVENOUS at 14:04

## 2024-05-13 RX ADMIN — PROPOFOL 20 MG: 10 INJECTION, EMULSION INTRAVENOUS at 13:54

## 2024-05-13 RX ADMIN — PROPOFOL 30 MG: 10 INJECTION, EMULSION INTRAVENOUS at 14:00

## 2024-05-13 RX ADMIN — LIDOCAINE HYDROCHLORIDE 40 MG: 10 INJECTION, SOLUTION EPIDURAL; INFILTRATION; INTRACAUDAL; PERINEURAL at 13:49

## 2024-05-13 RX ADMIN — SODIUM CHLORIDE, SODIUM LACTATE, POTASSIUM CHLORIDE, AND CALCIUM CHLORIDE: .6; .31; .03; .02 INJECTION, SOLUTION INTRAVENOUS at 13:45

## 2024-05-13 RX ADMIN — PROPOFOL 30 MG: 10 INJECTION, EMULSION INTRAVENOUS at 13:56

## 2024-05-13 NOTE — ANESTHESIA POSTPROCEDURE EVALUATION
Post-Op Assessment Note    CV Status:  Stable  Pain Score: 0    Pain management: adequate       Mental Status:  Sleepy and arousable   Hydration Status:  Stable   PONV Controlled:  None   Airway Patency:  Patent  Two or more mitigation strategies used for obstructive sleep apnea   Post Op Vitals Reviewed: Yes    No anethesia notable event occurred.    Staff: CRNA   Comments: spontaneously breathing, simple mask to O2, vss, fully endorsed to recovery w/o AC              BP   124/65   Temp      Pulse  72   Resp   14   SpO2   99

## 2024-05-13 NOTE — H&P
"History and Physical -  [facility]  Gosia Bragg 73 y.o. female MRN: [id number]    HPI: Gosia Bragg is a 73 y.o. year old female who presents with dysphagia      Review of Systems    Historical Information   Past Medical History:   Diagnosis Date    BRCA gene mutation negative 05/19/2021    [employer]; 36 gene panel    Breast cancer (HCC)     Left breast    Colon polyp     Dental crowns present     Exercise involving walking     the dogs    History of angina     per pt \"years ago and related to stress\"    History of chemotherapy     breast cancer (left) 2021.    History of radiation therapy     [place] (hard of hearing)     wears hearing aids/will wear DOS bilat    Hypertension     Limb alert care status     No BP/IV Left Arm    Lymphedema of left arm     Prediabetes     Wears glasses      Past Surgical History:   Procedure Laterality Date    AXILLARY SURGERY Right     sweat glands removed -     BREAST CYST EXCISION Right 2000    benign    BREAST SURGERY Right     CHOLECYSTECTOMY      COLONOSCOPY  07/06/2022    DILATION AND CURETTAGE OF UTERUS      IR BIOPSY BONE  09/01/2023    IR PORT PLACEMENT  10/30/2023    MASTECTOMY Left 06/08/2021    [place] BREAST REDUCTION N/A 03/18/2022    Procedure: R BREAST REDUCTION; L BREAST EXCISION STANDING SKIN DEFORMITY; LOCAL FLAP;  Surgeon: Lisa Pineda MD;  [facility]: [facility];  Service: Plastics    [place] MAST MODF RAD W/AX LYMPH NOD W/WO PECT/NERI MIN Left 06/08/2021    Procedure: BREAST MODIFIED RADICAL MASTECTOMY; ANAND  DIRECTED AXILLARY DISSECTION;  Surgeon: Lisandro Tijerina MD;  [facility];  Service: Surgical Oncology    REDUCTION MAMMAPLASTY Right     March 2022    US BREAST NEEDLE LOC LEFT Left 06/02/2021    US GUIDED BREAST BIOPSY LEFT COMPLETE Left 04/19/2021    US GUIDED BREAST LYMPH NODE BIOPSY LEFT Left 04/19/2021    WISDOM TOOTH EXTRACTION       Social History   Social History     Substance and Sexual Activity   Alcohol Use Never     Social " History     Substance and Sexual Activity   Drug Use No     Social History     Tobacco Use   Smoking Status Never    Passive exposure: Never   Smokeless Tobacco Never     Family History   Problem Relation Age of Onset    Dementia Mother     Colon cancer Mother         50's-60's    Lung cancer Father         50's-60's    No Known Problems Sister     Breast cancer Paternal Aunt     No Known Problems Sister     Stomach cancer Paternal Uncle         70's       Meds/Allergies     (Not in a hospital admission)      No Known Allergies    Objective     /66   Pulse 84   Temp 98 °F (36.7 °C) (Temporal)   Resp 18   SpO2 100%       PHYSICAL EXAM    Gen: NAD  CV: RRR  CHEST: Clear  ABD: soft, NT/ND  EXT: no edema  Neuro: AAO      ASSESSMENT/PLAN:  This is a 73 y.o. year old female here for dysphagia    PLAN:   Procedure: egd

## 2024-05-13 NOTE — ANESTHESIA PREPROCEDURE EVALUATION
Procedure:  EGD    Relevant Problems   CARDIO   (+) Benign essential hypertension      GI/HEPATIC   (+) Dysphagia      GYN   (+) Malignant neoplasm of upper-outer quadrant of left breast in female, estrogen receptor positive (HCC)      NEURO/PSYCH   (+) Anxiety      Oncology   (+) S/P mastectomy, left        Physical Exam    Airway    Mallampati score: II  TM Distance: >3 FB  Neck ROM: full     Dental   Comment: Denies loose teeth     Cardiovascular  Cardiovascular exam normal    Pulmonary  Pulmonary exam normal     Other Findings  Portions of exam deferred due to low yield and/or unknown COVID statuspost-pubertal.      Anesthesia Plan  ASA Score- 3     Anesthesia Type- IV sedation with anesthesia with ASA Monitors.         Additional Monitors:     Airway Plan:            Plan Factors-Exercise tolerance (METS): >4 METS.    Chart reviewed.   Existing labs reviewed. Patient summary reviewed.    Patient is not a current smoker.              Induction- intravenous.    Postoperative Plan-     Informed Consent- Anesthetic plan and risks discussed with patient.  I personally reviewed this patient with the CRNA. Discussed and agreed on the Anesthesia Plan with the CRNA..

## 2024-05-14 ENCOUNTER — DOCUMENTATION (OUTPATIENT)
Dept: HEMATOLOGY ONCOLOGY | Facility: CLINIC | Age: 74
End: 2024-05-14

## 2024-05-14 NOTE — PROGRESS NOTES
Per [employer] they were unable to verify benefits, Information was confirmed with rep Ken and a request that application be expedited was made.

## 2024-05-15 ENCOUNTER — DOCUMENTATION (OUTPATIENT)
Dept: HEMATOLOGY ONCOLOGY | Facility: CLINIC | Age: 74
End: 2024-05-15

## 2024-05-15 ENCOUNTER — TELEPHONE (OUTPATIENT)
Dept: HEMATOLOGY ONCOLOGY | Facility: MEDICAL CENTER | Age: 74
End: 2024-05-15

## 2024-05-15 DIAGNOSIS — Z17.0 MALIGNANT NEOPLASM OF UPPER-OUTER QUADRANT OF LEFT BREAST IN FEMALE, ESTROGEN RECEPTOR POSITIVE (HCC): Primary | ICD-10-CM

## 2024-05-15 DIAGNOSIS — C50.412 MALIGNANT NEOPLASM OF UPPER-OUTER QUADRANT OF LEFT BREAST IN FEMALE, ESTROGEN RECEPTOR POSITIVE (HCC): Primary | ICD-10-CM

## 2024-05-15 NOTE — PROGRESS NOTES
Received email from providers office.   Gosia Bragg  Patient had EGD yesterday and has intrinsic compression of esophagus..she cannot swallow pills    Ibrance to be discontinued      Call placed to [employer] spoke with Kathya and requested free drug application be discontinued.

## 2024-05-15 NOTE — TELEPHONE ENCOUNTER
Per EGD note:  n the proximal esophagus at approximate 28 cm from the incisors there is a small area of ulcerated mucosa, this was biopsied.  Could not pass a standard gastroscope beyond this area with extrinsic compression, switch to pediatric gastroscope could not pass beyond this area as well.  Appears to be fixed extrinsic compression in the midesophagus causing patient's symptoms     RECOMMENDATION:    Await pathology results      Discharge home  Full liquid diet    Patient unable to swallow pills  Dr Ledesma aware  Ibrance discontinued  Liquid tamoxifen ordered in addition to faslodex  Patient verbalizes understanding of plan

## 2024-05-15 NOTE — PROGRESS NOTES
Medical oncology office was contacted by GI.  Patient recently underwent upper GI.  Impression stated that at the proximal esophagus at approximately 28 cm from the incisors, there was a small area of ulcerated mucosa.  Scope could not be passed because of extrinsic compression.  Apparently there were undigested pills stuck there.  This explains the patient's history to not be able to swallow most pills.    Patient is to continue with tamoxifen, liquid form for hormonal suppression.  Patient is also on Faslodex, an IM monthly injection.  This obviously avoids her concern for pills getting stuck anywhere.  GI evaluation greatly appreciated.

## 2024-05-17 PROCEDURE — 88312 SPECIAL STAINS GROUP 1: CPT | Performed by: PATHOLOGY

## 2024-05-17 PROCEDURE — 88342 IMHCHEM/IMCYTCHM 1ST ANTB: CPT | Performed by: PATHOLOGY

## 2024-05-17 PROCEDURE — 88313 SPECIAL STAINS GROUP 2: CPT | Performed by: PATHOLOGY

## 2024-05-17 PROCEDURE — 88305 TISSUE EXAM BY PATHOLOGIST: CPT | Performed by: PATHOLOGY

## 2024-05-17 PROCEDURE — 88341 IMHCHEM/IMCYTCHM EA ADD ANTB: CPT | Performed by: PATHOLOGY

## 2024-05-17 PROCEDURE — 88360 TUMOR IMMUNOHISTOCHEM/MANUAL: CPT | Performed by: PATHOLOGY

## 2024-05-24 ENCOUNTER — HOSPITAL ENCOUNTER (OUTPATIENT)
Dept: INFUSION CENTER | Facility: HOSPITAL | Age: 74
End: 2024-05-24
Attending: INTERNAL MEDICINE
Payer: MEDICARE

## 2024-05-24 DIAGNOSIS — Z17.0 MALIGNANT NEOPLASM OF UPPER-OUTER QUADRANT OF LEFT BREAST IN FEMALE, ESTROGEN RECEPTOR POSITIVE (HCC): ICD-10-CM

## 2024-05-24 DIAGNOSIS — C50.412 MALIGNANT NEOPLASM OF UPPER-OUTER QUADRANT OF LEFT BREAST IN FEMALE, ESTROGEN RECEPTOR POSITIVE (HCC): ICD-10-CM

## 2024-05-24 DIAGNOSIS — M84.58XA PATHOLOGICAL FRACTURE OF SACRAL VERTEBRA DUE TO NEOPLASTIC DISEASE: ICD-10-CM

## 2024-05-24 DIAGNOSIS — E87.6 HYPOKALEMIA: Primary | ICD-10-CM

## 2024-05-24 PROCEDURE — 96402 CHEMO HORMON ANTINEOPL SQ/IM: CPT

## 2024-05-24 RX ORDER — LAMOTRIGINE 25 MG/1
500 TABLET ORAL ONCE
OUTPATIENT
Start: 2024-06-06

## 2024-05-24 RX ORDER — LAMOTRIGINE 25 MG/1
500 TABLET ORAL ONCE
Status: COMPLETED | OUTPATIENT
Start: 2024-05-24 | End: 2024-05-24

## 2024-05-24 RX ADMIN — FULVESTRANT 500 MG: 50 INJECTION INTRAMUSCULAR at 09:46

## 2024-05-24 NOTE — PROGRESS NOTES
Gosia Bragg  tolerated treatment well with no complications.      Gosia Bragg is aware of future appt on 6/7 at 0930.     AVS printed and given to Gosia Bragg:    No (Declined by Gosia Bragg)

## 2024-05-31 ENCOUNTER — APPOINTMENT (OUTPATIENT)
Dept: LAB | Facility: CLINIC | Age: 74
End: 2024-05-31
Payer: MEDICARE

## 2024-05-31 ENCOUNTER — TRANSCRIBE ORDERS (OUTPATIENT)
Dept: LAB | Facility: CLINIC | Age: 74
End: 2024-05-31

## 2024-05-31 DIAGNOSIS — G89.3 PAIN FROM BONE METASTASES (HCC): Primary | ICD-10-CM

## 2024-05-31 DIAGNOSIS — C79.51 PAIN FROM BONE METASTASES (HCC): ICD-10-CM

## 2024-05-31 DIAGNOSIS — G89.3 PAIN FROM BONE METASTASES (HCC): ICD-10-CM

## 2024-05-31 DIAGNOSIS — C79.51 PAIN FROM BONE METASTASES (HCC): Primary | ICD-10-CM

## 2024-05-31 DIAGNOSIS — E87.6 HYPOKALEMIA: ICD-10-CM

## 2024-05-31 LAB
ALBUMIN SERPL BCP-MCNC: 3.7 G/DL (ref 3.5–5)
ALP SERPL-CCNC: 65 U/L (ref 34–104)
ALT SERPL W P-5'-P-CCNC: 11 U/L (ref 7–52)
ANION GAP SERPL CALCULATED.3IONS-SCNC: 16 MMOL/L (ref 4–13)
AST SERPL W P-5'-P-CCNC: 18 U/L (ref 13–39)
BASOPHILS # BLD AUTO: 0.03 THOUSANDS/ÂΜL (ref 0–0.1)
BASOPHILS NFR BLD AUTO: 0 % (ref 0–1)
BILIRUB SERPL-MCNC: 1.17 MG/DL (ref 0.2–1)
BUN SERPL-MCNC: 13 MG/DL (ref 5–25)
CALCIUM SERPL-MCNC: 9.4 MG/DL (ref 8.4–10.2)
CHLORIDE SERPL-SCNC: 100 MMOL/L (ref 96–108)
CO2 SERPL-SCNC: 27 MMOL/L (ref 21–32)
CREAT SERPL-MCNC: 0.53 MG/DL (ref 0.6–1.3)
EOSINOPHIL # BLD AUTO: 0.15 THOUSAND/ÂΜL (ref 0–0.61)
EOSINOPHIL NFR BLD AUTO: 2 % (ref 0–6)
ERYTHROCYTE [DISTWIDTH] IN BLOOD BY AUTOMATED COUNT: 14.8 % (ref 11.6–15.1)
GFR SERPL CREATININE-BSD FRML MDRD: 95 ML/MIN/1.73SQ M
GLUCOSE P FAST SERPL-MCNC: 105 MG/DL (ref 65–99)
HCT VFR BLD AUTO: 37.7 % (ref 34.8–46.1)
HGB BLD-MCNC: 12.6 G/DL (ref 11.5–15.4)
IMM GRANULOCYTES # BLD AUTO: 0.04 THOUSAND/UL (ref 0–0.2)
IMM GRANULOCYTES NFR BLD AUTO: 1 % (ref 0–2)
LYMPHOCYTES # BLD AUTO: 0.34 THOUSANDS/ÂΜL (ref 0.6–4.47)
LYMPHOCYTES NFR BLD AUTO: 5 % (ref 14–44)
MCH RBC QN AUTO: 31.2 PG (ref 26.8–34.3)
MCHC RBC AUTO-ENTMCNC: 33.4 G/DL (ref 31.4–37.4)
MCV RBC AUTO: 93 FL (ref 82–98)
MONOCYTES # BLD AUTO: 0.55 THOUSAND/ÂΜL (ref 0.17–1.22)
MONOCYTES NFR BLD AUTO: 8 % (ref 4–12)
NEUTROPHILS # BLD AUTO: 6.16 THOUSANDS/ÂΜL (ref 1.85–7.62)
NEUTS SEG NFR BLD AUTO: 84 % (ref 43–75)
NRBC BLD AUTO-RTO: 0 /100 WBCS
PLATELET # BLD AUTO: 253 THOUSANDS/UL (ref 149–390)
PMV BLD AUTO: 12.1 FL (ref 8.9–12.7)
POTASSIUM SERPL-SCNC: 2.7 MMOL/L (ref 3.5–5.3)
PROT SERPL-MCNC: 6.8 G/DL (ref 6.4–8.4)
RBC # BLD AUTO: 4.04 MILLION/UL (ref 3.81–5.12)
SODIUM SERPL-SCNC: 143 MMOL/L (ref 135–147)
WBC # BLD AUTO: 7.27 THOUSAND/UL (ref 4.31–10.16)

## 2024-05-31 PROCEDURE — 80053 COMPREHEN METABOLIC PANEL: CPT

## 2024-05-31 PROCEDURE — 36415 COLL VENOUS BLD VENIPUNCTURE: CPT

## 2024-05-31 PROCEDURE — 85025 COMPLETE CBC W/AUTO DIFF WBC: CPT

## 2024-05-31 PROCEDURE — 86300 IMMUNOASSAY TUMOR CA 15-3: CPT

## 2024-06-02 LAB
CANCER AG15-3 SERPL-ACNC: 70.9 U/ML (ref 0–25)
CANCER AG27-29 SERPL-ACNC: 80.2 U/ML (ref 0–38.6)

## 2024-06-17 ENCOUNTER — LAB REQUISITION (OUTPATIENT)
Dept: LAB | Facility: HOSPITAL | Age: 74
End: 2024-06-17

## 2024-06-17 DIAGNOSIS — R13.10 DYSPHAGIA, UNSPECIFIED: ICD-10-CM

## 2024-06-17 LAB — SCAN RESULT: NORMAL

## 2024-08-26 ENCOUNTER — TELEPHONE (OUTPATIENT)
Age: 74
End: 2024-08-26

## 2024-08-26 NOTE — TELEPHONE ENCOUNTER
Viviana from [facility] Rad Onc Dept is requesting a treatment plan summary of what radiation treatment Gosia received with us. Please fax to [phone].

## 2024-11-04 ENCOUNTER — OFFICE VISIT (OUTPATIENT)
Dept: FAMILY MEDICINE CLINIC | Facility: CLINIC | Age: 74
End: 2024-11-04
Payer: MEDICARE

## 2024-11-04 VITALS
HEART RATE: 102 BPM | WEIGHT: 155 LBS | SYSTOLIC BLOOD PRESSURE: 128 MMHG | TEMPERATURE: 97.2 F | DIASTOLIC BLOOD PRESSURE: 72 MMHG | HEIGHT: 63 IN | RESPIRATION RATE: 16 BRPM | BODY MASS INDEX: 27.46 KG/M2

## 2024-11-04 DIAGNOSIS — I10 BENIGN ESSENTIAL HYPERTENSION: Primary | ICD-10-CM

## 2024-11-04 PROCEDURE — G2211 COMPLEX E/M VISIT ADD ON: HCPCS | Performed by: INTERNAL MEDICINE

## 2024-11-04 PROCEDURE — 99213 OFFICE O/P EST LOW 20 MIN: CPT | Performed by: INTERNAL MEDICINE

## 2024-11-04 NOTE — ASSESSMENT & PLAN NOTE
She has been off her metoprolol for about 8 months and will stay off this. Will continue to check this periodically.

## 2024-11-04 NOTE — PROGRESS NOTES
"Ambulatory Visit  Name: Gosia Bragg      : 1950      MRN: [id number]  Encounter Provider: Dalia Peña MD  Encounter Date: 2024   Encounter department: [facility]    Assessment & Plan  Benign essential hypertension  She has been off her metoprolol for about 8 months and will stay off this. Will continue to check this periodically.            History of Present Illness     Here for follow up of hypertension. She has not been taking her metooprolol for about 6-8 months.  BP had been low at one of her oncology appointments so she stopped this.           Review of Systems   Constitutional: Negative.    Respiratory: Negative.     Cardiovascular: Negative.            Objective     /72   Pulse 102   Temp (!) 97.2 °F (36.2 °C)   Resp 16   Ht 5' 2.75\" (1.594 m)   Wt 70.3 kg (155 lb)   BMI 27.68 kg/m²     Physical Exam  Constitutional:       Appearance: Normal appearance.   HENT:      Head: Normocephalic and atraumatic.   Eyes:      Pupils: Pupils are equal, round, and reactive to light.   Cardiovascular:      Rate and Rhythm: Normal rate and regular rhythm.      Heart sounds: No murmur heard.     No friction rub. No gallop.   Pulmonary:      Effort: Pulmonary effort is normal.      Breath sounds: Normal breath sounds. No wheezing or rales.   Musculoskeletal:         General: No swelling.   Neurological:      Mental Status: She is alert.         "

## 2024-11-13 ENCOUNTER — NURSE TRIAGE (OUTPATIENT)
Age: 74
End: 2024-11-13

## 2024-11-13 ENCOUNTER — TELEPHONE (OUTPATIENT)
Age: 74
End: 2024-11-13

## 2024-11-13 NOTE — TELEPHONE ENCOUNTER
Called patient, no answer, left message requesting she call back to have nursing triage symptoms.

## 2024-11-13 NOTE — TELEPHONE ENCOUNTER
Chantale,    I called patient and confirmed that she is taking the Protonix as previously prescribed. The feeding tube is out. She states she is able to eat and swallow. She would like to proceed with an urgent appt if available as you suggested. I am sending to GI Clinical Pod as well as we do not book urgents. Thank you.

## 2024-11-13 NOTE — TELEPHONE ENCOUNTER
"Patient calling after having some recent finding on ct scan. States that she is having difficulty swallowing and the ct scan show inflammation.  First available apt both urgent and follow up not until Dec 16 2024.   Please review and discuss with patient.    She states that both food and liquid \" feels weird\" and she can always feels that sensation where the swelling is.    Reason for Disposition   Swallowing difficulty and cause unknown  (Exception: Difficulty swallowing is a chronic symptom.)    Answer Assessment - Initial Assessment Questions  1. DESCRIPTION: \"Tell me more about this problem.\" \"Are you  having trouble swallowing liquids, solids, or both?\" \"Any trouble with swallowing saliva (spit)?\"   \" It feels weird when I swallow\"  \" even just talking I can feel it, right below collar bones\"   2. SEVERITY: \"How bad is the swallowing difficulty?\"  (Scale 1-10; or mild, moderate, severe)moderate     3. ONSET: \"When did the swallowing problems begin?\"     Started again 2-3 weeks ago.     4. CAUSE: \"What do you think is causing the problem?\"  (e.g., dry mouth, food or pill stuck in throat, mouth pain, sore throat, progression of disease process such as dementia or Parkinson's disease). Inflammation in one spot      5. CHRONIC or RECURRENT: \"Is this a new problem for you?\"  If No, ask: \"How long have you had this problem?\" (e.g., days, weeks, months)   recurrent  6. OTHER SYMPTOMS: \"Do you have any other symptoms?\" (e.g., chest pain, difficulty breathing, mouth sores, sore throat, swollen tongue, chest pain)    Protocols used: Swallowing Difficulty-Adult-[place]    "

## 2024-11-13 NOTE — TELEPHONE ENCOUNTER
Patients GI provider:  Dr. Blancas    Number to return call: [phone]    Reason for call: Ernestina angulo nurse from Dr Phillips from Hem/Onc office called to make an urgent appt for the pt. She states the pt is still having esophageal pain and loose stools and they would like her to be seen soon. Please reach back out to schedule    Scheduled procedure/appointment date if applicable: Apt/procedure

## 2024-12-12 ENCOUNTER — LAB REQUISITION (OUTPATIENT)
Dept: LAB | Facility: HOSPITAL | Age: 74
End: 2024-12-12

## 2024-12-12 DIAGNOSIS — C79.9 SECONDARY MALIGNANT NEOPLASM OF UNSPECIFIED SITE (CODE) (HCC): ICD-10-CM

## 2024-12-16 ENCOUNTER — OFFICE VISIT (OUTPATIENT)
Dept: GASTROENTEROLOGY | Facility: CLINIC | Age: 74
End: 2024-12-16
Payer: MEDICARE

## 2024-12-16 ENCOUNTER — TELEPHONE (OUTPATIENT)
Dept: HEMATOLOGY ONCOLOGY | Facility: MEDICAL CENTER | Age: 74
End: 2024-12-16

## 2024-12-16 VITALS
DIASTOLIC BLOOD PRESSURE: 84 MMHG | BODY MASS INDEX: 27.85 KG/M2 | HEIGHT: 63 IN | SYSTOLIC BLOOD PRESSURE: 150 MMHG | WEIGHT: 157.2 LBS | HEART RATE: 97 BPM

## 2024-12-16 DIAGNOSIS — L08.9: ICD-10-CM

## 2024-12-16 DIAGNOSIS — R13.19 ESOPHAGEAL DYSPHAGIA: Primary | ICD-10-CM

## 2024-12-16 DIAGNOSIS — Z17.0 MALIGNANT NEOPLASM OF UPPER-OUTER QUADRANT OF LEFT BREAST IN FEMALE, ESTROGEN RECEPTOR POSITIVE (HCC): ICD-10-CM

## 2024-12-16 DIAGNOSIS — K94.22: ICD-10-CM

## 2024-12-16 DIAGNOSIS — E43 SEVERE PROTEIN-CALORIE MALNUTRITION (HCC): ICD-10-CM

## 2024-12-16 DIAGNOSIS — C50.412 MALIGNANT NEOPLASM OF UPPER-OUTER QUADRANT OF LEFT BREAST IN FEMALE, ESTROGEN RECEPTOR POSITIVE (HCC): ICD-10-CM

## 2024-12-16 PROCEDURE — 99213 OFFICE O/P EST LOW 20 MIN: CPT | Performed by: PHYSICIAN ASSISTANT

## 2024-12-16 RX ORDER — DEXAMETHASONE 4 MG/1
TABLET ORAL
COMMUNITY
Start: 2024-11-14

## 2024-12-16 RX ORDER — FAMOTIDINE 20 MG
1000 TABLET ORAL DAILY
COMMUNITY

## 2024-12-16 RX ORDER — SODIUM CHLORIDE, SODIUM LACTATE, POTASSIUM CHLORIDE, CALCIUM CHLORIDE 600; 310; 30; 20 MG/100ML; MG/100ML; MG/100ML; MG/100ML
125 INJECTION, SOLUTION INTRAVENOUS CONTINUOUS
OUTPATIENT
Start: 2024-12-16

## 2024-12-16 RX ORDER — DIPHENOXYLATE HYDROCHLORIDE AND ATROPINE SULFATE 2.5; .025 MG/1; MG/1
1 TABLET ORAL DAILY
COMMUNITY

## 2024-12-16 NOTE — PROGRESS NOTES
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  This was demonstrated on May 2024 EGD.  There was fixed external compression also which was not traversable with either standard or pediatric scope.  Ulcerated mucosa biopsies came back consistent with metastatic disease from her breast cancer.  -She continues to have difficulties with swallowing.  - will attempt repeat EGD  Orders:  •  EGD Dilation; Future    Malignant neoplasm of upper-outer quadrant of left breast in female, estrogen receptor positive (HCC)             History of Present Illness   HPI  Gosia Bragg is a 74 y.o. female new to me with past medical history of hypertension, venous insufficiency, hydradenitis suppurativa, metastatic breast cancer who presents issues with dysphagia.  She had an EGD back in May 2024 which showed abnormal mucosa 28 cm from the incisors which was found on biopsy to be metastatic process from her known breast cancer.  She also had fixed extrinsic compression which was not traversable with either standard or pediatric scope.    Dysphagia  How long with symptoms -7 months  Where does food feel like it gets stuck - mid chest  Solids same and liquids - solids worse  Need to regurgitate food - No   Pain with swallowing - No  History of seasonal or environmental allergies - No  History of Asthma - No  History of GERD - No  Unintentional weight loss -yes with known history of metastatic breast cancer  Previous esophageal imaging -recent PET/CT 2024 shows increased uptake in midesophagus  Previous EGD - May 2024 which showed abnormal mucosa 28 cm from the incisors which was found on biopsy to be metastatic process from her known breast cancer.   "She also had fixed extrinsic compression which was not traversable with either standard or pediatric scope.    She has been undergoing chemotherapy and feels that her symptoms are slightly improved but still present.  She is hoping that after therapy things have reduced in size and the upper scope may be more successful and might be able to include dilation.    No pacemaker or defibrillator implanted.   No chronic kidney disease or solitary kidney.  Not on any supplemental oxygen at night.  Not on any antiplatelet or anticoagulants.  BMI is             Review of Systems   Constitutional:  Negative for activity change, appetite change, chills, diaphoresis, fatigue and unexpected weight change.   HENT:  Positive for trouble swallowing. Negative for mouth sores and sore throat.    Eyes:  Negative for pain, redness and visual disturbance.   Respiratory:  Negative for apnea, chest tightness and shortness of breath.    Cardiovascular:  Negative for chest pain and leg swelling.   Gastrointestinal:  Negative for abdominal distention, abdominal pain, anal bleeding, blood in stool, constipation, diarrhea, nausea and vomiting.   Genitourinary:  Negative for difficulty urinating, dysuria and hematuria.   Musculoskeletal:  Negative for arthralgias, back pain, gait problem, joint swelling and myalgias.   Skin:  Negative for color change, pallor and rash.   Allergic/Immunologic: Negative for environmental allergies and food allergies.   Neurological:  Negative for dizziness, weakness, light-headedness, numbness and headaches.   Psychiatric/Behavioral:  Negative for agitation and behavioral problems.      Past Medical History   Past Medical History:   Diagnosis Date   • BRCA gene mutation negative 05/19/2021    [employer]; 36 gene panel   • Breast cancer (HCC)     Left breast   • Cancer (HCC) 2220   • Colon polyp    • Dental crowns present    • Exercise involving walking     the dogs   • History of angina     per pt \"years ago and " "related to stress\"   • History of chemotherapy     breast cancer (left) 2021.   • History of radiation therapy    • [place] (hard of hearing)     wears hearing aids/will wear DOS bilat   • Hypertension    • Limb alert care status     No BP/IV Left Arm   • Lymphedema of left arm    • Prediabetes    • Wears glasses      Past Surgical History:   Procedure Laterality Date   • AXILLARY SURGERY Right     sweat glands removed -    • BREAST CYST EXCISION Right 2000    benign   • BREAST SURGERY Right    • CHOLECYSTECTOMY     • COLONOSCOPY  07/06/2022   • DILATION AND CURETTAGE OF UTERUS     • IR BIOPSY BONE  09/01/2023   • IR PORT PLACEMENT  10/30/2023   • MASTECTOMY Left 06/08/2021   • [place] BREAST REDUCTION N/A 03/18/2022    Procedure: R BREAST REDUCTION; L BREAST EXCISION STANDING SKIN DEFORMITY; LOCAL FLAP;  Surgeon: Lisa Pineda MD;  [facility]: [facility];  Service: Plastics   • SC MAST MODF RAD W/AX LYMPH NOD W/WO PECT/NERI MIN Left 06/08/2021    Procedure: BREAST MODIFIED RADICAL MASTECTOMY; ANAND  DIRECTED AXILLARY DISSECTION;  Surgeon: Lisandro Tijerina MD;  [facility];  Service: Surgical Oncology   • REDUCTION MAMMAPLASTY Right     March 2022   • UPPER GASTROINTESTINAL ENDOSCOPY     • US BREAST NEEDLE LOC LEFT Left 06/02/2021   • US GUIDED BREAST BIOPSY LEFT COMPLETE Left 04/19/2021   • US GUIDED BREAST LYMPH NODE BIOPSY LEFT Left 04/19/2021   • WISDOM TOOTH EXTRACTION       Family History   Problem Relation Age of Onset   • Dementia Mother    • Colon cancer Mother         50's-60's   • Lung cancer Father         50's-60's   • No Known Problems Sister    • Breast cancer Paternal Aunt    • No Known Problems Sister    • Stomach cancer Paternal Uncle         70's   • Diabetes Sister       reports that she has never smoked. She has never been exposed to tobacco smoke. She has never used smokeless tobacco. She reports that she does not drink alcohol and does not use drugs.  Current Outpatient Medications on File " Prior to Visit   Medication Sig Dispense Refill   • albuterol (ProAir HFA) 90 mcg/act inhaler Inhale 2 puffs every 4 (four) hours as needed for wheezing or shortness of breath 8 g 0   • dexamethasone (DECADRON) 4 mg tablet TAKE 2 TABLETS (8MG) BY MOUTH ON DAYS 2, 3 AND 4     • fluticasone (FLONASE) 50 mcg/act nasal spray 2 puffs into each nostril daily     • gabapentin (Neurontin) 100 mg capsule Take 1 capsule (100 mg total) by mouth daily at bedtime 30 capsule 1   • hydrOXYzine pamoate (VISTARIL) 25 mg capsule Take 1 capsule (25 mg total) by mouth 3 (three) times a day as needed for itching 60 capsule 3   • multivitamin (THERAGRAN) TABS Take 1 tablet by mouth daily     • oxyCODONE (ROXICODONE) 10 MG TABS Take 1.5 tablets (15 mg total) by mouth every 4 (four) hours as needed for moderate pain Max Daily Amount: 90 mg 150 tablet 0   • pantoprazole (PROTONIX) 40 mg tablet Take 1 tablet (40 mg total) by mouth daily 30 tablet 3   • potassium chloride (K-DUR,KLOR-CON) 10 mEq tablet Take 1 tablet (10 mEq total) by mouth every other day 45 tablet 3   • pyridoxine (VITAMIN B6) 50 mg tablet Take 1 tablet (50 mg total) by mouth daily 30 tablet 4   • senna-docusate sodium (SENOKOT-S) 8.6-50 mg per tablet Take 1 tablet by mouth 2 (two) times a day  0   • Vitamin D, Cholecalciferol, 25 MCG (1000 UT) CAPS Take 1,000 Units by mouth daily     • Ascorbic Acid (VITAMIN C PO) Take 500 mg by mouth in the morning (Patient not taking: Reported on 12/16/2024)     • Multiple Vitamins-Minerals (MULTIVITAMIN ADULT PO) Take by mouth daily (Patient not taking: Reported on 12/16/2024)     • naloxone (NARCAN) 4 mg/0.1 mL nasal spray Administer 1 spray into a nostril. If no response after 2-3 minutes, give another dose in the other nostril using a new spray. For use in emergencies in the event of accidental ingestion, respiratory depression or oversedation. 1 each 1   • VITAMIN D PO Take by mouth in the morning (Patient not taking: Reported on  12/16/2024)       No current facility-administered medications on file prior to visit.   No Known Allergies   Current Outpatient Medications on File Prior to Visit   Medication Sig Dispense Refill   • albuterol (ProAir HFA) 90 mcg/act inhaler Inhale 2 puffs every 4 (four) hours as needed for wheezing or shortness of breath 8 g 0   • dexamethasone (DECADRON) 4 mg tablet TAKE 2 TABLETS (8MG) BY MOUTH ON DAYS 2, 3 AND 4     • fluticasone (FLONASE) 50 mcg/act nasal spray 2 puffs into each nostril daily     • gabapentin (Neurontin) 100 mg capsule Take 1 capsule (100 mg total) by mouth daily at bedtime 30 capsule 1   • hydrOXYzine pamoate (VISTARIL) 25 mg capsule Take 1 capsule (25 mg total) by mouth 3 (three) times a day as needed for itching 60 capsule 3   • multivitamin (THERAGRAN) TABS Take 1 tablet by mouth daily     • oxyCODONE (ROXICODONE) 10 MG TABS Take 1.5 tablets (15 mg total) by mouth every 4 (four) hours as needed for moderate pain Max Daily Amount: 90 mg 150 tablet 0   • pantoprazole (PROTONIX) 40 mg tablet Take 1 tablet (40 mg total) by mouth daily 30 tablet 3   • potassium chloride (K-DUR,KLOR-CON) 10 mEq tablet Take 1 tablet (10 mEq total) by mouth every other day 45 tablet 3   • pyridoxine (VITAMIN B6) 50 mg tablet Take 1 tablet (50 mg total) by mouth daily 30 tablet 4   • senna-docusate sodium (SENOKOT-S) 8.6-50 mg per tablet Take 1 tablet by mouth 2 (two) times a day  0   • Vitamin D, Cholecalciferol, 25 MCG (1000 UT) CAPS Take 1,000 Units by mouth daily     • Ascorbic Acid (VITAMIN C PO) Take 500 mg by mouth in the morning (Patient not taking: Reported on 12/16/2024)     • Multiple Vitamins-Minerals (MULTIVITAMIN ADULT PO) Take by mouth daily (Patient not taking: Reported on 12/16/2024)     • naloxone (NARCAN) 4 mg/0.1 mL nasal spray Administer 1 spray into a nostril. If no response after 2-3 minutes, give another dose in the other nostril using a new spray. For use in emergencies in the event of  "accidental ingestion, respiratory depression or oversedation. 1 each 1   • VITAMIN D PO Take by mouth in the morning (Patient not taking: Reported on 12/16/2024)       No current facility-administered medications on file prior to visit.      Social History     Tobacco Use   • Smoking status: Never     Passive exposure: Never   • Smokeless tobacco: Never   Vaping Use   • Vaping status: Never Used   Substance and Sexual Activity   • Alcohol use: No   • Drug use: No   • Sexual activity: Yes     Partners: Male     Birth control/protection: Male Sterilization     Comment: defer        Objective   /84 (BP Location: Right arm, Patient Position: Sitting, Cuff Size: Standard)   Pulse 97   Ht 5' 2.75\" (1.594 m)   Wt 71.3 kg (157 lb 3.2 oz)   BMI 28.07 kg/m²      Physical Exam  Vitals reviewed.   Constitutional:       General: She is not in acute distress.     Appearance: Normal appearance. She is not ill-appearing.   HENT:      Head: Normocephalic and atraumatic.   Eyes:      General: No scleral icterus.     Conjunctiva/sclera: Conjunctivae normal.   Cardiovascular:      Rate and Rhythm: Normal rate and regular rhythm.   Pulmonary:      Effort: Pulmonary effort is normal. No respiratory distress.      Breath sounds: Normal breath sounds.   Abdominal:      General: Bowel sounds are normal. There is no distension.      Palpations: Abdomen is soft.      Tenderness: There is no abdominal tenderness. There is no guarding.   Skin:     General: Skin is warm and dry.   Neurological:      Mental Status: She is alert and oriented to person, place, and time.   Psychiatric:         Mood and Affect: Mood normal.         Behavior: Behavior normal.             Manuel Medley PA-C  [facility] - Gastroentrology    "

## 2024-12-16 NOTE — TELEPHONE ENCOUNTER
Asked by Dr Ledesma to determine patient status  Patient is now following with oncologist at [facility] [address] per chart notes  Left voicemail for patient to call [facility] line to verify status

## 2024-12-16 NOTE — PATIENT INSTRUCTIONS
Scheduled date of EGD with Dilation (as of today): 1/14/25  Physician performing EGD: Dr. Blancas  Location of EGD: [facility]  Instructions reviewed with patient by: N.M.  Clearances: SUSAN

## 2024-12-16 NOTE — ASSESSMENT & PLAN NOTE
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  This was demonstrated on May 2024 EGD.  There was fixed external compression also which was not traversable with either standard or pediatric scope.  Ulcerated mucosa biopsies came back consistent with metastatic disease from her breast cancer.  -She continues to have difficulties with swallowing.  - will attempt repeat EGD  Orders:  •  EGD Dilation; Future

## 2024-12-16 NOTE — ASSESSMENT & PLAN NOTE
Malnutrition Findings:                                 BMI Findings:           Body mass index is 28.07 kg/m².

## 2024-12-20 LAB — SCAN RESULT: NORMAL

## 2024-12-27 ENCOUNTER — ANESTHESIA (OUTPATIENT)
Dept: ANESTHESIOLOGY | Facility: HOSPITAL | Age: 74
End: 2024-12-27

## 2024-12-27 ENCOUNTER — ANESTHESIA EVENT (OUTPATIENT)
Dept: ANESTHESIOLOGY | Facility: HOSPITAL | Age: 74
End: 2024-12-27

## 2025-01-14 ENCOUNTER — ANESTHESIA EVENT (OUTPATIENT)
Dept: GASTROENTEROLOGY | Facility: AMBULARY SURGERY CENTER | Age: 75
End: 2025-01-14
Payer: MEDICARE

## 2025-01-14 ENCOUNTER — ANESTHESIA EVENT (OUTPATIENT)
Dept: ANESTHESIOLOGY | Facility: HOSPITAL | Age: 75
End: 2025-01-14

## 2025-01-14 ENCOUNTER — ANESTHESIA (OUTPATIENT)
Dept: ANESTHESIOLOGY | Facility: HOSPITAL | Age: 75
End: 2025-01-14

## 2025-01-14 ENCOUNTER — HOSPITAL ENCOUNTER (OUTPATIENT)
Dept: GASTROENTEROLOGY | Facility: AMBULARY SURGERY CENTER | Age: 75
Setting detail: OUTPATIENT SURGERY
Discharge: HOME | End: 2025-01-14
Attending: INTERNAL MEDICINE
Payer: MEDICARE

## 2025-01-14 VITALS
HEART RATE: 99 BPM | RESPIRATION RATE: 18 BRPM | OXYGEN SATURATION: 100 % | TEMPERATURE: 97.6 F | SYSTOLIC BLOOD PRESSURE: 158 MMHG | DIASTOLIC BLOOD PRESSURE: 70 MMHG

## 2025-01-14 DIAGNOSIS — R13.19 ESOPHAGEAL DYSPHAGIA: ICD-10-CM

## 2025-01-14 PROCEDURE — 88360 TUMOR IMMUNOHISTOCHEM/MANUAL: CPT | Performed by: STUDENT IN AN ORGANIZED HEALTH CARE EDUCATION/TRAINING PROGRAM

## 2025-01-14 PROCEDURE — 88305 TISSUE EXAM BY PATHOLOGIST: CPT | Performed by: STUDENT IN AN ORGANIZED HEALTH CARE EDUCATION/TRAINING PROGRAM

## 2025-01-14 PROCEDURE — 88342 IMHCHEM/IMCYTCHM 1ST ANTB: CPT | Performed by: STUDENT IN AN ORGANIZED HEALTH CARE EDUCATION/TRAINING PROGRAM

## 2025-01-14 PROCEDURE — C1726 CATH, BAL DIL, NON-VASCULAR: HCPCS

## 2025-01-14 RX ORDER — LIDOCAINE HYDROCHLORIDE 10 MG/ML
INJECTION, SOLUTION EPIDURAL; INFILTRATION; INTRACAUDAL; PERINEURAL AS NEEDED
Status: DISCONTINUED | OUTPATIENT
Start: 2025-01-14 | End: 2025-01-14

## 2025-01-14 RX ORDER — SODIUM CHLORIDE, SODIUM LACTATE, POTASSIUM CHLORIDE, CALCIUM CHLORIDE 600; 310; 30; 20 MG/100ML; MG/100ML; MG/100ML; MG/100ML
125 INJECTION, SOLUTION INTRAVENOUS CONTINUOUS
Status: DISCONTINUED | OUTPATIENT
Start: 2025-01-14 | End: 2025-01-18 | Stop reason: HOSPADM

## 2025-01-14 RX ORDER — EPINEPHRINE 1 MG/ML
INJECTION, SOLUTION, CONCENTRATE INTRAVENOUS AS NEEDED
Status: COMPLETED | OUTPATIENT
Start: 2025-01-14 | End: 2025-01-14

## 2025-01-14 RX ORDER — PROPOFOL 10 MG/ML
INJECTION, EMULSION INTRAVENOUS AS NEEDED
Status: DISCONTINUED | OUTPATIENT
Start: 2025-01-14 | End: 2025-01-14

## 2025-01-14 RX ADMIN — PROPOFOL 50 MG: 10 INJECTION, EMULSION INTRAVENOUS at 09:09

## 2025-01-14 RX ADMIN — EPINEPHRINE 1 MG: 1 INJECTION, SOLUTION, CONCENTRATE INTRAVENOUS at 09:11

## 2025-01-14 RX ADMIN — PROPOFOL 50 MG: 10 INJECTION, EMULSION INTRAVENOUS at 08:59

## 2025-01-14 RX ADMIN — LIDOCAINE HYDROCHLORIDE 50 MG: 10 INJECTION, SOLUTION EPIDURAL; INFILTRATION; INTRACAUDAL; PERINEURAL at 08:55

## 2025-01-14 RX ADMIN — SODIUM CHLORIDE, SODIUM LACTATE, POTASSIUM CHLORIDE, AND CALCIUM CHLORIDE: .6; .31; .03; .02 INJECTION, SOLUTION INTRAVENOUS at 08:54

## 2025-01-14 RX ADMIN — PROPOFOL 50 MG: 10 INJECTION, EMULSION INTRAVENOUS at 09:04

## 2025-01-14 RX ADMIN — PROPOFOL 100 MG: 10 INJECTION, EMULSION INTRAVENOUS at 08:55

## 2025-01-14 RX ADMIN — PROPOFOL 50 MG: 10 INJECTION, EMULSION INTRAVENOUS at 09:06

## 2025-01-14 RX ADMIN — PROPOFOL 50 MG: 10 INJECTION, EMULSION INTRAVENOUS at 08:56

## 2025-01-14 RX ADMIN — PROPOFOL 50 MG: 10 INJECTION, EMULSION INTRAVENOUS at 09:02

## 2025-01-14 NOTE — ANESTHESIA POSTPROCEDURE EVALUATION
Post-Op Assessment Note    CV Status:  Stable    Pain management: adequate       Mental Status:  Alert and awake   Hydration Status:  Euvolemic   PONV Controlled:  Controlled   Airway Patency:  Patent     Post Op Vitals Reviewed: Yes    No anethesia notable event occurred.    Staff: Anesthesiologist           Last Filed PACU Vitals:  Vitals Value Taken Time   Temp     Pulse 99 01/14/25 0938   /70 01/14/25 0938   Resp 18 01/14/25 0938   SpO2 100 % 01/14/25 0938       Modified Hazel:     Vitals Value Taken Time   Activity 2 01/14/25 0942   Respiration 2 01/14/25 0942   Circulation 2 01/14/25 0942   Consciousness 2 01/14/25 0942   Oxygen Saturation 2 01/14/25 0942     Modified Hazel Score: 10

## 2025-01-14 NOTE — ANESTHESIA PREPROCEDURE EVALUATION
Procedure:  EGD    Relevant Problems   CARDIO   (+) Benign essential hypertension   (+) Venous insufficiency (chronic) (peripheral)      GI/HEPATIC   (+) Dysphagia      GYN   (+) Malignant neoplasm of upper-outer quadrant of left breast in female, estrogen receptor positive (HCC)      NEURO/PSYCH   (+) Anxiety        Physical Exam    Airway    Mallampati score: II  TM Distance: >3 FB  Neck ROM: full     Dental   Comment: Denies loose/chipped teeth     Cardiovascular      Pulmonary      Other Findings  post-pubertal.      Anesthesia Plan  ASA Score- 2     Anesthesia Type- IV sedation with anesthesia with ASA Monitors.         Additional Monitors:     Airway Plan:            Plan Factors-    Chart reviewed.    Patient summary reviewed.                  Induction- intravenous.    Postoperative Plan-     Perioperative Resuscitation Plan - Level 1 - Full Code.       Informed Consent- Anesthetic plan and risks discussed with patient.        NPO Status:  Vitals Value Taken Time   Date of last liquid 01/13/25 01/14/25 0728   Time of last liquid 2100 01/14/25 0728   Date of last solid 01/13/25 01/14/25 0728   Time of last solid 1800 01/14/25 0728

## 2025-01-14 NOTE — H&P
"History and Physical -  [facility]  Gosia Bragg 74 y.o. female MRN: [id number]    HPI: Gosia Bragg is a 74 y.o. year old female who presents with dysphagia      Review of Systems    Historical Information   Past Medical History:   Diagnosis Date    BRCA gene mutation negative 05/19/2021    [employer]; 36 gene panel    Breast cancer (HCC)     Left breast    Cancer (HCC) 2220    Colon polyp     Dental crowns present     Exercise involving walking     the dogs    History of angina     per pt \"years ago and related to stress\"    History of chemotherapy     breast cancer (left) 2021.    History of radiation therapy     [place] (hard of hearing)     wears hearing aids/will wear DOS bilat    Hypertension     Limb alert care status     No BP/IV Left Arm    Lymphedema of left arm     Prediabetes     Wears glasses      Past Surgical History:   Procedure Laterality Date    AXILLARY SURGERY Right     sweat glands removed -     BREAST CYST EXCISION Right 2000    benign    BREAST SURGERY Right     CHOLECYSTECTOMY      COLONOSCOPY  07/06/2022    DILATION AND CURETTAGE OF UTERUS      IR BIOPSY BONE  09/01/2023    IR PORT PLACEMENT  10/30/2023    MASTECTOMY Left 06/08/2021    [place] BREAST REDUCTION N/A 03/18/2022    Procedure: R BREAST REDUCTION; L BREAST EXCISION STANDING SKIN DEFORMITY; LOCAL FLAP;  Surgeon: Lisa Pineda MD;  [facility];  Service: Plastics    [place] MAST MODF RAD W/AX LYMPH NOD W/WO PECT/NERI MIN Left 06/08/2021    Procedure: BREAST MODIFIED RADICAL MASTECTOMY; ANAND  DIRECTED AXILLARY DISSECTION;  Surgeon: Lisandro Tijerina MD;  [facility];  Service: Surgical Oncology    REDUCTION MAMMAPLASTY Right     March 2022    UPPER GASTROINTESTINAL ENDOSCOPY      US BREAST NEEDLE LOC LEFT Left 06/02/2021    US GUIDED BREAST BIOPSY LEFT COMPLETE Left 04/19/2021    US GUIDED BREAST LYMPH NODE BIOPSY LEFT Left 04/19/2021    WISDOM TOOTH EXTRACTION       Social History   Social History "     Substance and Sexual Activity   Alcohol Use No     Social History     Substance and Sexual Activity   Drug Use No     Social History     Tobacco Use   Smoking Status Never    Passive exposure: Never   Smokeless Tobacco Never     Family History   Problem Relation Age of Onset    Dementia Mother     Colon cancer Mother         50's-60's    Lung cancer Father         50's-60's    No Known Problems Sister     Breast cancer Paternal Aunt     No Known Problems Sister     Stomach cancer Paternal Uncle         70's    Diabetes Sister        Meds/Allergies     Not in a hospital admission.    No Known Allergies    Objective     /71   Pulse 80   Temp 97.6 °F (36.4 °C) (Temporal)   Resp 18   SpO2 97%       PHYSICAL EXAM    Gen: NAD  CV: RRR  CHEST: Clear  ABD: soft, NT/ND  EXT: no edema  Neuro: AAO      ASSESSMENT/PLAN:  This is a 74 y.o. year old female here for dysphagia    PLAN:   Procedure: egd

## 2025-01-14 NOTE — PERIOPERATIVE NURSING NOTE
Received patient from gi rn nuria into phase 2. Pt sleeping, vitals stable. Bed locked in lowest position, side rails raised and call light in reach. Rn to monitor, plan of care ongoing.

## 2025-01-17 DIAGNOSIS — R13.19 ESOPHAGEAL DYSPHAGIA: Primary | ICD-10-CM

## 2025-02-14 ENCOUNTER — RESULTS FOLLOW-UP (OUTPATIENT)
Dept: GASTROENTEROLOGY | Facility: AMBULARY SURGERY CENTER | Age: 75
End: 2025-02-14

## 2025-02-17 DIAGNOSIS — Z23 NEED FOR VACCINATION: Primary | ICD-10-CM

## 2025-02-17 RX ORDER — ZOSTER VACCINE RECOMBINANT, ADJUVANTED 50 MCG/0.5
0.5 KIT INTRAMUSCULAR ONCE
Qty: 1 EACH | Refills: 1 | Status: SHIPPED | OUTPATIENT
Start: 2025-02-17 | End: 2025-02-17

## 2025-04-28 ENCOUNTER — RA CDI HCC (OUTPATIENT)
Dept: OTHER | Facility: HOSPITAL | Age: 75
End: 2025-04-28

## 2025-04-28 NOTE — PROGRESS NOTES
HCC coding opportunities          Chart Reviewed number of suggestions sent to Provider: 1     Patients Insurance     Medicare Insurance: Medicare        Z93.1

## 2025-05-05 ENCOUNTER — OFFICE VISIT (OUTPATIENT)
Dept: FAMILY MEDICINE CLINIC | Facility: CLINIC | Age: 75
End: 2025-05-05
Payer: MEDICARE

## 2025-05-05 VITALS
HEART RATE: 65 BPM | WEIGHT: 159.4 LBS | SYSTOLIC BLOOD PRESSURE: 112 MMHG | TEMPERATURE: 99.8 F | BODY MASS INDEX: 28.24 KG/M2 | HEIGHT: 63 IN | DIASTOLIC BLOOD PRESSURE: 78 MMHG | OXYGEN SATURATION: 96 %

## 2025-05-05 DIAGNOSIS — Z00.00 MEDICARE ANNUAL WELLNESS VISIT, SUBSEQUENT: Primary | ICD-10-CM

## 2025-05-05 DIAGNOSIS — Z51.5 PALLIATIVE CARE PATIENT: ICD-10-CM

## 2025-05-05 DIAGNOSIS — Z99.89 AMBULATES WITH CANE: ICD-10-CM

## 2025-05-05 PROBLEM — K94.22: Status: RESOLVED | Noted: 2024-12-16 | Resolved: 2025-05-05

## 2025-05-05 PROBLEM — L08.9: Status: RESOLVED | Noted: 2024-12-16 | Resolved: 2025-05-05

## 2025-05-05 PROBLEM — J91.0 MALIGNANT PLEURAL EFFUSION: Status: ACTIVE | Noted: 2025-05-05

## 2025-05-05 PROBLEM — C50.912: Status: ACTIVE | Noted: 2025-05-05

## 2025-05-05 PROCEDURE — G0439 PPPS, SUBSEQ VISIT: HCPCS | Performed by: INTERNAL MEDICINE

## 2025-05-05 NOTE — PATIENT INSTRUCTIONS
Medicare Preventive Visit Patient Instructions  Thank you for completing your Welcome to Medicare Visit or Medicare Annual Wellness Visit today. Your next wellness visit will be due in one year (5/6/2026).  The screening/preventive services that you may require over the next 5-10 years are detailed below. Some tests may not apply to you based off risk factors and/or age. Screening tests ordered at today's visit but not completed yet may show as past due. Also, please note that scanned in results may not display below.  Preventive Screenings:  Service Recommendations Previous Testing/Comments   Colorectal Cancer Screening  * Colonoscopy    * Fecal Occult Blood Test (FOBT)/Fecal Immunochemical Test (FIT)  * Fecal DNA/Cologuard Test  * Flexible Sigmoidoscopy Age: 45-75 years old   Colonoscopy: every 10 years (may be performed more frequently if at higher risk)  OR  FOBT/FIT: every 1 year  OR  Cologuard: every 3 years  OR  Sigmoidoscopy: every 5 years  Screening may be recommended earlier than age 45 if at higher risk for colorectal cancer. Also, an individualized decision between you and your healthcare provider will decide whether screening between the ages of 76-85 would be appropriate. Colonoscopy: 07/06/2022  FOBT/FIT: Not on file  Cologuard: Not on file  Sigmoidoscopy: Not on file    Screening Current     Breast Cancer Screening Age: 40+ years old  Frequency: every 1-2 years  Not required if history of left and right mastectomy Mammogram: 07/20/2023    History Breast Cancer   Cervical Cancer Screening Between the ages of 21-29, pap smear recommended once every 3 years.   Between the ages of 30-65, can perform pap smear with HPV co-testing every 5 years.   Recommendations may differ for women with a history of total hysterectomy, cervical cancer, or abnormal pap smears in past. Pap Smear: Not on file    Screening Not Indicated   Hepatitis C Screening Once for adults born between 1945 and 1965  More frequently in  patients at high risk for Hepatitis C Hep C Antibody: 08/26/2020    Screening Current   Diabetes Screening 1-2 times per year if you're at risk for diabetes or have pre-diabetes Fasting glucose: 105 mg/dL (5/31/2024)  A1C: 6.1 % (11/12/2022)  Screening Current   Cholesterol Screening Once every 5 years if you don't have a lipid disorder. May order more often based on risk factors. Lipid panel: 11/12/2022    Screening Current     Other Preventive Screenings Covered by Medicare:  Abdominal Aortic Aneurysm (AAA) Screening: covered once if your at risk. You're considered to be at risk if you have a family history of AAA.  Lung Cancer Screening: covers low dose CT scan once per year if you meet all of the following conditions: (1) Age 55-77; (2) No signs or symptoms of lung cancer; (3) Current smoker or have quit smoking within the last 15 years; (4) You have a tobacco smoking history of at least 20 pack years (packs per day multiplied by number of years you smoked); (5) You get a written order from a healthcare provider.  Glaucoma Screening: covered annually if you're considered high risk: (1) You have diabetes OR (2) Family history of glaucoma OR (3)  aged 50 and older OR (4)  American aged 65 and older  Osteoporosis Screening: covered every 2 years if you meet one of the following conditions: (1) You're estrogen deficient and at risk for osteoporosis based off medical history and other findings; (2) Have a vertebral abnormality; (3) On glucocorticoid therapy for more than 3 months; (4) Have primary hyperparathyroidism; (5) On osteoporosis medications and need to assess response to drug therapy.   Last bone density test (DXA Scan): 02/21/2022.  HIV Screening: covered annually if you're between the age of 15-65. Also covered annually if you are younger than 15 and older than 65 with risk factors for HIV infection. For pregnant patients, it is covered up to 3 times per  pregnancy.    Immunizations:  Immunization Recommendations   Influenza Vaccine Annual influenza vaccination during flu season is recommended for all persons aged >= 6 months who do not have contraindications   Pneumococcal Vaccine   * Pneumococcal conjugate vaccine = PCV13 (Prevnar 13), PCV15 (Vaxneuvance), PCV20 (Prevnar 20)  * Pneumococcal polysaccharide vaccine = PPSV23 (Pneumovax) Adults 19-65 yo with certain risk factors or if 65+ yo  If never received any pneumonia vaccine: recommend Prevnar 20 (PCV20)  Give PCV20 if previously received 1 dose of PCV13 or PPSV23   Hepatitis B Vaccine 3 dose series if at intermediate or high risk (ex: diabetes, end stage renal disease, liver disease)   Respiratory syncytial virus (RSV) Vaccine - COVERED BY MEDICARE PART D  * RSVPreF3 (Arexvy) CDC recommends that adults 60 years of age and older may receive a single dose of RSV vaccine using shared clinical decision-making (SCDM)   Tetanus (Td) Vaccine - COST NOT COVERED BY MEDICARE PART B Following completion of primary series, a booster dose should be given every 10 years to maintain immunity against tetanus. Td may also be given as tetanus wound prophylaxis.   Tdap Vaccine - COST NOT COVERED BY MEDICARE PART B Recommended at least once for all adults. For pregnant patients, recommended with each pregnancy.   Shingles Vaccine (Shingrix) - COST NOT COVERED BY MEDICARE PART B  2 shot series recommended in those 19 years and older who have or will have weakened immune systems or those 50 years and older     Health Maintenance Due:      Topic Date Due   • Breast Cancer Screening: Mammogram  07/20/2024   • Colorectal Cancer Screening  07/05/2027   • Hepatitis C Screening  Completed     Immunizations Due:      Topic Date Due   • Pneumococcal Vaccine: 65+ Years (3 of 3 - PPSV23, PCV20 or PCV21) 11/05/2020   • COVID-19 Vaccine (6 - 2024-25 season) 11/26/2024     Advance Directives   What are advance directives?  Advance directives are  legal documents that state your wishes and plans for medical care. These plans are made ahead of time in case you lose your ability to make decisions for yourself. Advance directives can apply to any medical decision, such as the treatments you want, and if you want to donate organs.   What are the types of advance directives?  There are many types of advance directives, and each state has rules about how to use them. You may choose a combination of any of the following:  Living will:  This is a written record of the treatment you want. You can also choose which treatments you do not want, which to limit, and which to stop at a certain time. This includes surgery, medicine, IV fluid, and tube feedings.   Durable power of  for healthcare (DPAHC):  This is a written record that states who you want to make healthcare choices for you when you are unable to make them for yourself. This person, called a proxy, is usually a family member or a friend. You may choose more than 1 proxy.  Do not resuscitate (DNR) order:  A DNR order is used in case your heart stops beating or you stop breathing. It is a request not to have certain forms of treatment, such as CPR. A DNR order may be included in other types of advance directives.  Medical directive:  This covers the care that you want if you are in a coma, near death, or unable to make decisions for yourself. You can list the treatments you want for each condition. Treatment may include pain medicine, surgery, blood transfusions, dialysis, IV or tube feedings, and a ventilator (breathing machine).  Values history:  This document has questions about your views, beliefs, and how you feel and think about life. This information can help others choose the care that you would choose.  Why are advance directives important?  An advance directive helps you control your care. Although spoken wishes may be used, it is better to have your wishes written down. Spoken wishes can be  misunderstood, or not followed. Treatments may be given even if you do not want them. An advance directive may make it easier for your family to make difficult choices about your care.   Fall Prevention    Fall prevention  includes ways to make your home and other areas safer. It also includes ways you can move more carefully to prevent a fall. Health conditions that cause changes in your blood pressure, vision, or muscle strength and coordination may increase your risk for falls. Medicines may also increase your risk for falls if they make you dizzy, weak, or sleepy.   Fall prevention tips:   Stand or sit up slowly.    Use assistive devices as directed.    Wear shoes that fit well and have soles that .    Wear a personal alarm.    Stay active.    Manage your medical conditions.    Home Safety Tips:  Add items to prevent falls in the bathroom.    Keep paths clear.    Install bright lights in your home.    Keep items you use often on shelves within reach.    Paint or place reflective tape on the edges of your stairs.    Weight Management   Why it is important to manage your weight:  Being overweight increases your risk of health conditions such as heart disease, high blood pressure, type 2 diabetes, and certain types of cancer. It can also increase your risk for osteoarthritis, sleep apnea, and other respiratory problems. Aim for a slow, steady weight loss. Even a small amount of weight loss can lower your risk of health problems.  How to lose weight safely:  A safe and healthy way to lose weight is to eat fewer calories and get regular exercise. You can lose up about 1 pound a week by decreasing the number of calories you eat by 500 calories each day.   Healthy meal plan for weight management:  A healthy meal plan includes a variety of foods, contains fewer calories, and helps you stay healthy. A healthy meal plan includes the following:  Eat whole-grain foods more often.  A healthy meal plan should contain  fiber. Fiber is the part of grains, fruits, and vegetables that is not broken down by your body. Whole-grain foods are healthy and provide extra fiber in your diet. Some examples of whole-grain foods are whole-wheat breads and pastas, oatmeal, brown rice, and bulgur.  Eat a variety of vegetables every day.  Include dark, leafy greens such as spinach, kale, angel greens, and mustard greens. Eat yellow and orange vegetables such as carrots, sweet potatoes, and winter squash.   Eat a variety of fruits every day.  Choose fresh or canned fruit (canned in its own juice or light syrup) instead of juice. Fruit juice has very little or no fiber.  Eat low-fat dairy foods.  Drink fat-free (skim) milk or 1% milk. Eat fat-free yogurt and low-fat cottage cheese. Try low-fat cheeses such as mozzarella and other reduced-fat cheeses.  Choose meat and other protein foods that are low in fat.  Choose beans or other legumes such as split peas or lentils. Choose fish, skinless poultry (chicken or turkey), or lean cuts of red meat (beef or pork). Before you cook meat or poultry, cut off any visible fat.   Use less fat and oil.  Try baking foods instead of frying them. Add less fat, such as margarine, sour cream, regular salad dressing and mayonnaise to foods. Eat fewer high-fat foods. Some examples of high-fat foods include french fries, doughnuts, ice cream, and cakes.  Eat fewer sweets.  Limit foods and drinks that are high in sugar. This includes candy, cookies, regular soda, and sweetened drinks.  Exercise:  Exercise at least 30 minutes per day on most days of the week. Some examples of exercise include walking, biking, dancing, and swimming. You can also fit in more physical activity by taking the stairs instead of the elevator or parking farther away from stores. Ask your healthcare provider about the best exercise plan for you.   Narcotic (Opioid) Safety    Use narcotics safely:  Take prescribed narcotics exactly as directed  Do  not give narcotics to others or take narcotics that belong to someone else  Do not mix narcotics without medicines or alcohol  Do not drive or operate heavy machinery after you take the narcotic  Monitor for side effects and notify your healthcare provider if you experienced side effects such as nausea, sleepiness, itching, or trouble thinking clearly.    Manage constipation:    Constipation is the most common side effect of narcotic medicine. Constipation is when you have hard, dry bowel movements, or you go longer than usual between bowel movements. Tell your healthcare provider about all changes in your bowel movements while you are taking narcotics. He or she may recommend laxative medicine to help you have a bowel movement. He or she may also change the kind of narcotic you are taking, or change when you take it. The following are more ways you can prevent or relieve constipation:    Drink liquids as directed.  You may need to drink extra liquids to help soften and move your bowels. Ask how much liquid to drink each day and which liquids are best for you.  Eat high-fiber foods.  This may help decrease constipation by adding bulk to your bowel movements. High-fiber foods include fruits, vegetables, whole-grain breads and cereals, and beans. Your healthcare provider or dietitian can help you create a high-fiber meal plan. Your provider may also recommend a fiber supplement if you cannot get enough fiber from food.  Exercise regularly.  Regular physical activity can help stimulate your intestines. Walking is a good exercise to prevent or relieve constipation. Ask which exercises are best for you.  Schedule a time each day to have a bowel movement.  This may help train your body to have regular bowel movements. Bend forward while you are on the toilet to help move the bowel movement out. Sit on the toilet for at least 10 minutes, even if you do not have a bowel movement.    Store narcotics safely:   Store narcotics  where others cannot easily get them.  Keep them in a locked cabinet or secure area. Do not  keep them in a purse or other bag you carry with you. A person may be looking for something else and find the narcotics.  Make sure narcotics are stored out of the reach of children.  A child can easily overdose on narcotics. Narcotics may look like candy to a small child.    The best way to dispose of narcotics:      The laws vary by country and area. In the [place], the best way is to return the narcotics through a take-back program. This program is offered by the [place] Drug Enforcement Agency (LAM). The following are options for using the program:  Take the narcotics to a LAM collection site.  The site is often a law enforcement center. Call your local law enforcement center for scheduled take-back days in your area. You will be given information on where to go if the collection site is in a different location.  Take the narcotics to an approved pharmacy or hospital.  A pharmacy or hospital may be set up as a collection site. You will need to ask if it is a LAM collection site if you were not directed there. A pharmacy or doctor's office may not be able to take back narcotics unless it is a LAM site.  Use a mail-back system.  This means you are given containers to put the narcotics into. You will then mail them in the containers.  Use a take-back drop box.  This is a place to leave the narcotics at any time. People and animals will not be able to get into the box. Your local law enforcement agency can tell you where to find a drop box in your area.    Other ways to manage pain:   Ask your healthcare provider about non-narcotic medicines to control pain.  Nonprescription medicines include NSAIDs (such as ibuprofen) and acetaminophen. Prescription medicines include muscle relaxers, antidepressants, and steroids.  Pain may be managed without any medicines.  Some ways to relieve pain include massage, aromatherapy, or  meditation. Physical or occupational therapy may also help.    For more information:   Drug Enforcement Administration  [address] [place] [address]  Phone: 3- 340 - 077-8479  Web Address: https://www.deadiversion.[facility].gov/drug_disposal/     Food and Drug Administration  [address] [place] [address]  Phone: 1- 776 - 026-8714  Web Address: http://www.fda.gov     © Copyright [employer] 2018 Information is for End User's use only and may not be sold, redistributed or otherwise used for commercial purposes. All illustrations and images included in CareNotes® are the copyrighted property of [employer] or [employer]

## 2025-05-05 NOTE — PROGRESS NOTES
Name: Gosia Bragg      : 1950      MRN: [id number]  Encounter Provider: Dalia Peña MD  Encounter Date: 2025   Encounter department: [facility]  :  Assessment & Plan  Medicare annual wellness visit, subsequent         Palliative care patient         Ambulates with cane  Continued use encouraged.           Preventive health issues were discussed with patient, and age appropriate screening tests were ordered as noted in patient's After Visit Summary. Personalized health advice and appropriate referrals for health education or preventive services given if needed, as noted in patient's After Visit Summary.    History of Present Illness     Here for AWV.  She continues her chemo treatment for stage IV breast cancer, now at [facility].    She denies pain, is getting around okay with her cane.  Is not currently needing pain medications.        Patient Care Team:  Dalia Peña MD as PCP - General (Internal Medicine)  Lisandro Tijerina MD (Inactive) as Surgeon (Surgical Oncology)  Keren Monahan MD (Family Medicine)  Marilynn Hernandez RN as Registered Nurse (Oncology)  Iza Angela MD as Medical Oncologist (Hematology)  Tiffany Babb MD (Radiation Oncology)  Mauro Cui MD (Nephrology)  Jimmie Ledesma MD as Medical Oncologist (Hematology and Oncology)  Hung Blancas MD (Gastroenterology)    Review of Systems   Constitutional:  Negative for chills and fever.   HENT:  Negative for ear pain and sore throat.    Eyes:  Negative for pain and visual disturbance.   Respiratory:  Negative for cough and shortness of breath.    Cardiovascular:  Negative for chest pain and palpitations.   Gastrointestinal:  Negative for abdominal pain and vomiting.   Genitourinary:  Negative for dysuria and hematuria.   Musculoskeletal:  Negative for arthralgias and back pain.   Skin:  Negative for color change and rash.   Neurological:  Negative for seizures and syncope.   All other systems reviewed and are  negative.    Medical History Reviewed by provider this encounter:       Annual Wellness Visit Questionnaire   Gosia is here for her Subsequent Wellness visit.     Health Risk Assessment:   Patient rates overall health as fair. Patient feels that their physical health rating is same. Patient is satisfied with their life. Eyesight was rated as same. Hearing was rated as much better. Patient feels that their emotional and mental health rating is same. Patients states they are never, rarely angry. Patient states they are often unusually tired/fatigued. Pain experienced in the last 7 days has been none. Patient states that she has experienced no weight loss or gain in last 6 months.     Depression Screening:   PHQ-2 Score: 0      Fall Risk Screening:   In the past year, patient has experienced: history of falling in past year    Number of falls: 1  Injured during fall?: No    Feels unsteady when standing or walking?: Yes    Worried about falling?: Yes      Urinary Incontinence Screening:   Patient has not leaked urine accidently in the last six months.     Home Safety:  Patient has trouble with stairs inside or outside of their home. Patient has working smoke alarms and has working carbon monoxide detector. Home safety hazards include: none.     Nutrition:   Current diet is Regular.     Medications:   Patient is not currently taking any over-the-counter supplements. Patient is able to manage medications.     Activities of Daily Living (ADLs)/Instrumental Activities of Daily Living (IADLs):   Walk and transfer into and out of bed and chair?: Yes  Dress and groom yourself?: Yes    Bathe or shower yourself?: Yes    Feed yourself? Yes  Do your laundry/housekeeping?: Yes  Manage your money, pay your bills and track your expenses?: Yes  Make your own meals?: Yes    Do your own shopping?: Yes    Previous Hospitalizations:   Any hospitalizations or ED visits within the last 12 months?: No      Advance Care Planning:   Living  will: Yes    Durable POA for healthcare: Yes    Advanced directive: Yes    End of Life Decisions reviewed with patient: Yes      Cognitive Screening:   Provider or family/friend/caregiver concerned regarding cognition?: No    Preventive Screenings      Cardiovascular Screening:    General: Screening Current      Diabetes Screening:     General: Screening Current      Colorectal Cancer Screening:     General: Screening Current      Breast Cancer Screening:     General: History Breast Cancer and Screening Not Indicated      Cervical Cancer Screening:    General: Screening Not Indicated      Osteoporosis Screening:    General: Screening Not Indicated      Abdominal Aortic Aneurysm (AAA) Screening:        General: Screening Not Indicated      Lung Cancer Screening:     General: Screening Not Indicated      Hepatitis C Screening:    General: Screening Current    Immunizations:  - Immunizations due: Prevnar 20 and Zoster (Shingrix)    Screening, Brief Intervention, and Referral to Treatment (SBIRT)     Screening  Typical number of drinks in a day: 0  Typical number of drinks in a week: 0  Interpretation: Low risk drinking behavior.    AUDIT-C Screenin) How often did you have a drink containing alcohol in the past year? never  2) How many drinks did you have on a typical day when you were drinking in the past year? 0  3) How often did you have 6 or more drinks on one occasion in the past year? never    AUDIT-C Score: 0  Interpretation: Score 0-2 (female): Negative screen for alcohol misuse    Single Item Drug Screening:  How often have you used an illegal drug (including marijuana) or a prescription medication for non-medical reasons in the past year? never    Single Item Drug Screen Score: 0  Interpretation: Negative screen for possible drug use disorder    Brief Intervention  Alcohol & drug use screenings were reviewed. No concerns regarding substance use disorder identified.     Review of Current Opioid  "Use    Opioid Risk Tool (ORT) Interpretation: Complete Opioid Risk Tool (ORT)    Other Counseling Topics:   Car/seat belt/driving safety, skin self-exam, sunscreen and calcium and vitamin D intake and regular weightbearing exercise.     Social Drivers of Health     Financial Resource Strain: Low Risk  (8/25/2024)    Received from [facility]    Overall Financial Resource Strain (CARDIA)    • Difficulty of Paying Living Expenses: Not hard at all   Food Insecurity: No Food Insecurity (5/5/2025)    Hunger Vital Sign    • Worried About Running Out of Food in the Last Year: Never true    • Ran Out of Food in the Last Year: Never true   Transportation Needs: No Transportation Needs (5/5/2025)    PRAPARE - Transportation    • Lack of Transportation (Medical): No    • Lack of Transportation (Non-Medical): No   Housing Stability: Unknown (5/5/2025)    Housing Stability Vital Sign    • Unable to Pay for Housing in the Last Year: No    • Homeless in the Last Year: No   Utilities: Not At Risk (5/5/2025)    [facility] Utilities    • Threatened with loss of utilities: No     No results found.    Objective   /78 (Patient Position: Sitting, Cuff Size: Adult)   Pulse 65   Temp 99.8 °F (37.7 °C) (Tympanic Core)   Ht 5' 2.75\" (1.594 m)   Wt 72.3 kg (159 lb 6.4 oz)   SpO2 96%   BMI 28.46 kg/m²     Physical Exam  Constitutional:       General: She is not in acute distress.     Appearance: She is well-developed. She is not diaphoretic.   HENT:      Head: Normocephalic and atraumatic.      Right Ear: External ear normal.      Left Ear: External ear normal.      Nose: Nose normal.   Eyes:      Pupils: Pupils are equal, round, and reactive to light.   Neck:      Thyroid: No thyromegaly.      Vascular: No JVD.   Cardiovascular:      Rate and Rhythm: Regular rhythm.      Heart sounds: No murmur heard.     No friction rub. No gallop.   Pulmonary:      Effort: Pulmonary effort is normal.      Breath sounds: Normal breath " sounds. No wheezing or rales.   Abdominal:      General: Bowel sounds are normal. There is no distension.      Palpations: Abdomen is soft.      Tenderness: There is no abdominal tenderness.   Musculoskeletal:         General: Normal range of motion.      Cervical back: Normal range of motion and neck supple.   Skin:     General: Skin is warm and dry.      Findings: No rash.   Neurological:      Mental Status: She is alert and oriented to person, place, and time.      Cranial Nerves: No cranial nerve deficit.      Sensory: No sensory deficit.      Motor: No abnormal muscle tone.      Coordination: Coordination normal.      Deep Tendon Reflexes: Reflexes normal.   Psychiatric:         Behavior: Behavior normal.         Thought Content: Thought content normal.         Judgment: Judgment normal.

## (undated) DEVICE — CHEST/BREAST DRAPE: Brand: CONVERTORS

## (undated) DEVICE — 2000CC GUARDIAN II: Brand: GUARDIAN

## (undated) DEVICE — BETHLEHEM UNIVERSAL LAPAROTOMY: Brand: CARDINAL HEALTH

## (undated) DEVICE — DRAPE SHEET THREE QUARTER

## (undated) DEVICE — GLOVE SRG BIOGEL 7

## (undated) DEVICE — SUT VICRYL 2-0 REEL 54 IN J286G

## (undated) DEVICE — INTENDED FOR TISSUE SEPARATION, AND OTHER PROCEDURES THAT REQUIRE A SHARP SURGICAL BLADE TO PUNCTURE OR CUT.: Brand: BARD-PARKER ® CARBON RIB-BACK BLADES

## (undated) DEVICE — SUT ETHILON 2-0 FSLX 30 IN 1674H

## (undated) DEVICE — SKIN MARKER DUAL TIP WITH RULER CAP, FLEXIBLE RULER AND LABELS: Brand: DEVON

## (undated) DEVICE — PENCIL ELECTROSURG E-Z CLEAN -0035H

## (undated) DEVICE — LIGHT HANDLE COVER SLEEVE DISP BLUE STELLAR

## (undated) DEVICE — SCD SEQUENTIAL COMPRESSION COMFORT SLEEVE MEDIUM KNEE LENGTH: Brand: KENDALL SCD

## (undated) DEVICE — SUT VICRYL 2-0 SH 27 IN UNDYED J417H

## (undated) DEVICE — SYRINGE 20ML LL

## (undated) DEVICE — KERLIX BANDAGE ROLL: Brand: KERLIX

## (undated) DEVICE — GLOVE INDICATOR PI UNDERGLOVE SZ 7.5 BLUE

## (undated) DEVICE — ELECTRODE BLADE MOD  E-Z CLEAN 6.5IN -0014M

## (undated) DEVICE — GLOVE SRG BIOGEL 7.5

## (undated) DEVICE — TUBING SUCTION 5MM X 12 FT

## (undated) DEVICE — SMOKE EVACUATION TUBING WITH 8 IN INTEGRAL WAND AND SPONGE GUARD: Brand: BUFFALO FILTER

## (undated) DEVICE — DRAPE EQUIPMENT RF WAND

## (undated) DEVICE — CHLORAPREP HI-LITE 26ML ORANGE

## (undated) DEVICE — ADHESIVE SKIN HIGH VISCOSITY EXOFIN 1ML

## (undated) DEVICE — 3M™ STERI-STRIP™ REINFORCED ADHESIVE SKIN CLOSURES, R1547, 1/2 IN X 4 IN (12 MM X 100 MM), 6 STRIPS/ENVELOPE: Brand: 3M™ STERI-STRIP™

## (undated) DEVICE — SUT MONOCRYL 3-0 PS-2 27 IN Y427H

## (undated) DEVICE — UNDYED MONOFILAMENT (POLYDIOXANONE), ABSORBABLE SURGICAL SUTURE: Brand: PDS

## (undated) DEVICE — DRAIN SPONGES,6 PLY: Brand: EXCILON

## (undated) DEVICE — NEEDLE HYPO 22G X 1-1/2 IN

## (undated) DEVICE — PROXIMATE SKIN STAPLERS (35 WIDE) CONTAINS 35 STAINLESS STEEL STAPLES (FIXED HEAD): Brand: PROXIMATE

## (undated) DEVICE — SPECIMEN CONTAINER STERILE PEEL PACK

## (undated) DEVICE — 3M™ STERI-STRIP™ REINFORCED ADHESIVE SKIN CLOSURES, R1542, 1/4 IN X 1-1/2 IN (6 MM X 38 MM), 6 STRIPS/ENVELOPE: Brand: 3M™ STERI-STRIP™

## (undated) DEVICE — SUT MONOCRYL 4-0 PS-2 18 IN Y496G

## (undated) DEVICE — SUT SILK 2-0 SH 30 IN K833H

## (undated) DEVICE — MEDI-VAC YANKAUER SUCTION HANDLE W/BULBOUS AND CONTROL VENT: Brand: CARDINAL HEALTH

## (undated) DEVICE — TRAY FOLEY 16FR URIMETER SILICONE SURESTEP

## (undated) DEVICE — VIAL DECANTER

## (undated) DEVICE — NEEDLE 25G X 1 1/2

## (undated) DEVICE — ELECTRODE BLADE MOD E-Z CLEAN  2.75IN 7CM -0012AM

## (undated) DEVICE — BETHLEHEM UNIVERSAL BREAST PK: Brand: CARDINAL HEALTH

## (undated) DEVICE — INTENDED FOR TISSUE SEPARATION, AND OTHER PROCEDURES THAT REQUIRE A SHARP SURGICAL BLADE TO PUNCTURE OR CUT.: Brand: BARD-PARKER SAFETY BLADES SIZE 15, STERILE

## (undated) DEVICE — PLUMEPEN PRO 10FT